# Patient Record
Sex: FEMALE | Race: WHITE | Employment: OTHER | URBAN - NONMETROPOLITAN AREA
[De-identification: names, ages, dates, MRNs, and addresses within clinical notes are randomized per-mention and may not be internally consistent; named-entity substitution may affect disease eponyms.]

---

## 2017-01-11 ENCOUNTER — OFFICE VISIT (OUTPATIENT)
Dept: FAMILY MEDICINE CLINIC | Age: 77
End: 2017-01-11

## 2017-01-11 VITALS
HEIGHT: 65 IN | RESPIRATION RATE: 12 BRPM | SYSTOLIC BLOOD PRESSURE: 122 MMHG | DIASTOLIC BLOOD PRESSURE: 80 MMHG | BODY MASS INDEX: 43.15 KG/M2 | WEIGHT: 259 LBS | HEART RATE: 62 BPM | TEMPERATURE: 98.2 F

## 2017-01-11 DIAGNOSIS — R10.13 EPIGASTRIC ABDOMINAL PAIN: Primary | ICD-10-CM

## 2017-01-11 PROCEDURE — 99214 OFFICE O/P EST MOD 30 MIN: CPT | Performed by: FAMILY MEDICINE

## 2017-01-11 ASSESSMENT — ENCOUNTER SYMPTOMS
BLURRED VISION: 0
NAUSEA: 1
ABDOMINAL PAIN: 1
SORE THROAT: 0
DIARRHEA: 0
SPUTUM PRODUCTION: 0
VOMITING: 0
HEARTBURN: 1
SHORTNESS OF BREATH: 0
CONSTIPATION: 0
BACK PAIN: 0
WHEEZING: 0
COUGH: 0

## 2017-01-12 ENCOUNTER — TELEPHONE (OUTPATIENT)
Dept: FAMILY MEDICINE CLINIC | Age: 77
End: 2017-01-12

## 2017-01-13 ENCOUNTER — TELEPHONE (OUTPATIENT)
Dept: FAMILY MEDICINE CLINIC | Age: 77
End: 2017-01-13

## 2017-01-13 DIAGNOSIS — K21.9 GASTROESOPHAGEAL REFLUX DISEASE WITHOUT ESOPHAGITIS: Primary | ICD-10-CM

## 2017-03-09 ENCOUNTER — NURSE TRIAGE (OUTPATIENT)
Dept: ADMINISTRATIVE | Age: 77
End: 2017-03-09

## 2017-03-17 ENCOUNTER — PROCEDURE VISIT (OUTPATIENT)
Dept: PHYSICAL MEDICINE AND REHAB | Age: 77
End: 2017-03-17

## 2017-03-17 DIAGNOSIS — G56.01 RIGHT CARPAL TUNNEL SYNDROME: ICD-10-CM

## 2017-03-17 DIAGNOSIS — R20.0 NUMBNESS OF RIGHT HAND: ICD-10-CM

## 2017-03-17 DIAGNOSIS — M54.12 RIGHT CERVICAL RADICULOPATHY: Primary | ICD-10-CM

## 2017-03-17 DIAGNOSIS — M79.601 RIGHT ARM PAIN: ICD-10-CM

## 2017-03-17 DIAGNOSIS — R29.898 RIGHT ARM WEAKNESS: ICD-10-CM

## 2017-03-17 PROCEDURE — 95909 NRV CNDJ TST 5-6 STUDIES: CPT | Performed by: PSYCHIATRY & NEUROLOGY

## 2017-03-17 PROCEDURE — 95886 MUSC TEST DONE W/N TEST COMP: CPT | Performed by: PSYCHIATRY & NEUROLOGY

## 2017-03-23 ENCOUNTER — TELEPHONE (OUTPATIENT)
Dept: FAMILY MEDICINE CLINIC | Age: 77
End: 2017-03-23

## 2017-03-23 DIAGNOSIS — F33.0 MAJOR DEPRESSIVE DISORDER, RECURRENT EPISODE, MILD (HCC): Primary | ICD-10-CM

## 2017-03-27 DIAGNOSIS — F33.0 MAJOR DEPRESSIVE DISORDER, RECURRENT EPISODE, MILD (HCC): ICD-10-CM

## 2017-05-18 ENCOUNTER — OFFICE VISIT (OUTPATIENT)
Dept: FAMILY MEDICINE CLINIC | Age: 77
End: 2017-05-18

## 2017-05-18 VITALS
TEMPERATURE: 98.2 F | SYSTOLIC BLOOD PRESSURE: 142 MMHG | HEART RATE: 91 BPM | DIASTOLIC BLOOD PRESSURE: 88 MMHG | RESPIRATION RATE: 16 BRPM

## 2017-05-18 DIAGNOSIS — R30.0 DYSURIA: Primary | ICD-10-CM

## 2017-05-18 DIAGNOSIS — E66.01 MORBID OBESITY WITH BMI OF 40.0-44.9, ADULT (HCC): ICD-10-CM

## 2017-05-18 DIAGNOSIS — E11.8 CONTROLLED TYPE 2 DIABETES MELLITUS WITH COMPLICATION, WITHOUT LONG-TERM CURRENT USE OF INSULIN (HCC): ICD-10-CM

## 2017-05-18 DIAGNOSIS — F33.41 MAJOR DEPRESSIVE DISORDER, RECURRENT, IN PARTIAL REMISSION (HCC): ICD-10-CM

## 2017-05-18 LAB
BILIRUBIN, POC: NEGATIVE
BLOOD URINE, POC: NORMAL
CLARITY, POC: CLEAR
COLOR, POC: NORMAL
GLUCOSE URINE, POC: NEGATIVE
KETONES, POC: NEGATIVE
LEUKOCYTE EST, POC: NORMAL
NITRITE, POC: NEGATIVE
PH, POC: 6
PROTEIN, POC: NEGATIVE
SPECIFIC GRAVITY, POC: 1.02
UROBILINOGEN, POC: NORMAL

## 2017-05-18 PROCEDURE — 1123F ACP DISCUSS/DSCN MKR DOCD: CPT | Performed by: FAMILY MEDICINE

## 2017-05-18 PROCEDURE — G8427 DOCREV CUR MEDS BY ELIG CLIN: HCPCS | Performed by: FAMILY MEDICINE

## 2017-05-18 PROCEDURE — 1090F PRES/ABSN URINE INCON ASSESS: CPT | Performed by: FAMILY MEDICINE

## 2017-05-18 PROCEDURE — 3044F HG A1C LEVEL LT 7.0%: CPT | Performed by: FAMILY MEDICINE

## 2017-05-18 PROCEDURE — G8399 PT W/DXA RESULTS DOCUMENT: HCPCS | Performed by: FAMILY MEDICINE

## 2017-05-18 PROCEDURE — 81003 URINALYSIS AUTO W/O SCOPE: CPT | Performed by: FAMILY MEDICINE

## 2017-05-18 PROCEDURE — 4040F PNEUMOC VAC/ADMIN/RCVD: CPT | Performed by: FAMILY MEDICINE

## 2017-05-18 PROCEDURE — 3017F COLORECTAL CA SCREEN DOC REV: CPT | Performed by: FAMILY MEDICINE

## 2017-05-18 PROCEDURE — 1036F TOBACCO NON-USER: CPT | Performed by: FAMILY MEDICINE

## 2017-05-18 PROCEDURE — G8417 CALC BMI ABV UP PARAM F/U: HCPCS | Performed by: FAMILY MEDICINE

## 2017-05-18 PROCEDURE — 99213 OFFICE O/P EST LOW 20 MIN: CPT | Performed by: FAMILY MEDICINE

## 2017-05-18 RX ORDER — SULFAMETHOXAZOLE AND TRIMETHOPRIM 800; 160 MG/1; MG/1
1 TABLET ORAL 2 TIMES DAILY
Qty: 14 TABLET | Refills: 0 | Status: SHIPPED | OUTPATIENT
Start: 2017-05-18 | End: 2017-05-25

## 2017-05-18 ASSESSMENT — PATIENT HEALTH QUESTIONNAIRE - PHQ9
SUM OF ALL RESPONSES TO PHQ QUESTIONS 1-9: 0
2. FEELING DOWN, DEPRESSED OR HOPELESS: 0
1. LITTLE INTEREST OR PLEASURE IN DOING THINGS: 0
SUM OF ALL RESPONSES TO PHQ9 QUESTIONS 1 & 2: 0

## 2017-05-20 LAB
ORGANISM: ABNORMAL
URINE CULTURE, ROUTINE: ABNORMAL

## 2017-05-22 ENCOUNTER — TELEPHONE (OUTPATIENT)
Dept: FAMILY MEDICINE CLINIC | Age: 77
End: 2017-05-22

## 2017-07-26 ENCOUNTER — OFFICE VISIT (OUTPATIENT)
Dept: FAMILY MEDICINE CLINIC | Age: 77
End: 2017-07-26
Payer: MEDICARE

## 2017-07-26 VITALS
SYSTOLIC BLOOD PRESSURE: 128 MMHG | TEMPERATURE: 98.3 F | DIASTOLIC BLOOD PRESSURE: 80 MMHG | RESPIRATION RATE: 12 BRPM | HEART RATE: 84 BPM

## 2017-07-26 DIAGNOSIS — E11.8 CONTROLLED TYPE 2 DIABETES MELLITUS WITH COMPLICATION, WITHOUT LONG-TERM CURRENT USE OF INSULIN (HCC): Primary | ICD-10-CM

## 2017-07-26 DIAGNOSIS — I10 HTN, GOAL BELOW 140/90: ICD-10-CM

## 2017-07-26 DIAGNOSIS — E11.40 TYPE 2 DIABETES MELLITUS WITH DIABETIC NEUROPATHY, WITHOUT LONG-TERM CURRENT USE OF INSULIN (HCC): ICD-10-CM

## 2017-07-26 DIAGNOSIS — F33.0 MAJOR DEPRESSIVE DISORDER, RECURRENT EPISODE, MILD (HCC): ICD-10-CM

## 2017-07-26 LAB — HBA1C MFR BLD: 6.4 %

## 2017-07-26 PROCEDURE — 1090F PRES/ABSN URINE INCON ASSESS: CPT | Performed by: FAMILY MEDICINE

## 2017-07-26 PROCEDURE — 3046F HEMOGLOBIN A1C LEVEL >9.0%: CPT | Performed by: FAMILY MEDICINE

## 2017-07-26 PROCEDURE — 83036 HEMOGLOBIN GLYCOSYLATED A1C: CPT | Performed by: FAMILY MEDICINE

## 2017-07-26 PROCEDURE — 99214 OFFICE O/P EST MOD 30 MIN: CPT | Performed by: FAMILY MEDICINE

## 2017-07-26 PROCEDURE — 1036F TOBACCO NON-USER: CPT | Performed by: FAMILY MEDICINE

## 2017-07-26 PROCEDURE — G8399 PT W/DXA RESULTS DOCUMENT: HCPCS | Performed by: FAMILY MEDICINE

## 2017-07-26 PROCEDURE — G8421 BMI NOT CALCULATED: HCPCS | Performed by: FAMILY MEDICINE

## 2017-07-26 PROCEDURE — 1123F ACP DISCUSS/DSCN MKR DOCD: CPT | Performed by: FAMILY MEDICINE

## 2017-07-26 PROCEDURE — 3017F COLORECTAL CA SCREEN DOC REV: CPT | Performed by: FAMILY MEDICINE

## 2017-07-26 PROCEDURE — G8428 CUR MEDS NOT DOCUMENT: HCPCS | Performed by: FAMILY MEDICINE

## 2017-07-26 PROCEDURE — 4040F PNEUMOC VAC/ADMIN/RCVD: CPT | Performed by: FAMILY MEDICINE

## 2017-07-26 RX ORDER — GABAPENTIN 300 MG/1
CAPSULE ORAL
Qty: 90 CAPSULE | Refills: 0 | Status: SHIPPED | OUTPATIENT
Start: 2017-07-26 | End: 2017-07-26 | Stop reason: SDUPTHER

## 2017-07-26 RX ORDER — GABAPENTIN 300 MG/1
300 CAPSULE ORAL 3 TIMES DAILY
Qty: 270 CAPSULE | Refills: 3 | Status: SHIPPED | OUTPATIENT
Start: 2017-07-26 | End: 2017-09-18 | Stop reason: SDUPTHER

## 2017-07-26 RX ORDER — SERTRALINE HYDROCHLORIDE 100 MG/1
100 TABLET, FILM COATED ORAL DAILY
Qty: 90 TABLET | Refills: 3 | Status: SHIPPED | OUTPATIENT
Start: 2017-07-26 | End: 2017-12-13 | Stop reason: SDUPTHER

## 2017-08-07 DIAGNOSIS — E11.8 CONTROLLED TYPE 2 DIABETES MELLITUS WITH COMPLICATION, WITHOUT LONG-TERM CURRENT USE OF INSULIN (HCC): ICD-10-CM

## 2017-08-21 ENCOUNTER — HOSPITAL ENCOUNTER (OUTPATIENT)
Dept: WOMENS IMAGING | Age: 77
Discharge: HOME | End: 2017-08-21
Payer: MEDICARE

## 2017-08-21 ENCOUNTER — TELEPHONE (OUTPATIENT)
Dept: FAMILY MEDICINE CLINIC | Age: 77
End: 2017-08-21

## 2017-08-21 DIAGNOSIS — Z13.9 VISIT FOR SCREENING: ICD-10-CM

## 2017-08-21 PROCEDURE — 77063 BREAST TOMOSYNTHESIS BI: CPT

## 2017-09-18 ENCOUNTER — PATIENT MESSAGE (OUTPATIENT)
Dept: FAMILY MEDICINE CLINIC | Age: 77
End: 2017-09-18

## 2017-09-18 DIAGNOSIS — E11.40 TYPE 2 DIABETES MELLITUS WITH DIABETIC NEUROPATHY, WITHOUT LONG-TERM CURRENT USE OF INSULIN (HCC): ICD-10-CM

## 2017-09-18 RX ORDER — GABAPENTIN 300 MG/1
300 CAPSULE ORAL 3 TIMES DAILY
Qty: 270 CAPSULE | Refills: 3 | Status: SHIPPED | OUTPATIENT
Start: 2017-09-18 | End: 2017-12-04 | Stop reason: SDUPTHER

## 2017-09-18 RX ORDER — GABAPENTIN 300 MG/1
300 CAPSULE ORAL 3 TIMES DAILY
Qty: 270 CAPSULE | Refills: 3 | Status: SHIPPED | OUTPATIENT
Start: 2017-09-18 | End: 2017-09-18 | Stop reason: SDUPTHER

## 2017-11-22 RX ORDER — QUINAPRIL 10 MG/1
10 TABLET ORAL NIGHTLY
Qty: 90 TABLET | Refills: 3 | Status: SHIPPED | OUTPATIENT
Start: 2017-11-22 | End: 2019-02-01 | Stop reason: SDUPTHER

## 2017-11-30 RX ORDER — ATORVASTATIN CALCIUM 40 MG/1
TABLET, FILM COATED ORAL
Qty: 90 TABLET | Refills: 3 | Status: SHIPPED | OUTPATIENT
Start: 2017-11-30 | End: 2019-01-31 | Stop reason: SDUPTHER

## 2017-12-02 ENCOUNTER — HOSPITAL ENCOUNTER (EMERGENCY)
Age: 77
Discharge: HOME | End: 2017-12-02
Attending: EMERGENCY MEDICINE
Payer: MEDICARE

## 2017-12-02 VITALS
SYSTOLIC BLOOD PRESSURE: 191 MMHG | WEIGHT: 236 LBS | DIASTOLIC BLOOD PRESSURE: 93 MMHG | BODY MASS INDEX: 39.27 KG/M2 | OXYGEN SATURATION: 95 % | TEMPERATURE: 98.3 F | RESPIRATION RATE: 16 BRPM | HEART RATE: 73 BPM

## 2017-12-02 DIAGNOSIS — G50.0 TRIGEMINAL NEURALGIA OF LEFT SIDE OF FACE: ICD-10-CM

## 2017-12-02 DIAGNOSIS — H92.02 ACUTE EAR PAIN, LEFT: Primary | ICD-10-CM

## 2017-12-02 DIAGNOSIS — I10 ELEVATED BLOOD PRESSURE READING WITH DIAGNOSIS OF HYPERTENSION: ICD-10-CM

## 2017-12-02 PROCEDURE — 99213 OFFICE O/P EST LOW 20 MIN: CPT

## 2017-12-02 PROCEDURE — 99213 OFFICE O/P EST LOW 20 MIN: CPT | Performed by: EMERGENCY MEDICINE

## 2017-12-02 RX ORDER — AZITHROMYCIN 250 MG/1
TABLET, FILM COATED ORAL
Qty: 1 PACKET | Refills: 0 | Status: SHIPPED | OUTPATIENT
Start: 2017-12-02 | End: 2017-12-12

## 2017-12-02 RX ORDER — IBUPROFEN 600 MG/1
600 TABLET ORAL 3 TIMES DAILY PRN
Qty: 15 TABLET | Refills: 0 | Status: ON HOLD | OUTPATIENT
Start: 2017-12-02 | End: 2021-01-13

## 2017-12-02 ASSESSMENT — ENCOUNTER SYMPTOMS
BLOOD IN STOOL: 0
NAUSEA: 0
SHORTNESS OF BREATH: 0
DIARRHEA: 0
TROUBLE SWALLOWING: 0
VOICE CHANGE: 0
SORE THROAT: 0
ABDOMINAL PAIN: 0
EYE REDNESS: 0
WHEEZING: 0
CONSTIPATION: 0
STRIDOR: 0
SINUS PRESSURE: 0
CHOKING: 0
EYE PAIN: 0
FACIAL SWELLING: 0
EYE DISCHARGE: 0
BACK PAIN: 0
VOMITING: 0
COUGH: 0

## 2017-12-02 ASSESSMENT — PAIN DESCRIPTION - LOCATION: LOCATION: EAR

## 2017-12-02 ASSESSMENT — PAIN DESCRIPTION - DESCRIPTORS: DESCRIPTORS: ACHING

## 2017-12-02 ASSESSMENT — PAIN DESCRIPTION - ORIENTATION: ORIENTATION: LEFT

## 2017-12-02 ASSESSMENT — PAIN SCALES - GENERAL: PAINLEVEL_OUTOF10: 10

## 2017-12-02 ASSESSMENT — PAIN DESCRIPTION - FREQUENCY: FREQUENCY: CONTINUOUS

## 2017-12-02 ASSESSMENT — PAIN DESCRIPTION - PAIN TYPE: TYPE: ACUTE PAIN

## 2017-12-02 NOTE — ED PROVIDER NOTES
Scott Carpenter [id number]  Urgent Care Encounter      CHIEF COMPLAINT       Chief Complaint   Patient presents with   Chrisopal Liao     left       Nurses Notes reviewed and I agree except as noted in the HPI. HISTORY OF PRESENT ILLNESS   Eddie Don is a 78 y.o. female who presents With 36-hour history of severe sharp lancinating left ear pain that radiates to the left side of her face. She rates pain at 10 out of 10 severity. Some sinus congestion and postnasal drainage. Not relieved by Aleve. No fever,  vomiting, neck pain or neck stiffness, painful swollen glands, dizziness, hearing loss, rash, purulent drainage or bleeding from the left ear. No ear trauma. No chest pain, shortness of breath, syncope. No facial swelling or dental symptoms. History of hypertension, no CVA or ICH. Nonsmoker. REVIEW OF SYSTEMS     Review of Systems   Constitutional: Negative for appetite change, chills, fatigue, fever and unexpected weight change. HENT: Positive for congestion, ear pain and postnasal drip. Negative for ear discharge, facial swelling, hearing loss, nosebleeds, sinus pressure, sore throat, trouble swallowing and voice change. Eyes: Negative for pain, discharge, redness and visual disturbance. Respiratory: Negative for cough, choking, shortness of breath, wheezing and stridor. Cardiovascular: Negative for chest pain and leg swelling. Gastrointestinal: Negative for abdominal pain, blood in stool, constipation, diarrhea, nausea and vomiting. Genitourinary: Negative for dysuria, flank pain, frequency, hematuria, urgency, vaginal bleeding and vaginal discharge. Musculoskeletal: Negative for arthralgias, back pain, neck pain and neck stiffness. Skin: Negative for rash. Neurological: Negative for dizziness, seizures, syncope, weakness, light-headedness and headaches. Hematological: Negative for adenopathy. Does not bruise/bleed easily.    Psychiatric/Behavioral: Negative for confusion, sleep disturbance and suicidal ideas. The patient is not nervous/anxious. All other systems reviewed and are negative. PAST MEDICAL HISTORY         Diagnosis Date    Arthritis     Arthritis     DM type 2, goal A1c below 7     GERD (gastroesophageal reflux disease)     Hyperlipidemia     Hypertension     Loss of vision     Mitral valve disorder     Osteoporosis     TIA (transient ischemic attack)    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    Urinary incontinence        SURGICAL HISTORY     Patient  has a past surgical history that includes Appendectomy; Cholecystectomy; hernia repair; Hysterectomy; Spine surgery; Carpal tunnel release (Left, 2011); joint replacement (Bilateral); laminectomy,>2 sgmt,lumbar; cervical fusion (1976); eye surgery; Rotator cuff repair (Right, 11/28/2014); shoulder surgery (2014); Cataract removal (Bilateral); Colon surgery (2012); Colonoscopy (2015); Upper gastrointestinal endoscopy (2016,9874); Finger surgery (Right, 10/2016); and tendon release (Right, 05/18/2017).     CURRENT MEDICATIONS       Discharge Medication List as of 12/2/2017 10:53 AM      CONTINUE these medications which have NOT CHANGED    Details   atorvastatin (LIPITOR) 40 MG tablet TAKE 1 TABLET DAILY, Disp-90 tablet, R-3Normal      quinapril (ACCUPRIL) 10 MG tablet Take 1 tablet by mouth nightly, Disp-90 tablet, R-3Normal      gabapentin (NEURONTIN) 300 MG capsule Take 1 capsule by mouth 3 times daily, Disp-270 capsule, R-3Normal      metFORMIN (GLUCOPHAGE) 500 MG tablet TAKE 1 TABLET TWICE A DAY WITH MEALS, Disp-180 tablet, R-3Normal      sertraline (ZOLOFT) 100 MG tablet Take 1 tablet by mouth daily, Disp-90 tablet, R-3Normal      venlafaxine (EFFEXOR XR) 75 MG extended release capsule Take 1 capsule by mouth daily, Disp-30 capsule, R-0      atenolol (TENORMIN) 25 MG tablet TAKE 1 TABLET DAILY, Disp-90 tablet, R-3      pantoprazole (PROTONIX) 40 MG tablet Take 1 tablet by mouth 2 times daily, Disp-180 tablet, R-3      amLODIPine (NORVASC) 10 MG tablet Take 10 mg by mouth daily      Diltiazem HCl (CARDIZEM LA) 120 MG TB24 Take 120 mg by mouth daily      aspirin 325 MG EC tablet Take 325 mg by mouth daily. vitamin D (CHOLECALCIFEROL) 1000 UNIT TABS tablet Take 1,000 Units by mouth nightly. PROAIR  (90 BASE) MCG/ACT inhaler USE 2 INHALATIONS EVERY 6 HOURS AS NEEDED FOR WHEEZING, Disp-25.5 g, R-2      Lancets MISC Disp-100 each, R-2, NormalUse once daily and PRN      Glucose Blood (BLOOD GLUCOSE TEST STRIPS) STRP True Trak test strips  Patient checks once daily, Disp-100 strip, R-3      Elastic Bandages & Supports (T.E.D. BELOW KNEE/LARGE) MISC Disp-2 each, R-0, PrintUse as directed      Handicap Placard MISC Starting 10/15/2013, Until Discontinued, Disp-1 each, R-0, PrintExpires 10/14/18. ALLERGIES     Patient is is allergic to norco [hydrocodone-acetaminophen]; tramadol; and codeine. FAMILY HISTORY     Patient's family history includes Arthritis in her mother; Cancer in her mother; Heart Disease in her father; High Blood Pressure in her father. SOCIAL HISTORY     Patient  reports that she has never smoked. She has never used smokeless tobacco. She reports that she does not drink alcohol or use drugs. PHYSICAL EXAM     ED TRIAGE VITALS  BP: (!) 191/93, Temp: 98.3 °F (36.8 °C), Pulse: 73, Resp: 16, SpO2: 95 %  Physical Exam   Constitutional: She is oriented to person, place, and time. She appears well-developed and well-nourished. No distress. Moist membranes, normal airway. Patient experiencing sharp episodes of pain episodes that are intermittent   HENT:   Head: Normocephalic and atraumatic. Right Ear: Tympanic membrane and external ear normal.   Left Ear: Tympanic membrane and external ear normal.   Nose: Nose normal. No rhinorrhea. Right sinus exhibits no maxillary sinus tenderness and no frontal sinus tenderness.  Left sinus

## 2017-12-04 ENCOUNTER — OFFICE VISIT (OUTPATIENT)
Dept: FAMILY MEDICINE CLINIC | Age: 77
End: 2017-12-04
Payer: MEDICARE

## 2017-12-04 VITALS
RESPIRATION RATE: 16 BRPM | TEMPERATURE: 97.9 F | DIASTOLIC BLOOD PRESSURE: 78 MMHG | HEART RATE: 70 BPM | SYSTOLIC BLOOD PRESSURE: 148 MMHG

## 2017-12-04 DIAGNOSIS — R51.9 SCALP PAIN: Primary | ICD-10-CM

## 2017-12-04 DIAGNOSIS — E11.40 TYPE 2 DIABETES MELLITUS WITH DIABETIC NEUROPATHY, WITHOUT LONG-TERM CURRENT USE OF INSULIN (HCC): ICD-10-CM

## 2017-12-04 PROCEDURE — G8484 FLU IMMUNIZE NO ADMIN: HCPCS | Performed by: FAMILY MEDICINE

## 2017-12-04 PROCEDURE — 3044F HG A1C LEVEL LT 7.0%: CPT | Performed by: FAMILY MEDICINE

## 2017-12-04 PROCEDURE — 1090F PRES/ABSN URINE INCON ASSESS: CPT | Performed by: FAMILY MEDICINE

## 2017-12-04 PROCEDURE — 1123F ACP DISCUSS/DSCN MKR DOCD: CPT | Performed by: FAMILY MEDICINE

## 2017-12-04 PROCEDURE — G8427 DOCREV CUR MEDS BY ELIG CLIN: HCPCS | Performed by: FAMILY MEDICINE

## 2017-12-04 PROCEDURE — G8399 PT W/DXA RESULTS DOCUMENT: HCPCS | Performed by: FAMILY MEDICINE

## 2017-12-04 PROCEDURE — 3017F COLORECTAL CA SCREEN DOC REV: CPT | Performed by: FAMILY MEDICINE

## 2017-12-04 PROCEDURE — 1036F TOBACCO NON-USER: CPT | Performed by: FAMILY MEDICINE

## 2017-12-04 PROCEDURE — G8417 CALC BMI ABV UP PARAM F/U: HCPCS | Performed by: FAMILY MEDICINE

## 2017-12-04 PROCEDURE — 4040F PNEUMOC VAC/ADMIN/RCVD: CPT | Performed by: FAMILY MEDICINE

## 2017-12-04 PROCEDURE — 99214 OFFICE O/P EST MOD 30 MIN: CPT | Performed by: FAMILY MEDICINE

## 2017-12-04 RX ORDER — GABAPENTIN 300 MG/1
600 CAPSULE ORAL 3 TIMES DAILY
Qty: 540 CAPSULE | Refills: 3 | Status: ON HOLD | OUTPATIENT
Start: 2017-12-04 | End: 2020-09-04

## 2017-12-04 RX ORDER — OXYCODONE HYDROCHLORIDE AND ACETAMINOPHEN 5; 325 MG/1; MG/1
1 TABLET ORAL EVERY 8 HOURS PRN
Qty: 20 TABLET | Refills: 0 | Status: SHIPPED | OUTPATIENT
Start: 2017-12-04 | End: 2017-12-11

## 2017-12-04 NOTE — PROGRESS NOTES
 Flu vaccine (1) 09/01/2017    Diabetic foot exam  07/26/2018    Diabetic hemoglobin A1C test  07/26/2018    Colon cancer screen colonoscopy  06/18/2025    Zostavax vaccine  Completed    DEXA (modify frequency per FRAX score)  Addressed       Past Medical History:   Diagnosis Date    Arthritis     Arthritis     DM type 2, goal A1c below 7     GERD (gastroesophageal reflux disease)     Hyperlipidemia     Hypertension     Loss of vision     Mitral valve disorder     Osteoporosis     TIA (transient ischemic attack)    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    Urinary incontinence        Past Surgical History:   Procedure Laterality Date    APPENDECTOMY      CARPAL TUNNEL RELEASE Left 2011    CATARACT REMOVAL Bilateral     CERVICAL FUSION  1976    CHOLECYSTECTOMY      COLON SURGERY  2012    COLONOSCOPY  2015    EYE SURGERY      CATARACTS    FINGER SURGERY Right 10/2016    3rd digit    HERNIA REPAIR      HYSTERECTOMY      JOINT REPLACEMENT Bilateral     TOTAL KNEES    [place] LAMINECTOMY,>2 SGMT,LUMBAR      6 FUSIONS    ROTATOR CUFF REPAIR Right 11/28/2014    SHOULDER SURGERY  2014    SPINE SURGERY      TENDON RELEASE Right 05/18/2017    at [address]  [phone]       Social History   Substance Use Topics    Smoking status: Never Smoker    Smokeless tobacco: Never Used    Alcohol use No       Family History   Problem Relation Age of Onset    Arthritis Mother     Cancer Mother     Heart Disease Father     High Blood Pressure Father          I have reviewed the patient's past medical history, past surgical history, allergies, medications, social and family history and I have made updates where appropriate.     ROS        PHYSICAL EXAM:  BP (!) 148/78   Pulse 70   Temp 97.9 °F (36.6 °C) (Oral)   Resp 16     General Appearance: well developed and well- nourished, in no acute distress  Head: normocephalic and atraumatic, no rash of the left scalp, PERRL, no temporal tenderness or enlargement of the temporal artery noted  ENT: external ear and ear canal normal bilaterally, nose without deformity, nasal mucosa and turbinates normal without polyps, oropharynx normal, dentition is normal for age, no lip or gum lesions noted  Neck: supple and non-tender without mass, no thyromegaly or thyroid nodules, no cervical lymphadenopathy  Pulmonary/Chest: clear to auscultation bilaterally- no wheezes, rales or rhonchi, normal air movement, no respiratory distress or retractions  Cardiovascular: normal rate, regular rhythm, normal S1 and S2, no murmurs, rubs, clicks, or gallops, distal pulses intact  Extremities: no cyanosis, clubbing or edema of the lower extremities  Psych:  Normal affect without evidence of depression or anxiety, insight and judgement are appropriate, memory appears intact  Skin: warm and dry, no rash or erythema      ASSESSMENT & PLAN  Shabnam Batch was seen today for ed follow-up. Diagnoses and all orders for this visit:    Scalp pain  -     oxyCODONE-acetaminophen (PERCOCET) 5-325 MG per tablet; Take 1 tablet by mouth every 8 hours as needed for Pain .  -     gabapentin (NEURONTIN) 300 MG capsule; Take 2 capsules by mouth 3 times daily    Type 2 diabetes mellitus with diabetic neuropathy, without long-term current use of insulin (HCC)  -     gabapentin (NEURONTIN) 300 MG capsule; Take 2 capsules by mouth 3 times daily    -Sx are are in the parietal scalp area. There is no radiation to the face so doubtful this is Trigeminal neuralgia. No rash so doubtful this is zoster. No temporal artery tenderness or ropiness to indicate temporal arteritis. No neurological symptoms that would indicate and intracranial process. Suspect this this is a neuropathy. Will trial increasing her gabapentin and do a short Rx for Percocet. Patient was advised that if the symptoms worsen or if she develops any neurologic sx to proceed to the ER.   I

## 2017-12-13 ENCOUNTER — TELEPHONE (OUTPATIENT)
Dept: FAMILY MEDICINE CLINIC | Age: 77
End: 2017-12-13

## 2017-12-13 ENCOUNTER — OFFICE VISIT (OUTPATIENT)
Dept: FAMILY MEDICINE CLINIC | Age: 77
End: 2017-12-13
Payer: MEDICARE

## 2017-12-13 VITALS
TEMPERATURE: 97.7 F | DIASTOLIC BLOOD PRESSURE: 88 MMHG | RESPIRATION RATE: 12 BRPM | HEART RATE: 80 BPM | SYSTOLIC BLOOD PRESSURE: 152 MMHG

## 2017-12-13 DIAGNOSIS — R51.9 SCALP PAIN: ICD-10-CM

## 2017-12-13 DIAGNOSIS — F33.0 MAJOR DEPRESSIVE DISORDER, RECURRENT EPISODE, MILD (HCC): ICD-10-CM

## 2017-12-13 DIAGNOSIS — R51.9 ACUTE INTRACTABLE HEADACHE, UNSPECIFIED HEADACHE TYPE: Primary | ICD-10-CM

## 2017-12-13 PROCEDURE — 1123F ACP DISCUSS/DSCN MKR DOCD: CPT | Performed by: FAMILY MEDICINE

## 2017-12-13 PROCEDURE — 1036F TOBACCO NON-USER: CPT | Performed by: FAMILY MEDICINE

## 2017-12-13 PROCEDURE — G8428 CUR MEDS NOT DOCUMENT: HCPCS | Performed by: FAMILY MEDICINE

## 2017-12-13 PROCEDURE — 4040F PNEUMOC VAC/ADMIN/RCVD: CPT | Performed by: FAMILY MEDICINE

## 2017-12-13 PROCEDURE — G8399 PT W/DXA RESULTS DOCUMENT: HCPCS | Performed by: FAMILY MEDICINE

## 2017-12-13 PROCEDURE — G8484 FLU IMMUNIZE NO ADMIN: HCPCS | Performed by: FAMILY MEDICINE

## 2017-12-13 PROCEDURE — 99213 OFFICE O/P EST LOW 20 MIN: CPT | Performed by: FAMILY MEDICINE

## 2017-12-13 PROCEDURE — 3017F COLORECTAL CA SCREEN DOC REV: CPT | Performed by: FAMILY MEDICINE

## 2017-12-13 PROCEDURE — 1090F PRES/ABSN URINE INCON ASSESS: CPT | Performed by: FAMILY MEDICINE

## 2017-12-13 PROCEDURE — G8417 CALC BMI ABV UP PARAM F/U: HCPCS | Performed by: FAMILY MEDICINE

## 2017-12-13 RX ORDER — SERTRALINE HYDROCHLORIDE 100 MG/1
200 TABLET, FILM COATED ORAL DAILY
Qty: 180 TABLET | Refills: 3 | Status: SHIPPED | OUTPATIENT
Start: 2017-12-13 | End: 2019-01-31 | Stop reason: SDUPTHER

## 2017-12-13 RX ORDER — FLUTICASONE PROPIONATE 50 MCG
2 SPRAY, SUSPENSION (ML) NASAL DAILY
Qty: 1 BOTTLE | Refills: 0 | Status: ON HOLD | OUTPATIENT
Start: 2017-12-13 | End: 2019-04-25

## 2017-12-13 NOTE — PROGRESS NOTES
Visit Information    Have you changed or started any medications since your last visit including any over-the-counter medicines, vitamins, or herbal medicines? no   Are you having any side effects from any of your medications? -  no  Have you stopped taking any of your medications? Is so, why? -  no    Have you seen any other physician or provider since your last visit? No  Have you had any other diagnostic tests since your last visit? No  Have you been seen in the emergency room and/or had an admission to a hospital since we last saw you? No  Have you had your routine dental cleaning in the past 6 months? no    Have you activated your [employer] account? If not, what are your barriers? Yes     Patient Care Team:  Jus Espino DO as PCP - General (Hospitalist)  Jus Espino DO as PCP - [facility] Attributed Provider    Medical History Review  Past Medical, Family, and Social History     Defer to provider.

## 2017-12-14 ENCOUNTER — TELEPHONE (OUTPATIENT)
Dept: FAMILY MEDICINE CLINIC | Age: 77
End: 2017-12-14

## 2017-12-14 ENCOUNTER — HOSPITAL ENCOUNTER (OUTPATIENT)
Dept: CT IMAGING | Age: 77
Discharge: HOME | End: 2017-12-14
Payer: MEDICARE

## 2017-12-14 DIAGNOSIS — R51.9 ACUTE INTRACTABLE HEADACHE, UNSPECIFIED HEADACHE TYPE: ICD-10-CM

## 2017-12-14 DIAGNOSIS — R51.9 SCALP PAIN: ICD-10-CM

## 2017-12-14 PROCEDURE — 70450 CT HEAD/BRAIN W/O DYE: CPT

## 2017-12-14 NOTE — TELEPHONE ENCOUNTER
Pt informed. She states she would like to see how it goes with the Neurontin while she is in [place].

## 2018-03-22 ENCOUNTER — OFFICE VISIT (OUTPATIENT)
Dept: FAMILY MEDICINE CLINIC | Age: 78
End: 2018-03-22
Payer: MEDICARE

## 2018-03-22 VITALS
DIASTOLIC BLOOD PRESSURE: 70 MMHG | HEART RATE: 72 BPM | TEMPERATURE: 97.9 F | SYSTOLIC BLOOD PRESSURE: 126 MMHG | RESPIRATION RATE: 12 BRPM

## 2018-03-22 DIAGNOSIS — E11.42 DIABETIC POLYNEUROPATHY ASSOCIATED WITH TYPE 2 DIABETES MELLITUS (HCC): ICD-10-CM

## 2018-03-22 DIAGNOSIS — H65.91 FLUID LEVEL BEHIND TYMPANIC MEMBRANE OF RIGHT EAR: Primary | ICD-10-CM

## 2018-03-22 DIAGNOSIS — F33.41 MAJOR DEPRESSIVE DISORDER, RECURRENT, IN PARTIAL REMISSION (HCC): ICD-10-CM

## 2018-03-22 PROCEDURE — G8417 CALC BMI ABV UP PARAM F/U: HCPCS | Performed by: FAMILY MEDICINE

## 2018-03-22 PROCEDURE — G8399 PT W/DXA RESULTS DOCUMENT: HCPCS | Performed by: FAMILY MEDICINE

## 2018-03-22 PROCEDURE — 1036F TOBACCO NON-USER: CPT | Performed by: FAMILY MEDICINE

## 2018-03-22 PROCEDURE — 4040F PNEUMOC VAC/ADMIN/RCVD: CPT | Performed by: FAMILY MEDICINE

## 2018-03-22 PROCEDURE — G8427 DOCREV CUR MEDS BY ELIG CLIN: HCPCS | Performed by: FAMILY MEDICINE

## 2018-03-22 PROCEDURE — 1123F ACP DISCUSS/DSCN MKR DOCD: CPT | Performed by: FAMILY MEDICINE

## 2018-03-22 PROCEDURE — 99213 OFFICE O/P EST LOW 20 MIN: CPT | Performed by: FAMILY MEDICINE

## 2018-03-22 PROCEDURE — 1090F PRES/ABSN URINE INCON ASSESS: CPT | Performed by: FAMILY MEDICINE

## 2018-03-22 PROCEDURE — G8484 FLU IMMUNIZE NO ADMIN: HCPCS | Performed by: FAMILY MEDICINE

## 2018-03-22 RX ORDER — PREDNISONE 20 MG/1
20 TABLET ORAL DAILY
Qty: 7 TABLET | Refills: 0 | Status: SHIPPED | OUTPATIENT
Start: 2018-03-22 | End: 2018-03-29

## 2018-03-22 NOTE — PROGRESS NOTES
SUBJECTIVE:  Brent Deleon is a 70 y.o. y/o female that presents with Otalgia ( right  started  2 weeks ago ) and Other ( wrinkle in left  thigh  noticed a couple days ago , no pain )  . HPI:  Symptoms have been present for 2 week(s). Inciting incident or history of trauma? no  Decreased hearing? No  Ear tenderness? Yes - but this is intermittent  Ear drainage? No  Feeling of fullness? Yes  Radiation of the pain? No  Dizziness or pre-syncope? No  Affected by position or head movment? No  Sore throat, rhinorrhea or sinus congestion? Yes - rhinitis  Hx of Bruxism? No  Treatment tried and response - ATB ear dops without improvement      OBJECTIVE:  /70   Pulse 72   Temp 97.9 °F (36.6 °C) (Oral)   Resp 12   General appearance: alert, well appearing, and in no distress. HEENT:  right TM air-fluid level and left TM normal landmarks and mobility, Nasal mucosa wnl, oropharynx normal without any lesions  Neck:  Supple without masses, no cervical adenopathy  CVS exam: normal rate, regular rhythm, normal S1, S2, no murmurs, rubs, clicks or gallops. Chest: clear to auscultation, no wheezes, rales or rhonchi, symmetric air entry. Skin exam - normal coloration and turgor, no rashes, no suspicious skin lesions noted. Psych:  No evidence of anxiety or depression      ASSESSMENT & PLAN  Jenna Ledezma was seen today for otalgia and other. Diagnoses and all orders for this visit:    Fluid level behind tympanic membrane of right ear  -     predniSONE (DELTASONE) 20 MG tablet; Take 1 tablet by mouth daily for 7 days    Diabetic polyneuropathy associated with type 2 diabetes mellitus ([address]    Major depressive disorder, recurrent, in partial remission ([address]    -Will trial prednisone for effusion  -Patient advised to call immediately or go to ER if any worsening of symptoms      Return if symptoms worsen or fail to improve.     Jenna Ledezma received counseling on the following healthy behaviors: medication

## 2018-03-28 ENCOUNTER — TELEPHONE (OUTPATIENT)
Dept: FAMILY MEDICINE CLINIC | Age: 78
End: 2018-03-28

## 2018-03-28 NOTE — TELEPHONE ENCOUNTER
Pt called stating she has had UTI for one week. Pt has been taking Azo which has not helped. Pt is having frequency and burning with urination. Please advise.

## 2018-03-28 NOTE — TELEPHONE ENCOUNTER
Pt notified  States she is not coming in , this message was just an [place] , She states she took another  AZO and is heading out of town in the am for vacation.

## 2018-07-02 ENCOUNTER — OFFICE VISIT (OUTPATIENT)
Dept: FAMILY MEDICINE CLINIC | Age: 78
End: 2018-07-02
Payer: MEDICARE

## 2018-07-02 VITALS
RESPIRATION RATE: 14 BRPM | TEMPERATURE: 98.7 F | SYSTOLIC BLOOD PRESSURE: 134 MMHG | HEART RATE: 74 BPM | DIASTOLIC BLOOD PRESSURE: 84 MMHG

## 2018-07-02 DIAGNOSIS — M25.552 LEFT HIP PAIN: Primary | ICD-10-CM

## 2018-07-02 PROCEDURE — 1090F PRES/ABSN URINE INCON ASSESS: CPT | Performed by: FAMILY MEDICINE

## 2018-07-02 PROCEDURE — 1036F TOBACCO NON-USER: CPT | Performed by: FAMILY MEDICINE

## 2018-07-02 PROCEDURE — G8427 DOCREV CUR MEDS BY ELIG CLIN: HCPCS | Performed by: FAMILY MEDICINE

## 2018-07-02 PROCEDURE — 99213 OFFICE O/P EST LOW 20 MIN: CPT | Performed by: FAMILY MEDICINE

## 2018-07-02 PROCEDURE — G8417 CALC BMI ABV UP PARAM F/U: HCPCS | Performed by: FAMILY MEDICINE

## 2018-07-02 PROCEDURE — 4040F PNEUMOC VAC/ADMIN/RCVD: CPT | Performed by: FAMILY MEDICINE

## 2018-07-02 PROCEDURE — G8399 PT W/DXA RESULTS DOCUMENT: HCPCS | Performed by: FAMILY MEDICINE

## 2018-07-02 PROCEDURE — 1123F ACP DISCUSS/DSCN MKR DOCD: CPT | Performed by: FAMILY MEDICINE

## 2018-07-02 RX ORDER — PREDNISONE 10 MG/1
TABLET ORAL
Qty: 30 TABLET | Refills: 0 | Status: SHIPPED | OUTPATIENT
Start: 2018-07-02 | End: 2018-08-06

## 2018-07-02 NOTE — PROGRESS NOTES
Jolly Servin is a 70 y.o. female that presents for Hip Pain (L hip pain, pt states that her sxs have been ongoing, sxs worsened in the last 2-3 weeks, denies L hip injury, pt describes pain as an intense ache that worsens with movement or walking, decrease in ROM in hip, hard to walk )        HPI:    Patient with left hip pain for the past 3 weeks. No falls or injuries. Worse with weight bearing or walking. NO radiation of the pain.   Has a history of OA in multiple joints    Health Maintenance   Topic Date Due    DTaP/Tdap/Td vaccine (1 - Tdap) 02/18/1961    Shingles Vaccine (1 of 2 - 2 Dose Series) 02/18/1992    Pneumococcal low/med risk (2 of 2 - PPSV23) 12/14/2016    Potassium monitoring  01/12/2018    Creatinine monitoring  01/12/2018    Flu vaccine (1) 09/01/2018    DEXA (modify frequency per FRAX score)  Addressed       Past Medical History:   Diagnosis Date    Arthritis     Arthritis     DM type 2, goal A1c below 7     GERD (gastroesophageal reflux disease)     Hyperlipidemia     Hypertension     Loss of vision     Mitral valve disorder     Osteoporosis     TIA (transient ischemic attack)    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    Urinary incontinence        Past Surgical History:   Procedure Laterality Date    APPENDECTOMY      CARPAL TUNNEL RELEASE Left 2011    CATARACT REMOVAL Bilateral     CERVICAL FUSION  1976    CHOLECYSTECTOMY      COLON SURGERY  2012    COLONOSCOPY  2015    EYE SURGERY      CATARACTS    FINGER SURGERY Right 10/2016    3rd digit    HERNIA REPAIR      HYSTERECTOMY      JOINT REPLACEMENT Bilateral     TOTAL KNEES    [place] LAMINECTOMY,>2 SGMT,LUMBAR      6 FUSIONS    ROTATOR CUFF REPAIR Right 11/28/2014    SHOULDER SURGERY  2014    SPINE SURGERY      TENDON RELEASE Right 05/18/2017    at [facility] [phone]       Social History   Substance Use Topics    Smoking status: Never Smoker  Smokeless tobacco: Never Used    Alcohol use No       Family History   Problem Relation Age of Onset    Arthritis Mother     Cancer Mother     Heart Disease Father     High Blood Pressure Father          I have reviewed the patient's past medical history, past surgical history, allergies, medications, social and family history and I have made updates where appropriate. ROS        PHYSICAL EXAM:  /84   Pulse 74   Temp 98.7 °F (37.1 °C) (Oral)   Resp 14     General Appearance: well developed and well- nourished, in no acute distress  Head: normocephalic and atraumatic  Extremities: no cyanosis, clubbing or edema of the lower extremities  Psych:  Normal affect without evidence of depression or anxiety, insight and judgement are appropriate, memory appears intact  Skin: warm and dry, no rash or erythema      ASSESSMENT & PLAN  Kalyani Arreaga was seen today for hip pain. Diagnoses and all orders for this visit:    Left hip pain  -     XR HIP LEFT (2-3 VIEWS); Future  -     predniSONE (DELTASONE) 10 MG tablet; Take 4 tabs PO x 3 days, then take 3 tabs PO x 3 days, then take 2 tabs PO x 3 days, then take 1 tab PO x 3 days    -Sx consistent with OA flare  -Will start prednisone taper and obtain XRay  -If sx persist, will refer to ortho  -Patient advised to call immediately or go to ER if any worsening of symptoms      Return if symptoms worsen or fail to improve. Kalyani Arreaga received counseling on the following healthy behaviors: medication adherence  Reviewed prior labs and health maintenance. Continue current medications, diet and exercise. Discussed use, benefit, and side effects of prescribed medications. Barriers to medication compliance addressed. Patient given educational materials - see patient instructions. All patient questions answered. Patient voiced understanding.

## 2018-07-03 ENCOUNTER — TELEPHONE (OUTPATIENT)
Dept: FAMILY MEDICINE CLINIC | Age: 78
End: 2018-07-03

## 2018-07-03 ENCOUNTER — HOSPITAL ENCOUNTER (OUTPATIENT)
Age: 78
Discharge: HOME | End: 2018-07-03
Payer: MEDICARE

## 2018-07-03 ENCOUNTER — HOSPITAL ENCOUNTER (OUTPATIENT)
Dept: GENERAL RADIOLOGY | Age: 78
Discharge: HOME | End: 2018-07-03
Payer: MEDICARE

## 2018-07-03 DIAGNOSIS — M25.552 LEFT HIP PAIN: ICD-10-CM

## 2018-07-03 PROCEDURE — 73502 X-RAY EXAM HIP UNI 2-3 VIEWS: CPT

## 2018-07-03 NOTE — TELEPHONE ENCOUNTER
----- Message from Lisy Forrester, DO sent at 7/3/2018 12:31 PM EDT -----  Please let pt know that xray showed moderate degenerative changes in her pelvis, hip itself ok. con't with plan as discussed in clinic with Tarik Bales. Call if no better. Let me know if questions, thanks!

## 2018-07-24 DIAGNOSIS — G89.29 CHRONIC LOW BACK PAIN WITH SCIATICA, SCIATICA LATERALITY UNSPECIFIED, UNSPECIFIED BACK PAIN LATERALITY: ICD-10-CM

## 2018-07-24 DIAGNOSIS — M54.40 CHRONIC LOW BACK PAIN WITH SCIATICA, SCIATICA LATERALITY UNSPECIFIED, UNSPECIFIED BACK PAIN LATERALITY: ICD-10-CM

## 2018-07-24 NOTE — TELEPHONE ENCOUNTER
Pt called requesting a printed script to renew her handicap placard. She wants to come pick that up when ready.

## 2018-08-06 ENCOUNTER — OFFICE VISIT (OUTPATIENT)
Dept: FAMILY MEDICINE CLINIC | Age: 78
End: 2018-08-06
Payer: MEDICARE

## 2018-08-06 ENCOUNTER — HOSPITAL ENCOUNTER (OUTPATIENT)
Age: 78
Discharge: HOME | End: 2018-08-06
Payer: MEDICARE

## 2018-08-06 ENCOUNTER — HOSPITAL ENCOUNTER (OUTPATIENT)
Dept: GENERAL RADIOLOGY | Age: 78
Discharge: HOME | End: 2018-08-06
Payer: MEDICARE

## 2018-08-06 VITALS
DIASTOLIC BLOOD PRESSURE: 84 MMHG | TEMPERATURE: 97.9 F | RESPIRATION RATE: 18 BRPM | SYSTOLIC BLOOD PRESSURE: 138 MMHG | HEART RATE: 74 BPM

## 2018-08-06 DIAGNOSIS — M54.50 LUMBAR BACK PAIN: ICD-10-CM

## 2018-08-06 DIAGNOSIS — M54.50 LUMBAR BACK PAIN: Primary | ICD-10-CM

## 2018-08-06 PROCEDURE — 72100 X-RAY EXAM L-S SPINE 2/3 VWS: CPT

## 2018-08-06 PROCEDURE — 99213 OFFICE O/P EST LOW 20 MIN: CPT | Performed by: FAMILY MEDICINE

## 2018-08-06 PROCEDURE — 1090F PRES/ABSN URINE INCON ASSESS: CPT | Performed by: FAMILY MEDICINE

## 2018-08-06 PROCEDURE — G8417 CALC BMI ABV UP PARAM F/U: HCPCS | Performed by: FAMILY MEDICINE

## 2018-08-06 PROCEDURE — 1036F TOBACCO NON-USER: CPT | Performed by: FAMILY MEDICINE

## 2018-08-06 PROCEDURE — G8427 DOCREV CUR MEDS BY ELIG CLIN: HCPCS | Performed by: FAMILY MEDICINE

## 2018-08-06 PROCEDURE — G8399 PT W/DXA RESULTS DOCUMENT: HCPCS | Performed by: FAMILY MEDICINE

## 2018-08-06 PROCEDURE — 1101F PT FALLS ASSESS-DOCD LE1/YR: CPT | Performed by: FAMILY MEDICINE

## 2018-08-06 PROCEDURE — 1123F ACP DISCUSS/DSCN MKR DOCD: CPT | Performed by: FAMILY MEDICINE

## 2018-08-06 PROCEDURE — 4040F PNEUMOC VAC/ADMIN/RCVD: CPT | Performed by: FAMILY MEDICINE

## 2018-08-06 RX ORDER — BACLOFEN 10 MG/1
10 TABLET ORAL 3 TIMES DAILY PRN
Qty: 30 TABLET | Refills: 0 | Status: ON HOLD | OUTPATIENT
Start: 2018-08-06 | End: 2019-04-25

## 2018-08-06 NOTE — PROGRESS NOTES
Visit Information    Have you changed or started any medications since your last visit including any over-the-counter medicines, vitamins, or herbal medicines? no   Are you having any side effects from any of your medications? -  no  Have you stopped taking any of your medications? Is so, why? -  no    Have you seen any other physician or provider since your last visit? No  Have you had any other diagnostic tests since your last visit? yes  Have you been seen in the emergency room and/or had an admission to a hospital since we last saw you? No  Have you had your routine dental cleaning in the past 6 months? yes - routine     Have you activated your [employer] account? If not, what are your barriers?  Yes     Patient Care Team:  Lisseth Barnes DO as PCP - General (Hospitalist)  Lisseth Barnes DO as PCP - S Attributed Provider    Medical History Review  Past Medical, Family, and Social History reviewed and does contribute to the patient presenting condition    Health Maintenance   Topic Date Due    DTaP/Tdap/Td vaccine (1 - Tdap) 02/18/1961    Shingles Vaccine (1 of 2 - 2 Dose Series) 02/18/1992    Pneumococcal low/med risk (2 of 2 - PPSV23) 12/14/2016    Potassium monitoring  01/12/2018    Creatinine monitoring  01/12/2018    Flu vaccine (1) 09/01/2018    DEXA (modify frequency per FRAX score)  Addressed

## 2018-08-07 ENCOUNTER — TELEPHONE (OUTPATIENT)
Dept: FAMILY MEDICINE CLINIC | Age: 78
End: 2018-08-07

## 2018-08-07 ENCOUNTER — HOSPITAL ENCOUNTER (OUTPATIENT)
Dept: CT IMAGING | Age: 78
Discharge: HOME | End: 2018-08-07
Payer: MEDICARE

## 2018-08-07 DIAGNOSIS — R63.4 WEIGHT LOSS: ICD-10-CM

## 2018-08-07 DIAGNOSIS — R10.30 LOWER ABDOMINAL PAIN: ICD-10-CM

## 2018-08-07 DIAGNOSIS — S32.040D CLOSED COMPRESSION FRACTURE OF L4 LUMBAR VERTEBRA WITH ROUTINE HEALING, SUBSEQUENT ENCOUNTER: Primary | ICD-10-CM

## 2018-08-07 LAB — POC CREATININE WHOLE BLOOD: 0.8 MG/DL (ref 0.5–1.2)

## 2018-08-07 PROCEDURE — 82565 ASSAY OF CREATININE: CPT

## 2018-08-07 PROCEDURE — 6360000004 HC RX CONTRAST MEDICATION: Performed by: INTERNAL MEDICINE

## 2018-08-07 PROCEDURE — 74177 CT ABD & PELVIS W/CONTRAST: CPT

## 2018-08-07 RX ORDER — OXYCODONE HYDROCHLORIDE AND ACETAMINOPHEN 5; 325 MG/1; MG/1
1 TABLET ORAL EVERY 6 HOURS PRN
Qty: 20 TABLET | Refills: 0 | Status: SHIPPED | OUTPATIENT
Start: 2018-08-07 | End: 2018-08-07 | Stop reason: SDUPTHER

## 2018-08-07 RX ORDER — OXYCODONE HYDROCHLORIDE AND ACETAMINOPHEN 5; 325 MG/1; MG/1
1 TABLET ORAL EVERY 6 HOURS PRN
Qty: 20 TABLET | Refills: 0 | Status: SHIPPED | OUTPATIENT
Start: 2018-08-07 | End: 2018-08-12

## 2018-08-07 RX ADMIN — IOHEXOL 50 ML: 240 INJECTION, SOLUTION INTRATHECAL; INTRAVASCULAR; INTRAVENOUS; ORAL at 14:21

## 2018-08-07 RX ADMIN — IOPAMIDOL 85 ML: 755 INJECTION, SOLUTION INTRAVENOUS at 14:21

## 2018-08-07 NOTE — TELEPHONE ENCOUNTER
Sent to mail order. Will cancel order. Please send to [facility]. Will review pharmacy prior to future refills. Requested Prescriptions     Pending Prescriptions Disp Refills    oxyCODONE-acetaminophen (PERCOCET) 5-325 MG per tablet 20 tablet 0     Sig: Take 1 tablet by mouth every 6 hours as needed for Pain for up to 5 days. Intended supply: 5 days. Take lowest dose possible to manage pain. Signed Prescriptions Disp Refills    oxyCODONE-acetaminophen (PERCOCET) 5-325 MG per tablet 20 tablet 0     Sig: Take 1 tablet by mouth every 6 hours as needed for Pain for up to 5 days. Intended supply: 5 days. Take lowest dose possible to manage pain.      Authorizing Provider: Sebastián Brown

## 2018-08-13 ENCOUNTER — TELEPHONE (OUTPATIENT)
Dept: FAMILY MEDICINE CLINIC | Age: 78
End: 2018-08-13

## 2018-08-13 DIAGNOSIS — M48.062 SPINAL STENOSIS OF LUMBAR REGION WITH NEUROGENIC CLAUDICATION: Primary | ICD-10-CM

## 2018-08-13 NOTE — TELEPHONE ENCOUNTER
Pt called stating Dr Leopoldo Pronto had recommended a Dr in [facility] but she can't remember what the name was. Pt was asked what type of provider the recommendation was for & the pt replied \"oh he'll know what I'm talking about. \"   Please advise.

## 2018-08-16 ENCOUNTER — TELEPHONE (OUTPATIENT)
Dept: FAMILY MEDICINE CLINIC | Age: 78
End: 2018-08-16

## 2018-08-16 DIAGNOSIS — N30.90 CYSTITIS: Primary | ICD-10-CM

## 2018-08-16 RX ORDER — CIPROFLOXACIN 500 MG/1
500 TABLET, FILM COATED ORAL 2 TIMES DAILY
Qty: 6 TABLET | Refills: 0 | Status: SHIPPED | OUTPATIENT
Start: 2018-08-16 | End: 2018-08-19

## 2018-10-03 ENCOUNTER — CARE COORDINATION (OUTPATIENT)
Dept: CARE COORDINATION | Age: 78
End: 2018-10-03

## 2018-11-30 ENCOUNTER — TELEPHONE (OUTPATIENT)
Dept: FAMILY MEDICINE CLINIC | Age: 78
End: 2018-11-30

## 2018-11-30 ENCOUNTER — NURSE ONLY (OUTPATIENT)
Dept: LAB | Age: 78
End: 2018-11-30

## 2018-11-30 LAB
ALBUMIN SERPL-MCNC: 4.3 G/DL (ref 3.5–5.1)
ALP BLD-CCNC: 73 U/L (ref 38–126)
ALT SERPL-CCNC: 27 U/L (ref 11–66)
ANION GAP SERPL CALCULATED.3IONS-SCNC: 14 MEQ/L (ref 8–16)
AST SERPL-CCNC: 23 U/L (ref 5–40)
BASOPHILS # BLD: 0.6 %
BASOPHILS ABSOLUTE: 0 THOU/MM3 (ref 0–0.1)
BILIRUB SERPL-MCNC: 0.5 MG/DL (ref 0.3–1.2)
BUN BLDV-MCNC: 14 MG/DL (ref 7–22)
CALCIUM SERPL-MCNC: 9.6 MG/DL (ref 8.5–10.5)
CHLORIDE BLD-SCNC: 102 MEQ/L (ref 98–111)
CO2: 26 MEQ/L (ref 23–33)
CREAT SERPL-MCNC: 0.9 MG/DL (ref 0.4–1.2)
EOSINOPHIL # BLD: 4.5 %
EOSINOPHILS ABSOLUTE: 0.3 THOU/MM3 (ref 0–0.4)
ERYTHROCYTE [DISTWIDTH] IN BLOOD BY AUTOMATED COUNT: 13 % (ref 11.5–14.5)
ERYTHROCYTE [DISTWIDTH] IN BLOOD BY AUTOMATED COUNT: 41.3 FL (ref 35–45)
GFR SERPL CREATININE-BSD FRML MDRD: 61 ML/MIN/1.73M2
GLUCOSE BLD-MCNC: 94 MG/DL (ref 70–108)
HCT VFR BLD CALC: 44.4 % (ref 37–47)
HEMOGLOBIN: 15.1 GM/DL (ref 12–16)
IMMATURE GRANS (ABS): 0.02 THOU/MM3 (ref 0–0.07)
IMMATURE GRANULOCYTES: 0.3 %
LIPASE: 18.4 U/L (ref 5.6–51.3)
LYMPHOCYTES # BLD: 22.5 %
LYMPHOCYTES ABSOLUTE: 1.5 THOU/MM3 (ref 1–4.8)
MCH RBC QN AUTO: 30.1 PG (ref 26–33)
MCHC RBC AUTO-ENTMCNC: 34 GM/DL (ref 32.2–35.5)
MCV RBC AUTO: 88.4 FL (ref 81–99)
MONOCYTES # BLD: 6.5 %
MONOCYTES ABSOLUTE: 0.4 THOU/MM3 (ref 0.4–1.3)
NUCLEATED RED BLOOD CELLS: 0 /100 WBC
PLATELET # BLD: 243 THOU/MM3 (ref 130–400)
PMV BLD AUTO: 10.9 FL (ref 9.4–12.4)
POTASSIUM SERPL-SCNC: 3.9 MEQ/L (ref 3.5–5.2)
RBC # BLD: 5.02 MILL/MM3 (ref 4.2–5.4)
SEG NEUTROPHILS: 65.6 %
SEGMENTED NEUTROPHILS ABSOLUTE COUNT: 4.3 THOU/MM3 (ref 1.8–7.7)
SODIUM BLD-SCNC: 142 MEQ/L (ref 135–145)
TOTAL PROTEIN: 7.3 G/DL (ref 6.1–8)
WBC # BLD: 6.5 THOU/MM3 (ref 4.8–10.8)

## 2018-11-30 NOTE — TELEPHONE ENCOUNTER
FYI: Regarding Orthopedic referral dated 8/13/18. Called Dr Brigida Gonzalez office @[phone] to request office notes, per Anant Becker the patient called & canceled the appt on 10/18/18 & stated it was not needed. Called & talked to the patient, she said to cancel the referral.  She does not want to have back surgery done & she knows there is nothing else that can be done. She will call our office if she changes her mind and wants the referral reopened.   Canceled referral.

## 2018-12-05 ENCOUNTER — HOSPITAL ENCOUNTER (OUTPATIENT)
Dept: NON INVASIVE DIAGNOSTICS | Age: 78
Discharge: HOME | End: 2018-12-05
Payer: MEDICARE

## 2018-12-05 LAB
LV EF: 53 %
LVEF MODALITY: NORMAL

## 2018-12-05 PROCEDURE — 93306 TTE W/DOPPLER COMPLETE: CPT

## 2019-01-29 ENCOUNTER — OFFICE VISIT (OUTPATIENT)
Dept: CARDIOLOGY CLINIC | Age: 79
End: 2019-01-29
Payer: MEDICARE

## 2019-01-29 VITALS
BODY MASS INDEX: 43.87 KG/M2 | HEART RATE: 75 BPM | SYSTOLIC BLOOD PRESSURE: 150 MMHG | DIASTOLIC BLOOD PRESSURE: 72 MMHG | WEIGHT: 257 LBS | HEIGHT: 64 IN

## 2019-01-29 DIAGNOSIS — I35.0 NONRHEUMATIC AORTIC VALVE STENOSIS: Primary | ICD-10-CM

## 2019-01-29 DIAGNOSIS — E78.5 DYSLIPIDEMIA: ICD-10-CM

## 2019-01-29 PROCEDURE — 99204 OFFICE O/P NEW MOD 45 MIN: CPT | Performed by: INTERNAL MEDICINE

## 2019-01-29 PROCEDURE — G8399 PT W/DXA RESULTS DOCUMENT: HCPCS | Performed by: INTERNAL MEDICINE

## 2019-01-29 PROCEDURE — G8484 FLU IMMUNIZE NO ADMIN: HCPCS | Performed by: INTERNAL MEDICINE

## 2019-01-29 PROCEDURE — 1123F ACP DISCUSS/DSCN MKR DOCD: CPT | Performed by: INTERNAL MEDICINE

## 2019-01-29 PROCEDURE — 93000 ELECTROCARDIOGRAM COMPLETE: CPT | Performed by: INTERNAL MEDICINE

## 2019-01-29 PROCEDURE — 1090F PRES/ABSN URINE INCON ASSESS: CPT | Performed by: INTERNAL MEDICINE

## 2019-01-29 PROCEDURE — 4040F PNEUMOC VAC/ADMIN/RCVD: CPT | Performed by: INTERNAL MEDICINE

## 2019-01-29 PROCEDURE — 1101F PT FALLS ASSESS-DOCD LE1/YR: CPT | Performed by: INTERNAL MEDICINE

## 2019-01-29 PROCEDURE — G8427 DOCREV CUR MEDS BY ELIG CLIN: HCPCS | Performed by: INTERNAL MEDICINE

## 2019-01-29 PROCEDURE — G8417 CALC BMI ABV UP PARAM F/U: HCPCS | Performed by: INTERNAL MEDICINE

## 2019-01-29 PROCEDURE — 1036F TOBACCO NON-USER: CPT | Performed by: INTERNAL MEDICINE

## 2019-01-29 RX ORDER — VENLAFAXINE 75 MG/1
75 TABLET ORAL 3 TIMES DAILY
Status: ON HOLD | COMMUNITY
End: 2019-04-25

## 2019-01-29 RX ORDER — OXYCODONE HYDROCHLORIDE AND ACETAMINOPHEN 5; 325 MG/1; MG/1
1 TABLET ORAL EVERY 4 HOURS PRN
Status: ON HOLD | COMMUNITY
End: 2019-04-25

## 2019-01-31 DIAGNOSIS — K21.9 GASTROESOPHAGEAL REFLUX DISEASE WITHOUT ESOPHAGITIS: ICD-10-CM

## 2019-01-31 DIAGNOSIS — F33.0 MAJOR DEPRESSIVE DISORDER, RECURRENT EPISODE, MILD (HCC): ICD-10-CM

## 2019-01-31 RX ORDER — HYDRALAZINE HYDROCHLORIDE 50 MG/1
50 TABLET, FILM COATED ORAL 2 TIMES DAILY
COMMUNITY
End: 2019-01-31 | Stop reason: SDUPTHER

## 2019-01-31 RX ORDER — PANTOPRAZOLE SODIUM 40 MG/1
40 TABLET, DELAYED RELEASE ORAL 2 TIMES DAILY
Qty: 180 TABLET | Refills: 3 | Status: SHIPPED | OUTPATIENT
Start: 2019-01-31 | End: 2020-03-05

## 2019-01-31 RX ORDER — HYDRALAZINE HYDROCHLORIDE 50 MG/1
50 TABLET, FILM COATED ORAL 2 TIMES DAILY
Qty: 180 TABLET | Refills: 3 | Status: SHIPPED | OUTPATIENT
Start: 2019-01-31 | End: 2020-03-05

## 2019-01-31 RX ORDER — ATORVASTATIN CALCIUM 40 MG/1
TABLET, FILM COATED ORAL
Qty: 90 TABLET | Refills: 3 | Status: SHIPPED | OUTPATIENT
Start: 2019-01-31 | End: 2020-03-05

## 2019-01-31 RX ORDER — SERTRALINE HYDROCHLORIDE 100 MG/1
200 TABLET, FILM COATED ORAL DAILY
Qty: 180 TABLET | Refills: 3 | Status: SHIPPED | OUTPATIENT
Start: 2019-01-31 | End: 2020-03-05

## 2019-02-01 DIAGNOSIS — E11.8 CONTROLLED TYPE 2 DIABETES MELLITUS WITH COMPLICATION, WITHOUT LONG-TERM CURRENT USE OF INSULIN (HCC): ICD-10-CM

## 2019-02-01 RX ORDER — QUINAPRIL 10 MG/1
10 TABLET ORAL NIGHTLY
Qty: 90 TABLET | Refills: 3 | Status: SHIPPED | OUTPATIENT
Start: 2019-02-01 | End: 2020-03-05

## 2019-02-07 DIAGNOSIS — I10 HTN, GOAL BELOW 140/90: Primary | ICD-10-CM

## 2019-02-07 RX ORDER — ATENOLOL 25 MG/1
25 TABLET ORAL DAILY
Qty: 30 TABLET | Refills: 0 | Status: ON HOLD | OUTPATIENT
Start: 2019-02-07 | End: 2019-04-25

## 2019-02-07 RX ORDER — AMLODIPINE BESYLATE 10 MG/1
10 TABLET ORAL DAILY
Qty: 30 TABLET | Refills: 0 | Status: SHIPPED | OUTPATIENT
Start: 2019-02-07 | End: 2019-05-06 | Stop reason: SDUPTHER

## 2019-04-24 ENCOUNTER — OFFICE VISIT (OUTPATIENT)
Dept: FAMILY MEDICINE CLINIC | Age: 79
End: 2019-04-24
Payer: MEDICARE

## 2019-04-24 VITALS
SYSTOLIC BLOOD PRESSURE: 130 MMHG | RESPIRATION RATE: 14 BRPM | TEMPERATURE: 97.7 F | HEART RATE: 68 BPM | DIASTOLIC BLOOD PRESSURE: 80 MMHG

## 2019-04-24 DIAGNOSIS — F33.0 MAJOR DEPRESSIVE DISORDER, RECURRENT EPISODE, MILD (HCC): ICD-10-CM

## 2019-04-24 DIAGNOSIS — M25.562 CHRONIC PAIN OF LEFT KNEE: Primary | ICD-10-CM

## 2019-04-24 DIAGNOSIS — E66.01 MORBID OBESITY WITH BMI OF 40.0-44.9, ADULT (HCC): ICD-10-CM

## 2019-04-24 DIAGNOSIS — E11.40 TYPE 2 DIABETES MELLITUS WITH DIABETIC NEUROPATHY, WITHOUT LONG-TERM CURRENT USE OF INSULIN (HCC): ICD-10-CM

## 2019-04-24 DIAGNOSIS — H93.13 TINNITUS AURIUM, BILATERAL: ICD-10-CM

## 2019-04-24 DIAGNOSIS — G89.29 CHRONIC PAIN OF LEFT KNEE: Primary | ICD-10-CM

## 2019-04-24 LAB — HBA1C MFR BLD: 6.7 %

## 2019-04-24 PROCEDURE — G8417 CALC BMI ABV UP PARAM F/U: HCPCS | Performed by: FAMILY MEDICINE

## 2019-04-24 PROCEDURE — 83036 HEMOGLOBIN GLYCOSYLATED A1C: CPT | Performed by: FAMILY MEDICINE

## 2019-04-24 PROCEDURE — 99214 OFFICE O/P EST MOD 30 MIN: CPT | Performed by: FAMILY MEDICINE

## 2019-04-24 PROCEDURE — 1123F ACP DISCUSS/DSCN MKR DOCD: CPT | Performed by: FAMILY MEDICINE

## 2019-04-24 PROCEDURE — G8399 PT W/DXA RESULTS DOCUMENT: HCPCS | Performed by: FAMILY MEDICINE

## 2019-04-24 PROCEDURE — 4040F PNEUMOC VAC/ADMIN/RCVD: CPT | Performed by: FAMILY MEDICINE

## 2019-04-24 PROCEDURE — G8427 DOCREV CUR MEDS BY ELIG CLIN: HCPCS | Performed by: FAMILY MEDICINE

## 2019-04-24 PROCEDURE — 1036F TOBACCO NON-USER: CPT | Performed by: FAMILY MEDICINE

## 2019-04-24 PROCEDURE — 1090F PRES/ABSN URINE INCON ASSESS: CPT | Performed by: FAMILY MEDICINE

## 2019-04-24 NOTE — PROGRESS NOTES
05/14/2016           Health Maintenance   Topic Date Due    DTaP/Tdap/Td vaccine (1 - Tdap) 02/18/1961    Shingles Vaccine (1 of 2) 02/18/1992    Pneumococcal 65+ years Vaccine (2 of 2 - PPSV23) 12/14/2016    Flu vaccine (Season Ended) 09/01/2019    Potassium monitoring  11/30/2019    Creatinine monitoring  11/30/2019    DEXA (modify frequency per FRAX score)  Addressed       Past Medical History:   Diagnosis Date    Arthritis     Arthritis     DM type 2, goal A1c below 7     GERD (gastroesophageal reflux disease)     Hyperlipidemia     Hypertension     Loss of vision     Mitral valve disorder     Osteoporosis     TIA (transient ischemic attack)    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    Urinary incontinence        Past Surgical History:   Procedure Laterality Date    APPENDECTOMY      CARPAL TUNNEL RELEASE Left 2011    CATARACT REMOVAL Bilateral     CERVICAL FUSION  1976    CHOLECYSTECTOMY      COLON SURGERY  2012    COLONOSCOPY  2015    EYE SURGERY      CATARACTS    FINGER SURGERY Right 10/2016    3rd digit    HERNIA REPAIR      HYSTERECTOMY      JOINT REPLACEMENT Bilateral     TOTAL KNEES    SD LAMINECTOMY,>2 SGMT,LUMBAR      6 FUSIONS    ROTATOR CUFF REPAIR Right 11/28/2014    SHOULDER SURGERY  2014    SPINE SURGERY      TENDON RELEASE Right 05/18/2017    at [address]  [phone]       Social History     Tobacco Use    Smoking status: Never Smoker    Smokeless tobacco: Never Used   Substance Use Topics    Alcohol use: No    Drug use: No       Family History   Problem Relation Age of Onset    Arthritis Mother     Cancer Mother     Heart Disease Father     High Blood Pressure Father          I have reviewed the patient's past medical history, past surgical history, allergies, medications, social and family history and I have made updates where appropriate.     Review of Systems        PHYSICAL EXAM:  BP 130/80   Pulse 68   Temp 97.7 °F (36.5 °C) (Oral)   Resp 14     General Appearance: well developed and well- nourished, in no acute distress  Head: normocephalic and atraumatic  ENT: external ear and ear canal normal bilaterally, TMs intact and regular, nose without deformity, nasal mucosa and turbinates normal without polyps, oropharynx normal, dentition is normal for age, no lipor gum lesions noted  Neck: supple and non-tender without mass, no thyromegaly or thyroid nodules, no cervical lymphadenopathy  Pulmonary/Chest: clear to auscultation bilaterally- no wheezes, rales or rhonchi, normal air movement, no respiratory distress or retractions  Cardiovascular: normal rate, regular rhythm, normal S1 and S2, no murmurs, rubs, clicks, or gallops, distal pulses intact  Extremities: no cyanosis, clubbing or edema of the lower extremities  Psych:  Normal affect without evidence of depression oranxiety, insight and judgement are appropriate, memory appears intact  Skin: warm and dry, no rash or erythema      ASSESSMENT & PLAN  Arjun Ruiz was seen today for leg pain and tinnitus. Diagnoses and all orders for this visit:    Chronic pain of left knee  -     Sonny Menendez MD, Orthopedic Surgery, [place]  -     XR KNEE LEFT (3 VIEWS); Future    Tinnitus aurium, bilateral  -     Audiometry with tympanometry;  Future    Type 2 diabetes mellitus with diabetic neuropathy, without long-term current use of insulin (HCC)  -     POCT glycosylated hemoglobin (Hb A1C)    Morbid obesity with BMI of 40.0-44.9, adult (HCC)    Morbid obesity with BMI of 40.0-44.9, adult (HCC)    Major depressive disorder, recurrent episode, mild (HCC)    -A1c controlled, continue metformin  -Refer to Ortho for knee pain  -Obtain hearing testing for tinnitus    Quality & Risk Score Accuracy    Visit Dx:  M97.48, Z68.41 - Morbid obesity with BMI of 40.0-44.9, adult [facility]  The current BMI is 44.11 kg/m2 as of 04/24/19 10:08 EDT  Assessment and plan:  Stable based upon symptoms and exam. Continue current treatment plan and follow up at least yearly. Visit Dx:  E11.40 - Type 2 diabetes mellitus with diabetic neuropathy, without long-term current use of insulin (HCC)  Assessment and plan:  Stable based upon symptoms and exam. Continue current treatment plan and follow up at least yearly. Visit Dx:  F33.0 - Major depressive disorder, recurrent episode, mild ([facility]  Assessment and plan:  Stable based upon symptoms and exam. Continue current treatment plan and follow up at least yearly. Last edited 04/24/19 10:08 EDT by Arnulfo Lind, DO             Return if symptoms worsen or fail to improve. Controlled Substances Monitoring:                     Jennifer Henriquez received counseling on the following healthy behaviors: medication adherence  Reviewed prior labs and health maintenance. Continue current medications, diet and exercise. Discussed use, benefit, and side effects of prescribed medications. Barriers to medication compliance addressed. Patient given educational materials - see patient instructions. All patient questions answered. Patient voiced understanding.

## 2019-04-24 NOTE — PATIENT INSTRUCTIONS
You may receive a survey about your visit with us today. The feedback from our patients helps us identify what is working well and where the service to all patients can be enhanced. Thank you.

## 2019-04-25 ENCOUNTER — APPOINTMENT (OUTPATIENT)
Dept: GENERAL RADIOLOGY | Age: 79
DRG: 086 | End: 2019-04-25
Payer: MEDICARE

## 2019-04-25 ENCOUNTER — APPOINTMENT (OUTPATIENT)
Dept: CT IMAGING | Age: 79
DRG: 086 | End: 2019-04-25
Payer: MEDICARE

## 2019-04-25 ENCOUNTER — HOSPITAL ENCOUNTER (INPATIENT)
Age: 79
LOS: 3 days | Discharge: HOME | DRG: 086 | End: 2019-04-28
Attending: EMERGENCY MEDICINE | Admitting: SURGERY
Payer: MEDICARE
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
## 2019-04-25 DIAGNOSIS — S09.90XA CLOSED HEAD INJURY, INITIAL ENCOUNTER: ICD-10-CM

## 2019-04-25 DIAGNOSIS — I60.9 SUBARACHNOID HEMORRHAGE (HCC): ICD-10-CM
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
## 2019-04-25 DIAGNOSIS — S62.627A CLOSED DISPLACED FRACTURE OF MIDDLE PHALANX OF LEFT LITTLE FINGER, INITIAL ENCOUNTER: ICD-10-CM

## 2019-04-25 PROBLEM — S00.83XA TRAUMATIC HEMATOMA OF FOREHEAD: Status: ACTIVE | Noted: 2019-04-25
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
## 2019-04-25 LAB
ALBUMIN SERPL-MCNC: 4.6 G/DL (ref 3.5–5.1)
ALP BLD-CCNC: 77 U/L (ref 38–126)
ALT SERPL-CCNC: 29 U/L (ref 11–66)
ANION GAP SERPL CALCULATED.3IONS-SCNC: 16 MEQ/L (ref 8–16)
AST SERPL-CCNC: 27 U/L (ref 5–40)
BASOPHILS # BLD: 0.3 %
BASOPHILS ABSOLUTE: 0 THOU/MM3 (ref 0–0.1)
BILIRUB SERPL-MCNC: 0.5 MG/DL (ref 0.3–1.2)
BILIRUBIN DIRECT: < 0.2 MG/DL (ref 0–0.3)
BUN BLDV-MCNC: 19 MG/DL (ref 7–22)
CALCIUM SERPL-MCNC: 9.6 MG/DL (ref 8.5–10.5)
CHLORIDE BLD-SCNC: 105 MEQ/L (ref 98–111)
CO2: 24 MEQ/L (ref 23–33)
CREAT SERPL-MCNC: 0.9 MG/DL (ref 0.4–1.2)
EKG ATRIAL RATE: 74 BPM
EKG P AXIS: 48 DEGREES
EKG P-R INTERVAL: 152 MS
EKG Q-T INTERVAL: 404 MS
EKG QRS DURATION: 88 MS
EKG QTC CALCULATION (BAZETT): 448 MS
EKG R AXIS: 10 DEGREES
EKG T AXIS: 31 DEGREES
EKG VENTRICULAR RATE: 74 BPM
EOSINOPHIL # BLD: 3 %
EOSINOPHILS ABSOLUTE: 0.3 THOU/MM3 (ref 0–0.4)
ERYTHROCYTE [DISTWIDTH] IN BLOOD BY AUTOMATED COUNT: 13.5 % (ref 11.5–14.5)
ERYTHROCYTE [DISTWIDTH] IN BLOOD BY AUTOMATED COUNT: 43.8 FL (ref 35–45)
GFR SERPL CREATININE-BSD FRML MDRD: 61 ML/MIN/1.73M2
GLUCOSE BLD-MCNC: 143 MG/DL (ref 70–108)
HCT VFR BLD CALC: 45.8 % (ref 37–47)
HEMOGLOBIN: 15.5 GM/DL (ref 12–16)
IMMATURE GRANS (ABS): 0.04 THOU/MM3 (ref 0–0.07)
IMMATURE GRANULOCYTES: 0.4 %
LIPASE: 21 U/L (ref 5.6–51.3)
LYMPHOCYTES # BLD: 12.5 %
LYMPHOCYTES ABSOLUTE: 1.1 THOU/MM3 (ref 1–4.8)
MAGNESIUM: 1.9 MG/DL (ref 1.6–2.4)
MCH RBC QN AUTO: 30.2 PG (ref 26–33)
MCHC RBC AUTO-ENTMCNC: 33.8 GM/DL (ref 32.2–35.5)
MCV RBC AUTO: 89.1 FL (ref 81–99)
MONOCYTES # BLD: 6 %
MONOCYTES ABSOLUTE: 0.5 THOU/MM3 (ref 0.4–1.3)
MRSA SCREEN RT-PCR: NEGATIVE
NUCLEATED RED BLOOD CELLS: 0 /100 WBC
OSMOLALITY CALCULATION: 293.4 MOSMOL/KG (ref 275–300)
PLATELET # BLD: 219 THOU/MM3 (ref 130–400)
PMV BLD AUTO: 11.3 FL (ref 9.4–12.4)
POTASSIUM SERPL-SCNC: 4.2 MEQ/L (ref 3.5–5.2)
PRO-BNP: 249.9 PG/ML (ref 0–1800)
RBC # BLD: 5.14 MILL/MM3 (ref 4.2–5.4)
SEG NEUTROPHILS: 77.8 %
SEGMENTED NEUTROPHILS ABSOLUTE COUNT: 7.1 THOU/MM3 (ref 1.8–7.7)
SODIUM BLD-SCNC: 145 MEQ/L (ref 135–145)
TOTAL PROTEIN: 7.8 G/DL (ref 6.1–8)
TROPONIN T: < 0.01 NG/ML
VANCOMYCIN RESISTANT ENTEROCOCCUS: NEGATIVE
WBC # BLD: 9.1 THOU/MM3 (ref 4.8–10.8)

## 2019-04-25 PROCEDURE — 99222 1ST HOSP IP/OBS MODERATE 55: CPT | Performed by: NEUROLOGICAL SURGERY

## 2019-04-25 PROCEDURE — 93010 ELECTROCARDIOGRAM REPORT: CPT | Performed by: INTERNAL MEDICINE

## 2019-04-25 PROCEDURE — 80076 HEPATIC FUNCTION PANEL: CPT

## 2019-04-25 PROCEDURE — 2709999900 HC NON-CHARGEABLE SUPPLY

## 2019-04-25 PROCEDURE — 87147 CULTURE TYPE IMMUNOLOGIC: CPT

## 2019-04-25 PROCEDURE — 87641 MR-STAPH DNA AMP PROBE: CPT

## 2019-04-25 PROCEDURE — 6370000000 HC RX 637 (ALT 250 FOR IP): Performed by: SURGERY

## 2019-04-25 PROCEDURE — 70450 CT HEAD/BRAIN W/O DYE: CPT

## 2019-04-25 PROCEDURE — 87500 VANOMYCIN DNA AMP PROBE: CPT

## 2019-04-25 PROCEDURE — 2000000000 HC ICU R&B

## 2019-04-25 PROCEDURE — 84484 ASSAY OF TROPONIN QUANT: CPT

## 2019-04-25 PROCEDURE — 85025 COMPLETE CBC W/AUTO DIFF WBC: CPT

## 2019-04-25 PROCEDURE — 2580000003 HC RX 258: Performed by: NURSE PRACTITIONER

## 2019-04-25 PROCEDURE — 73130 X-RAY EXAM OF HAND: CPT

## 2019-04-25 PROCEDURE — 72125 CT NECK SPINE W/O DYE: CPT

## 2019-04-25 PROCEDURE — 71046 X-RAY EXAM CHEST 2 VIEWS: CPT

## 2019-04-25 PROCEDURE — 72131 CT LUMBAR SPINE W/O DYE: CPT

## 2019-04-25 PROCEDURE — 80048 BASIC METABOLIC PNL TOTAL CA: CPT

## 2019-04-25 PROCEDURE — 83880 ASSAY OF NATRIURETIC PEPTIDE: CPT

## 2019-04-25 PROCEDURE — 83690 ASSAY OF LIPASE: CPT

## 2019-04-25 PROCEDURE — 6360000002 HC RX W HCPCS: Performed by: EMERGENCY MEDICINE

## 2019-04-25 PROCEDURE — 99285 EMERGENCY DEPT VISIT HI MDM: CPT

## 2019-04-25 PROCEDURE — 73564 X-RAY EXAM KNEE 4 OR MORE: CPT

## 2019-04-25 PROCEDURE — 99222 1ST HOSP IP/OBS MODERATE 55: CPT | Performed by: SURGERY

## 2019-04-25 PROCEDURE — 96374 THER/PROPH/DIAG INJ IV PUSH: CPT

## 2019-04-25 PROCEDURE — 93005 ELECTROCARDIOGRAM TRACING: CPT | Performed by: EMERGENCY MEDICINE

## 2019-04-25 PROCEDURE — 87081 CULTURE SCREEN ONLY: CPT

## 2019-04-25 PROCEDURE — 72128 CT CHEST SPINE W/O DYE: CPT

## 2019-04-25 PROCEDURE — APPSS60 APP SPLIT SHARED TIME 46-60 MINUTES: Performed by: NURSE PRACTITIONER

## 2019-04-25 PROCEDURE — 72100 X-RAY EXAM L-S SPINE 2/3 VWS: CPT

## 2019-04-25 PROCEDURE — 36415 COLL VENOUS BLD VENIPUNCTURE: CPT

## 2019-04-25 PROCEDURE — 6370000000 HC RX 637 (ALT 250 FOR IP): Performed by: NURSE PRACTITIONER

## 2019-04-25 PROCEDURE — 83735 ASSAY OF MAGNESIUM: CPT

## 2019-04-25 PROCEDURE — 96376 TX/PRO/DX INJ SAME DRUG ADON: CPT

## 2019-04-25 RX ORDER — DOCUSATE SODIUM 100 MG/1
100 CAPSULE, LIQUID FILLED ORAL 2 TIMES DAILY
Status: DISCONTINUED | OUTPATIENT
Start: 2019-04-25 | End: 2019-04-28 | Stop reason: HOSPADM

## 2019-04-25 RX ORDER — SERTRALINE HYDROCHLORIDE 100 MG/1
200 TABLET, FILM COATED ORAL DAILY
Status: DISCONTINUED | OUTPATIENT
Start: 2019-04-25 | End: 2019-04-28 | Stop reason: HOSPADM

## 2019-04-25 RX ORDER — ATENOLOL 25 MG/1
25 TABLET ORAL DAILY
Status: DISCONTINUED | OUTPATIENT
Start: 2019-04-25 | End: 2019-04-28 | Stop reason: HOSPADM

## 2019-04-25 RX ORDER — LISINOPRIL 5 MG/1
5 TABLET ORAL DAILY
Status: DISCONTINUED | OUTPATIENT
Start: 2019-04-25 | End: 2019-04-28 | Stop reason: HOSPADM

## 2019-04-25 RX ORDER — SODIUM CHLORIDE 0.9 % (FLUSH) 0.9 %
10 SYRINGE (ML) INJECTION PRN
Status: DISCONTINUED | OUTPATIENT
Start: 2019-04-25 | End: 2019-04-28 | Stop reason: HOSPADM

## 2019-04-25 RX ORDER — OXYCODONE HYDROCHLORIDE AND ACETAMINOPHEN 5; 325 MG/1; MG/1
1 TABLET ORAL EVERY 4 HOURS PRN
Status: DISCONTINUED | OUTPATIENT
Start: 2019-04-25 | End: 2019-04-28 | Stop reason: HOSPADM

## 2019-04-25 RX ORDER — AMLODIPINE BESYLATE 10 MG/1
10 TABLET ORAL DAILY
Status: DISCONTINUED | OUTPATIENT
Start: 2019-04-26 | End: 2019-04-28 | Stop reason: HOSPADM

## 2019-04-25 RX ORDER — FAMOTIDINE 20 MG/1
20 TABLET, FILM COATED ORAL 2 TIMES DAILY
Status: DISCONTINUED | OUTPATIENT
Start: 2019-04-25 | End: 2019-04-28 | Stop reason: HOSPADM

## 2019-04-25 RX ORDER — ATORVASTATIN CALCIUM 40 MG/1
40 TABLET, FILM COATED ORAL NIGHTLY
Status: DISCONTINUED | OUTPATIENT
Start: 2019-04-25 | End: 2019-04-28 | Stop reason: HOSPADM

## 2019-04-25 RX ORDER — HYDRALAZINE HYDROCHLORIDE 50 MG/1
50 TABLET, FILM COATED ORAL 2 TIMES DAILY
Status: DISCONTINUED | OUTPATIENT
Start: 2019-04-25 | End: 2019-04-28 | Stop reason: HOSPADM

## 2019-04-25 RX ORDER — ACETAMINOPHEN 325 MG/1
650 TABLET ORAL EVERY 4 HOURS PRN
Status: DISCONTINUED | OUTPATIENT
Start: 2019-04-25 | End: 2019-04-28 | Stop reason: HOSPADM

## 2019-04-25 RX ORDER — FENTANYL CITRATE 50 UG/ML
50 INJECTION, SOLUTION INTRAMUSCULAR; INTRAVENOUS ONCE
Status: COMPLETED | OUTPATIENT
Start: 2019-04-25 | End: 2019-04-25

## 2019-04-25 RX ORDER — SODIUM CHLORIDE 0.9 % (FLUSH) 0.9 %
10 SYRINGE (ML) INJECTION EVERY 12 HOURS SCHEDULED
Status: DISCONTINUED | OUTPATIENT
Start: 2019-04-25 | End: 2019-04-28 | Stop reason: HOSPADM

## 2019-04-25 RX ORDER — FENTANYL CITRATE 50 UG/ML
25 INJECTION, SOLUTION INTRAMUSCULAR; INTRAVENOUS
Status: DISCONTINUED | OUTPATIENT
Start: 2019-04-25 | End: 2019-04-28 | Stop reason: HOSPADM

## 2019-04-25 RX ORDER — OXYCODONE HYDROCHLORIDE AND ACETAMINOPHEN 5; 325 MG/1; MG/1
2 TABLET ORAL EVERY 4 HOURS PRN
Status: DISCONTINUED | OUTPATIENT
Start: 2019-04-25 | End: 2019-04-28 | Stop reason: HOSPADM

## 2019-04-25 RX ORDER — CYCLOBENZAPRINE HCL 10 MG
10 TABLET ORAL 3 TIMES DAILY PRN
Status: DISCONTINUED | OUTPATIENT
Start: 2019-04-25 | End: 2019-04-28 | Stop reason: HOSPADM

## 2019-04-25 RX ORDER — ONDANSETRON 2 MG/ML
4 INJECTION INTRAMUSCULAR; INTRAVENOUS EVERY 6 HOURS PRN
Status: DISCONTINUED | OUTPATIENT
Start: 2019-04-25 | End: 2019-04-28 | Stop reason: HOSPADM

## 2019-04-25 RX ORDER — GABAPENTIN 300 MG/1
600 CAPSULE ORAL 3 TIMES DAILY
Status: DISCONTINUED | OUTPATIENT
Start: 2019-04-25 | End: 2019-04-28 | Stop reason: HOSPADM

## 2019-04-25 RX ORDER — FENTANYL CITRATE 50 UG/ML
50 INJECTION, SOLUTION INTRAMUSCULAR; INTRAVENOUS
Status: DISCONTINUED | OUTPATIENT
Start: 2019-04-25 | End: 2019-04-28 | Stop reason: HOSPADM

## 2019-04-25 RX ADMIN — ATENOLOL 25 MG: 25 TABLET ORAL at 20:26

## 2019-04-25 RX ADMIN — DOCUSATE SODIUM 100 MG: 100 CAPSULE, LIQUID FILLED ORAL at 20:27

## 2019-04-25 RX ADMIN — FENTANYL CITRATE 50 MCG: 50 INJECTION, SOLUTION INTRAMUSCULAR; INTRAVENOUS at 13:48

## 2019-04-25 RX ADMIN — FENTANYL CITRATE 50 MCG: 50 INJECTION, SOLUTION INTRAMUSCULAR; INTRAVENOUS at 12:19

## 2019-04-25 RX ADMIN — HYDRALAZINE HYDROCHLORIDE 50 MG: 50 TABLET, FILM COATED ORAL at 20:26

## 2019-04-25 RX ADMIN — ATORVASTATIN CALCIUM 40 MG: 40 TABLET, FILM COATED ORAL at 20:26

## 2019-04-25 RX ADMIN — Medication 10 ML: at 20:30

## 2019-04-25 RX ADMIN — OXYCODONE AND ACETAMINOPHEN 2 TABLET: 5; 325 TABLET ORAL at 20:27

## 2019-04-25 RX ADMIN — FAMOTIDINE 20 MG: 20 TABLET ORAL at 20:27

## 2019-04-25 RX ADMIN — OXYCODONE AND ACETAMINOPHEN 2 TABLET: 5; 325 TABLET ORAL at 16:18

## 2019-04-25 RX ADMIN — LISINOPRIL 5 MG: 5 TABLET ORAL at 20:26

## 2019-04-25 ASSESSMENT — ENCOUNTER SYMPTOMS
STRIDOR: 0
WHEEZING: 0
EYE DISCHARGE: 0
SORE THROAT: 0
EYE PAIN: 0
RHINORRHEA: 0
EYE ITCHING: 0
CHOKING: 0
DIARRHEA: 0
PHOTOPHOBIA: 0
TROUBLE SWALLOWING: 0
COLOR CHANGE: 0
SINUS PRESSURE: 0
COUGH: 0
CONSTIPATION: 0
ABDOMINAL PAIN: 0
EYE REDNESS: 0
VOICE CHANGE: 0
ABDOMINAL DISTENTION: 0
CHEST TIGHTNESS: 0
BLOOD IN STOOL: 0
APNEA: 0
BACK PAIN: 1
NAUSEA: 0
FACIAL SWELLING: 0
SHORTNESS OF BREATH: 0
VOMITING: 0

## 2019-04-25 ASSESSMENT — PAIN SCALES - GENERAL
PAINLEVEL_OUTOF10: 8
PAINLEVEL_OUTOF10: 10
PAINLEVEL_OUTOF10: 9

## 2019-04-25 ASSESSMENT — PAIN - FUNCTIONAL ASSESSMENT: PAIN_FUNCTIONAL_ASSESSMENT: PREVENTS OR INTERFERES SOME ACTIVE ACTIVITIES AND ADLS

## 2019-04-25 ASSESSMENT — PAIN DESCRIPTION - LOCATION
LOCATION: HEAD;BACK
LOCATION: BACK

## 2019-04-25 ASSESSMENT — PAIN DESCRIPTION - ORIENTATION: ORIENTATION: UPPER

## 2019-04-25 ASSESSMENT — PAIN DESCRIPTION - PROGRESSION: CLINICAL_PROGRESSION: NOT CHANGED

## 2019-04-25 ASSESSMENT — PAIN DESCRIPTION - PAIN TYPE
TYPE: ACUTE PAIN
TYPE: ACUTE PAIN;CHRONIC PAIN

## 2019-04-25 ASSESSMENT — PAIN DESCRIPTION - DIRECTION: RADIATING_TOWARDS: NECK/HEAD

## 2019-04-25 ASSESSMENT — PAIN DESCRIPTION - DESCRIPTORS: DESCRIPTORS: ACHING;DISCOMFORT

## 2019-04-25 ASSESSMENT — PAIN DESCRIPTION - FREQUENCY: FREQUENCY: CONTINUOUS

## 2019-04-25 ASSESSMENT — PAIN DESCRIPTION - ONSET: ONSET: ON-GOING

## 2019-04-25 NOTE — ED NOTES
[facility], NP trauma at bedside for assessment.       Anibal Carolina RN  04/25/19 [phone]

## 2019-04-25 NOTE — ED NOTES
Pt ambulated to restroom with help of . Pt favoring right leg due to left knee hurting. Pt states \"I feel like my left knee has been giving out. My knee replacement maybe going bad. \"      Tempie Aase, RN  04/25/19 [phone]

## 2019-04-25 NOTE — CARE COORDINATION
ED Care Transition    2019    Patient Name: Kelby Calderón   : 1940  MRN: [id number]    SUSANNE Score: 3  PCP: Emma Boss DO   Specialist: yes - Dr. Aury Amador   Active ACC/CTC: no                       Utilization Review:  ED visits:    Admissions: 1 - current     Problem List:  Patient Active Problem List   Diagnosis    Dyslipidemia    HTN, goal below 140/90    Leg cramps    GERD (gastroesophageal reflux disease)    Major depressive disorder, recurrent episode, mild ([facility]    Type 2 diabetes mellitus with diabetic neuropathy, without long-term current use of insulin ([facility]   Summary:  The patient's  states that he heard a \"scream\" causing him to check on the patient. He reports to have found the patient at the bottom of the steps on a concrete floor and reports a head trauma but denies any LOC.    PMH: CVA/TIA, dyslipidemia, HTN, leg cramps, gerd, DM    Plan is for admission, Palliative Care Eval       Follow up appointments:    Future Appointments   Date Time Provider James Cruz   2019  1:00 PM Garland Tyler AUDIOLOGY None   2019  9:30 AM STR ECHO RM1 STRZ ECHO None       Review Due Health Maintenance:  Health Maintenance Due   Topic Date Due    DTaP/Tdap/Td vaccine (1 - Tdap) 1961    Shingles Vaccine (1 of 2) 1992    Pneumococcal 65+ years Vaccine (2 of 2 - PPSV23) 2016     JORDY Smith 11, BSN  [phone]  [phone]

## 2019-04-25 NOTE — ED NOTES
Pt taken to CT and x-ray. Pt given medication prior to departure. States no other needs at this time.       Kirill Soto RN  04/25/19 [phone]

## 2019-04-25 NOTE — ED NOTES
Neuro surgery at bedside for assessment. Pt will have follow up CT tomorrow morning to see if bleed is getting worse.       Erlinda Kimbrough RN  04/25/19 [phone]

## 2019-04-25 NOTE — H&P
Trauma H&P  Dr. Alee Brink     Patient:  Michelle Molina date: 4/25/2019   YOB: 1940 Date of Evaluation: 4/25/2019  MRN: [id number]  Acct: [de-identified]    Injury Date:4/25/2019  Injury time:~11am  PCP: Ivett Sibley DO   Referring physician: Shawn Acosta DO - ER provider    Time of Trauma Surgeon Notification:    Time of Trauma JULISA Arrival: 1345 on April 25, 2019  Time of Trauma Surgeon Arrival:  1640 on April 25, 2019    Assessment:    Trauma by fall down steps  Right forehead hematoma  Subarachnoid hemorrhage  Closed head injury  Lower back pain  Plan:    Admit to the ICU under Trauma Services    Subarachnoid hemorrhage   - Neurosurgery consulted   - Repeat head CT at their discretion   - Neuro checks    Closed head injury   - SLP for cog eval   - Limited stimulation brain injury guidelines   - Neuro checks    Lower back pain   - Films all negative for acute injury   - Consult Ortho Spine   - NV checks    Pain control    - Tylenol, Percocet and Fentanyl PRN   - Flexeril    Up with assistance  General diet  Repeat labs in AM  PT/OT eval and treat  Prophylaxis: SCDs, IS, C&DB, Pepcid, stool softeners  Resume home medications once clarified  Discharge disposition pending clinical course      Activation: []Level I (Trauma Alert) []Level II (Injury Call) [x]Level III (Trauma Consult) []Downgraded  Mode of Arrival: family  Referring Facility: N/A  Loss of Consciousness []No []Yes[x]Unknown    Mechanism of Injury:  []Motor Vehicle crash   []Single Vehicle [] []Passenger []Scene Fatality []Front Seat  []Restrained   []Air Bag Deployed   []Ejected []Rollover []Pedestrian []Trapped   Type of vehicle:   Protective Devices:   []Motorcycle  Wearing Helmet []Yes []No  []Bicycle  Wearing Helmet []Yes []No  [x]Fall   Distance - unknown   []Assault    Abuse Reported []Yes []No  []Gunshot  []Stabbing  []Work Related  []Burn: []Flame []Scald []Electrical []Chemical []Contact []Inhalation []House Fire  []Other:   Patient Active Problem List   Diagnosis    Dyslipidemia    HTN, goal below 140/90    Leg cramps    GERD (gastroesophageal reflux disease)    Major depressive disorder, recurrent episode, mild (HCC)    Type 2 diabetes mellitus with diabetic neuropathy, without long-term current use of insulin ([facility]    Fall    Subarachnoid hemorrhage ([facility]    Traumatic hematoma of forehead     Subjective   Chief Complaint: Fall, back pain    History of Present Illness:  Siena Donovan is a 70year old female presenting to the Emergency Department for evaluation of potential injuries sustained during a fall. She reports that she had gone into her basement for a vacuum and does not recall anything after that until her  was helping her up the steps.  states that he heard her scream and found her lying on the basement floor. It is unclear how far she fell and if she had a loss of consciousness as the fall was not witnessed.  states that the patient was babbling incoherently when he found her. She arrives complaining of amnesia surrounding fall, upper back pain and lower back pain as well as difficulty moving the right lower extremity. She denies headache, visual changes, paresthesias, nausea. She has a history of lumbar fusion a number of years ago in [place] and has followed with Dr. Cirilo Peralta since moving to this area approximately 7 years ago. Review of Systems:   Review of Systems   Constitutional: Negative for activity change, appetite change, chills, diaphoresis, fatigue, fever and unexpected weight change. HENT: Negative for congestion, dental problem, drooling, ear discharge, ear pain, facial swelling, hearing loss, mouth sores, nosebleeds, postnasal drip, rhinorrhea, sinus pressure, sneezing, sore throat, tinnitus, trouble swallowing and voice change. Eyes: Negative for photophobia, pain, discharge, redness, itching and visual disturbance.    Respiratory: Negative for apnea, cough, choking, chest tightness, shortness of breath, wheezing and stridor. Cardiovascular: Negative for chest pain, palpitations and leg swelling. Gastrointestinal: Negative for abdominal distention, abdominal pain, blood in stool, constipation, diarrhea, nausea and vomiting. Endocrine: Negative for cold intolerance, heat intolerance, polydipsia, polyphagia and polyuria. Genitourinary: Negative for difficulty urinating, dysuria, flank pain, frequency, hematuria and urgency. Musculoskeletal: Positive for back pain, gait problem, myalgias and neck pain. Negative for arthralgias, joint swelling and neck stiffness. Skin: Positive for wound. Negative for color change, pallor and rash. Allergic/Immunologic: Negative for environmental allergies, food allergies and immunocompromised state. Neurological: Positive for weakness. Negative for dizziness, tremors, seizures, syncope, facial asymmetry, speech difficulty, light-headedness, numbness and headaches. Hematological: Negative for adenopathy. Does not bruise/bleed easily. Psychiatric/Behavioral: Positive for confusion. Negative for agitation, behavioral problems, decreased concentration, dysphoric mood, hallucinations, self-injury, sleep disturbance and suicidal ideas. The patient is not nervous/anxious and is not hyperactive.         Norco [hydrocodone-acetaminophen]; Tramadol; and Codeine  Past Surgical History:   Procedure Laterality Date    APPENDECTOMY      CARPAL TUNNEL RELEASE Left 2011    CATARACT REMOVAL Bilateral     CERVICAL FUSION  1976    CHOLECYSTECTOMY      COLON SURGERY  2012    COLONOSCOPY  2015    EYE SURGERY      CATARACTS    FINGER SURGERY Right 10/2016    3rd digit    HERNIA REPAIR      HYSTERECTOMY      JOINT REPLACEMENT Bilateral     TOTAL KNEES    TX LAMINECTOMY,>2 SGMT,LUMBAR      6 FUSIONS    ROTATOR CUFF REPAIR Right 11/28/2014    SHOULDER SURGERY  2014    SPINE SURGERY      TENDON RELEASE Right 05/18/2017    at [address]  [phone]     Past Medical History:   Diagnosis Date    Arthritis     Arthritis     DM type 2, goal A1c below 7     GERD (gastroesophageal reflux disease)     Hyperlipidemia     Hypertension     Loss of vision     Mitral valve disorder     Osteoporosis     TIA (transient ischemic attack)    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    Urinary incontinence      Past Surgical History:   Procedure Laterality Date    APPENDECTOMY      CARPAL TUNNEL RELEASE Left 2011    CATARACT REMOVAL Bilateral     CERVICAL FUSION  1976    CHOLECYSTECTOMY      COLON SURGERY  2012    COLONOSCOPY  2015    EYE SURGERY      CATARACTS    FINGER SURGERY Right 10/2016    3rd digit    HERNIA REPAIR      HYSTERECTOMY      JOINT REPLACEMENT Bilateral     TOTAL KNEES    NC LAMINECTOMY,>2 SGMT,LUMBAR      6 FUSIONS    ROTATOR CUFF REPAIR Right 11/28/2014    SHOULDER SURGERY  2014    SPINE SURGERY      TENDON RELEASE Right 05/18/2017    at [address]  [phone]     Social History     Socioeconomic History    Marital status:      Spouse name: None    Number of children: None    Years of education: None    Highest education level: None   Occupational History    None   Social Needs    Financial resource strain: None    Food insecurity:     Worry: None     Inability: None    Transportation needs:     Medical: None     Non-medical: None   Tobacco Use    Smoking status: Never Smoker    Smokeless tobacco: Never Used   Substance and Sexual Activity    Alcohol use: No    Drug use: No    Sexual activity: None   Lifestyle    Physical activity:     Days per week: None     Minutes per session: None    Stress: None   Relationships    Social connections:     Talks on phone: None     Gets together: None     Attends Confucianism service: None     Active member of club or organization: None     Attends meetings of clubs or organizations: None     Relationship status: None    Intimate partner violence:     Fear of current or ex partner: None     Emotionally abused: None     Physically abused: None     Forced sexual activity: None   Other Topics Concern    None   Social History Narrative    None     Family History   Problem Relation Age of Onset    Arthritis Mother     Cancer Mother     Heart Disease Father     High Blood Pressure Father        Home medications:    Current Discharge Medication List      CONTINUE these medications which have NOT CHANGED    Details   !! atenolol (TENORMIN) 25 MG tablet Take 1 tablet by mouth daily  Qty: 30 tablet, Refills: 0    Associated Diagnoses: HTN, goal below 140/90      !! amLODIPine (NORVASC) 10 MG tablet Take 1 tablet by mouth daily  Qty: 30 tablet, Refills: 0    Associated Diagnoses: HTN, goal below 140/90      metFORMIN (GLUCOPHAGE) 500 MG tablet TAKE 1 TABLET TWICE A DAY WITH MEALS  Qty: 180 tablet, Refills: 3    Associated Diagnoses: Controlled type 2 diabetes mellitus with complication, without long-term current use of insulin ([facility])      quinapril (ACCUPRIL) 10 MG tablet Take 1 tablet by mouth nightly  Qty: 90 tablet, Refills: 3      pantoprazole (PROTONIX) 40 MG tablet Take 1 tablet by mouth 2 times daily  Qty: 180 tablet, Refills: 3    Associated Diagnoses: Gastroesophageal reflux disease without esophagitis      sertraline (ZOLOFT) 100 MG tablet Take 2 tablets by mouth daily  Qty: 180 tablet, Refills: 3    Associated Diagnoses: Major depressive disorder, recurrent episode, mild (HCC)      atorvastatin (LIPITOR) 40 MG tablet TAKE 1 TABLET DAILY  Qty: 90 tablet, Refills: 3      hydrALAZINE (APRESOLINE) 50 MG tablet Take 1 tablet by mouth 2 times daily  Qty: 180 tablet, Refills: 3      venlafaxine (EFFEXOR) 75 MG tablet Take 75 mg by mouth 3 times daily      oxyCODONE-acetaminophen (PERCOCET) 5-325 MG per tablet Take 1 tablet by mouth every 4 hours as needed for Pain..      baclofen (LIORESAL) 10 MG tablet Take 1 tablet by mouth 3 times daily as needed (back spasms)  Qty: 30 tablet, Refills: 0    Associated Diagnoses: Lumbar back pain      Handicap Placard MISC by Does not apply route Expires 10/14/21. Qty: 1 each, Refills: 0    Associated Diagnoses: Chronic low back pain with sciatica, sciatica laterality unspecified, unspecified back pain laterality      fluticasone (FLONASE) 50 MCG/ACT nasal spray 2 sprays by Nasal route daily  Qty: 1 Bottle, Refills: 0    Associated Diagnoses: Acute intractable headache, unspecified headache type; Scalp pain      gabapentin (NEURONTIN) 300 MG capsule Take 2 capsules by mouth 3 times daily  Qty: 540 capsule, Refills: 3    Associated Diagnoses: Type 2 diabetes mellitus with diabetic neuropathy, without long-term current use of insulin ([facility] [address] Scalp pain      ibuprofen (ADVIL;MOTRIN) 600 MG tablet Take 1 tablet by mouth 3 times daily as needed for Pain (Take with food 3 times daily for pain)  Qty: 15 tablet, Refills: 0      PROAIR  (90 BASE) MCG/ACT inhaler USE 2 INHALATIONS EVERY 6 HOURS AS NEEDED FOR WHEEZING  Qty: 25.5 g, Refills: 2    Associated Diagnoses: Wheezing; Dyspnea on exertion      !! atenolol (TENORMIN) 25 MG tablet TAKE 1 TABLET DAILY  Qty: 90 tablet, Refills: 3      Lancets MISC Use once daily and PRN  Qty: 100 each, Refills: 2    Associated Diagnoses: DM II (diabetes mellitus, type II), controlled ([address]      ! ! amLODIPine (NORVASC) 10 MG tablet Take 10 mg by mouth daily      Glucose Blood (BLOOD GLUCOSE TEST STRIPS) STRP True Trak test strips  Patient checks once daily  Qty: 100 strip, Refills: 3      Elastic Bandages & Supports (T.E.D. BELOW KNEE/LARGE) MISC Use as directed  Qty: 2 each, Refills: 0    Associated Diagnoses: Bilateral leg edema      aspirin 325 MG EC tablet Take 325 mg by mouth daily. vitamin D (CHOLECALCIFEROL) 1000 UNIT TABS tablet Take 1,000 Units by mouth nightly.        !! - Potential duplicate medications found. Please discuss with provider.           Hospital medications:  Scheduled Meds:  Continuous Infusions:  PRN Meds:  Objective   ED TRIAGE VITALS  BP: (!) 164/81, Temp: 98 °F (36.7 °C), Pulse: 73, Resp: 13, SpO2: 95 %  Bear Coma Scale  Eye Opening: Spontaneous  Best Verbal Response: Oriented  Best Motor Response: Obeys commands  Hobbs Coma Scale Score: 15  Results for orders placed or performed during the hospital encounter of 04/25/19   CBC auto differential   Result Value Ref Range    WBC 9.1 4.8 - 10.8 thou/mm3    RBC 5.14 4.20 - 5.40 mill/mm3    Hemoglobin 15.5 12.0 - 16.0 gm/dl    Hematocrit 45.8 37.0 - 47.0 %    MCV 89.1 81.0 - 99.0 fL    MCH 30.2 26.0 - 33.0 pg    MCHC 33.8 32.2 - 35.5 gm/dl    RDW-CV 13.5 11.5 - 14.5 %    RDW-SD 43.8 35.0 - 45.0 fL    Platelets 477 940 - 267 thou/mm3    MPV 11.3 9.4 - 12.4 fL    Seg Neutrophils 77.8 %    Lymphocytes 12.5 %    Monocytes 6.0 %    Eosinophils 3.0 %    Basophils 0.3 %    Immature Granulocytes 0.4 %    Segs Absolute 7.1 1.8 - 7.7 thou/mm3    Lymphocytes # 1.1 1.0 - 4.8 thou/mm3    Monocytes # 0.5 0.4 - 1.3 thou/mm3    Eosinophils # 0.3 0.0 - 0.4 thou/mm3    Basophils # 0.0 0.0 - 0.1 thou/mm3    Immature Grans (Abs) 0.04 0.00 - 0.07 thou/mm3    nRBC 0 /100 wbc   Brain Natriuretic Peptide   Result Value Ref Range    Pro-.9 0.0 - 1800.0 pg/mL   Basic Metabolic Panel   Result Value Ref Range    Sodium 145 135 - 145 meq/L    Potassium 4.2 3.5 - 5.2 meq/L    Chloride 105 98 - 111 meq/L    CO2 24 23 - 33 meq/L    Glucose 143 (H) 70 - 108 mg/dL    BUN 19 7 - 22 mg/dL    CREATININE 0.9 0.4 - 1.2 mg/dL    Calcium 9.6 8.5 - 10.5 mg/dL   Hepatic function panel   Result Value Ref Range    Alb 4.6 3.5 - 5.1 g/dL    Total Bilirubin 0.5 0.3 - 1.2 mg/dL    Bilirubin, Direct <0.2 0.0 - 0.3 mg/dL    Alkaline Phosphatase 77 38 - 126 U/L    AST 27 5 - 40 U/L    ALT 29 11 - 66 U/L    Total Protein 7.8 6.1 - 8.0 g/dL   Lipase   Result Value Ref Range    Lipase 21.0 5.6 - 51.3 U/L   Troponin   Result Value Ref Range    Troponin T < 0.010 ng/ml   Magnesium   Result Value Ref Range    Magnesium 1.9 1.6 - 2.4 mg/dL   Anion Gap   Result Value Ref Range    Anion Gap 16.0 8.0 - 16.0 meq/L   Glomerular Filtration Rate, Estimated   Result Value Ref Range    Est, Glom Filt Rate 61 (A) ml/min/1.73m2   Osmolality   Result Value Ref Range    Osmolality Calc 293.4 275.0 - 300 mOsmol/kg   EKG Emergency   Result Value Ref Range    Ventricular Rate 74 BPM    Atrial Rate 74 BPM    P-R Interval 152 ms    QRS Duration 88 ms    Q-T Interval 404 ms    QTc Calculation (Bazett) 448 ms    P Axis 48 degrees    R Axis 10 degrees    T Axis 31 degrees       Physical Exam:  Patient Vitals for the past 24 hrs:   BP Temp Temp src Pulse Resp SpO2 Height Weight   04/25/19 1447 (!) 164/81 -- -- 73 13 -- -- --   04/25/19 1420 (!) 190/97 -- -- 77 21 95 % -- --   04/25/19 1400 (!) 171/85 -- -- 79 18 96 % -- --   04/25/19 1350 (!) 171/81 -- -- 76 20 96 % -- --   04/25/19 1335 (!) 151/67 -- -- 72 13 96 % -- --   04/25/19 1315 (!) 190/90 -- -- 79 12 96 % -- --   04/25/19 1130 (!) 169/82 98 °F (36.7 °C) Oral 82 18 96 % 5' 4\" (1.626 m) 230 lb (104.3 kg)     Primary Assessment:  Airway: Patent, trachea midline  Breathing: Breath sounds present and equal bilaterally, spontaneous, and unlabored  Circulation: Hemodynamically stable, 2+ central and peripheral pulses. Disability: GUY x 4, following commands. GCS =15    Secondary Assessment:  General: Alert, NAD. Head: Normocephalic, mid face stable. Hematoma to right upper forehead/scalp region. Tympanic membranes intact. Nares patent bilaterally, no epistaxis. Mouth clear of foreign bodies, no lacerations or abrasions. Eyes: PERRLA. EOMI. Nontraumatic. Neurologic: A & O x3. Following commands. CN 2-12 intact  Neck: trachea midline. Cervical spines NTTP midline, without step-offs, crepitus or deformity.   Back:TL spines are TTP midline but without step-offs, crepitus or deformity. No abrasions, contusions, or ecchymosis noted. Lumbar fusion scar noted  Lungs: Clear to auscultation bilaterally. Chest Wall: Chest rise symmetrical.  Chest wall without tenderness to palpation. No crepitus, deformities, lacerations, or abrasions. Heart: RRR. Normal S1/S2. No obvious M/G/R. Abdomen:  Soft, NTTP. No guarding. Non-peritoneal.  Pelvis:  NTTP, stable to compression. Femoral pulses 2+. GI/: No blood at the urinary meatus. No gross hematuria. Extremities: No gross deformities. PMS intact. Radial /DP/PT pulses 2+ bilaterally. Right leg lift weak. Skin: Skin warm and dry. Normal for ethnicity. Radiology:     XR LUMBAR SPINE (2-3 VIEWS)   Final Result   No acute osseous findings. **This report has been created using voice recognition software. It may contain minor errors which are inherent in voice recognition technology. **      Final report electronically signed by Dr. Hema Cast on 4/25/2019 1:41 PM      XR KNEE LEFT (MIN 4 VIEWS)   Final Result   No acute fracture or dislocation. **This report has been created using voice recognition software. It may contain minor errors which are inherent in voice recognition technology. **      Final report electronically signed by Dr. Hema Cast on 4/25/2019 1:37 PM      XR HAND LEFT (MIN 3 VIEWS)   Final Result   No acute fracture or dislocation. **This report has been created using voice recognition software. It may contain minor errors which are inherent in voice recognition technology. **      Final report electronically signed by Dr. Hema Cast on 4/25/2019 1:39 PM      XR CHEST STANDARD (2 VW)   Final Result      No acute findings. **This report has been created using voice recognition software. It may contain minor errors which are inherent in voice recognition technology. **      Final report electronically signed by Dr. Hema Cast on 4/25/2019 1:43 PM      CT CERVICAL SPINE WO CONTRAST   Final Result   No acute fracture or subluxation. **This report has been created using voice recognition software. It may contain minor errors which are inherent in voice recognition technology. **      Final report electronically signed by Dr. Buffy Leal on 4/25/2019 12:55 PM      CT HEAD WO CONTRAST   Final Result   Small amount of acute subarachnoid hemorrhage left frontal sulcus. Frontal scalp hematoma. These results were communicated directly with  [ Angel Roy on 4/25/2019 12:52 PM,  [ ]       **This report has been created using voice recognition software. It may contain minor errors which are inherent in voice recognition technology. **      Final report electronically signed by Dr. Buffy Leal on 4/25/2019 12:52 PM        Fast Exam: No    Electronically signed by SEAN Foster CNP on 4/25/2019 at 2:47 PM      Patient seen and examined independently by me. Above discussed and I agree with Ayesha Armstrong CNP. See my additional comments below for updated orders and plan. Labs, cultures, and radiographs where available were reviewed. I discussed patient concerns with the patient's nurse and instructions were given. Please see our orders for the updated patient care plan. - Neurosurgery consultation. Repeat CT head. Speech therapy with cognition evaluation. Neurovascular checks. Orthopedic spine consultation. CT spine. A.m. labs. Advance diet as tolerated. SCDs for DVT prophylaxis. PT/OT. ICU observation overnight.     Electronically signed by Dipika Weber MD on 4/25/2019 at 5:09 PM

## 2019-04-25 NOTE — ED PROVIDER NOTES
[facility]  eMERGENCY dEPARTMENT eNCOUnter          CHIEF COMPLAINT       Chief Complaint   Patient presents with    Fall       Nurses Notes reviewed and I agreeexcept as noted in the HPI. HISTORY OF PRESENT ILLNESS    Raymundo Maldonado is a 70 y.o. female who presents to the Emergency Department for the evaluation following a fall. The patient's  states that he heard a \"scream\" causing him to check on the patient. He reports to have found the patient at the bottom of the steps on a concrete floor and reports a head trauma but denies any LOC. Patient reports that she is unable to recall the events that transpired prior to her fall. Patient was ambulatory following her fall. She states to have chronic back pain but reports that her pain is exacerbated from her baseline. Patient rates her current pain as a 10/10 in severity and aching in character. Her pain is exacerbated secondary to movement. The patient denies any numbness, weakness, or associated paresthesias. She denies any neck pain, chest pain, or abdominal pain. The patient denies any diplopia, visual disturbances, blurred vision, or additional neurological symptoms. The patient reports no additional symptoms or complaints at this time. The HPI was provided by the patient. REVIEW OF SYSTEMS     Review of Systems   Constitutional: Negative for activity change, appetite change, diaphoresis, fatigue and unexpected weight change. HENT: Negative for congestion, ear discharge, ear pain, hearing loss, rhinorrhea, sinus pressure, sore throat, trouble swallowing and voice change. Eyes: Negative for photophobia, pain, discharge, redness and itching. Respiratory: Negative for cough, choking, chest tightness, shortness of breath and wheezing. Cardiovascular: Negative for chest pain, palpitations and leg swelling.    Gastrointestinal: Negative for abdominal distention, abdominal pain, blood in stool, constipation, diarrhea, nausea and vomiting. Endocrine: Negative for polydipsia, polyphagia and polyuria. Genitourinary: Negative for decreased urine volume, difficulty urinating, dysuria, enuresis, frequency, hematuria and urgency. Musculoskeletal: Positive for back pain. Negative for arthralgias, gait problem, myalgias, neck pain and neck stiffness. Skin: Negative for pallor and rash. Allergic/Immunologic: Negative for immunocompromised state. Neurological: Negative for dizziness, tremors, seizures, syncope, facial asymmetry, weakness, light-headedness, numbness and headaches. Hematological: Negative for adenopathy. Does not bruise/bleed easily. Psychiatric/Behavioral: Negative for agitation, hallucinations and suicidal ideas. The patient is not nervous/anxious. PAST MEDICAL HISTORY    has a past medical history of Arthritis, Chronic low back pain, DM type 2, goal A1c below 7, GERD (gastroesophageal reflux disease), Hyperlipidemia, Hypertension, Loss of vision, Mitral valve disorder, Osteoporosis, PAF (paroxysmal atrial fibrillation) ([facility]), Subarachnoid hemorrhage ([facility] [address] TIA (transient ischemic attack), Type 2 diabetes mellitus with diabetic neuropathy, without long-term current use of insulin ([facility] [address] Unspecified cerebral artery occlusion with cerebral infarction, and Urinary incontinence. SURGICALHISTORY      has a past surgical history that includes Appendectomy; Cholecystectomy; hernia repair; Hysterectomy; Spine surgery; Carpal tunnel release (Left, 2011); pr laminectomy,>2 sgmt,lumbar; cervical fusion (1976); eye surgery; Rotator cuff repair (Right, 11/28/2014); shoulder surgery (2014); Cataract removal (Bilateral); Colon surgery (2012); Colonoscopy (2015); Upper gastrointestinal endoscopy (8802,[id number]); Finger surgery (Right, 10/2016); tendon release (Right, 05/18/2017); and Total knee arthroplasty (Bilateral).     CURRENT MEDICATIONS       Discharge Medication List as of 4/28/2019 11:52 AM      CONTINUE these medications which have NOT CHANGED    Details   amLODIPine (NORVASC) 10 MG tablet Take 1 tablet by mouth daily, Disp-30 tablet, R-0Normal      metFORMIN (GLUCOPHAGE) 500 MG tablet TAKE 1 TABLET TWICE A DAY WITH MEALS, Disp-180 tablet, R-3Normal      quinapril (ACCUPRIL) 10 MG tablet Take 1 tablet by mouth nightly, Disp-90 tablet, R-3Normal      pantoprazole (PROTONIX) 40 MG tablet Take 1 tablet by mouth 2 times daily, Disp-180 tablet, R-3Normal      sertraline (ZOLOFT) 100 MG tablet Take 2 tablets by mouth daily, Disp-180 tablet, R-3Normal      atorvastatin (LIPITOR) 40 MG tablet TAKE 1 TABLET DAILY, Disp-90 tablet, R-3Normal      hydrALAZINE (APRESOLINE) 50 MG tablet Take 1 tablet by mouth 2 times daily, Disp-180 tablet, R-3Normal      atenolol (TENORMIN) 25 MG tablet TAKE 1 TABLET DAILY, Disp-90 tablet, R-3      vitamin D (CHOLECALCIFEROL) 1000 UNIT TABS tablet Take 1,000 Units by mouth nightly. Handicap Placard [facility] Starting 2018, Disp-1 each, R-0, PrintExpires 10/14/21. gabapentin (NEURONTIN) 300 MG capsule Take 2 capsules by mouth 3 times daily, Disp-540 capsule, R-3Normal      ibuprofen (ADVIL;MOTRIN) 600 MG tablet Take 1 tablet by mouth 3 times daily as needed for Pain (Take with food 3 times daily for pain), Disp-15 tablet, R-0Print      Lancets MISC Disp-100 each, R-2, NormalUse once daily and PRN      Glucose Blood (BLOOD GLUCOSE TEST STRIPS) STRP True Trak test strips  Patient checks once daily, Disp-100 strip, R-3      Elastic Bandages & Supports (T.E.D. BELOW KNEE/LARGE) MISC Disp-2 each, R-0, PrintUse as directed             ALLERGIES     is allergic to norco [hydrocodone-acetaminophen]; tramadol; and codeine. FAMILY HISTORY     indicated that her mother is . She indicated that her father is . She indicated that her brother is alive.    family history includes Arthritis in her mother; Cancer in her mother; Heart Disease in her father; High Blood Pressure in her father. SOCIAL HISTORY       Social History     Socioeconomic History    Marital status:      Spouse name: Not on file    Number of children: Not on file    Years of education: Not on file    Highest education level: Not on file   Occupational History    Not on file   Social Needs    Financial resource strain: Not on file    Food insecurity:     Worry: Not on file     Inability: Not on file    Transportation needs:     Medical: Not on file     Non-medical: Not on file   Tobacco Use    Smoking status: Never Smoker    Smokeless tobacco: Never Used   Substance and Sexual Activity    Alcohol use: No    Drug use: No    Sexual activity: Not on file   Lifestyle    Physical activity:     Days per week: Not on file     Minutes per session: Not on file    Stress: Not on file   Relationships    Social connections:     Talks on phone: Not on file     Gets together: Not on file     Attends [employer] service: Not on file     Active member of club or organization: Not on file     Attends meetings of clubs or organizations: Not on file     Relationship status: Not on file    Intimate partner violence:     Fear of current or ex partner: Not on file     Emotionally abused: Not on file     Physically abused: Not on file     Forced sexual activity: Not on file   Other Topics Concern    Not on file   Social History Narrative    Not on file       PHYSICAL EXAM     INITIAL VITALS:  height is 5' 4\" (1.626 m) and weight is 269 lb 8 oz (122.2 kg). Her oral temperature is 98.3 °F (36.8 °C). Her blood pressure is 137/63 and her pulse is 81. Her respiration is 16 and oxygen saturation is 92%. Physical Exam   Constitutional: She is oriented to person, place, and time. She appears well-developed and well-nourished. No distress. HENT:   Head: Normocephalic. Head is with contusion (forehead).    Right Ear: External ear normal.   Left Ear: External ear normal.   Nose: Nose normal.   Mouth/Throat: Oropharynx is such as CT, Ultrasound and MRI are read by the radiologist.    [address]   Final Result    Slight interval decrease in conspicuity of hyperdense subarachnoid blood at the left parafalcine frontal region with stable ventricular size. **This report has been created using voice recognition software. It may contain minor errors which are inherent in voice recognition technology. **      Final report electronically signed by Dr. Mookie Patel MD on 4/28/2019 8:19 AM      CT HEAD WO CONTRAST   Final Result   1. Stable focus of suspected subarachnoid hemorrhage along the left frontal parietal grey-white junctions. 2. Negative exam for acute changes. **This report has been created using voice recognition software. It may contain minor errors which are inherent in voice recognition technology. **      Final report electronically signed by Dr. Brigitte Lemus on 4/26/2019 5:42 AM      CT THORACIC SPINE WO CONTRAST   Final Result    No acute fracture or subluxation. Lower cervical, thoracic, and upper lumbar degenerative disc disease and DJD. Status post C6 corpectomy and fusion from C5 through C7. Status post L1 and L2 laminectomies and pedicle screws are present in L2, distal extent not imaged. Bilateral hydronephrosis and/or parapelvic cysts, see the accompanying lumbar spine CT report. **This report has been created using voice recognition software. It may contain minor errors which are inherent in voice recognition technology. **      Final report electronically signed by Dr. Kera Street on 4/25/2019 5:35 PM      CT LUMBAR SPINE WO CONTRAST   Final Result    No acute fracture. Old moderate L4 compression fracture. 3 mm anterolisthesis of L2 on L3, stabilized by the pedicle screws. Extensive postoperative changes of the lumbar spine as discussed above. Multilevel moderate to severe degenerative disc disease.          **This report has been created using voice recognition software. It may contain minor errors which are inherent in voice recognition technology. **      Final report electronically signed by Dr. Tara Tapia on 4/25/2019 5:45 PM      XR LUMBAR SPINE (2-3 VIEWS)   Final Result   No acute osseous findings. **This report has been created using voice recognition software. It may contain minor errors which are inherent in voice recognition technology. **      Final report electronically signed by Dr. Mich Gomez on 4/25/2019 1:41 PM      XR KNEE LEFT (MIN 4 VIEWS)   Final Result   No acute fracture or dislocation. **This report has been created using voice recognition software. It may contain minor errors which are inherent in voice recognition technology. **      Final report electronically signed by Dr. Mich Gomez on 4/25/2019 1:37 PM      XR HAND LEFT (MIN 3 VIEWS)   Final Result   No acute fracture or dislocation. **This report has been created using voice recognition software. It may contain minor errors which are inherent in voice recognition technology. **      Final report electronically signed by Dr. Mich Gomez on 4/25/2019 1:39 PM      XR CHEST STANDARD (2 VW)   Final Result      No acute findings. **This report has been created using voice recognition software. It may contain minor errors which are inherent in voice recognition technology. **      Final report electronically signed by Dr. Mich Gomez on 4/25/2019 1:43 PM      CT CERVICAL SPINE WO CONTRAST   Final Result   No acute fracture or subluxation. **This report has been created using voice recognition software. It may contain minor errors which are inherent in voice recognition technology. **      Final report electronically signed by Dr. Mich Gomez on 4/25/2019 12:55 PM      CT HEAD WO CONTRAST   Final Result   Small amount of acute subarachnoid hemorrhage left frontal sulcus. Frontal scalp hematoma.       These results were communicated directly with Dr. Nils Donaldson on 4/25/2019 12:52 PM,  [ ]       **This report has been created using voice recognition software. It may contain minor errors which are inherent in voice recognition technology. **      Final report electronically signed by Dr. Heather Riley on 4/25/2019 12:52 PM      CT HEAD WO CONTRAST    (Results Pending)       LABS:   Labs Reviewed   BASIC METABOLIC PANEL - Abnormal; Notable for the following components:       Result Value    Glucose 143 (*)     All other components within normal limits   GLOMERULAR FILTRATION RATE, ESTIMATED - Abnormal; Notable for the following components:    Est, Glom Filt Rate 61 (*)     All other components within normal limits   BASIC METABOLIC PANEL W/ REFLEX TO MG FOR LOW K - Abnormal; Notable for the following components:    CO2 22 (*)     Glucose 134 (*)     All other components within normal limits   GLOMERULAR FILTRATION RATE, ESTIMATED - Abnormal; Notable for the following components:    Est, Glom Filt Rate 48 (*)     All other components within normal limits   MRSA SCREENING CULTURE ONLY    Narrative:     Source: rectal       Site: swab          Current Antibiotics: not stated   VRE SCREEN BY PCR   CBC WITH AUTO DIFFERENTIAL   BRAIN NATRIURETIC PEPTIDE   HEPATIC FUNCTION PANEL   LIPASE   TROPONIN   MAGNESIUM   ANION GAP   OSMOLALITY   MRSA BY PCR   CBC   ANION GAP       EMERGENCY DEPARTMENT COURSE:   Vitals:    Vitals:    04/27/19 2316 04/28/19 0416 04/28/19 0807 04/28/19 0946   BP: 137/61 120/72 130/60 137/63   Pulse: 87 81 78 81   Resp:  18 16    Temp:  98.5 °F (36.9 °C) 98.3 °F (36.8 °C)    TempSrc:  Oral Oral    SpO2:  95% 92%    Weight:  269 lb 8 oz (122.2 kg)     Height:         11:55 AM: The patient was seen and evaluated. Appropriate labs were ordered and reviewed. Patient had a fall down several stairs. She did have what appears to be a concussion as she was awake and talking to the  but was kind of out of it.   She got up the stairs and walk. She was complaining about back pain and she has pulmonary pain on the left-hand this is what she initially came in for. She does have a hematoma on the right frontal scalp. Evaluation of the CT scan of the head revealed a small subarachnoid hemorrhage on the left frontal area. There is also a scalp hematoma. CT cervical spine was negative. At this point I called and spoke with Dr. Sandy Salcido and discussed case with him. He graciously accepted the admission. I then called neurosurgery and spoke with Dr. Brie Solis discussed the case he graciously accepted the consult. Patient remained hemodynamically stable throughout course. Patient is admitted to trauma surgery pending further evaluation and treatment. 12:58 PM: The case was discussed with Dr. Sandy Salcido (general surgery)    12:59 PM: The case was discussed with Dr. Brie Solis (neurosurgery)    CRITICAL CARE:   None     CONSULTS:  Dr Sandy Mcdermott:  None    FINAL IMPRESSION      1. Fall down stairs, initial encounter    2. Subarachnoid hemorrhage (HCC)    3. Closed displaced fracture of middle phalanx of left little finger, initial encounter    4. Fall, subsequent encounter    5. Closed head injury, initial encounter          DISPOSITION/PLAN   Admission    PATIENT REFERRED TO:  Keenan Lizama DO  [address]  [address] [facility]  [phone]    On 5/10/2019  10:30, Hospital follow up    Jaquan Aiken MD  [facility]    On 5/3/2019  9:30      DISCHARGE MEDICATIONS:  Discharge Medication List as of 4/28/2019 11:52 AM      START taking these medications    Details   cyclobenzaprine (FLEXERIL) 10 MG tablet Take 1 tablet by mouth 3 times daily as needed for Muscle spasms, Disp-30 tablet, R-0Normal      diphenhydrAMINE (BENADRYL) 25 MG tablet Take 1 tablet by mouth every 6 hours as needed for ItchingOTC             (Please note that portions of this note were completed with a voice recognition program.  Efforts were made to edit thedictations but occasionally words are mis-transcribed.)    Scribe:  Adiel Isaac 4/25/19 11:55 AM Scribing for and in the presence of Max Rouse DO. Scribe: Adiel Isaac 4/25/19 11:55 AM    Provider:  I personally performed the services described in the documentation, reviewed and edited the documentation which was dictated to the scribe inmy presence, and it accurately records my words and actions.     Max Rouse DO 4/25/19 9:49 PM       Max Rouse DO  04/28/19 2666

## 2019-04-25 NOTE — ED NOTES
Pt c/o fall this AM. Pt states she fell down the basement steps and ripped the banister off the wall.  found her at the bottom of the steps. Pt states she cannot remember if she lost consciousness or if her left knee gave out. Pt states her left knee has been hurting and not \"working\" well. Pt states she had just gotten out of the shower. Pt states she hit her head but she cannot remember a thing about the incident.  Pt  states pt was in too much pain and so they decided to bring her in.      Richard Cueva, KENDALL  04/25/19 8845

## 2019-04-25 NOTE — CONSULTS
Consults     Attending Note:    Patient seen and examined in conjunction with neurosurgery ELIZABETH Guan PA-C). Discussed with trauma team and ICU team  as well. All data and imaging reviewed by myself. I agree with examination assessment and plan as documented below. Please See my additional comments below for updated orders and plan. - This is a 70year old female who had a ground-level fall. Despite patient does not remember the event however there is no new neurological deficits after that fall.   - Patient underwent brain CT that showed small subarachnoid hemorrhage .  - At this time,  there is no indication for any acute neuro-surgical intervention for this patient. The aspirin needs to be stopped. We will  repeat the Brain CT  tomorrow morning.  - I discussed with patient and her  her current medical condition the finding of her brain CT, as well as her treatment plan. - All questions and concerns were addressed and answered. - Patient was in agreement with the above treatment plan. Neptali Suarez MD                                                 [address]                                          NEUROSURGICAL CONSULTATION NOTE       Jodie Brar   YOB: 1940  Account Number: [de-identified]   Date of Examination: 4/25/2019    ASSESSMENT:  Follow acute subarachnoid hemorrhage    PLAN:  The Patient is seen and examined along with Dr. Karuna Truong and appears stable and neurologically intact on exam following admission through the emergency department following an unwitnessed mechanical fall on stairs in her home. She denies loss of consciousness but does have some amnesia involving the event. Findings on examination and on imaging do not suggest surgical intervention at this time. We will order a repeat of the CT scan for review in the morning with continued observation.   We also recommend discontinuing her aspirin medication at this time. Neurosurgery to follow. HISTORY OF PRESENT ILLNESS:  Srinivasa Rangel is a very pleasant, normally active nonsmoker, a 70 y.o. female with HTN, HLD, GERD, type 2 diabetes mellitus, known mitral valve disorder, a history for TIA and osteoporosis who was admitted through the emergency department on :4/25/2019 11:27 AM following a trauma sustained in an unwitnessed mechanical fall on stairs in her home at approximately 10:30 this morning. Her , who was present during the exam, denies witnessing the event and cannot confirm a loss of consciousness. The patient is stable and neurologically intact on exam without complaints of diplopia, visual disturbances, headache, or issues with imbalance but does have amnesia for the event. She denies headache but does relate some neck and back discomfort. CT scan performed on arrival reveals a small amount of acute subarachnoid hemorrhage in the left frontal sulcus without any additional abnormalities noted. ROS:    Review of Systems   Constitutional: Negative for activity change. Respiratory: Negative for apnea, chest tightness and shortness of breath. Cardiovascular: Negative for chest pain. Gastrointestinal: Negative for abdominal distention and abdominal pain. Musculoskeletal: Positive for back pain. Negative for arthralgias, gait problem, neck pain and neck stiffness. Neurological: Negative for dizziness, seizures, weakness, numbness and headaches. Psychiatric/Behavioral: Negative for agitation and behavioral problems.           PROBLEM LIST:  Patient Active Problem List   Diagnosis    Dyslipidemia    HTN, goal below 140/90    Leg cramps    GERD (gastroesophageal reflux disease)    Major depressive disorder, recurrent episode, mild (HCC)    Type 2 diabetes mellitus with diabetic neuropathy, without long-term current use of insulin (HCC)       MEDICATIONS:   Prior to Admission medications    Medication Sig Start Date End Date Taking? Authorizing Provider   atenolol (TENORMIN) 25 MG tablet Take 1 tablet by mouth daily 2/7/19   Cory Krishnan MD   amLODIPine (NORVASC) 10 MG tablet Take 1 tablet by mouth daily 2/7/19   Cory Krishnan MD   metFORMIN (GLUCOPHAGE) 500 MG tablet TAKE 1 TABLET TWICE A DAY WITH MEALS 2/1/19   Melina Givens, DO   quinapril (ACCUPRIL) 10 MG tablet Take 1 tablet by mouth nightly 2/1/19   Melina Givens, DO   pantoprazole (PROTONIX) 40 MG tablet Take 1 tablet by mouth 2 times daily 1/31/19   Melina Givens, DO   sertraline (ZOLOFT) 100 MG tablet Take 2 tablets by mouth daily 1/31/19   Melina Givens, DO   atorvastatin (LIPITOR) 40 MG tablet TAKE 1 TABLET DAILY 1/31/19   Melina Givens, DO   hydrALAZINE (APRESOLINE) 50 MG tablet Take 1 tablet by mouth 2 times daily 1/31/19   Cory Krishnan MD   venlafaxine (EFFEXOR) 75 MG tablet Take 75 mg by mouth 3 times daily    Historical Provider, MD   oxyCODONE-acetaminophen (PERCOCET) 5-325 MG per tablet Take 1 tablet by mouth every 4 hours as needed for Pain. Manuela Sharma     Historical Provider, MD   baclofen (LIORESAL) 10 MG tablet Take 1 tablet by mouth 3 times daily as needed (back spasms) 8/6/18   Melina Givens, DO   Handicap Placard MISC by Does not apply route Expires 10/14/21. 7/24/18   Melina Givens, DO   fluticasone [facility]) 50 MCG/ACT nasal spray 2 sprays by Nasal route daily 12/13/17   Melina Givens, DO   gabapentin (NEURONTIN) 300 MG capsule Take 2 capsules by mouth 3 times daily 12/4/17   Melina Givens, DO   ibuprofen (ADVIL;MOTRIN) 600 MG tablet Take 1 tablet by mouth 3 times daily as needed for Pain (Take with food 3 times daily for pain) 12/2/17   Joy Norton MD   PROAIR  (90 BASE) MCG/ACT inhaler USE 2 INHALATIONS EVERY 6 HOURS AS NEEDED FOR WHEEZING 9/19/16   Melina Givens, DO   atenolol (TENORMIN) 25 MG tablet TAKE 1 TABLET DAILY 9/8/16   Melina Givens,    Lancets MISC Use once daily and PRN 2/9/16   Melina Givens, DO   amLODIPine (NORVASC) 10 MG tablet Take 10 mg by mouth daily    Historical Provider, MD   Glucose Blood (BLOOD GLUCOSE TEST STRIPS) STRP True Trak test strips  Patient checks once daily 9/4/14   Melina Givens, DO   Elastic Bandages & Supports (T.E.D. BELOW KNEE/LARGE) MISC Use as directed 6/9/14   Melina Givens DO   aspirin 325 MG EC tablet Take 325 mg by mouth daily. Historical Provider, MD   vitamin D (CHOLECALCIFEROL) 1000 UNIT TABS tablet Take 1,000 Units by mouth nightly. Historical Provider, MD       No current facility-administered medications for this encounter. Current Outpatient Medications   Medication Sig Dispense Refill    atenolol (TENORMIN) 25 MG tablet Take 1 tablet by mouth daily 30 tablet 0    amLODIPine (NORVASC) 10 MG tablet Take 1 tablet by mouth daily 30 tablet 0    metFORMIN (GLUCOPHAGE) 500 MG tablet TAKE 1 TABLET TWICE A DAY WITH MEALS 180 tablet 3    quinapril (ACCUPRIL) 10 MG tablet Take 1 tablet by mouth nightly 90 tablet 3    pantoprazole (PROTONIX) 40 MG tablet Take 1 tablet by mouth 2 times daily 180 tablet 3    sertraline (ZOLOFT) 100 MG tablet Take 2 tablets by mouth daily 180 tablet 3    atorvastatin (LIPITOR) 40 MG tablet TAKE 1 TABLET DAILY 90 tablet 3    hydrALAZINE (APRESOLINE) 50 MG tablet Take 1 tablet by mouth 2 times daily 180 tablet 3    venlafaxine (EFFEXOR) 75 MG tablet Take 75 mg by mouth 3 times daily      oxyCODONE-acetaminophen (PERCOCET) 5-325 MG per tablet Take 1 tablet by mouth every 4 hours as needed for Pain. Christina Richmond baclofen (LIORESAL) 10 MG tablet Take 1 tablet by mouth 3 times daily as needed (back spasms) 30 tablet 0    Handicap Placard MISC by Does not apply route Expires 10/14/21. 1 each 0    fluticasone (FLONASE) 50 MCG/ACT nasal spray 2 sprays by Nasal route daily 1 Bottle 0    gabapentin (NEURONTIN) 300 MG capsule Take 2 capsules by mouth 3 times daily 540 capsule 3    ibuprofen (ADVIL;MOTRIN) 600 MG tablet Take 1 tablet by mouth 3 times daily as needed for Pain (Take with

## 2019-04-26 ENCOUNTER — APPOINTMENT (OUTPATIENT)
Dept: CT IMAGING | Age: 79
DRG: 086 | End: 2019-04-26
Payer: MEDICARE

## 2019-04-26 LAB
ANION GAP SERPL CALCULATED.3IONS-SCNC: 14 MEQ/L (ref 8–16)
BUN BLDV-MCNC: 22 MG/DL (ref 7–22)
CALCIUM SERPL-MCNC: 8.7 MG/DL (ref 8.5–10.5)
CHLORIDE BLD-SCNC: 105 MEQ/L (ref 98–111)
CO2: 22 MEQ/L (ref 23–33)
CREAT SERPL-MCNC: 1.1 MG/DL (ref 0.4–1.2)
ERYTHROCYTE [DISTWIDTH] IN BLOOD BY AUTOMATED COUNT: 13.9 % (ref 11.5–14.5)
ERYTHROCYTE [DISTWIDTH] IN BLOOD BY AUTOMATED COUNT: 43.5 FL (ref 35–45)
GFR SERPL CREATININE-BSD FRML MDRD: 48 ML/MIN/1.73M2
GLUCOSE BLD-MCNC: 134 MG/DL (ref 70–108)
HCT VFR BLD CALC: 37.8 % (ref 37–47)
HEMOGLOBIN: 13.3 GM/DL (ref 12–16)
MCH RBC QN AUTO: 30.2 PG (ref 26–33)
MCHC RBC AUTO-ENTMCNC: 35.2 GM/DL (ref 32.2–35.5)
MCV RBC AUTO: 85.7 FL (ref 81–99)
PLATELET # BLD: 171 THOU/MM3 (ref 130–400)
PMV BLD AUTO: 11.4 FL (ref 9.4–12.4)
POTASSIUM REFLEX MAGNESIUM: 3.7 MEQ/L (ref 3.5–5.2)
RBC # BLD: 4.41 MILL/MM3 (ref 4.2–5.4)
SODIUM BLD-SCNC: 141 MEQ/L (ref 135–145)
WBC # BLD: 7.7 THOU/MM3 (ref 4.8–10.8)

## 2019-04-26 PROCEDURE — 2580000003 HC RX 258: Performed by: NURSE PRACTITIONER

## 2019-04-26 PROCEDURE — 97530 THERAPEUTIC ACTIVITIES: CPT

## 2019-04-26 PROCEDURE — APPSS60 APP SPLIT SHARED TIME 46-60 MINUTES: Performed by: NURSE PRACTITIONER

## 2019-04-26 PROCEDURE — 99232 SBSQ HOSP IP/OBS MODERATE 35: CPT | Performed by: SURGERY

## 2019-04-26 PROCEDURE — 2709999900 HC NON-CHARGEABLE SUPPLY

## 2019-04-26 PROCEDURE — 6370000000 HC RX 637 (ALT 250 FOR IP): Performed by: NURSE PRACTITIONER

## 2019-04-26 PROCEDURE — 99231 SBSQ HOSP IP/OBS SF/LOW 25: CPT | Performed by: PHYSICIAN ASSISTANT

## 2019-04-26 PROCEDURE — 97110 THERAPEUTIC EXERCISES: CPT

## 2019-04-26 PROCEDURE — 70450 CT HEAD/BRAIN W/O DYE: CPT

## 2019-04-26 PROCEDURE — 85027 COMPLETE CBC AUTOMATED: CPT

## 2019-04-26 PROCEDURE — 80048 BASIC METABOLIC PNL TOTAL CA: CPT

## 2019-04-26 PROCEDURE — 6370000000 HC RX 637 (ALT 250 FOR IP): Performed by: SURGERY

## 2019-04-26 PROCEDURE — 97166 OT EVAL MOD COMPLEX 45 MIN: CPT

## 2019-04-26 PROCEDURE — 2060000000 HC ICU INTERMEDIATE R&B

## 2019-04-26 PROCEDURE — 92523 SPEECH SOUND LANG COMPREHEN: CPT

## 2019-04-26 PROCEDURE — 97161 PT EVAL LOW COMPLEX 20 MIN: CPT

## 2019-04-26 PROCEDURE — 36415 COLL VENOUS BLD VENIPUNCTURE: CPT

## 2019-04-26 RX ORDER — DIPHENHYDRAMINE HCL 25 MG
25 TABLET ORAL EVERY 6 HOURS PRN
Status: DISCONTINUED | OUTPATIENT
Start: 2019-04-26 | End: 2019-04-28 | Stop reason: HOSPADM

## 2019-04-26 RX ADMIN — OXYCODONE AND ACETAMINOPHEN 2 TABLET: 5; 325 TABLET ORAL at 05:43

## 2019-04-26 RX ADMIN — OXYCODONE AND ACETAMINOPHEN 2 TABLET: 5; 325 TABLET ORAL at 00:35

## 2019-04-26 RX ADMIN — OXYCODONE AND ACETAMINOPHEN 2 TABLET: 5; 325 TABLET ORAL at 20:53

## 2019-04-26 RX ADMIN — FAMOTIDINE 20 MG: 20 TABLET ORAL at 08:40

## 2019-04-26 RX ADMIN — FAMOTIDINE 20 MG: 20 TABLET ORAL at 20:55

## 2019-04-26 RX ADMIN — DOCUSATE SODIUM 100 MG: 100 CAPSULE, LIQUID FILLED ORAL at 20:55

## 2019-04-26 RX ADMIN — ATORVASTATIN CALCIUM 40 MG: 40 TABLET, FILM COATED ORAL at 20:55

## 2019-04-26 RX ADMIN — GABAPENTIN 600 MG: 300 CAPSULE ORAL at 08:37

## 2019-04-26 RX ADMIN — VITAMIN D, TAB 1000IU (100/BT) 1000 UNITS: 25 TAB at 20:55

## 2019-04-26 RX ADMIN — Medication 10 ML: at 20:55

## 2019-04-26 RX ADMIN — SERTRALINE 200 MG: 100 TABLET, FILM COATED ORAL at 08:34

## 2019-04-26 RX ADMIN — METFORMIN HYDROCHLORIDE 500 MG: 500 TABLET ORAL at 18:25

## 2019-04-26 RX ADMIN — METFORMIN HYDROCHLORIDE 500 MG: 500 TABLET ORAL at 08:34

## 2019-04-26 RX ADMIN — DIPHENHYDRAMINE HCL 25 MG: 25 TABLET ORAL at 02:26

## 2019-04-26 RX ADMIN — GABAPENTIN 600 MG: 300 CAPSULE ORAL at 20:55

## 2019-04-26 ASSESSMENT — PAIN DESCRIPTION - PROGRESSION: CLINICAL_PROGRESSION: GRADUALLY IMPROVING

## 2019-04-26 ASSESSMENT — PAIN DESCRIPTION - PAIN TYPE
TYPE: ACUTE PAIN;CHRONIC PAIN
TYPE: ACUTE PAIN;CHRONIC PAIN
TYPE: CHRONIC PAIN

## 2019-04-26 ASSESSMENT — PAIN DESCRIPTION - ORIENTATION
ORIENTATION: MID;UPPER
ORIENTATION: MID;UPPER

## 2019-04-26 ASSESSMENT — ENCOUNTER SYMPTOMS
BACK PAIN: 1
CHEST TIGHTNESS: 0
ABDOMINAL DISTENTION: 0
ABDOMINAL PAIN: 0
APNEA: 0
SHORTNESS OF BREATH: 0

## 2019-04-26 ASSESSMENT — PAIN SCALES - GENERAL
PAINLEVEL_OUTOF10: 7
PAINLEVEL_OUTOF10: 6
PAINLEVEL_OUTOF10: 7

## 2019-04-26 ASSESSMENT — PAIN DESCRIPTION - FREQUENCY
FREQUENCY: CONTINUOUS
FREQUENCY: CONTINUOUS

## 2019-04-26 ASSESSMENT — PAIN DESCRIPTION - DIRECTION
RADIATING_TOWARDS: NECK/HEAD
RADIATING_TOWARDS: NECK/HEAD

## 2019-04-26 ASSESSMENT — PAIN DESCRIPTION - LOCATION
LOCATION: HEAD;BACK
LOCATION: BACK
LOCATION: HEAD;BACK

## 2019-04-26 ASSESSMENT — PAIN DESCRIPTION - DESCRIPTORS
DESCRIPTORS: ACHING;DISCOMFORT
DESCRIPTORS: ACHING;DISCOMFORT

## 2019-04-26 ASSESSMENT — PAIN - FUNCTIONAL ASSESSMENT
PAIN_FUNCTIONAL_ASSESSMENT: PREVENTS OR INTERFERES SOME ACTIVE ACTIVITIES AND ADLS
PAIN_FUNCTIONAL_ASSESSMENT: PREVENTS OR INTERFERES SOME ACTIVE ACTIVITIES AND ADLS

## 2019-04-26 ASSESSMENT — PAIN DESCRIPTION - ONSET
ONSET: ON-GOING
ONSET: ON-GOING

## 2019-04-26 NOTE — PROGRESS NOTES
Val[facility]  Daily Progress Note      Pt Name: Jet Diaz  Medical Record Number: [id number]  Date of Birth 1940   Today's Date: 4/26/2019    HD: # 1    CC: \"My back is still so sore\"    ASSESSMENT  1. Active Hospital Problems    Diagnosis Date Noted    Fall [W19. XXXA] 04/25/2019    Subarachnoid hemorrhage ([address] [I60.9] 04/25/2019    Traumatic hematoma of forehead [S00.83XA] 04/25/2019    Closed head injury [S09.90XA] 04/25/2019    Fall down stairs [W10.8XXA]          PLAN  Admitted to the ICU under Trauma Services   - May transfer out per NS recommendations     Subarachnoid hemorrhage              - Neurosurgery managing              - Repeat head CT this AM stable              - Neuro checks     Closed head injury              - SLP for cog eval pending              - Limited stimulation brain injury guidelines              - Neuro checks     Lower back pain              - Films all negative for acute injury              - Consult Ortho Spine              - NV checks     Pain control                 - Tylenol, Percocet and Fentanyl PRN              - Flexeril     Up with assistance  General diet  PT/OT eval and treat  Prophylaxis: SCDs, IS, C&DB, Pepcid, stool softeners  Home medications clarified - parameters placed on HTN meds  Discharge disposition pending clinical course   - Anticipate discharge home with spouse          SUBJECTIVE  Pt doing well. Adequate analgesia with Percocet, however she states that it is making her itch. Taking benadryl which is helping. she is tolerating a general diet. She has not been out of bed yet. States that she is tired as she did not sleep well due to being woken up all night long. Is wanting to go home today, however she verbalizes understanding that neurosurgery will need to determine when she is able to leave. Pt is passing flatus and has not had a bowel movement. Pt denies any nausea of vomiting.   She is able to move her right lower extremity more today, but not to her normal.  Also states that her back pain has slightly improved, but still worse than her baseline level of pain. Wt Readings from Last 3 Encounters:   04/26/19 255 lb 1.2 oz (115.7 kg)   01/29/19 257 lb (116.6 kg)   12/02/17 236 lb (107 kg)     Temp Readings from Last 3 Encounters:   04/26/19 98.5 °F (36.9 °C) (Oral)   04/24/19 97.7 °F (36.5 °C) (Oral)   08/06/18 97.9 °F (36.6 °C) (Oral)     BP Readings from Last 3 Encounters:   04/26/19 (!) 105/59   04/24/19 130/80   01/29/19 (!) 150/72     Pulse Readings from Last 3 Encounters:   04/26/19 69   04/24/19 68   01/29/19 75       24 HR INTAKE/OUTPUT :     Intake/Output Summary (Last 24 hours) at 4/26/2019 0809  Last data filed at 4/26/2019 0545  Gross per 24 hour   Intake 790 ml   Output 700 ml   Net 90 ml   BM = 0    DIET GENERAL;    OBJECTIVE  CURRENT VITALS BP (!) 105/59   Pulse 69   Temp 98.5 °F (36.9 °C) (Oral)   Resp 14   Ht 5' 4\" (1.626 m)   Wt 255 lb 1.2 oz (115.7 kg)   SpO2 94%   BMI 43.78 kg/m²        GENERAL: alert, cooperative, no distress  NEURO: Awake, alert and oriented. PERRL. Conversing fluently and appropriately  LUNGS: clear to auscultation bilaterally- no wheezes, rales or rhonchi, normal air movement, no respiratory distress  HEART: normal rate, normal S1 and S2, no gallops, intact distal pulses and no carotid bruits  ABDOMEN: soft, non-tender, non-distended, normal bowel sounds, no masses or organomegaly  WOUNDS: Forehead hematoma stable  EXTREMITY: no cyanosis, no clubbing and no edema.   Right lower extremity strength 4/5, left 5/5      LABS  CBC :   Recent Labs     04/25/19  1218   WBC 9.1   HGB 15.5   HCT 45.8   MCV 89.1        BMP:   Recent Labs     04/25/19  1218      K 4.2      CO2 24   BUN 19   CREATININE 0.9     COAGS:   Recent Labs     04/25/19  1218   PROT 7.8     Pancreas/HFP:    Recent Labs     04/25/19  1218   LIPASE 21.0     Recent Labs     04/25/19  1218   AST 27   ALT 29   BILIDIR <0.2   BILITOT 0.5   ALKPHOS 77     RADIOLOGY:  Narrative   PROCEDURE: CT HEAD WO CONTRAST       CLINICAL INFORMATION: SAH, PROVEN BY LP OR IMAGING.       COMPARISON: April 25, 2019       TECHNIQUE: Noncontrast 5 mm axial images were obtained through the brain.       All CT scans at this facility use dose modulation, iterative reconstruction, and/or weight-based dosing when appropriate to reduce radiation dose to as low as reasonably achievable.       FINDINGS:       There are no acute fracture changes. There is stable diffuse cerebral atrophy with focal hypodensity in the left parasagittal high parietal lobe again noted. There is no additional hemorrhage changes present. The extra-axial spaces, ventricles, and    cisterns remain clear.       The paranasal sinuses are normal..           Impression   1. Stable focus of suspected subarachnoid hemorrhage along the left frontal parietal grey-white junctions.           2. Negative exam for acute changes.           **This report has been created using voice recognition software. It may contain minor errors which are inherent in voice recognition technology. **       Final report electronically signed by Dr. Steven Vance on 4/26/2019 5:42 AM         Electronically signed by SEAN Suárez CNP on 4/26/2019 at 8:09 AM    Patient seen and examined independently by me early this Am. Above discussed and I agree with Doris Bernal CNP. See my additional comments below for updated orders and plan. Labs, cultures, and radiographs where available were reviewed. I discussed patient concerns with the patient's nurse and instructions were given. Please see our orders for the updated patient care plan. - Repeat CT had stable. Neurologically intact. No acute changes status post back CT. Proceed orthopedic spine assistance. PT/OT. Regular diet. SCDs for DVT prophylaxis. Transfer out of ICU.   Home soon.    Electronically signed by Abbie Seay MD on 4/26/2019 at 12:08 PM

## 2019-04-26 NOTE — PROGRESS NOTES
[address] PHYSICAL THERAPY  EVALUATION  [facility] 4D - 4D-16/016-A    Time In: 1022  Time Out: 1054  Timed Code Treatment Minutes: 24 Minutes  Minutes: 32          Date: 2019  Patient Name: Perri Carrillo,  Gender:  female        MRN: [id number]  : 1940  (70 y.o.)      Referring Practitioner: Andres Reese CNP  Diagnosis: Fall  Additional Pertinent Hx: Dorene Pinto is a 70year old female presenting to the Emergency Department for evaluation of potential injuries sustained during a fall. She reports that she had gone into her basement for a vacuum and does not recall anything after that until her  was helping her up the steps.  states that he heard her scream and found her lying on the basement floor. It is unclear how far she fell and if she had a loss of consciousness as the fall was not witnessed.  states that the patient was babbling incoherently when he found her. She arrives complaining of amnesia surrounding fall, upper back pain and lower back pain as well as difficulty moving the right lower extremity. She denies headache, visual changes, paresthesias, nausea. She has a history of lumbar fusion a number of years ago in [place] and has followed with Dr. Fara Benaivdez since moving to this area approximately 7 years ago. CT of head shows L frontal SAH, old L4 compression fracture.      Past Medical History:   Diagnosis Date    Arthritis     Arthritis     DM type 2, goal A1c below 7     GERD (gastroesophageal reflux disease)     Hyperlipidemia     Hypertension     Loss of vision     Mitral valve disorder     Osteoporosis     TIA (transient ischemic attack)     Unspecified cerebral artery occlusion with cerebral infarction 3-    Urinary incontinence      Past Surgical History:   Procedure Laterality Date    APPENDECTOMY      CARPAL TUNNEL RELEASE Left     CATARACT REMOVAL Bilateral     CERVICAL FUSION      CHOLECYSTECTOMY  COLON SURGERY  2012    COLONOSCOPY  2015    EYE SURGERY      CATARACTS    FINGER SURGERY Right 10/2016    3rd digit    HERNIA REPAIR      HYSTERECTOMY      JOINT REPLACEMENT Bilateral     TOTAL KNEES    NV LAMINECTOMY,>2 SGMT,LUMBAR      6 FUSIONS    ROTATOR CUFF REPAIR Right 11/28/2014    SHOULDER SURGERY  2014    SPINE SURGERY      TENDON RELEASE Right 05/18/2017    at [address]  [phone]       Restrictions/Precautions:  General Precautions, Fall Risk           Subjective:  Chart Reviewed: Yes  Patient assessed for rehabilitation services?: Yes  Family / Caregiver Present: No  Subjective: RN approved session, ok for activity as tolerated, pt agreeable. General:  Overall Orientation Status: Within Functional Limits  Follows Commands: Within Functional Limits    Vision: Within Functional Limits    Hearing: Within functional limits         Pain:   . Pre Treatment Pain Screening  Pain at present: 7  Scale Used: Numeric Score  Intervention List: Patient able to continue with treatment  Comments / Details: chronic LBP, pt states several back surgeries    Social/Functional History:    Lives With: Spouse  Type of Home: House  Home Layout: One level  Home Access: Level entry  Home Equipment: Cane, Rolling walker, Electric scooter         Ambulation Assistance: Independent  Transfer Assistance: Independent    Active : Yes     Additional Comments: Pt amb without AD, electric scooter outside, limited distance amb due to multiple low back surgeries      Objective:       RLE AROM: WFL       LLE AROM : WFL        B LE's grossly 4-/5         RLE Tone: Normotonic  LLE Tone: Normotonic       Balance  Sitting - Static: Good  Standing - Static: Fair, +  Standing - Dynamic: Fair  Comments: Pt sits EOB at least 5 minutes in prep for transfer/gait training with SBA for balance.     Supine to Sit: Stand by assistance(HOB elevated, use of bed rail)  Sit to Supine: Stand by assistance    Transfers  Sit to Stand: Contact guard assistance(from EOB)  Stand to sit: Contact guard assistance       Ambulation 1  Surface: level tile  Device: Rolling Walker  Assistance: Contact guard assistance  Quality of Gait: Pt amb with increased trunk flexion due to chronic LBP, decreased speed and shai, decreased step length, overall steady. Distance: 125 feet                 Exercises:  Comments: Pt performs supine B LE AROM: ankle pumps, heel slides and hip abd/add x 10 reps to increase strength for improved gait. Activity Tolerance:  Activity Tolerance: Patient Tolerated treatment well    Treatment Initiated: See above exercises, EOB balance, education. Assessment: Body structures, Functions, Activity limitations: Decreased functional mobility , Decreased balance, Decreased strength  Assessment: Pt tolerates session well, amb in hallway with walker with CGA, education to use walker upon DC home for increased support. PT to continue to progress strength, balance and gait to ensure safe DC home. Prognosis: Good    Clinical Presentation: Low - Stable and Uncomplicated: Pt tolerates session well, amb in hallway with walker with CGA, education to use walker upon DC home for increased support. PT to continue to progress strength, balance and gait to ensure safe DC home. Decision Making: High Complexitybased on patient assessment and decision making process of determining plan of care and establishing reasonable expectations for measurable functional outcomes    REQUIRES PT FOLLOW UP: Yes    Discharge Recommendations:  Discharge Recommendations: Home with assist PRN    Patient Education:  Patient Education: POC, education to use walker at home at all times    Equipment Recommendations:  Equipment Needed: No    Safety:  Type of devices:  All fall risk precautions in place, Call light within reach, Nurse notified, Left in bed, Patient at risk for falls  Restraints  Initially in place: No    Plan:  Times per week: 5-6 X N/T  Times per day: Daily  Specific instructions for Next Treatment: gait, balance tasks, strengthening  Current Treatment Recommendations: Strengthening, Transfer Training, Patient/Caregiver Education & Training, Balance Training, Gait Training, Functional Mobility Training, Stair training, Safety Education & Training    Goals:  Patient goals : to go home  Short term goals  Time Frame for Short term goals: 1 week  Short term goal 1: Pt to transfer supine <--> sit mod I to enable pt to get in/out of bed. Short term goal 2: Pt to transfer sit <--> stand S for increased functional mobility. Short term goal 3: Pt to ambulate >200 feet with RW SBA for household ambulation. Long term goals  Time Frame for Long term goals : NA due to short length of stay. Evaluation Complexity: Based on the findings of patient history, examination, clinical presentation, and decision making during this evaluation, the evaluation of Damien Grant  is of low complexity.             AM-PAC Inpatient Mobility without Stair Climbing Raw Score : 15  AM-PAC Inpatient without Stair Climbing T-Scale Score : 43.03  Mobility Inpatient CMS 0-100% Score: 47.43  Mobility Inpatient without Stair CMS G-Code Modifier : CK

## 2019-04-26 NOTE — PROGRESS NOTES
2011    CATARACT REMOVAL Bilateral     CERVICAL FUSION  1976    CHOLECYSTECTOMY      COLON SURGERY  2012    COLONOSCOPY  2015    EYE SURGERY      CATARACTS    FINGER SURGERY Right 10/2016    3rd digit    HERNIA REPAIR      HYSTERECTOMY      JOINT REPLACEMENT Bilateral     TOTAL KNEES    PA LAMINECTOMY,>2 SGMT,LUMBAR      6 FUSIONS    ROTATOR CUFF REPAIR Right 11/28/2014    SHOULDER SURGERY  2014    SPINE SURGERY      TENDON RELEASE Right 05/18/2017    at [address]  [phone]           Subjective  Chart Reviewed: Yes(progress notes, orders)  Patient assessed for rehabilitation services?: Yes  Response to previous treatment: Patient reporting fatigue but able to participate  Family / Caregiver Present: Yes()         General:  Overall Orientation Status: Within Functional Limits    Vision: Within Functional Limits    Hearing: Within functional limits         Pain:  Pain Assessment  Patient Currently in Pain: Yes  Pain Assessment: 0-10  Pain Level: 6       Social/Functional History:  Lives With: Spouse  Type of Home: House  Home Layout: One level  Home Access: Level entry  Home Equipment: Cane, Rolling walker, Electric scooter     Bathroom Shower/Tub: Tub/Shower unit  Bathroom Toilet: Standard  Bathroom Equipment: Grab bars in shower  Bathroom Accessibility: Accessible    Receives Help From: Family()  ADL Assistance: [address] [place] [address] Avenue: Independent       Ambulation Assistance: Independent  Transfer Assistance: Independent    Active :  Yes     Additional Comments: Pt reports they have all the AE needed at home but she has not been using currently    Objective        Overall Cognitive Status: Exceptions  Attention Span: Difficulty attending to directions  Safety Judgement: Decreased awareness of need for safety  Problem Solving: Decreased awareness of errors  Insights: Decreased awareness of deficits         Sensation  Overall Sensation Status: WFL(appears to be intact)              LUE AROM (degrees)  LUE AROM : WFL          RUE AROM (degrees)  RUE AROM : WFL       LUE Strength  L Hand Grasp: 4+/5  LUE Strength Comment: Not tested d/t back pain        RUE Strength  R Hand Grasp: 4+/5  RUE Strength Comment: Not tested d/t back pain          ADL  LE Dressing: Contact guard assistance(adjusting BLE slipper socks)     Bed mobility  Supine to Sit: Stand by assistance(HOB elevated, use of bed rails)  Sit to Supine: Stand by assistance  Scooting: Contact guard assistance    Transfers  Sit to stand: Contact guard assistance  Stand to sit: Contact guard assistance    Balance  Sitting Balance: Stand by assistance  Standing Balance: Contact guard assistance           Functional Mobility  Functional - Mobility Device: Rolling Walker  Activity: Other  Assist Level: Minimal assistance  Functional Mobility Comments: Pt demo forward flexed posture when ambulating           Activity Tolerance:  Activity Tolerance: Patient limited by fatigue, Treatment limited secondary to decreased cognition    Treatment Initiated:  Pt ambulated in hallway w/ RW and CGA. Pt unaware of balance deficits, Pt reporting she does not need to use a RW when she returns home. When Pt ambulated 3 steps without RW Pt had 1 LOB w/ Min A to correct. Pt educated on walker safety and that when ambulating she should be using the RW at this time. Therapist initiated education on [address] for LE dressing for safety and ease, Pt reluctant to try. Poor back protection strategies during session given history of back issues. Assessment:  Assessment: Pt demo decrease saftey awareness, balance, and endurance limiting independence in ADLs and functional mobility. Pt would benefit from continued OT services to educate on safety and compensatory strategies needed for safe return home.   Performance deficits / Impairments: Decreased functional mobility , Decreased safe awareness, Decreased balance, Decreased ADL status, Decreased endurance  Prognosis: Good    Clinical Decision Making: Clinical Decision making was of Moderate Complexity as the result of analysis of data from a detailed assessment, a consideration of several treatment options, the presence of comorbidities affecting the plan of care and the need for minimal to moderate modifications or assistance required to complete the evaluation. Discharge Recommendations:  Discharge Recommendations: Continue to assess pending progress, IP Rehab    Patient Education:  Patient Education: OT role, safety, POC  Barriers to Learning: Insight     Equipment Recommendations: Other: Monitor pending progress    Safety:  Safety Devices in place: Yes  Type of devices: All fall risk precautions in place, Gait belt, Bed alarm in place, Left in bed, Call light within reach, Nurse notified    Plan:  Times per week: 6x    Goals:  Patient goals : Pt stated she would like to return home    Short term goals  Time Frame for Short term goals: 2 weeks  Short term goal 1: Complete 5-6 min standing ADL task w/ SBA and 0 vcs for safety  Short term goal 2: Complete various sit to stand t/sf including toilet w/ SBA and 0-1 vcs for technique   Short term goal 3: Complete functional mobility to toilet and in hallway w/ SBA and RW   Short term goal 4: Complete LB dressing w/ LHAE and SBA   Short term goal 5: Complete ADL task during session w/ Min vcs for walker safety and fall prevention   Long term goals  Time Frame for Long term goals : No LGT d/t short ELOS     Evaluation Complexity: Based on the findings of patient history, examination, clinical presentation, and decision making during this evaluation, this patient is of medium complexity.

## 2019-04-26 NOTE — CONSULTS
Take 1 tablet by mouth daily Yes Tee Gaxiola MD   metFORMIN (GLUCOPHAGE) 500 MG tablet TAKE 1 TABLET TWICE A DAY WITH MEALS Yes Naveed Valentin DO   quinapril (ACCUPRIL) 10 MG tablet Take 1 tablet by mouth nightly Yes Akbar Valentin DO   pantoprazole (PROTONIX) 40 MG tablet Take 1 tablet by mouth 2 times daily Yes Keely Rodarte DO   sertraline (ZOLOFT) 100 MG tablet Take 2 tablets by mouth daily Yes Naveed Valentin DO   atorvastatin (LIPITOR) 40 MG tablet TAKE 1 TABLET DAILY Yes Naveed Valentin DO   hydrALAZINE (APRESOLINE) 50 MG tablet Take 1 tablet by mouth 2 times daily Yes Tee Gaxiola MD   atenolol (TENORMIN) 25 MG tablet TAKE 1 TABLET DAILY Yes Naveed Valentin DO   vitamin D (CHOLECALCIFEROL) 1000 UNIT TABS tablet Take 1,000 Units by mouth nightly. Yes Historical Provider, MD   Handicap Placard MISC by Does not apply route Expires 10/14/21. Keely Rodarte DO   gabapentin (NEURONTIN) 300 MG capsule Take 2 capsules by mouth 3 times daily  Patient taking differently: Take 600 mg by mouth 3 times daily as needed.    Keely Rodarte DO   ibuprofen (ADVIL;MOTRIN) 600 MG tablet Take 1 tablet by mouth 3 times daily as needed for Pain (Take with food 3 times daily for pain)  Zahra Lang MD   Lancets MISC Use once daily and PRN  Keely Rodarte DO   Glucose Blood (BLOOD GLUCOSE TEST STRIPS) STRP True Trak test strips  Patient checks once daily  Keely Rodarte DO   Elastic Bandages & Supports (T.E.D. BELOW KNEE/LARGE) MISC Use as directed  Keely Rodarte DO     Past Medical History:   Diagnosis Date    Arthritis     Arthritis     DM type 2, goal A1c below 7     GERD (gastroesophageal reflux disease)     Hyperlipidemia     Hypertension     Loss of vision     Mitral valve disorder     Osteoporosis     TIA (transient ischemic attack)    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    Urinary incontinence      Past Surgical History:   Procedure Laterality Date    APPENDECTOMY      recognition software. It may contain minor errors which are inherent in voice recognition technology. ** Final report electronically signed by Dr. Gideon Albrecht on 4/25/2019 1:43 PM    Xr Lumbar Spine (2-3 Views)    Result Date: 4/25/2019  PROCEDURE: XR LUMBAR SPINE (2-3 VIEWS) CLINICAL INFORMATION: Fall downstairs, pain, COMPARISON: No prior study. TECHNIQUE: AP and lateral views of the lumbar spine. FINDINGS: Degenerative changes lumbar spine postsurgical changes with fusion L2-L4. Old vertebral body compression fracture L4. Mild lumbar scoliosis. Surgical clips in the right upper quadrant. Stool throughout the colon. Degenerative changes and coarsened bony mineralization of the pelvis. Vascular calcifications. SI joints are symmetric. No acute osseous findings. **This report has been created using voice recognition software. It may contain minor errors which are inherent in voice recognition technology. ** Final report electronically signed by Dr. Gideon Albrecht on 4/25/2019 1:41 PM    Xr Hand Left (min 3 Views)    Result Date: 4/25/2019  PROCEDURE: XR HAND LEFT (MIN 3 VIEWS) CLINICAL INFORMATION: Fall downstairs, pain . COMPARISON:  No prior study. TECHNIQUE:  3 views of the left hand. FINDINGS: Degenerative changes with interphalangeal joint space narrowing. No acute fracture or dislocation. Postsurgical changes base of the second metacarpal. Generalized osteopenia. Soft tissues are unremarkable. No acute fracture or dislocation. **This report has been created using voice recognition software. It may contain minor errors which are inherent in voice recognition technology. ** Final report electronically signed by Dr. Gideon Albrecht on 4/25/2019 1:39 PM    Xr Knee Left (min 4 Views)    Result Date: 4/25/2019  PROCEDURE: XR KNEE LEFT (MIN 4 VIEWS) CLINICAL INFORMATION: Fall downstairs, pain. COMPARISON: No prior study.   TECHNIQUE: 4 views of the left knee include an AP view, a lateral view and bilateral oblique views. FINDINGS: Knee prosthesis is in normal alignment. Threaded screws in the tibia are intact. No acute fracture or dislocation. No significant joint effusion. Spurring along the patella surface. Posterior knee vascular calcifications. Generalized demineralization. No acute fracture or dislocation. **This report has been created using voice recognition software. It may contain minor errors which are inherent in voice recognition technology. ** Final report electronically signed by Dr. Emily Liu on 4/25/2019 1:37 PM    Ct Head Wo Contrast    Result Date: 4/26/2019  PROCEDURE: CT HEAD WO CONTRAST CLINICAL INFORMATION: SAH, PROVEN BY LP OR IMAGING. COMPARISON: April 25, 2019 TECHNIQUE: Noncontrast 5 mm axial images were obtained through the brain. All CT scans at this facility use dose modulation, iterative reconstruction, and/or weight-based dosing when appropriate to reduce radiation dose to as low as reasonably achievable. FINDINGS: There are no acute fracture changes. There is stable diffuse cerebral atrophy with focal hypodensity in the left parasagittal high parietal lobe again noted. There is no additional hemorrhage changes present. The extra-axial spaces, ventricles, and cisterns remain clear. The paranasal sinuses are normal..     1. Stable focus of suspected subarachnoid hemorrhage along the left frontal parietal grey-white junctions. 2. Negative exam for acute changes. **This report has been created using voice recognition software. It may contain minor errors which are inherent in voice recognition technology. ** Final report electronically signed by Dr. John Goldstein on 4/26/2019 5:42 AM    Ct Head Wo Contrast    Result Date: 4/25/2019  PROCEDURE: CT HEAD WO CONTRAST CLINICAL INFORMATION: Fall downstairs, pain. COMPARISON: No prior study. TECHNIQUE: Noncontrast 5 mm axial images were obtained through the brain.  All CT scans at this facility use dose modulation, iterative reconstruction, and/or weight-based dosing when appropriate to reduce radiation dose to as low as reasonably achievable. FINDINGS: No acute intracranial findings. Intracranial vascular calcifications. Small amount of subarachnoid hemorrhage in the left frontal sulci. No midline shift. Generalized volume loss and small vessel ischemic changes. Ventricles are within normal limits for age. Paranasal sinuses are clear. Mastoid air cells are patent. Skull: Unremarkable. Soft tissues: Frontal scalp hematoma. Small amount of acute subarachnoid hemorrhage left frontal sulcus. Frontal scalp hematoma. These results were communicated directly with Dr. Nils Hollingsworth on 4/25/2019 12:52 PM,  [ ] **This report has been created using voice recognition software. It may contain minor errors which are inherent in voice recognition technology. ** Final report electronically signed by Dr. Jorgito Hart on 4/25/2019 12:52 PM    Ct Cervical Spine Wo Contrast    Result Date: 4/25/2019  PROCEDURE: CT CERVICAL SPINE WO CONTRAST CLINICAL INFORMATION: Fall downstairs, pain. COMPARISON: No prior study. TECHNIQUE: 3 mm noncontrast axial images were obtained through the cervical spine with sagittal and coronal reconstructions. All CT scans at this facility use dose modulation, iterative reconstruction, and/or weight-based dosing when appropriate to reduce radiation dose to as low as reasonably achievable. FINDINGS: Straightening of the normal cervical curvature which is likely related to positioning versus muscular spasm. Previous anterior fusion C5-C6 and C7. No acute fracture or subluxation. Prevertebral soft tissues are unremarkable. There is facet hypertrophy. No severe central canal stenosis. No acute fracture or subluxation. **This report has been created using voice recognition software. It may contain minor errors which are inherent in voice recognition technology. ** Final report electronically signed by Dr. Jorgito Hart on 4/25/2019 12:55 PM    Ct Thoracic Spine Wo Contrast    Result Date: 4/25/2019  PROCEDURE: CT THORACIC SPINE WO CONTRAST CLINICAL INFORMATION: T/L-SPINE TRAUMA, SIGNIFICANT INJURY SUSPECTED, +/- LOCALIZING SIGNS. COMPARISON: Chest x-ray earlier today TECHNIQUE: 3 mm axial noncontrast CT images were obtained through the thoracic spine. Sagittal and coronal reconstructions were obtained. All CT scans at this facility use dose modulation, iterative reconstruction, and/or weight-based dosing when appropriate to reduce radiation dose to as low as reasonably achievable. FINDINGS: There is no acute fracture or subluxation. There is multilevel lower cervical and thoracic and upper lumbar disc space narrowing consistent with degenerative disc disease with endplate spondylosis and facet joint DJD. Beckey Conquest Patient is status post C6 corpectomy with bone graft and anterior fusion with a plate and screws from C5 through C7. Pedicle screws are present at L2, distal extent not imaged. Patient also status post L1 and L2 laminectomies. No bony spinal stenosis is seen. No significant disc bulge or herniation is seen. The paravertebral soft tissues are unremarkable. There is either mild bilateral hydronephrosis or bilateral renal parapelvic cysts. See the accompanying lumbar spine CT report for further information. No acute fracture or subluxation. Lower cervical, thoracic, and upper lumbar degenerative disc disease and DJD. Status post C6 corpectomy and fusion from C5 through C7. Status post L1 and L2 laminectomies and pedicle screws are present in L2, distal extent not imaged. Bilateral hydronephrosis and/or parapelvic cysts, see the accompanying lumbar spine CT report. **This report has been created using voice recognition software. It may contain minor errors which are inherent in voice recognition technology. ** Final report electronically signed by Dr. Benji Drew on 4/25/2019 5:35 PM    Ct Lumbar Spine Wo Contrast    Result Date: 4/25/2019  PROCEDURE: CT LUMBAR SPINE WO CONTRAST CLINICAL INFORMATION: TRAUMA, lumbar fracture. COMPARISON: Lumbar spine x-rays earlier today TECHNIQUE: 3 mm noncontrast axial images were obtained of the lumbar spine. Sagittal and coronal reconstructions were obtained. Angled images were reconstructed through the L3-4, L4-5 and L5-S1 disc levels. All CT scans at this facility use dose modulation, iterative reconstruction, and/or weight-based dosing when appropriate to reduce radiation dose to as low as reasonably achievable. FINDINGS: There are 5  non-rib-bearing lumbar vertebrae. Patient is status post bilateral L2-L4 pedicle screw fusion. There is extensive bilateral facet joint fusion, diffusely on the left from T11 through S1 and on the right from T12 through S1. Patient also status post L1-L3 laminectomies. There is kyphosis of the lumbar spine. There is an old moderate L4 compression fracture deformity. There is no acute fracture. There is 3 mm anterolisthesis of L2 on L3, stabilized by the pedicle screws. . There is multilevel moderate to severe lower thoracic and lumbar disc space narrowing consistent with degenerative disc disease, most severe at T12-L1, L1-2 and L2-3. There is multilevel mild bony spinal stenosis due to hypertrophic spurring. There is multilevel endplate spondylosis. The paravertebral soft tissues are unremarkable. The SI joints are unremarkable. There is deformity of the posterior sacrum which may be posttraumatic or postsurgical in nature. There are bilateral renal probable parapelvic cysts. There are left renal hilar calcifications which probably represent arterial atherosclerotic calcifications. No acute fracture. Old moderate L4 compression fracture. 3 mm anterolisthesis of L2 on L3, stabilized by the pedicle screws. Extensive postoperative changes of the lumbar spine as discussed above. Multilevel moderate to severe degenerative disc disease. **This report has been created using voice recognition software.  It may contain minor errors which are inherent in voice recognition technology. ** Final report electronically signed by Dr. Armen Garza on 4/25/2019 5:45 PM          Electronically signed by Elaine Branch PA-C on 4/26/19 at 6:36 AM

## 2019-04-26 NOTE — CARE COORDINATION
04/26/19 2:27 PM    Spoke to OT just after they finished working with Hina Tovar; states she has very poor safety awareness, definitely needs to use a walker, and was very unsteady without the walker, feels rehab/TCU would be a good idea. Physiatry consult had already been ordered by [facility]. Dr. Cassy Foreman ordered TCU/Rehab consult. Spoke with Hina Tovar; she is agreeable to rehab/TCU at discharge if that is what she needs.

## 2019-04-26 NOTE — PROGRESS NOTES
Awake- alert, oriented to direct questions. Follows commands x4 and strong. Pt does seem to have some short term memory loss. Repeated same story twice. 0930- C/O being tired/drowsy- explained to pt need for rest.  1125- PT here to work with pt- see note. 1210- No new neuro deficits noted. Denies numbness or tingling of extremities. Denies headache but does have back pain augustine with moving-refuses meds at this time.  here. 12- Dr Jose Luis Castañeda here- updated on therapy results- POC explained to pt.  1500- Report called to [address] [place] [address]- 4A   1540- Transferred per bed to 4A 14.

## 2019-04-26 NOTE — PROGRESS NOTES
[facility] ICU 4D  Speech - Language - Cognitive Evaluation    SLP Individual Minutes  Time In: 7773  Time Out: 1650  Minutes: 18  Timed Code Treatment Minutes: 0 Minutes       Date: 2019  Patient Name: Best Garcia      MRN: [id number]    : 1940  (70 y.o.)  Gender: female   Referring Physician:  SEAN Sepulveda CNP  Diagnosis: fall, [address]  Secondary Diagnosis:  Cognitive deficits   Date of Last MBS:  n/a  Diet:  General with thin  History of Present Illness/Injury: Pt presents with the above diagnosis; see physician H&P for further history. Due to admitting diagnosis, ST to complete cognitive evaluation and determine POC. Past Medical History:   Diagnosis Date    Arthritis     Arthritis     DM type 2, goal A1c below 7     GERD (gastroesophageal reflux disease)     Hyperlipidemia     Hypertension     Loss of vision     Mitral valve disorder     Osteoporosis     TIA (transient ischemic attack)     Unspecified cerebral artery occlusion with cerebral infarction 3-    Urinary incontinence        Precautions: fall risk  Pain:  None reported     Subjective:  Pt seen at bedside with son in room. Pt expressing increased fatigue secondary to poor sleep previous night, though agreeable to all ST tasks. SOCIAL HISTORY: Pt reports living at home with her  prior to admission. Pt independently completed all ADL and IADL tasks including management of medications and finances. Pt was an active . Pt reports her  recently underwent a valve replacement procedure; she has been able to care for him in his recovery. ORAL MOTOR:  Labial / Facial:  WFL  Lingual: WFL  Soft Palate: DNT  Sensation: DNT  Dentition: DNT    SPEECH / VOICE:  Vocal quality clear and dry; no noted dysphonia. Speech 100% intelligible in conversation; no noted dysarthria. LANGUAGE:  Receptive:  Receptive language grossly intact.  Pt able to effectively respond to all questions and participate in conversation. Pt able to understand all directions, evidenced by initiating execution of directions. Expressive:  Expressive language grossly intact. Pt able to effectively communicate complex ideas and participate in conversational discourse. COGNITION:[place] Cognitive Assessment (MOCA) version 7.3 completed. Pt scored 18/30. Normal is greater than or equal to 26/30. Orientation: 6/6  Immediate recall: 5/5  Short term recall: 0/5  Divergent namin members/1 minute (n=11 members/1 minute)  Problem solving: impaired  Reasonin/2 verbal abstraction   Sequencing: intact; pt with logical provision of social history   Thought organization: impaired  Insight: poor  Attention: complex attention impaired; 10/11 targets - selective attention task  Numerical relations: 3/5    SWALLOWING:  Pt currently tolerating regular diet with thin liquids with good success; no report of difficulty from nursing. IMPRESSIONS: Pt presents with at least mild cognitive deficits characterized by impairments in recall, working memory, complex attention, processing speed, thought organization, problem solving/reasoning and executive functioning. Pt and son report pt premorbid recall to be [facility], with pt stating, \"I never needed to write anything down, I was sharp. \" Impairments in attention and thought organization impacting pt overall participation in evaluation; pt with difficulty progressing from one task to the next. Repetitions as well as slow presentation of instructions required for pt participation secondary to attention deficits; ST altered environment in room to limit external distractions. Due to pt PLOF and responsibilities in the home environment, recommend continued ST services to address the above deficits to promote a safe and successful return to the home setting upon discharge.  Further, due to attention deficits, recommend consideration for driving evaluation prior to pt return to driving. REHAB POTENTIAL: [] Excellent [x] Good [] Fair  [] Poor    EDUCATION:   Learner: [x]Patient [] Significant other [] Son/Daughter [] Parent     [] Other:   Education: [x] Reviewed results and recommendations of this evaluation     [] Reviewed diet and strategies     [] Reviewed signs, symptoms and risk of aspiration     [] Demonstrated how to thick liquid appropriately. [x] Reviewed goals and Plan of Care     [] OTHER:   Method: [x] Discussion [] Demonstration [] Hand-out     [] OTHER:   Evaluation of Education:     [x] Verbalizes understanding [x] Demonstrates with assistance     [] Demonstrates without assistance [x]Needs further instruction     [] No evidence of learning  [] Family not present    PLAN / TREATMENT RECOMMENDATIONS:  [x] Skilled SLP intervention on acute care 3-5 x per week or until goals met and/or pt plateaus in function. Specific interventions for next session may include: memory and attention tasks       SHORT TERM GOALS:  Short-term Goals  Timeframe for Short-term Goals: 2 weeks  Goal 1: Pt will complete functional recall and working memory tasks with 75% accuracy, mod cues to improve retention of pertinent medical information. Goal 2: Pt will complete complex attention tasks (selective, alternating) with no more than 3 errors in 5 minutes to promote a safe and functional return to ADL and IADL tasks, including driving. Goal 3: Pt will complete complex naming tasks with 80% accuracy, mod cues to improve overall processing speed. Goal 4: Pt will complete complex reasoning/problem solving and executive functioning tasls with 80% accuracy, mod cues to increase success with ADL and IADL task completion upon discharge to the home environment. LONG TERM GOALS:  No LTGs due to short ELOS    MADISON Hobson - Student Intern

## 2019-04-26 NOTE — PROGRESS NOTES
Neurosurgery Progress Note    Patient:  Perri Carrillo      Unit/Bed:4D-16/016-A    YOB: 1940    MRN: [id number]     Acct: [de-identified]     Admit date: 4/25/2019    Chief Complaint   Patient presents with   Chris Fisherman Fall       Patient Seen, Chart, Physician notes, Labs, Radiology studies reviewed. Subjective: Status post day one for admission through the emergency department for observation of small subarachnoid hemorrhage as evidenced on a CT scan upon admission. She remains stable and neurologically intact without significant complaints. Past, Family, Social History unchanged from admission. Diet:  DIET GENERAL;    Medications:  Scheduled Meds:   sodium chloride flush  10 mL Intravenous 2 times per day    docusate sodium  100 mg Oral BID    famotidine  20 mg Oral BID    amLODIPine  10 mg Oral Daily    atenolol  25 mg Oral Daily    atorvastatin  40 mg Oral Nightly    gabapentin  600 mg Oral TID    hydrALAZINE  50 mg Oral BID    metFORMIN  500 mg Oral BID WC    lisinopril  5 mg Oral Daily    sertraline  200 mg Oral Daily    vitamin D  1,000 Units Oral Nightly     Continuous Infusions:  PRN Meds:diphenhydrAMINE, sodium chloride flush, acetaminophen, magnesium hydroxide, ondansetron, cyclobenzaprine, fentanNYL **OR** fentanNYL, oxyCODONE-acetaminophen **OR** oxyCODONE-acetaminophen    Objective:  Patient is resting comfortably in bed with the head of the bed elevated at more than 30°. She is comfortable and she is without significant complaints. Nursing relates some transient cognitive issues, likely related to the trauma. Vitals: BP (!) 118/59   Pulse 71   Temp 98.1 °F (36.7 °C) (Oral)   Resp 11   Ht 5' 4\" (1.626 m)   Wt 255 lb 1.2 oz (115.7 kg)   SpO2 93%   BMI 43.78 kg/m²   Physical Exam:  Alert and attentive. Language appropriate, with no aphasia. Pupils equal.  Facial strength symmetric. Physical Exam   sickle exam remains unchanged from admission.   See HPI    ROS:  Review of Systems   Constitutional: Negative for activity change. Respiratory: Negative for apnea, chest tightness and shortness of breath. Cardiovascular: Negative for chest pain. Gastrointestinal: Negative for abdominal distention and abdominal pain. Genitourinary: Negative for difficulty urinating. Musculoskeletal: Negative for arthralgias, neck pain and neck stiffness. Neurological: Negative for facial asymmetry, speech difficulty, weakness, light-headedness and headaches. Psychiatric/Behavioral: Positive for confusion. Negative for agitation and behavioral problems. 24 hour intake/output:    Intake/Output Summary (Last 24 hours) at 4/26/2019 1324  Last data filed at 4/26/2019 0545  Gross per 24 hour   Intake 790 ml   Output 700 ml   Net 90 ml     Last 3 weights: Wt Readings from Last 3 Encounters:   04/26/19 255 lb 1.2 oz (115.7 kg)   01/29/19 257 lb (116.6 kg)   12/02/17 236 lb (107 kg)         CBC:   Recent Labs     04/25/19  1218 04/26/19  0749   WBC 9.1 7.7   HGB 15.5 13.3    171     BMP:    Recent Labs     04/25/19  1218 04/26/19  0749    141   K 4.2 3.7    105   CO2 24 22*   BUN 19 22   CREATININE 0.9 1.1   GLUCOSE 143* 134*     Calcium:  Recent Labs     04/26/19  0749   CALCIUM 8.7     Magnesium:  Recent Labs     04/25/19  1218   MG 1.9     Glucose:No results for input(s): POCGLU in the last 72 hours. HgbA1C:   Recent Labs     04/24/19  1000   LABA1C 6.7     Lipids: No results for input(s): CHOL, TRIG, HDL, LDLCALC in the last 72 hours. Invalid input(s): LDL    Radiology reports as per the Radiologist  Radiology: Xr Chest Standard (2 Vw)    Result Date: 4/25/2019  PROCEDURE: XR CHEST (2 VW) CLINICAL INFORMATION: Fall downstairs, pain. COMPARISON: March 25, 2014. TECHNIQUE: PA and lateral views the chest. FINDINGS: Postsurgical changes lower cervical spine. No lobar consolidation. Calcified granuloma left lung base. No cardiomegaly. Atherosclerotic changes aortic arch. Degenerative changes thoracic spine. Costophrenic recesses are preserved. No acute findings. **This report has been created using voice recognition software. It may contain minor errors which are inherent in voice recognition technology. ** Final report electronically signed by Dr. Preet Naranjo on 4/25/2019 1:43 PM    Xr Lumbar Spine (2-3 Views)    Result Date: 4/25/2019  PROCEDURE: XR LUMBAR SPINE (2-3 VIEWS) CLINICAL INFORMATION: Fall downstairs, pain, COMPARISON: No prior study. TECHNIQUE: AP and lateral views of the lumbar spine. FINDINGS: Degenerative changes lumbar spine postsurgical changes with fusion L2-L4. Old vertebral body compression fracture L4. Mild lumbar scoliosis. Surgical clips in the right upper quadrant. Stool throughout the colon. Degenerative changes and coarsened bony mineralization of the pelvis. Vascular calcifications. SI joints are symmetric. No acute osseous findings. **This report has been created using voice recognition software. It may contain minor errors which are inherent in voice recognition technology. ** Final report electronically signed by Dr. Preet Naranjo on 4/25/2019 1:41 PM    Xr Hand Left (min 3 Views)    Result Date: 4/25/2019  PROCEDURE: XR HAND LEFT (MIN 3 VIEWS) CLINICAL INFORMATION: Fall downstairs, pain . COMPARISON:  No prior study. TECHNIQUE:  3 views of the left hand. FINDINGS: Degenerative changes with interphalangeal joint space narrowing. No acute fracture or dislocation. Postsurgical changes base of the second metacarpal. Generalized osteopenia. Soft tissues are unremarkable. No acute fracture or dislocation. **This report has been created using voice recognition software. It may contain minor errors which are inherent in voice recognition technology. ** Final report electronically signed by Dr. Preet Naranjo on 4/25/2019 1:39 PM    Xr Knee Left (min 4 Views)    Result Date: 4/25/2019  PROCEDURE: XR KNEE LEFT (MIN 4 prior study. TECHNIQUE: Noncontrast 5 mm axial images were obtained through the brain. All CT scans at this facility use dose modulation, iterative reconstruction, and/or weight-based dosing when appropriate to reduce radiation dose to as low as reasonably achievable. FINDINGS: No acute intracranial findings. Intracranial vascular calcifications. Small amount of subarachnoid hemorrhage in the left frontal sulci. No midline shift. Generalized volume loss and small vessel ischemic changes. Ventricles are within normal limits for age. Paranasal sinuses are clear. Mastoid air cells are patent. Skull: Unremarkable. Soft tissues: Frontal scalp hematoma. Small amount of acute subarachnoid hemorrhage left frontal sulcus. Frontal scalp hematoma. These results were communicated directly with Dr. Nils Verde on 4/25/2019 12:52 PM,  [ ] **This report has been created using voice recognition software. It may contain minor errors which are inherent in voice recognition technology. ** Final report electronically signed by Dr. Lay David on 4/25/2019 12:52 PM    Ct Cervical Spine Wo Contrast    Result Date: 4/25/2019  PROCEDURE: CT CERVICAL SPINE WO CONTRAST CLINICAL INFORMATION: Fall downstairs, pain. COMPARISON: No prior study. TECHNIQUE: 3 mm noncontrast axial images were obtained through the cervical spine with sagittal and coronal reconstructions. All CT scans at this facility use dose modulation, iterative reconstruction, and/or weight-based dosing when appropriate to reduce radiation dose to as low as reasonably achievable. FINDINGS: Straightening of the normal cervical curvature which is likely related to positioning versus muscular spasm. Previous anterior fusion C5-C6 and C7. No acute fracture or subluxation. Prevertebral soft tissues are unremarkable. There is facet hypertrophy. No severe central canal stenosis. No acute fracture or subluxation. **This report has been created using voice recognition software.  It may contain minor errors which are inherent in voice recognition technology. ** Final report electronically signed by Dr. Azul Londono on 4/25/2019 12:55 PM    Ct Thoracic Spine Wo Contrast    Result Date: 4/25/2019  PROCEDURE: CT THORACIC SPINE WO CONTRAST CLINICAL INFORMATION: T/L-SPINE TRAUMA, SIGNIFICANT INJURY SUSPECTED, +/- LOCALIZING SIGNS. COMPARISON: Chest x-ray earlier today TECHNIQUE: 3 mm axial noncontrast CT images were obtained through the thoracic spine. Sagittal and coronal reconstructions were obtained. All CT scans at this facility use dose modulation, iterative reconstruction, and/or weight-based dosing when appropriate to reduce radiation dose to as low as reasonably achievable. FINDINGS: There is no acute fracture or subluxation. There is multilevel lower cervical and thoracic and upper lumbar disc space narrowing consistent with degenerative disc disease with endplate spondylosis and facet joint DJD. Camilla Henderson Patient is status post C6 corpectomy with bone graft and anterior fusion with a plate and screws from C5 through C7. Pedicle screws are present at L2, distal extent not imaged. Patient also status post L1 and L2 laminectomies. No bony spinal stenosis is seen. No significant disc bulge or herniation is seen. The paravertebral soft tissues are unremarkable. There is either mild bilateral hydronephrosis or bilateral renal parapelvic cysts. See the accompanying lumbar spine CT report for further information. No acute fracture or subluxation. Lower cervical, thoracic, and upper lumbar degenerative disc disease and DJD. Status post C6 corpectomy and fusion from C5 through C7. Status post L1 and L2 laminectomies and pedicle screws are present in L2, distal extent not imaged. Bilateral hydronephrosis and/or parapelvic cysts, see the accompanying lumbar spine CT report. **This report has been created using voice recognition software.  It may contain minor errors which are inherent in voice recognition

## 2019-04-27 PROCEDURE — 2709999900 HC NON-CHARGEABLE SUPPLY

## 2019-04-27 PROCEDURE — 2060000000 HC ICU INTERMEDIATE R&B

## 2019-04-27 PROCEDURE — 99231 SBSQ HOSP IP/OBS SF/LOW 25: CPT | Performed by: PHYSICIAN ASSISTANT

## 2019-04-27 PROCEDURE — 99232 SBSQ HOSP IP/OBS MODERATE 35: CPT | Performed by: SURGERY

## 2019-04-27 PROCEDURE — 6370000000 HC RX 637 (ALT 250 FOR IP): Performed by: NURSE PRACTITIONER

## 2019-04-27 PROCEDURE — 2580000003 HC RX 258: Performed by: NURSE PRACTITIONER

## 2019-04-27 PROCEDURE — 6370000000 HC RX 637 (ALT 250 FOR IP): Performed by: SURGERY

## 2019-04-27 RX ADMIN — VITAMIN D, TAB 1000IU (100/BT) 1000 UNITS: 25 TAB at 20:50

## 2019-04-27 RX ADMIN — GABAPENTIN 600 MG: 300 CAPSULE ORAL at 08:36

## 2019-04-27 RX ADMIN — OXYCODONE AND ACETAMINOPHEN 2 TABLET: 5; 325 TABLET ORAL at 04:10

## 2019-04-27 RX ADMIN — METFORMIN HYDROCHLORIDE 500 MG: 500 TABLET ORAL at 08:36

## 2019-04-27 RX ADMIN — OXYCODONE AND ACETAMINOPHEN 2 TABLET: 5; 325 TABLET ORAL at 20:49

## 2019-04-27 RX ADMIN — GABAPENTIN 600 MG: 300 CAPSULE ORAL at 20:49

## 2019-04-27 RX ADMIN — GABAPENTIN 600 MG: 300 CAPSULE ORAL at 15:12

## 2019-04-27 RX ADMIN — HYDRALAZINE HYDROCHLORIDE 50 MG: 50 TABLET, FILM COATED ORAL at 17:41

## 2019-04-27 RX ADMIN — ATORVASTATIN CALCIUM 40 MG: 40 TABLET, FILM COATED ORAL at 20:49

## 2019-04-27 RX ADMIN — METFORMIN HYDROCHLORIDE 500 MG: 500 TABLET ORAL at 17:41

## 2019-04-27 RX ADMIN — Medication 10 ML: at 20:50

## 2019-04-27 RX ADMIN — FAMOTIDINE 20 MG: 20 TABLET ORAL at 20:50

## 2019-04-27 RX ADMIN — FAMOTIDINE 20 MG: 20 TABLET ORAL at 08:35

## 2019-04-27 RX ADMIN — Medication 10 ML: at 08:37

## 2019-04-27 RX ADMIN — OXYCODONE AND ACETAMINOPHEN 2 TABLET: 5; 325 TABLET ORAL at 11:31

## 2019-04-27 RX ADMIN — DOCUSATE SODIUM 100 MG: 100 CAPSULE, LIQUID FILLED ORAL at 08:35

## 2019-04-27 RX ADMIN — DOCUSATE SODIUM 100 MG: 100 CAPSULE, LIQUID FILLED ORAL at 20:49

## 2019-04-27 RX ADMIN — SERTRALINE 200 MG: 100 TABLET, FILM COATED ORAL at 08:35

## 2019-04-27 ASSESSMENT — PAIN SCALES - GENERAL
PAINLEVEL_OUTOF10: 7
PAINLEVEL_OUTOF10: 6
PAINLEVEL_OUTOF10: 5

## 2019-04-27 ASSESSMENT — PAIN DESCRIPTION - FREQUENCY: FREQUENCY: CONTINUOUS

## 2019-04-27 ASSESSMENT — PAIN DESCRIPTION - PAIN TYPE
TYPE: CHRONIC PAIN
TYPE: CHRONIC PAIN

## 2019-04-27 ASSESSMENT — PAIN DESCRIPTION - ONSET: ONSET: ON-GOING

## 2019-04-27 ASSESSMENT — PAIN DESCRIPTION - LOCATION
LOCATION: BACK
LOCATION: BACK

## 2019-04-27 ASSESSMENT — PAIN DESCRIPTION - DESCRIPTORS: DESCRIPTORS: ACHING;DISCOMFORT

## 2019-04-27 NOTE — PROGRESS NOTES
[facility]  Daily Progress Note      Pt Name: Carie Lemus  Medical Record Number: [id number]  Date of Birth 1940   Today's Date: 4/27/2019    HD: # 2    CC: \"My back is still so sore\"    ASSESSMENT  1. Active Hospital Problems    Diagnosis Date Noted    Fall [W19. XXXA] 04/25/2019    Subarachnoid hemorrhage ([address] [I60.9] 04/25/2019    Traumatic hematoma of forehead [S00.83XA] 04/25/2019    Closed head injury [S09.90XA] 04/25/2019    Fall down stairs [W10.8XXA]          PLAN  Admitted to the ICU initially now on 4A     Subarachnoid hemorrhage              - Neurosurgery managing              - Repeat head CT this AM stable              - Neuro checks     Closed head injury              - SLP for cog eval pending              - Limited stimulation brain injury guidelines              - Neuro checks     Lower back pain              - Films all negative for acute injury              - Consulted Ortho Spine              - NV checks     Pain control                 - Tylenol, Percocet and Fentanyl PRN              - Flexeril     Up with assistance  General diet  PT/OT eval and treat  Prophylaxis: SCDs, IS, C&DB, Pepcid, stool softeners  Home medications clarified - parameters placed on HTN meds  Discharge disposition pending clinical course   - Anticipate discharge home with spouse          SUBJECTIVE  Pt doing well. Adequate analgesia with Percocet, however she states that it is making her itch. Taking benadryl which is helping. she is tolerating a general diet. She has not been out of bed yet. States that she is tired as she did not sleep well due to being woken up all night long. Is wanting to go home today, however she verbalizes understanding that neurosurgery will need to determine when she is able to leave. Pt is passing flatus and has not had a bowel movement. Pt denies any nausea of vomiting.   She is able to move her right lower extremity more today, but not to her normal.  Also states that her back pain has slightly improved, but still worse than her baseline level of pain. Wt Readings from Last 3 Encounters:   04/27/19 267 lb (121.1 kg)   01/29/19 257 lb (116.6 kg)   12/02/17 236 lb (107 kg)     Temp Readings from Last 3 Encounters:   04/27/19 97.5 °F (36.4 °C) (Oral)   04/24/19 97.7 °F (36.5 °C) (Oral)   08/06/18 97.9 °F (36.6 °C) (Oral)     BP Readings from Last 3 Encounters:   04/27/19 (!) 118/55   04/24/19 130/80   01/29/19 (!) 150/72     Pulse Readings from Last 3 Encounters:   04/27/19 69   04/24/19 68   01/29/19 75       24 HR INTAKE/OUTPUT :     Intake/Output Summary (Last 24 hours) at 4/27/2019 1117  Last data filed at 4/27/2019 0401  Gross per 24 hour   Intake 700 ml   Output 800 ml   Net -100 ml   BM = 0    DIET GENERAL;    OBJECTIVE  CURRENT VITALS BP (!) 118/55   Pulse 69   Temp 97.5 °F (36.4 °C) (Oral)   Resp 16   Ht 5' 4\" (1.626 m)   Wt 267 lb (121.1 kg)   SpO2 94%   BMI 45.83 kg/m²        GENERAL: alert, cooperative, no distress  NEURO: Awake, alert and oriented. PERRL. Conversing fluently and appropriately  LUNGS: clear to auscultation bilaterally- no wheezes, rales or rhonchi, normal air movement, no respiratory distress  HEART: normal rate, normal S1 and S2, no gallops, intact distal pulses and no carotid bruits  ABDOMEN: soft, non-tender, non-distended, normal bowel sounds, no masses or organomegaly  WOUNDS: Forehead hematoma stable  EXTREMITY: no cyanosis, no clubbing and no edema.   Right lower extremity strength 4/5, left 5/5      LABS  CBC :   Recent Labs     04/25/19  1218 04/26/19  0749   WBC 9.1 7.7   HGB 15.5 13.3   HCT 45.8 37.8   MCV 89.1 85.7    171     BMP:   Recent Labs     04/25/19  1218 04/26/19  0749    141   K 4.2 3.7    105   CO2 24 22*   BUN 19 22   CREATININE 0.9 1.1     COAGS:   Recent Labs     04/25/19  1218   PROT 7.8     Pancreas/HFP:    Recent Labs     04/25/19  1218 LIPASE 21.0     Recent Labs     04/25/19  1218   AST 27   ALT 29   BILIDIR <0.2   BILITOT 0.5   ALKPHOS 77     RADIOLOGY:  Narrative   PROCEDURE: CT HEAD WO CONTRAST       CLINICAL INFORMATION: SAH, PROVEN BY LP OR IMAGING.       COMPARISON: April 25, 2019       TECHNIQUE: Noncontrast 5 mm axial images were obtained through the brain.       All CT scans at this facility use dose modulation, iterative reconstruction, and/or weight-based dosing when appropriate to reduce radiation dose to as low as reasonably achievable.       FINDINGS:       There are no acute fracture changes. There is stable diffuse cerebral atrophy with focal hypodensity in the left parasagittal high parietal lobe again noted. There is no additional hemorrhage changes present. The extra-axial spaces, ventricles, and    cisterns remain clear.       The paranasal sinuses are normal..           Impression   1. Stable focus of suspected subarachnoid hemorrhage along the left frontal parietal grey-white junctions.           2. Negative exam for acute changes.           **This report has been created using voice recognition software. It may contain minor errors which are inherent in voice recognition technology. **       Final report electronically signed by Dr. Catracho Pina on 4/26/2019 5:42 AM         Pt to have repeat CT in AM  No gen surg issues        Electronically signed by Agustin Figueroa MD on 4/27/2019 at 11:17 AM

## 2019-04-27 NOTE — PROGRESS NOTES
Neurosurgery Progress Note    Patient:  Lan Casillas      Unit/Bed:4A-14/014-A    YOB: 1940    MRN: [id number]     Acct: [de-identified]     Admit date: 4/25/2019    Chief Complaint   Patient presents with   Anant Garcia       Patient Seen, Chart, Physician notes, Labs, Radiology studies reviewed. Subjective: She is status post day 2 following admission to the emergency department for observation small subarachnoid hemorrhage is evidence on CT upon admission. She remains stable and neurologically intact on exam this morning without significant complaints. She still has amnesia for the precipitating event is otherwise neurologically intact. Past, Family, Social History unchanged from admission. Diet:  DIET GENERAL;    Medications:  Scheduled Meds:   sodium chloride flush  10 mL Intravenous 2 times per day    docusate sodium  100 mg Oral BID    famotidine  20 mg Oral BID    amLODIPine  10 mg Oral Daily    atenolol  25 mg Oral Daily    atorvastatin  40 mg Oral Nightly    gabapentin  600 mg Oral TID    hydrALAZINE  50 mg Oral BID    metFORMIN  500 mg Oral BID WC    lisinopril  5 mg Oral Daily    sertraline  200 mg Oral Daily    vitamin D  1,000 Units Oral Nightly     Continuous Infusions:  PRN Meds:diphenhydrAMINE, sodium chloride flush, acetaminophen, magnesium hydroxide, ondansetron, cyclobenzaprine, fentanNYL **OR** fentanNYL, oxyCODONE-acetaminophen **OR** oxyCODONE-acetaminophen    Objective:  She is resting comfortably in bed with the head of the bed elevated at more than 30°. She is comfortable without significant complaints. Vitals: BP (!) 118/55   Pulse 69   Temp 97.5 °F (36.4 °C) (Oral)   Resp 16   Ht 5' 4\" (1.626 m)   Wt 267 lb (121.1 kg)   SpO2 94%   BMI 45.83 kg/m²   Physical Exam:  Alert and attentive. Language appropriate, with no aphasia. Pupils equal.  Facial strength symmetric. Physical Exam   physical exam remains unchanged from admission.   See HPI    ROS:  Review of Systems  Constitutional: Negative for activity change. Respiratory: Negative for apnea, chest tightness and shortness of breath. Cardiovascular: Negative for chest pain. Gastrointestinal: Negative for abdominal distention and abdominal pain. Genitourinary: Negative for difficulty urinating. Musculoskeletal: Negative for arthralgias, neck pain and neck stiffness. Neurological: Negative for facial asymmetry, speech difficulty, weakness, light-headedness and headaches. Psychiatric/Behavioral: Positive for confusion. Negative for agitation and behavioral problems. 24 hour intake/output:    Intake/Output Summary (Last 24 hours) at 4/27/2019 1117  Last data filed at 4/27/2019 0401  Gross per 24 hour   Intake 700 ml   Output 800 ml   Net -100 ml     Last 3 weights: Wt Readings from Last 3 Encounters:   04/27/19 267 lb (121.1 kg)   01/29/19 257 lb (116.6 kg)   12/02/17 236 lb (107 kg)         CBC:   Recent Labs     04/25/19  1218 04/26/19  0749   WBC 9.1 7.7   HGB 15.5 13.3    171     BMP:    Recent Labs     04/25/19  1218 04/26/19  0749    141   K 4.2 3.7    105   CO2 24 22*   BUN 19 22   CREATININE 0.9 1.1   GLUCOSE 143* 134*     Calcium:  Recent Labs     04/26/19  0749   CALCIUM 8.7     Magnesium:  Recent Labs     04/25/19  1218   MG 1.9     Glucose:No results for input(s): POCGLU in the last 72 hours. HgbA1C: No results for input(s): LABA1C in the last 72 hours. Lipids: No results for input(s): CHOL, TRIG, HDL, LDLCALC in the last 72 hours. Invalid input(s): LDL    Radiology reports as per the Radiologist  Radiology: Xr Chest Standard (2 Vw)    Result Date: 4/25/2019  PROCEDURE: XR CHEST (2 VW) CLINICAL INFORMATION: Fall downstairs, pain. COMPARISON: March 25, 2014. TECHNIQUE: PA and lateral views the chest. FINDINGS: Postsurgical changes lower cervical spine. No lobar consolidation. Calcified granuloma left lung base. No cardiomegaly. Atherosclerotic changes aortic arch. Degenerative changes thoracic spine. Costophrenic recesses are preserved. No acute findings. **This report has been created using voice recognition software. It may contain minor errors which are inherent in voice recognition technology. ** Final report electronically signed by Dr. Mireya López on 4/25/2019 1:43 PM    Xr Lumbar Spine (2-3 Views)    Result Date: 4/25/2019  PROCEDURE: XR LUMBAR SPINE (2-3 VIEWS) CLINICAL INFORMATION: Fall downstairs, pain, COMPARISON: No prior study. TECHNIQUE: AP and lateral views of the lumbar spine. FINDINGS: Degenerative changes lumbar spine postsurgical changes with fusion L2-L4. Old vertebral body compression fracture L4. Mild lumbar scoliosis. Surgical clips in the right upper quadrant. Stool throughout the colon. Degenerative changes and coarsened bony mineralization of the pelvis. Vascular calcifications. SI joints are symmetric. No acute osseous findings. **This report has been created using voice recognition software. It may contain minor errors which are inherent in voice recognition technology. ** Final report electronically signed by Dr. Mireya López on 4/25/2019 1:41 PM    Xr Hand Left (min 3 Views)    Result Date: 4/25/2019  PROCEDURE: XR HAND LEFT (MIN 3 VIEWS) CLINICAL INFORMATION: Fall downstairs, pain . COMPARISON:  No prior study. TECHNIQUE:  3 views of the left hand. FINDINGS: Degenerative changes with interphalangeal joint space narrowing. No acute fracture or dislocation. Postsurgical changes base of the second metacarpal. Generalized osteopenia. Soft tissues are unremarkable. No acute fracture or dislocation. **This report has been created using voice recognition software. It may contain minor errors which are inherent in voice recognition technology. ** Final report electronically signed by Dr. Mireya López on 4/25/2019 1:39 PM    Xr Knee Left (min 4 Views)    Result Date: 4/25/2019  PROCEDURE: XR KNEE LEFT (MIN 4 VIEWS) CLINICAL INFORMATION: Fall downstairs, pain. COMPARISON: No prior study. TECHNIQUE: 4 views of the left knee include an AP view, a lateral view and bilateral oblique views. FINDINGS: Knee prosthesis is in normal alignment. Threaded screws in the tibia are intact. No acute fracture or dislocation. No significant joint effusion. Spurring along the patella surface. Posterior knee vascular calcifications. Generalized demineralization. No acute fracture or dislocation. **This report has been created using voice recognition software. It may contain minor errors which are inherent in voice recognition technology. ** Final report electronically signed by Dr. Carmen Dupree on 4/25/2019 1:37 PM    Ct Head Wo Contrast    Result Date: 4/26/2019  PROCEDURE: CT HEAD WO CONTRAST CLINICAL INFORMATION: SAH, PROVEN BY LP OR IMAGING. COMPARISON: April 25, 2019 TECHNIQUE: Noncontrast 5 mm axial images were obtained through the brain. All CT scans at this facility use dose modulation, iterative reconstruction, and/or weight-based dosing when appropriate to reduce radiation dose to as low as reasonably achievable. FINDINGS: There are no acute fracture changes. There is stable diffuse cerebral atrophy with focal hypodensity in the left parasagittal high parietal lobe again noted. There is no additional hemorrhage changes present. The extra-axial spaces, ventricles, and cisterns remain clear. The paranasal sinuses are normal..     1. Stable focus of suspected subarachnoid hemorrhage along the left frontal parietal grey-white junctions. 2. Negative exam for acute changes. **This report has been created using voice recognition software. It may contain minor errors which are inherent in voice recognition technology. ** Final report electronically signed by Dr. Jarrod Fine on 4/26/2019 5:42 AM    Ct Head Wo Contrast    Result Date: 4/25/2019  PROCEDURE: CT HEAD WO CONTRAST CLINICAL INFORMATION: Fall downstairs, pain.  COMPARISON: No prior study. TECHNIQUE: Noncontrast 5 mm axial images were obtained through the brain. All CT scans at this facility use dose modulation, iterative reconstruction, and/or weight-based dosing when appropriate to reduce radiation dose to as low as reasonably achievable. FINDINGS: No acute intracranial findings. Intracranial vascular calcifications. Small amount of subarachnoid hemorrhage in the left frontal sulci. No midline shift. Generalized volume loss and small vessel ischemic changes. Ventricles are within normal limits for age. Paranasal sinuses are clear. Mastoid air cells are patent. Skull: Unremarkable. Soft tissues: Frontal scalp hematoma. Small amount of acute subarachnoid hemorrhage left frontal sulcus. Frontal scalp hematoma. These results were communicated directly with Dr. Nils Roy on 4/25/2019 12:52 PM,  [ ] **This report has been created using voice recognition software. It may contain minor errors which are inherent in voice recognition technology. ** Final report electronically signed by Dr. Buffy Leal on 4/25/2019 12:52 PM    Ct Cervical Spine Wo Contrast    Result Date: 4/25/2019  PROCEDURE: CT CERVICAL SPINE WO CONTRAST CLINICAL INFORMATION: Fall downstairs, pain. COMPARISON: No prior study. TECHNIQUE: 3 mm noncontrast axial images were obtained through the cervical spine with sagittal and coronal reconstructions. All CT scans at this facility use dose modulation, iterative reconstruction, and/or weight-based dosing when appropriate to reduce radiation dose to as low as reasonably achievable. FINDINGS: Straightening of the normal cervical curvature which is likely related to positioning versus muscular spasm. Previous anterior fusion C5-C6 and C7. No acute fracture or subluxation. Prevertebral soft tissues are unremarkable. There is facet hypertrophy. No severe central canal stenosis. No acute fracture or subluxation. **This report has been created using voice recognition software.  It may contain minor errors which are inherent in voice recognition technology. ** Final report electronically signed by Dr. Rei Blank on 4/25/2019 12:55 PM    Ct Thoracic Spine Wo Contrast    Result Date: 4/25/2019  PROCEDURE: CT THORACIC SPINE WO CONTRAST CLINICAL INFORMATION: T/L-SPINE TRAUMA, SIGNIFICANT INJURY SUSPECTED, +/- LOCALIZING SIGNS. COMPARISON: Chest x-ray earlier today TECHNIQUE: 3 mm axial noncontrast CT images were obtained through the thoracic spine. Sagittal and coronal reconstructions were obtained. All CT scans at this facility use dose modulation, iterative reconstruction, and/or weight-based dosing when appropriate to reduce radiation dose to as low as reasonably achievable. FINDINGS: There is no acute fracture or subluxation. There is multilevel lower cervical and thoracic and upper lumbar disc space narrowing consistent with degenerative disc disease with endplate spondylosis and facet joint DJD. Paramjit Ruffin Patient is status post C6 corpectomy with bone graft and anterior fusion with a plate and screws from C5 through C7. Pedicle screws are present at L2, distal extent not imaged. Patient also status post L1 and L2 laminectomies. No bony spinal stenosis is seen. No significant disc bulge or herniation is seen. The paravertebral soft tissues are unremarkable. There is either mild bilateral hydronephrosis or bilateral renal parapelvic cysts. See the accompanying lumbar spine CT report for further information. No acute fracture or subluxation. Lower cervical, thoracic, and upper lumbar degenerative disc disease and DJD. Status post C6 corpectomy and fusion from C5 through C7. Status post L1 and L2 laminectomies and pedicle screws are present in L2, distal extent not imaged. Bilateral hydronephrosis and/or parapelvic cysts, see the accompanying lumbar spine CT report. **This report has been created using voice recognition software.  It may contain minor errors which are inherent in voice recognition technology. ** Final report electronically signed by Dr. Iker Boothe on 4/25/2019 5:35 PM    Ct Lumbar Spine Wo Contrast    Result Date: 4/25/2019  PROCEDURE: CT LUMBAR SPINE WO CONTRAST CLINICAL INFORMATION: TRAUMA, lumbar fracture. COMPARISON: Lumbar spine x-rays earlier today TECHNIQUE: 3 mm noncontrast axial images were obtained of the lumbar spine. Sagittal and coronal reconstructions were obtained. Angled images were reconstructed through the L3-4, L4-5 and L5-S1 disc levels. All CT scans at this facility use dose modulation, iterative reconstruction, and/or weight-based dosing when appropriate to reduce radiation dose to as low as reasonably achievable. FINDINGS: There are 5  non-rib-bearing lumbar vertebrae. Patient is status post bilateral L2-L4 pedicle screw fusion. There is extensive bilateral facet joint fusion, diffusely on the left from T11 through S1 and on the right from T12 through S1. Patient also status post L1-L3 laminectomies. There is kyphosis of the lumbar spine. There is an old moderate L4 compression fracture deformity. There is no acute fracture. There is 3 mm anterolisthesis of L2 on L3, stabilized by the pedicle screws. . There is multilevel moderate to severe lower thoracic and lumbar disc space narrowing consistent with degenerative disc disease, most severe at T12-L1, L1-2 and L2-3. There is multilevel mild bony spinal stenosis due to hypertrophic spurring. There is multilevel endplate spondylosis. The paravertebral soft tissues are unremarkable. The SI joints are unremarkable. There is deformity of the posterior sacrum which may be posttraumatic or postsurgical in nature. There are bilateral renal probable parapelvic cysts. There are left renal hilar calcifications which probably represent arterial atherosclerotic calcifications. No acute fracture. Old moderate L4 compression fracture. 3 mm anterolisthesis of L2 on L3, stabilized by the pedicle screws.  Extensive postoperative

## 2019-04-27 NOTE — CONSULTS
Patient seen and examined for fall with TBI, no LOC and gait instability. Note to follow. Patient is a good candidate for rehab admission; but she and family would like to see how she progresses over weekend before deciding. Thank you for the consult.     Justin Bales MD

## 2019-04-27 NOTE — PLAN OF CARE
Problem: Falls - Risk of:  Goal: Will remain free from falls  Description  Will remain free from falls  Outcome: Ongoing  Note:   Pt is up with walker and gait belt to ambulate in the [place]. Gait was steady. Problem: Pain:  Description  Pain management should include both nonpharmacologic and pharmacologic interventions. Goal: Pain level will decrease  Description  Pain level will decrease  Note:   Continues with chronic back pain     Problem: Discharge Planning:  Goal: Discharged to appropriate level of care  Description  Discharged to appropriate level of care  Outcome: Ongoing  Note:   Pt seems hesitant about staying on inpatient rehab unit    Care plan reviewed with patient and family. Patient and family verbalize understanding of the plan of care and contribute to goal setting.

## 2019-04-27 NOTE — PROGRESS NOTES
SBIRT screening completed per trauma protocol. SBIRT Findings are Negative. Patient denies alcohol and/or drug use. Also denies depressive symptoms.     Brief Intervention and Referral to Treatment Summary N/A    Patient was provided PHQ-9, AUDIT and DAST Screening:      PHQ-9 Score:  Negative  AUDIT Score:  Negative  DAST Score:   Negative    Patients substance use is considered-Negative    Low Risk/Healthy  Moderate Risk  Harmful  Dependent    Patients depression is considered:-Negative    Minimal  Mild   Moderate  Moderately Severe  Severe    Brief Education Was Provided N/A    Patient was receptive  Patient was not receptive      Brief Intervention Is Provided (Only for AUDIT or DAST) N/A    Patient reports readiness to decrease and/or stop use and a plan was discussed   Patient denies readiness to decrease and/or stop use and a plan was not discussed      Recommendations/Referrals for Brief and/or Specialized Treatment Provided to Patient  N/A

## 2019-04-28 ENCOUNTER — APPOINTMENT (OUTPATIENT)
Dept: CT IMAGING | Age: 79
DRG: 086 | End: 2019-04-28
Payer: MEDICARE

## 2019-04-28 ENCOUNTER — TELEPHONE (OUTPATIENT)
Dept: FAMILY MEDICINE CLINIC | Age: 79
End: 2019-04-28

## 2019-04-28 VITALS
HEART RATE: 81 BPM | TEMPERATURE: 98.3 F | BODY MASS INDEX: 46.01 KG/M2 | SYSTOLIC BLOOD PRESSURE: 137 MMHG | HEIGHT: 64 IN | RESPIRATION RATE: 16 BRPM | OXYGEN SATURATION: 92 % | WEIGHT: 269.5 LBS | DIASTOLIC BLOOD PRESSURE: 63 MMHG

## 2019-04-28 LAB — MRSA SCREEN: NORMAL

## 2019-04-28 PROCEDURE — 2580000003 HC RX 258: Performed by: NURSE PRACTITIONER

## 2019-04-28 PROCEDURE — 6360000002 HC RX W HCPCS: Performed by: NURSE PRACTITIONER

## 2019-04-28 PROCEDURE — 6370000000 HC RX 637 (ALT 250 FOR IP): Performed by: NURSE PRACTITIONER

## 2019-04-28 PROCEDURE — 6370000000 HC RX 637 (ALT 250 FOR IP): Performed by: SURGERY

## 2019-04-28 PROCEDURE — APPSS45 APP SPLIT SHARED TIME 31-45 MINUTES: Performed by: PHYSICIAN ASSISTANT

## 2019-04-28 PROCEDURE — 99231 SBSQ HOSP IP/OBS SF/LOW 25: CPT | Performed by: PHYSICIAN ASSISTANT

## 2019-04-28 PROCEDURE — 70450 CT HEAD/BRAIN W/O DYE: CPT

## 2019-04-28 PROCEDURE — 99233 SBSQ HOSP IP/OBS HIGH 50: CPT | Performed by: SURGERY

## 2019-04-28 RX ORDER — OXYCODONE HYDROCHLORIDE AND ACETAMINOPHEN 5; 325 MG/1; MG/1
1 TABLET ORAL EVERY 6 HOURS PRN
Qty: 28 TABLET | Refills: 0 | Status: SHIPPED | OUTPATIENT
Start: 2019-04-28 | End: 2019-05-05

## 2019-04-28 RX ORDER — CYCLOBENZAPRINE HCL 10 MG
10 TABLET ORAL 3 TIMES DAILY PRN
Qty: 30 TABLET | Refills: 0 | Status: SHIPPED | OUTPATIENT
Start: 2019-04-28 | End: 2019-05-08

## 2019-04-28 RX ORDER — DIPHENHYDRAMINE HCL 25 MG
25 TABLET ORAL EVERY 6 HOURS PRN
COMMUNITY
Start: 2019-04-28 | End: 2019-05-28

## 2019-04-28 RX ADMIN — FAMOTIDINE 20 MG: 20 TABLET ORAL at 09:48

## 2019-04-28 RX ADMIN — ONDANSETRON 4 MG: 2 INJECTION INTRAMUSCULAR; INTRAVENOUS at 07:57

## 2019-04-28 RX ADMIN — METFORMIN HYDROCHLORIDE 500 MG: 500 TABLET ORAL at 09:48

## 2019-04-28 RX ADMIN — Medication 10 ML: at 07:58

## 2019-04-28 RX ADMIN — GABAPENTIN 600 MG: 300 CAPSULE ORAL at 09:48

## 2019-04-28 RX ADMIN — AMLODIPINE BESYLATE 10 MG: 10 TABLET ORAL at 09:48

## 2019-04-28 RX ADMIN — DOCUSATE SODIUM 100 MG: 100 CAPSULE, LIQUID FILLED ORAL at 09:48

## 2019-04-28 RX ADMIN — HYDRALAZINE HYDROCHLORIDE 50 MG: 50 TABLET, FILM COATED ORAL at 09:48

## 2019-04-28 RX ADMIN — SERTRALINE 200 MG: 100 TABLET, FILM COATED ORAL at 09:47

## 2019-04-28 RX ADMIN — OXYCODONE AND ACETAMINOPHEN 2 TABLET: 5; 325 TABLET ORAL at 04:20

## 2019-04-28 RX ADMIN — ATENOLOL 25 MG: 25 TABLET ORAL at 09:48

## 2019-04-28 RX ADMIN — LISINOPRIL 5 MG: 5 TABLET ORAL at 09:48

## 2019-04-28 ASSESSMENT — PAIN DESCRIPTION - LOCATION: LOCATION: BACK

## 2019-04-28 ASSESSMENT — PAIN DESCRIPTION - PAIN TYPE: TYPE: CHRONIC PAIN

## 2019-04-28 ASSESSMENT — PAIN SCALES - GENERAL
PAINLEVEL_OUTOF10: 5
PAINLEVEL_OUTOF10: 5

## 2019-04-28 NOTE — DISCHARGE SUMMARY
discharge and outpatient repeat CT in one week. Discharge Medications:   Eduardo Enter   Home Medication Instructions [facility]:[id number]    Printed on:04/28/19 7666   Medication Information                      amLODIPine (NORVASC) 10 MG tablet  Take 1 tablet by mouth daily             atenolol (TENORMIN) 25 MG tablet  TAKE 1 TABLET DAILY             atorvastatin (LIPITOR) 40 MG tablet  TAKE 1 TABLET DAILY             cyclobenzaprine (FLEXERIL) 10 MG tablet  Take 1 tablet by mouth 3 times daily as needed for Muscle spasms             diphenhydrAMINE (BENADRYL) 25 MG tablet  Take 1 tablet by mouth every 6 hours as needed for Itching             Elastic Bandages & Supports (T.E.D. BELOW KNEE/LARGE) MISC  Use as directed             gabapentin (NEURONTIN) 300 MG capsule  Take 2 capsules by mouth 3 times daily             Glucose Blood (BLOOD GLUCOSE TEST STRIPS) STRP  True Trak test strips  Patient checks once daily             Handicap Placard MISC  by Does not apply route Expires 10/14/21. hydrALAZINE (APRESOLINE) 50 MG tablet  Take 1 tablet by mouth 2 times daily             ibuprofen (ADVIL;MOTRIN) 600 MG tablet  Take 1 tablet by mouth 3 times daily as needed for Pain (Take with food 3 times daily for pain)             Lancets MISC  Use once daily and PRN             metFORMIN (GLUCOPHAGE) 500 MG tablet  TAKE 1 TABLET TWICE A DAY WITH MEALS             oxyCODONE-acetaminophen (PERCOCET) 5-325 MG per tablet  Take 1 tablet by mouth every 6 hours as needed for Pain for up to 7 days. Intended supply: 7 days. Take lowest dose possible to manage pain             pantoprazole (PROTONIX) 40 MG tablet  Take 1 tablet by mouth 2 times daily             quinapril (ACCUPRIL) 10 MG tablet  Take 1 tablet by mouth nightly             sertraline (ZOLOFT) 100 MG tablet  Take 2 tablets by mouth daily             vitamin D (CHOLECALCIFEROL) 1000 UNIT TABS tablet  Take 1,000 Units by mouth nightly. FINDINGS: Degenerative changes lumbar spine postsurgical changes with fusion L2-L4. Old vertebral body compression fracture L4. Mild lumbar scoliosis. Surgical clips in the right upper quadrant. Stool throughout the colon. Degenerative changes and coarsened bony mineralization of the pelvis. Vascular calcifications. SI joints are symmetric. No acute osseous findings. **This report has been created using voice recognition software. It may contain minor errors which are inherent in voice recognition technology. ** Final report electronically signed by Dr. Jorgito Hart on 4/25/2019 1:41 PM    Xr Hand Left (min 3 Views)    Result Date: 4/25/2019  PROCEDURE: XR HAND LEFT (MIN 3 VIEWS) CLINICAL INFORMATION: Fall downstairs, pain . COMPARISON:  No prior study. TECHNIQUE:  3 views of the left hand. FINDINGS: Degenerative changes with interphalangeal joint space narrowing. No acute fracture or dislocation. Postsurgical changes base of the second metacarpal. Generalized osteopenia. Soft tissues are unremarkable. No acute fracture or dislocation. **This report has been created using voice recognition software. It may contain minor errors which are inherent in voice recognition technology. ** Final report electronically signed by Dr. Jorgito Hart on 4/25/2019 1:39 PM    Xr Knee Left (min 4 Views)    Result Date: 4/25/2019  PROCEDURE: XR KNEE LEFT (MIN 4 VIEWS) CLINICAL INFORMATION: Fall downstairs, pain. COMPARISON: No prior study. TECHNIQUE: 4 views of the left knee include an AP view, a lateral view and bilateral oblique views. FINDINGS: Knee prosthesis is in normal alignment. Threaded screws in the tibia are intact. No acute fracture or dislocation. No significant joint effusion. Spurring along the patella surface. Posterior knee vascular calcifications. Generalized demineralization. No acute fracture or dislocation. **This report has been created using voice recognition software.  It may contain minor errors which are inherent in voice recognition technology. ** Final report electronically signed by Dr. Alex Henry on 4/25/2019 1:37 PM    Ct Head Wo Contrast    Result Date: 4/26/2019  PROCEDURE: CT HEAD WO CONTRAST CLINICAL INFORMATION: SAH, PROVEN BY LP OR IMAGING. COMPARISON: April 25, 2019 TECHNIQUE: Noncontrast 5 mm axial images were obtained through the brain. All CT scans at this facility use dose modulation, iterative reconstruction, and/or weight-based dosing when appropriate to reduce radiation dose to as low as reasonably achievable. FINDINGS: There are no acute fracture changes. There is stable diffuse cerebral atrophy with focal hypodensity in the left parasagittal high parietal lobe again noted. There is no additional hemorrhage changes present. The extra-axial spaces, ventricles, and cisterns remain clear. The paranasal sinuses are normal..     1. Stable focus of suspected subarachnoid hemorrhage along the left frontal parietal grey-white junctions. 2. Negative exam for acute changes. **This report has been created using voice recognition software. It may contain minor errors which are inherent in voice recognition technology. ** Final report electronically signed by Dr. Pooja Pickering on 4/26/2019 5:42 AM    Ct Head Wo Contrast    Result Date: 4/25/2019  PROCEDURE: CT HEAD WO CONTRAST CLINICAL INFORMATION: Fall downstairs, pain. COMPARISON: No prior study. TECHNIQUE: Noncontrast 5 mm axial images were obtained through the brain. All CT scans at this facility use dose modulation, iterative reconstruction, and/or weight-based dosing when appropriate to reduce radiation dose to as low as reasonably achievable. FINDINGS: No acute intracranial findings. Intracranial vascular calcifications. Small amount of subarachnoid hemorrhage in the left frontal sulci. No midline shift. Generalized volume loss and small vessel ischemic changes. Ventricles are within normal limits for age. Paranasal sinuses are clear.  Mastoid air cells are patent. Skull: Unremarkable. Soft tissues: Frontal scalp hematoma. Small amount of acute subarachnoid hemorrhage left frontal sulcus. Frontal scalp hematoma. These results were communicated directly with Dr. Nils Erwin on 4/25/2019 12:52 PM,  [ ] **This report has been created using voice recognition software. It may contain minor errors which are inherent in voice recognition technology. ** Final report electronically signed by Dr. Lisa Valdivia on 4/25/2019 12:52 PM    Ct Cervical Spine Wo Contrast    Result Date: 4/25/2019  PROCEDURE: CT CERVICAL SPINE WO CONTRAST CLINICAL INFORMATION: Fall downstairs, pain. COMPARISON: No prior study. TECHNIQUE: 3 mm noncontrast axial images were obtained through the cervical spine with sagittal and coronal reconstructions. All CT scans at this facility use dose modulation, iterative reconstruction, and/or weight-based dosing when appropriate to reduce radiation dose to as low as reasonably achievable. FINDINGS: Straightening of the normal cervical curvature which is likely related to positioning versus muscular spasm. Previous anterior fusion C5-C6 and C7. No acute fracture or subluxation. Prevertebral soft tissues are unremarkable. There is facet hypertrophy. No severe central canal stenosis. No acute fracture or subluxation. **This report has been created using voice recognition software. It may contain minor errors which are inherent in voice recognition technology. ** Final report electronically signed by Dr. Lisa Valdivia on 4/25/2019 12:55 PM    Ct Thoracic Spine Wo Contrast    Result Date: 4/25/2019  PROCEDURE: CT THORACIC SPINE WO CONTRAST CLINICAL INFORMATION: T/L-SPINE TRAUMA, SIGNIFICANT INJURY SUSPECTED, +/- LOCALIZING SIGNS. COMPARISON: Chest x-ray earlier today TECHNIQUE: 3 mm axial noncontrast CT images were obtained through the thoracic spine. Sagittal and coronal reconstructions were obtained.  All CT scans at this facility use dose modulation, weight-based dosing when appropriate to reduce radiation dose to as low as reasonably achievable. FINDINGS: There are 5  non-rib-bearing lumbar vertebrae. Patient is status post bilateral L2-L4 pedicle screw fusion. There is extensive bilateral facet joint fusion, diffusely on the left from T11 through S1 and on the right from T12 through S1. Patient also status post L1-L3 laminectomies. There is kyphosis of the lumbar spine. There is an old moderate L4 compression fracture deformity. There is no acute fracture. There is 3 mm anterolisthesis of L2 on L3, stabilized by the pedicle screws. . There is multilevel moderate to severe lower thoracic and lumbar disc space narrowing consistent with degenerative disc disease, most severe at T12-L1, L1-2 and L2-3. There is multilevel mild bony spinal stenosis due to hypertrophic spurring. There is multilevel endplate spondylosis. The paravertebral soft tissues are unremarkable. The SI joints are unremarkable. There is deformity of the posterior sacrum which may be posttraumatic or postsurgical in nature. There are bilateral renal probable parapelvic cysts. There are left renal hilar calcifications which probably represent arterial atherosclerotic calcifications. No acute fracture. Old moderate L4 compression fracture. 3 mm anterolisthesis of L2 on L3, stabilized by the pedicle screws. Extensive postoperative changes of the lumbar spine as discussed above. Multilevel moderate to severe degenerative disc disease. **This report has been created using voice recognition software. It may contain minor errors which are inherent in voice recognition technology. ** Final report electronically signed by Dr. Iker Boothe on 4/25/2019 5:45 PM      Labs:   Recent Results (from the past 67 hour(s))   Basic Metabolic Panel w/ Reflex to MG    Collection Time: 04/26/19  7:49 AM   Result Value Ref Range    Sodium 141 135 - 145 meq/L    Potassium reflex Magnesium 3.7 3.5 - 5.2 meq/L

## 2019-04-28 NOTE — PLAN OF CARE
Problem: Falls - Risk of:  Goal: Will remain free from falls  Description  Will remain free from falls  4/28/2019 0451 by Shaggy Martinez RN  Note:   Fall precautions maintained this shift. Nonskid socks on, call light within reach, bed alarm on, 2/4 side rails raised. No falls this shift. Problem: Discharge Planning:  Goal: Discharged to appropriate level of care  Description  Discharged to appropriate level of care  4/28/2019 0451 by Shaggy Martinez RN  Note:   Pt to home with . Problem: Pain:  Goal: Pain level will decrease  Description  Pain level will decrease  4/28/2019 0451 by Shaggy Martinez RN  Note:   Pt c/o moderate generalized pain this shift. Pain goal 5/10. Pain goal met this shift. PRN percocet used for pain control. Problem: Skin Integrity:  Goal: Absence of new skin breakdown  Description  Absence of new skin breakdown  Note:   Pt turns self in bed with pillow support. Heels elevated. No new evidence of skin breakdown. Care plan reviewed with patient. Patient verbalize understanding of the plan of care and contribute to goal setting.

## 2019-04-28 NOTE — TELEPHONE ENCOUNTER
.Transition of Care visit scheduled.   5/10/2019  Patient is being discharged to home  Date of discharge 4/28  Discharge from facility [facility]  Reason for admission subarachnoid hemorrhage

## 2019-04-28 NOTE — PROGRESS NOTES
Neurosurgery Progress Note    Patient:  Prisca Lanier      Unit/Bed:4A-14/014-A    YOB: 1940    MRN: [id number]     Acct: [de-identified]     Admit date: 4/25/2019    Chief Complaint   Patient presents with   Erle Austin Fall       Patient Seen, Chart, Physician notes, Labs, Radiology studies reviewed. Subjective: She is status post day 3 following admission to the emergency department for observation small subarachnoid hemorrhage is evidence on CT upon admission. She is stable and neurologically intact on exam this morning without new complaints. She still has amnesia for the precipitating event is otherwise neurologically intact. An incidence of nausea and vomiting has resolved. Past, Family, Social History unchanged from admission. Diet:  DIET GENERAL;    Medications:  Scheduled Meds:   sodium chloride flush  10 mL Intravenous 2 times per day    docusate sodium  100 mg Oral BID    famotidine  20 mg Oral BID    amLODIPine  10 mg Oral Daily    atenolol  25 mg Oral Daily    atorvastatin  40 mg Oral Nightly    gabapentin  600 mg Oral TID    hydrALAZINE  50 mg Oral BID    metFORMIN  500 mg Oral BID WC    lisinopril  5 mg Oral Daily    sertraline  200 mg Oral Daily    vitamin D  1,000 Units Oral Nightly     Continuous Infusions:  PRN Meds:diphenhydrAMINE, sodium chloride flush, acetaminophen, magnesium hydroxide, ondansetron, cyclobenzaprine, fentanNYL **OR** fentanNYL, oxyCODONE-acetaminophen **OR** oxyCODONE-acetaminophen    Objective:  She is resting comfortably in bed with the head of the bed elevated at more than 30°. She is comfortable without significant complaints. She remains stable and neurologically intact on exam.        Vitals: /63   Pulse 81   Temp 98.3 °F (36.8 °C) (Oral)   Resp 16   Ht 5' 4\" (1.626 m)   Wt 269 lb 8 oz (122.2 kg)   SpO2 92%   BMI 46.26 kg/m²   Physical Exam:  Alert and attentive. Language appropriate, with no aphasia.   Pupils equal. Facial strength symmetric. Physical Exam   physical exam remains unchanged from admission. See HPI        ROS:  Review of Systems  Constitutional: Negative for activity change. Respiratory: Negative for apnea, chest tightness and shortness of breath.    Cardiovascular: Negative for chest pain. Gastrointestinal: Negative for abdominal distention and abdominal pain. Genitourinary: Negative for difficulty urinating. Musculoskeletal: Negative for arthralgias, neck pain and neck stiffness. Neurological: Negative for facial asymmetry, speech difficulty, weakness, light-headedness and headaches. Psychiatric/Behavioral: Positive for confusion. Negative for agitation and behavioral problems.         24 hour intake/output:    Intake/Output Summary (Last 24 hours) at 4/28/2019 1052  Last data filed at 4/28/2019 0416  Gross per 24 hour   Intake 790 ml   Output 750 ml   Net 40 ml     Last 3 weights: Wt Readings from Last 3 Encounters:   04/28/19 269 lb 8 oz (122.2 kg)   01/29/19 257 lb (116.6 kg)   12/02/17 236 lb (107 kg)         CBC:   Recent Labs     04/25/19  1218 04/26/19  0749   WBC 9.1 7.7   HGB 15.5 13.3    171     BMP:    Recent Labs     04/25/19  1218 04/26/19  0749    141   K 4.2 3.7    105   CO2 24 22*   BUN 19 22   CREATININE 0.9 1.1   GLUCOSE 143* 134*     Calcium:  Recent Labs     04/26/19  0749   CALCIUM 8.7     Magnesium:  Recent Labs     04/25/19  1218   MG 1.9     Glucose:No results for input(s): POCGLU in the last 72 hours. HgbA1C: No results for input(s): LABA1C in the last 72 hours. Lipids: No results for input(s): CHOL, TRIG, HDL, LDLCALC in the last 72 hours. Invalid input(s): LDL    Radiology reports as per the Radiologist  Radiology: Xr Chest Standard (2 Vw)    Result Date: 4/25/2019  PROCEDURE: XR CHEST (2 VW) CLINICAL INFORMATION: Fall downstairs, pain. COMPARISON: March 25, 2014.  TECHNIQUE: PA and lateral views the chest. FINDINGS: Postsurgical changes lower cervical spine. No lobar consolidation. Calcified granuloma left lung base. No cardiomegaly. Atherosclerotic changes aortic arch. Degenerative changes thoracic spine. Costophrenic recesses are preserved. No acute findings. **This report has been created using voice recognition software. It may contain minor errors which are inherent in voice recognition technology. ** Final report electronically signed by Dr. Buffy Leal on 4/25/2019 1:43 PM    Xr Lumbar Spine (2-3 Views)    Result Date: 4/25/2019  PROCEDURE: XR LUMBAR SPINE (2-3 VIEWS) CLINICAL INFORMATION: Fall downstairs, pain, COMPARISON: No prior study. TECHNIQUE: AP and lateral views of the lumbar spine. FINDINGS: Degenerative changes lumbar spine postsurgical changes with fusion L2-L4. Old vertebral body compression fracture L4. Mild lumbar scoliosis. Surgical clips in the right upper quadrant. Stool throughout the colon. Degenerative changes and coarsened bony mineralization of the pelvis. Vascular calcifications. SI joints are symmetric. No acute osseous findings. **This report has been created using voice recognition software. It may contain minor errors which are inherent in voice recognition technology. ** Final report electronically signed by Dr. Buffy Leal on 4/25/2019 1:41 PM    Xr Hand Left (min 3 Views)    Result Date: 4/25/2019  PROCEDURE: XR HAND LEFT (MIN 3 VIEWS) CLINICAL INFORMATION: Fall downstairs, pain . COMPARISON:  No prior study. TECHNIQUE:  3 views of the left hand. FINDINGS: Degenerative changes with interphalangeal joint space narrowing. No acute fracture or dislocation. Postsurgical changes base of the second metacarpal. Generalized osteopenia. Soft tissues are unremarkable. No acute fracture or dislocation. **This report has been created using voice recognition software. It may contain minor errors which are inherent in voice recognition technology. ** Final report electronically signed by Dr. Buffy Leal on 4/25/2019 1:39 subarachnoid blood at the left parafalcine frontal region with stable ventricular size. **This report has been created using voice recognition software. It may contain minor errors which are inherent in voice recognition technology. ** Final report electronically signed by Dr. Millie Hodge MD on 4/28/2019 8:19 AM    Ct Head Wo Contrast    Result Date: 4/26/2019  PROCEDURE: CT HEAD WO CONTRAST CLINICAL INFORMATION: SAH, PROVEN BY LP OR IMAGING. COMPARISON: April 25, 2019 TECHNIQUE: Noncontrast 5 mm axial images were obtained through the brain. All CT scans at this facility use dose modulation, iterative reconstruction, and/or weight-based dosing when appropriate to reduce radiation dose to as low as reasonably achievable. FINDINGS: There are no acute fracture changes. There is stable diffuse cerebral atrophy with focal hypodensity in the left parasagittal high parietal lobe again noted. There is no additional hemorrhage changes present. The extra-axial spaces, ventricles, and cisterns remain clear. The paranasal sinuses are normal..     1. Stable focus of suspected subarachnoid hemorrhage along the left frontal parietal grey-white junctions. 2. Negative exam for acute changes. **This report has been created using voice recognition software. It may contain minor errors which are inherent in voice recognition technology. ** Final report electronically signed by Dr. Steven Vance on 4/26/2019 5:42 AM    Ct Head Wo Contrast    Result Date: 4/25/2019  PROCEDURE: CT HEAD WO CONTRAST CLINICAL INFORMATION: Fall downstairs, pain. COMPARISON: No prior study. TECHNIQUE: Noncontrast 5 mm axial images were obtained through the brain. All CT scans at this facility use dose modulation, iterative reconstruction, and/or weight-based dosing when appropriate to reduce radiation dose to as low as reasonably achievable. FINDINGS: No acute intracranial findings. Intracranial vascular calcifications.  Small amount of subarachnoid today TECHNIQUE: 3 mm axial noncontrast CT images were obtained through the thoracic spine. Sagittal and coronal reconstructions were obtained. All CT scans at this facility use dose modulation, iterative reconstruction, and/or weight-based dosing when appropriate to reduce radiation dose to as low as reasonably achievable. FINDINGS: There is no acute fracture or subluxation. There is multilevel lower cervical and thoracic and upper lumbar disc space narrowing consistent with degenerative disc disease with endplate spondylosis and facet joint DJD. Flori Christine Patient is status post C6 corpectomy with bone graft and anterior fusion with a plate and screws from C5 through C7. Pedicle screws are present at L2, distal extent not imaged. Patient also status post L1 and L2 laminectomies. No bony spinal stenosis is seen. No significant disc bulge or herniation is seen. The paravertebral soft tissues are unremarkable. There is either mild bilateral hydronephrosis or bilateral renal parapelvic cysts. See the accompanying lumbar spine CT report for further information. No acute fracture or subluxation. Lower cervical, thoracic, and upper lumbar degenerative disc disease and DJD. Status post C6 corpectomy and fusion from C5 through C7. Status post L1 and L2 laminectomies and pedicle screws are present in L2, distal extent not imaged. Bilateral hydronephrosis and/or parapelvic cysts, see the accompanying lumbar spine CT report. **This report has been created using voice recognition software. It may contain minor errors which are inherent in voice recognition technology. ** Final report electronically signed by Dr. Radha Duenas on 4/25/2019 5:35 PM    Ct Lumbar Spine Wo Contrast    Result Date: 4/25/2019  PROCEDURE: CT LUMBAR SPINE WO CONTRAST CLINICAL INFORMATION: TRAUMA, lumbar fracture. COMPARISON: Lumbar spine x-rays earlier today TECHNIQUE: 3 mm noncontrast axial images were obtained of the lumbar spine.  Sagittal and coronal reconstructions were obtained. Angled images were reconstructed through the L3-4, L4-5 and L5-S1 disc levels. All CT scans at this facility use dose modulation, iterative reconstruction, and/or weight-based dosing when appropriate to reduce radiation dose to as low as reasonably achievable. FINDINGS: There are 5  non-rib-bearing lumbar vertebrae. Patient is status post bilateral L2-L4 pedicle screw fusion. There is extensive bilateral facet joint fusion, diffusely on the left from T11 through S1 and on the right from T12 through S1. Patient also status post L1-L3 laminectomies. There is kyphosis of the lumbar spine. There is an old moderate L4 compression fracture deformity. There is no acute fracture. There is 3 mm anterolisthesis of L2 on L3, stabilized by the pedicle screws. . There is multilevel moderate to severe lower thoracic and lumbar disc space narrowing consistent with degenerative disc disease, most severe at T12-L1, L1-2 and L2-3. There is multilevel mild bony spinal stenosis due to hypertrophic spurring. There is multilevel endplate spondylosis. The paravertebral soft tissues are unremarkable. The SI joints are unremarkable. There is deformity of the posterior sacrum which may be posttraumatic or postsurgical in nature. There are bilateral renal probable parapelvic cysts. There are left renal hilar calcifications which probably represent arterial atherosclerotic calcifications. No acute fracture. Old moderate L4 compression fracture. 3 mm anterolisthesis of L2 on L3, stabilized by the pedicle screws. Extensive postoperative changes of the lumbar spine as discussed above. Multilevel moderate to severe degenerative disc disease. **This report has been created using voice recognition software. It may contain minor errors which are inherent in voice recognition technology. ** Final report electronically signed by Dr. Christie Shah on 4/25/2019 5:45 PM    A/P: S/P She remains status post admission through the emergency department following a mechanical fall in her home resulting in head trauma with subsequent findings of small subarachnoid hemorrhage is imaged on CT at admission.  Follow-up CT is  reviewed reveal a slight interval decrease in subarachnoid blood. She remained stable neurologically intact and with the improved findings on imaging, we recommend that she be discharged home with a follow-up CT to be ordered for one week from now with the results forwarded to neurosurgery for review. She is encouraged to contact our office in the interim should she have any additional questions or concerns or should she experience any significant changes in her general health.     -New CT scan ordered for one week from today with results forwarded to neurosurgery for review. -Recommend discharge home from a neurosurgical perspective.         Electronically signed by Micki Renee PA-C on 4/28/2019 at 10:52 AM

## 2019-04-28 NOTE — PROGRESS NOTES
Spoke with neurosurgery and ortho spine and both services are ok with discharge. Follow up appoinments made with PCP and Dr Raya Coreas office. Order for CT and face sheet faxed to central scheduling. Script for percocet sent with pt. Order for CT scan given to pt, discussed scheduling prior to appointment with Dr Monica Glass, number for central scheduling written on order. Given order for speech therapy, pt states they have a rehab facility close to their home they would like to check with. All belongings given to pt and spouse including cell phone, 2 tablets, and chargers. No further questions at this time.

## 2019-04-28 NOTE — PROGRESS NOTES
[facility] for Dr. Ryan Feliz  Daily Progress Note      Pt Name: Barbara Linda Record Number: [id number]  Date of Birth 1940   Today's Date: 4/28/2019    HD: # 3    CC: \"I had a slight headache overnight. \"     ASSESSMENT  1. Active Hospital Problems    Diagnosis Date Noted    Fall [W19. XXXA] 04/25/2019    Subarachnoid hemorrhage ([address] [I60.9] 04/25/2019    Traumatic hematoma of forehead [S00.83XA] 04/25/2019    Closed head injury [S09.90XA] 04/25/2019    Fall down stairs [W10.8XXA]          PLAN  Admitted to the ICU initially now on 4A     Subarachnoid hemorrhage              - Neurosurgery managing              - Repeat head CT this AM stable              - Neuro checks     Closed head injury              - SLP for cog eval pending              - Limited stimulation brain injury guidelines              - Neuro checks     Lower back pain              - Films all negative for acute injury              - Consulted Ortho Spine              - NV checks     Pain control                 - Tylenol, Percocet and Fentanyl PRN              - Flexeril     Up with assistance  General diet  PT/OT eval and treat  Prophylaxis: SCDs, IS, C&DB, Pepcid, stool softeners  Home medications clarified - parameters placed on HTN meds  Discharge disposition pending clinical course   -Pt will be discharged home with spouse today. SUBJECTIVE  Pt continues in neuro stepdown this AM. Clinically she appears well. Repeat CT head this AM demonstrates decrease in subarachnoid blood. Neurosurgery have cleared Pt for discharge at this time. Pt with adequate analgesia with Percocet. She ambulated in halls without difficulty. On exam she endorses she had a slight headache, but it has since resolved. No further complaints offered. She expresses eagerness to go home. Pt is passing flatus and has had a bowel movement during admission.  She has tolerated a general diet with no nausea or vomiting reported. She is stable from trauma standpoint and can be discharged home today with neurosurgery f/u. Wt Readings from Last 3 Encounters:   04/28/19 269 lb 8 oz (122.2 kg)   01/29/19 257 lb (116.6 kg)   12/02/17 236 lb (107 kg)     Temp Readings from Last 3 Encounters:   04/28/19 98.3 °F (36.8 °C) (Oral)   04/24/19 97.7 °F (36.5 °C) (Oral)   08/06/18 97.9 °F (36.6 °C) (Oral)     BP Readings from Last 3 Encounters:   04/28/19 130/60   04/24/19 130/80   01/29/19 (!) 150/72     Pulse Readings from Last 3 Encounters:   04/28/19 78   04/24/19 68   01/29/19 75       24 HR INTAKE/OUTPUT :     Intake/Output Summary (Last 24 hours) at 4/28/2019 0824  Last data filed at 4/28/2019 0416  Gross per 24 hour   Intake 1120 ml   Output 750 ml   Net 370 ml   BM = 0    DIET GENERAL;    OBJECTIVE  CURRENT VITALS /60   Pulse 78   Temp 98.3 °F (36.8 °C) (Oral)   Resp 16   Ht 5' 4\" (1.626 m)   Wt 269 lb 8 oz (122.2 kg)   SpO2 92%   BMI 46.26 kg/m²        GENERAL: alert, cooperative, no distress  NEURO: Awake, alert and oriented. PERRL. Conversing fluently and appropriately  LUNGS: clear to auscultation bilaterally- no wheezes, rales or rhonchi, normal air movement, no respiratory distress  HEART: normal rate, normal S1 and S2, no gallops, intact distal pulses and no carotid bruits  ABDOMEN: soft, non-tender, non-distended, normal bowel sounds, no masses or organomegaly  WOUNDS: Forehead hematoma stable  EXTREMITY: no cyanosis, no clubbing and no edema.   Right lower extremity strength 4/5, left 5/5    LABS  CBC :   Recent Labs     04/25/19  1218 04/26/19  0749   WBC 9.1 7.7   HGB 15.5 13.3   HCT 45.8 37.8   MCV 89.1 85.7    171     BMP:   Recent Labs     04/25/19  1218 04/26/19  0749    141   K 4.2 3.7    105   CO2 24 22*   BUN 19 22   CREATININE 0.9 1.1     COAGS:   Recent Labs     04/25/19  1218   PROT 7.8     Pancreas/HFP:    Recent Labs     04/25/19  1218   LIPASE 21.0 Recent Labs     04/25/19  1218   AST 27   ALT 29   BILIDIR <0.2   BILITOT 0.5   ALKPHOS 77     RADIOLOGY:  Narrative   PROCEDURE: CT HEAD WO CONTRAST       CLINICAL INFORMATION: Subarachnoid hemorrhage. Follow-up subarachnoid hemorrhage status post fall 4/25/2019.       COMPARISON: 4/26/2019.       TECHNIQUE: Noncontrast 5 mm axial images were obtained through the brain. Sagittal and coronal reconstructions were obtained.       All CT scans at this facility use dose modulation, iterative reconstruction, and/or weight-based dosing when appropriate to reduce radiation dose to as low as reasonably achievable.       FINDINGS:   Small focal area of hyperdense subarachnoid blood at the left parafalcine frontal lobe has slightly decreased in the interval. No new/interval intracranial hemorrhage is identified. The ventricles are stable in size. There are stable mild to moderate    patchy areas of hypodensity in the subcortical deep white matter is evidence for chronic microvascular angiopathy. Gray-white matter differentiation otherwise appears grossly preserved. The calvarium appears intact. Orbits are unremarkable. Visualized    paranasal sinuses and mastoid air cells are clear.           Impression    Slight interval decrease in conspicuity of hyperdense subarachnoid blood at the left parafalcine frontal region with stable ventricular size.                   **This report has been created using voice recognition software. It may contain minor errors which are inherent in voice recognition technology. **       Final report electronically signed by Dr. Niles Yang MD on 4/28/2019 8:19 AM         Electronically signed by Wil Brown PA-C on 4/28/2019 at 8:24 AM    Patient seen and evaluated independently. Denied headache. Stated she had a little abdominal pain earlier with some nausea which has resolved since Zofran. Her abdomen is benign. Follow-up head CT shows resolving small subarachnoid hemorrhage. Discharge home today if okay from neurosurgery standpoint she is stable for discharge from trauma services standpoint. Patient declined inpatient rehab. She is ambulating well. Follow-up with neurosurgery outpatient and outpatient therapies if recommended. Care coordinated with [facility], PA-C. Agree with examination assessment and plan as documented.

## 2019-04-29 ENCOUNTER — APPOINTMENT (OUTPATIENT)
Dept: CT IMAGING | Age: 79
End: 2019-04-29
Payer: MEDICARE

## 2019-04-29 ENCOUNTER — TELEPHONE (OUTPATIENT)
Dept: FAMILY MEDICINE CLINIC | Age: 79
End: 2019-04-29

## 2019-04-29 ENCOUNTER — TELEPHONE (OUTPATIENT)
Dept: NEUROSURGERY | Age: 79
End: 2019-04-29

## 2019-04-29 ENCOUNTER — APPOINTMENT (OUTPATIENT)
Dept: GENERAL RADIOLOGY | Age: 79
End: 2019-04-29
Payer: MEDICARE

## 2019-04-29 ENCOUNTER — HOSPITAL ENCOUNTER (EMERGENCY)
Age: 79
Discharge: HOME | End: 2019-04-29
Payer: MEDICARE

## 2019-04-29 VITALS
TEMPERATURE: 98.3 F | BODY MASS INDEX: 44.63 KG/M2 | OXYGEN SATURATION: 93 % | HEART RATE: 70 BPM | SYSTOLIC BLOOD PRESSURE: 148 MMHG | WEIGHT: 260 LBS | RESPIRATION RATE: 15 BRPM | DIASTOLIC BLOOD PRESSURE: 73 MMHG

## 2019-04-29 DIAGNOSIS — S06.6X9A SUBARACHNOID HEMORRHAGE FOLLOWING INJURY, WITH LOSS OF CONSCIOUSNESS, INITIAL ENCOUNTER (HCC): Primary | ICD-10-CM

## 2019-04-29 DIAGNOSIS — R11.2 NON-INTRACTABLE VOMITING WITH NAUSEA, UNSPECIFIED VOMITING TYPE: ICD-10-CM

## 2019-04-29 LAB
ABO: NORMAL
ALBUMIN SERPL-MCNC: 4.3 G/DL (ref 3.5–5.1)
ALP BLD-CCNC: 74 U/L (ref 38–126)
ALT SERPL-CCNC: 28 U/L (ref 11–66)
ANGLE TEG: 67.9 DEG (ref 53–72)
ANION GAP SERPL CALCULATED.3IONS-SCNC: 14 MEQ/L (ref 8–16)
ANTIBODY SCREEN: NORMAL
AST SERPL-CCNC: 25 U/L (ref 5–40)
BACTERIA: ABNORMAL /HPF
BASOPHILS # BLD: 0.3 %
BASOPHILS ABSOLUTE: 0 THOU/MM3 (ref 0–0.1)
BILIRUB SERPL-MCNC: 0.8 MG/DL (ref 0.3–1.2)
BILIRUBIN DIRECT: < 0.2 MG/DL (ref 0–0.3)
BILIRUBIN URINE: NEGATIVE
BLOOD, URINE: NEGATIVE
BUN BLDV-MCNC: 14 MG/DL (ref 7–22)
CALCIUM SERPL-MCNC: 10.1 MG/DL (ref 8.5–10.5)
CASTS 2: ABNORMAL /LPF
CASTS UA: ABNORMAL /LPF
CHARACTER, URINE: ABNORMAL
CHLORIDE BLD-SCNC: 95 MEQ/L (ref 98–111)
CO2: 26 MEQ/L (ref 23–33)
COLOR: YELLOW
CREAT SERPL-MCNC: 0.7 MG/DL (ref 0.4–1.2)
CRYSTALS, UA: ABNORMAL
EOSINOPHIL # BLD: 0.5 %
EOSINOPHILS ABSOLUTE: 0 THOU/MM3 (ref 0–0.4)
EPITHELIAL CELLS, UA: ABNORMAL /HPF
EPL-TEG: 0.7 %
ERYTHROCYTE [DISTWIDTH] IN BLOOD BY AUTOMATED COUNT: 13.2 % (ref 11.5–14.5)
ERYTHROCYTE [DISTWIDTH] IN BLOOD BY AUTOMATED COUNT: 41.6 FL (ref 35–45)
GFR SERPL CREATININE-BSD FRML MDRD: 81 ML/MIN/1.73M2
GLUCOSE BLD-MCNC: 163 MG/DL (ref 70–108)
GLUCOSE URINE: NEGATIVE MG/DL
HCT VFR BLD CALC: 41.7 % (ref 37–47)
HEMOGLOBIN: 14.2 GM/DL (ref 12–16)
HEPARIN THERAPY: NO
IMMATURE GRANS (ABS): 0.06 THOU/MM3 (ref 0–0.07)
IMMATURE GRANULOCYTES: 0.7 %
INHIBITION AA TEG: 48.5 %
INHIBITION ADP TEG: 79.8 %
KETONES, URINE: NEGATIVE
KINETICS TEG: 1.5 MINUTES (ref 1–3)
LEUKOCYTE ESTERASE, URINE: ABNORMAL
LIPASE: 8.9 U/L (ref 5.6–51.3)
LY30 (LYSIS) TEG: 0.7 % (ref 0–7.5)
LYMPHOCYTES # BLD: 9.1 %
LYMPHOCYTES ABSOLUTE: 0.8 THOU/MM3 (ref 1–4.8)
MA (MAX CLOT) TEG: 66.9 MM (ref 50–70)
MA(AA) TEG: 39.1 MM
MA(ACTIVATED) TEG: 9.6 MM
MA(ADP) TEG: 21.2 MM
MCH RBC QN AUTO: 29.6 PG (ref 26–33)
MCHC RBC AUTO-ENTMCNC: 34.1 GM/DL (ref 32.2–35.5)
MCV RBC AUTO: 86.9 FL (ref 81–99)
MISCELLANEOUS 2: ABNORMAL
MONOCYTES # BLD: 4.5 %
MONOCYTES ABSOLUTE: 0.4 THOU/MM3 (ref 0.4–1.3)
NITRITE, URINE: NEGATIVE
NUCLEATED RED BLOOD CELLS: 0 /100 WBC
OSMOLALITY CALCULATION: 274.2 MOSMOL/KG (ref 275–300)
PH UA: 8.5 (ref 5–9)
PLATELET # BLD: 216 THOU/MM3 (ref 130–400)
PMV BLD AUTO: 10.9 FL (ref 9.4–12.4)
POTASSIUM SERPL-SCNC: 4.2 MEQ/L (ref 3.5–5.2)
PROTEIN UA: 100
RBC # BLD: 4.8 MILL/MM3 (ref 4.2–5.4)
RBC URINE: ABNORMAL /HPF
REACTION TIME TEG: 5.3 MINUTES (ref 5–10)
RENAL EPITHELIAL, UA: ABNORMAL
RH FACTOR: NORMAL
SEG NEUTROPHILS: 84.9 %
SEGMENTED NEUTROPHILS ABSOLUTE COUNT: 7.4 THOU/MM3 (ref 1.8–7.7)
SODIUM BLD-SCNC: 135 MEQ/L (ref 135–145)
SPECIFIC GRAVITY, URINE: 1.02 (ref 1–1.03)
TOTAL PROTEIN: 7.3 G/DL (ref 6.1–8)
UROBILINOGEN, URINE: 1 EU/DL (ref 0–1)
WBC # BLD: 8.7 THOU/MM3 (ref 4.8–10.8)
WBC UA: ABNORMAL /HPF
YEAST: ABNORMAL

## 2019-04-29 PROCEDURE — 83690 ASSAY OF LIPASE: CPT

## 2019-04-29 PROCEDURE — 81001 URINALYSIS AUTO W/SCOPE: CPT

## 2019-04-29 PROCEDURE — 99284 EMERGENCY DEPT VISIT MOD MDM: CPT

## 2019-04-29 PROCEDURE — 87077 CULTURE AEROBIC IDENTIFY: CPT

## 2019-04-29 PROCEDURE — 86900 BLOOD TYPING SEROLOGIC ABO: CPT

## 2019-04-29 PROCEDURE — 87184 SC STD DISK METHOD PER PLATE: CPT

## 2019-04-29 PROCEDURE — 74018 RADEX ABDOMEN 1 VIEW: CPT

## 2019-04-29 PROCEDURE — 96374 THER/PROPH/DIAG INJ IV PUSH: CPT

## 2019-04-29 PROCEDURE — 87186 SC STD MICRODIL/AGAR DIL: CPT

## 2019-04-29 PROCEDURE — 6360000002 HC RX W HCPCS: Performed by: PHYSICIAN ASSISTANT

## 2019-04-29 PROCEDURE — 85576 BLOOD PLATELET AGGREGATION: CPT

## 2019-04-29 PROCEDURE — 86901 BLOOD TYPING SEROLOGIC RH(D): CPT

## 2019-04-29 PROCEDURE — 82248 BILIRUBIN DIRECT: CPT

## 2019-04-29 PROCEDURE — 80053 COMPREHEN METABOLIC PANEL: CPT

## 2019-04-29 PROCEDURE — 2709999900 HC NON-CHARGEABLE SUPPLY

## 2019-04-29 PROCEDURE — 70450 CT HEAD/BRAIN W/O DYE: CPT

## 2019-04-29 PROCEDURE — 2580000003 HC RX 258: Performed by: PHYSICIAN ASSISTANT

## 2019-04-29 PROCEDURE — 36415 COLL VENOUS BLD VENIPUNCTURE: CPT

## 2019-04-29 PROCEDURE — 96372 THER/PROPH/DIAG INJ SC/IM: CPT

## 2019-04-29 PROCEDURE — 85025 COMPLETE CBC W/AUTO DIFF WBC: CPT

## 2019-04-29 PROCEDURE — 87086 URINE CULTURE/COLONY COUNT: CPT

## 2019-04-29 PROCEDURE — 86850 RBC ANTIBODY SCREEN: CPT

## 2019-04-29 RX ORDER — PROMETHAZINE HYDROCHLORIDE 25 MG/1
25 TABLET ORAL EVERY 6 HOURS PRN
Qty: 15 TABLET | Refills: 0 | Status: ON HOLD | OUTPATIENT
Start: 2019-04-29 | End: 2021-02-09 | Stop reason: ALTCHOICE

## 2019-04-29 RX ORDER — 0.9 % SODIUM CHLORIDE 0.9 %
500 INTRAVENOUS SOLUTION INTRAVENOUS ONCE
Status: COMPLETED | OUTPATIENT
Start: 2019-04-29 | End: 2019-04-29

## 2019-04-29 RX ORDER — PROMETHAZINE HYDROCHLORIDE 25 MG/ML
25 INJECTION, SOLUTION INTRAMUSCULAR; INTRAVENOUS ONCE
Status: COMPLETED | OUTPATIENT
Start: 2019-04-29 | End: 2019-04-29

## 2019-04-29 RX ORDER — MORPHINE SULFATE 4 MG/ML
4 INJECTION, SOLUTION INTRAMUSCULAR; INTRAVENOUS ONCE
Status: COMPLETED | OUTPATIENT
Start: 2019-04-29 | End: 2019-04-29

## 2019-04-29 RX ORDER — ONDANSETRON 4 MG/1
4 TABLET, ORALLY DISINTEGRATING ORAL EVERY 8 HOURS PRN
Qty: 20 TABLET | Refills: 0 | Status: SHIPPED | OUTPATIENT
Start: 2019-04-29 | End: 2020-09-04

## 2019-04-29 RX ADMIN — PROMETHAZINE HYDROCHLORIDE 25 MG: 25 INJECTION INTRAMUSCULAR; INTRAVENOUS at 16:54

## 2019-04-29 RX ADMIN — MORPHINE SULFATE 4 MG: 4 INJECTION INTRAVENOUS at 16:54

## 2019-04-29 RX ADMIN — SODIUM CHLORIDE 500 ML: 9 INJECTION, SOLUTION INTRAVENOUS at 16:54

## 2019-04-29 ASSESSMENT — ENCOUNTER SYMPTOMS
NAUSEA: 1
ABDOMINAL PAIN: 1
COLOR CHANGE: 0
BACK PAIN: 0
VOMITING: 1
SORE THROAT: 0
DIARRHEA: 0
PHOTOPHOBIA: 1
SHORTNESS OF BREATH: 0
COUGH: 0

## 2019-04-29 ASSESSMENT — PAIN DESCRIPTION - LOCATION: LOCATION: HEAD

## 2019-04-29 ASSESSMENT — PAIN SCALES - GENERAL
PAINLEVEL_OUTOF10: 10
PAINLEVEL_OUTOF10: 10

## 2019-04-29 ASSESSMENT — PAIN DESCRIPTION - PAIN TYPE: TYPE: ACUTE PAIN

## 2019-04-29 ASSESSMENT — PAIN DESCRIPTION - DESCRIPTORS: DESCRIPTORS: ACHING

## 2019-04-29 NOTE — CARE COORDINATION
4/29/19, 7:32 AM    Home with  and OP SLP. Discharge plan discussed by  and . Discharge plan reviewed with patient/ family. Patient/ family verbalize understanding of discharge plan and are in agreement with plan. Understanding was demonstrated using the teach back method.        IMM Letter  IMM Letter given to Patient/Family/Significant other/Guardian/POA/by[de-identified] (4/26/19 0942 CM:  forms given, reviewed, and signed.)  IMM Letter date given[de-identified] 04/25/19  IMM Letter time given[de-identified] 0994

## 2019-04-29 NOTE — ED PROVIDER NOTES
[facility]  eMERGENCY dEPARTMENT eNCOUnter          CHIEF COMPLAINT       Chief Complaint   Patient presents with    Headache    Emesis       Nurses Notes reviewed and I agree except as noted inthe HPI. HISTORY OF PRESENT ILLNESS    Raymundo Maldonado is a 70 y.o. female who presents to the Emergency Department for the evaluation of headache and vomiting. Patient was recently admitted for intracranial bleed after falling down some stairs at home. She states that she has no memory of the fall. She's had headache and nausea with vomiting since, was treated in the hospital with percocet and zofran which did improve the emesis, but she still had vomiting. She was discharged last night and states she was given a script for Percocet but not zofran. She's had increase in emesis at home as well as headache which is behind the right eye. She notes \"upset stomach\" but denies fevers, vision changes, numbness, tingling, weakness or confusion. She states on her previous presentation, she had weakness of the right leg and this has resolved without recurrence. She called her neurologist who recommended repeat ED evaluation. The HPI was provided by the patient. REVIEW OF SYSTEMS     Review of Systems   Constitutional: Negative for chills and fever. HENT: Negative for sore throat. Eyes: Positive for photophobia. Negative for visual disturbance. Respiratory: Negative for cough and shortness of breath. Cardiovascular: Negative for leg swelling. Gastrointestinal: Positive for abdominal pain (\"upset\"), nausea and vomiting. Negative for diarrhea. Musculoskeletal: Negative for back pain. Skin: Negative for color change. Neurological: Positive for headaches. Negative for syncope, weakness and numbness. Psychiatric/Behavioral: Negative for confusion.        PAST MEDICAL HISTORY    has a past medical history of Arthritis, Chronic low back pain, DM type 2, goal A1c below 7, GERD Disp-180 tablet, R-3Normal      sertraline (ZOLOFT) 100 MG tablet Take 2 tablets by mouth daily, Disp-180 tablet, R-3Normal      atorvastatin (LIPITOR) 40 MG tablet TAKE 1 TABLET DAILY, Disp-90 tablet, R-3Normal      hydrALAZINE (APRESOLINE) 50 MG tablet Take 1 tablet by mouth 2 times daily, Disp-180 tablet, R-3Normal      Handicap Placard MISC Starting 2018, Disp-1 each, R-0, PrintExpires 10/14/21. gabapentin (NEURONTIN) 300 MG capsule Take 2 capsules by mouth 3 times daily, Disp-540 capsule, R-3Normal      ibuprofen (ADVIL;MOTRIN) 600 MG tablet Take 1 tablet by mouth 3 times daily as needed for Pain (Take with food 3 times daily for pain), Disp-15 tablet, R-0Print      atenolol (TENORMIN) 25 MG tablet TAKE 1 TABLET DAILY, Disp-90 tablet, R-3      Lancets MISC Disp-100 each, R-2, NormalUse once daily and PRN      Glucose Blood (BLOOD GLUCOSE TEST STRIPS) STRP True Trak test strips  Patient checks once daily, Disp-100 strip, R-3      Elastic Bandages & Supports (T.E.D. BELOW KNEE/LARGE) MISC Disp-2 each, R-0, PrintUse as directed      vitamin D (CHOLECALCIFEROL) 1000 UNIT TABS tablet Take 1,000 Units by mouth nightly. ALLERGIES     is allergic to norco [hydrocodone-acetaminophen]; tramadol; and codeine. FAMILY HISTORY     indicated that her mother is . She indicated that her father is . She indicated that her brother is alive. family history includes Arthritis in her mother; Cancer in her mother; Heart Disease in her father; High Blood Pressure in her father. SOCIAL HISTORY      reports that she has never smoked. She has never used smokeless tobacco. She reports that she does not drink alcohol or use drugs. PHYSICAL EXAM     INITIAL VITALS:  weight is 260 lb (117.9 kg). Her oral temperature is 98.3 °F (36.8 °C). Her blood pressure is 148/73 (abnormal) and her pulse is 70. Her respiration is 15 and oxygen saturation is 93%.     Physical Exam   Constitutional: She is Organism Escherichia coli (*)     All other components within normal limits    Narrative:     Source: urine, clean catch       Site:           Current Antibiotics: not stated   CBC WITH AUTO DIFFERENTIAL - Abnormal; Notable for the following components:    Lymphocytes # 0.8 (*)     All other components within normal limits   BASIC METABOLIC PANEL - Abnormal; Notable for the following components:    Chloride 95 (*)     Glucose 163 (*)     All other components within normal limits   OSMOLALITY - Abnormal; Notable for the following components:    Osmolality Calc 274.2 (*)     All other components within normal limits   GLOMERULAR FILTRATION RATE, ESTIMATED - Abnormal; Notable for the following components:    Est, Glom Filt Rate 81 (*)     All other components within normal limits   URINE WITH REFLEXED MICRO - Abnormal; Notable for the following components:    Protein,  (*)     Leukocyte Esterase, Urine SMALL (*)     Character, Urine CLOUDY (*)     All other components within normal limits   PLATELET MAP, TEG CITRATED   ANION GAP   HEPATIC FUNCTION PANEL   LIPASE   TYPE AND SCREEN       EMERGENCY DEPARTMENT COURSE:   Vitals:    Vitals:    04/29/19 1528 04/29/19 1615 04/29/19 1716 04/29/19 1754   BP: (!) 141/119 (!) 168/73 (!) 166/75 (!) 148/73   Pulse: 77 75 72 70   Resp: 19 23 17 15   Temp:       TempSrc:       SpO2:  93% 90% 93%   Weight: 260 lb (117.9 kg)         4:13 PM: The patient was seen and evaluated. Patient presents after recent hospital admission for headache and vomiting. She had similar symptoms in the hospital, worsened after discharge. She was being treated with zofran during her hospital stay, denies being discharged with an anti-emetic. No new neurologic changes. CT shows no acute changes. She reports improvement after treatment in the ED with morphine and phenergan. I discussed the case with Dr. Nida Camejo, neurosurgery who was agreeable with discharge and continued follow-up as scheduled. Patient agreeable as well. Will discharge with phenergan and zofran, she is to continue home percocet for pain control. Mental rest discussed as well as return precautions. CONSULTS:        FINAL IMPRESSION      1. Subarachnoid hemorrhage following injury, with loss of consciousness, initial encounter ([facility] [address]    2. Non-intractable vomiting with nausea, unspecified vomiting type          DISPOSITION/PLAN   Discharge    PATIENT REFERRED TO:  Christina Vicente MD  [address]  [facility]      as scheduled    [address] [facility] [address] [address] EMERGENCY DEPT  [address]  [phone]    If symptoms worsen      DISCHARGEMEDICATIONS:  Discharge Medication List as of 4/29/2019  5:59 PM      START taking these medications    Details   promethazine (PHENERGAN) 25 MG tablet Take 1 tablet by mouth every 6 hours as needed for Nausea, Disp-15 tablet, R-0Print      ondansetron (ZOFRAN ODT) 4 MG disintegrating tablet Take 1 tablet by mouth every 8 hours as needed for Nausea or Vomiting, Disp-20 tablet, R-0Print             (Please note that portions of this note were completedwith a voice recognition program.  Efforts were made to edit the dictations but occasionally words are mis-transcribed.)        Michaelle Coates PA-C  05/03/19 [address] [facility] Jorge Díaz MD  05/05/19 7800

## 2019-04-29 NOTE — ED NOTES
Patient to ED for headache. Patient was admitted last week. Patient fell on Thursday hit head. patient admitted for head bleed. Patient released from yesterday.  Patient states headache is back and she is now vomiting      Vale Ya RN  04/29/19 6812

## 2019-04-29 NOTE — TELEPHONE ENCOUNTER
Pt's  called in to schedule her follow up CT scan. According to [address] note from yesterday, she was to have follow up CT in one week from yesterday. Scheduled pt for CT head wo contrast in [place] on Monday 5/6/19 at 11:00am.  Rescheduled follow up with Ayah Griggs for 5/8/19 at 10:30am.  Called back and gave information to patient herself. She verbalized understanding of appointments. Patient then proceeded to tell me that her stomach feels so upset. She states she threw up twice last night, last time at 2:00am.  She tried to throw up this morning, but just had dry heaves. Pt also complains of sharp constant pain in her right eye and a headache she has had since leaving the hospital.  Pt seems confused as I'm talking to her. She states \"its hard to figure everything out. \"  Any advice?

## 2019-04-30 ENCOUNTER — CARE COORDINATION (OUTPATIENT)
Dept: CASE MANAGEMENT | Age: 79
End: 2019-04-30

## 2019-04-30 NOTE — CARE COORDINATION
Care Transition  ED Follow up Call    Reason for ED visit: Headache vomiting   How are you feeling? \"much better\"  Status:     improved      Do you have any questions related to your discharge instructions? no    Review of Instructions:    Discharged with new prescription? yes - zofran, phenergan     Review Medications:  Yes   Do you have any questions related to your medications? no    Understands what to report/when to return?:  Yes   Do you have a follow up appointment scheduled? Yes  Specific review if indicated (symptom, services, etc): Spoke with [employer], introduced self/role. Patient had recent admission, 4/25/19-4/28/19 for subarachnoid hemorrhage from a fall down stairs. Called Neurology office for headache, vomiting, advised ED for further evaluation. Patient was discharged with prescriptions for zofran and phenergan. Patient taking and nausea is controlled. Reports headache \"under control\". Denies new or worsening symptoms to report since discharge. Patient reports having bowel movement today. Educated on OTC stool softeners, patient states she has some at home will start taking. Denies s/s of UTI, preliminary urine culture shows gram negative bacilli, sensitivity pending. Denies fever, chills. Patient advised to call PCP office tomorrow for culture results, verbalized understanding. Patient has scheduled PCP appointment on 5/10/19. Patient given [facility] contact information for same/next day appointment if needed in meantime, verbalized understanding. Has scheduled Neurology appointment with repeat CT. Denies further needs/assistance/concerns/questions at this time. Understands s/s to report or return.        Do you have any needs or concerns I can assist you with? no     FU appts/Provider:    Future Appointments   Date Time Provider James Cruz   5/6/2019 11:00 AM STR DELPHOS CT IMAGING STRZ DEL CT STR [place]   5/8/2019 10:30 AM Keshav Webster PA-C [facility] NEUROSU [facility] - [facility] Oliveros   5/10/2019 10:30 AM Katlin Martin DO [facility] [place]   7/29/2019  9:30 AM STR ECHO RM1 STRZ ECHO None     [place], [place]  [phone]  [phone]

## 2019-05-02 LAB
ORGANISM: ABNORMAL
URINE CULTURE REFLEX: ABNORMAL

## 2019-05-03 ENCOUNTER — TELEPHONE (OUTPATIENT)
Dept: CARDIOLOGY CLINIC | Age: 79
End: 2019-05-03

## 2019-05-03 ENCOUNTER — TELEPHONE (OUTPATIENT)
Dept: PHARMACY | Age: 79
End: 2019-05-03

## 2019-05-03 NOTE — TELEPHONE ENCOUNTER
Dr. Sarahi Franco ordered to move the patients ECHO up due to recent syncopal episode. ECHO scheduled for 5/13/2019 at 4pm. Patient notified.

## 2019-05-06 ENCOUNTER — APPOINTMENT (OUTPATIENT)
Dept: NON INVASIVE DIAGNOSTICS | Age: 79
End: 2019-05-06
Payer: MEDICARE

## 2019-05-06 ENCOUNTER — HOSPITAL ENCOUNTER (OUTPATIENT)
Dept: CT IMAGING | Age: 79
Discharge: HOME | End: 2019-05-06
Payer: MEDICARE
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
## 2019-05-06 DIAGNOSIS — I10 HTN, GOAL BELOW 140/90: ICD-10-CM

## 2019-05-06 DIAGNOSIS — I60.9 SUBARACHNOID HEMORRHAGE (HCC): ICD-10-CM

## 2019-05-06 PROCEDURE — 70450 CT HEAD/BRAIN W/O DYE: CPT

## 2019-05-06 RX ORDER — AMLODIPINE BESYLATE 10 MG/1
10 TABLET ORAL DAILY
Qty: 90 TABLET | Refills: 1 | Status: SHIPPED | OUTPATIENT
Start: 2019-05-06 | End: 2019-10-15 | Stop reason: SDUPTHER

## 2019-05-08 ENCOUNTER — OFFICE VISIT (OUTPATIENT)
Dept: NEUROSURGERY | Age: 79
End: 2019-05-08
Payer: MEDICARE

## 2019-05-08 VITALS
SYSTOLIC BLOOD PRESSURE: 150 MMHG | BODY MASS INDEX: 41.32 KG/M2 | HEIGHT: 64 IN | WEIGHT: 242 LBS | DIASTOLIC BLOOD PRESSURE: 87 MMHG | HEART RATE: 78 BPM

## 2019-05-08 DIAGNOSIS — S06.6X0D SUBARACHNOID HEMATOMA, WITHOUT LOSS OF CONSCIOUSNESS, SUBSEQUENT ENCOUNTER: Primary | ICD-10-CM

## 2019-05-08 PROCEDURE — G8399 PT W/DXA RESULTS DOCUMENT: HCPCS | Performed by: PHYSICIAN ASSISTANT

## 2019-05-08 PROCEDURE — 1111F DSCHRG MED/CURRENT MED MERGE: CPT | Performed by: PHYSICIAN ASSISTANT

## 2019-05-08 PROCEDURE — 4040F PNEUMOC VAC/ADMIN/RCVD: CPT | Performed by: PHYSICIAN ASSISTANT

## 2019-05-08 PROCEDURE — G8427 DOCREV CUR MEDS BY ELIG CLIN: HCPCS | Performed by: PHYSICIAN ASSISTANT

## 2019-05-08 PROCEDURE — 1123F ACP DISCUSS/DSCN MKR DOCD: CPT | Performed by: PHYSICIAN ASSISTANT

## 2019-05-08 PROCEDURE — 99214 OFFICE O/P EST MOD 30 MIN: CPT | Performed by: PHYSICIAN ASSISTANT

## 2019-05-08 PROCEDURE — 1090F PRES/ABSN URINE INCON ASSESS: CPT | Performed by: PHYSICIAN ASSISTANT

## 2019-05-08 PROCEDURE — 1036F TOBACCO NON-USER: CPT | Performed by: PHYSICIAN ASSISTANT

## 2019-05-08 PROCEDURE — G8417 CALC BMI ABV UP PARAM F/U: HCPCS | Performed by: PHYSICIAN ASSISTANT

## 2019-05-13 ENCOUNTER — HOSPITAL ENCOUNTER (OUTPATIENT)
Dept: NON INVASIVE DIAGNOSTICS | Age: 79
Discharge: HOME | End: 2019-05-13
Payer: MEDICARE

## 2019-05-13 DIAGNOSIS — I35.0 NONRHEUMATIC AORTIC VALVE STENOSIS: ICD-10-CM

## 2019-05-13 LAB
LV EF: 58 %
LVEF MODALITY: NORMAL

## 2019-05-13 PROCEDURE — 93306 TTE W/DOPPLER COMPLETE: CPT

## 2019-05-24 ENCOUNTER — HOSPITAL ENCOUNTER (OUTPATIENT)
Dept: NUCLEAR MEDICINE | Age: 79
Discharge: HOME | End: 2019-05-24
Payer: MEDICARE

## 2019-05-24 ENCOUNTER — HOSPITAL ENCOUNTER (OUTPATIENT)
Dept: CT IMAGING | Age: 79
Discharge: HOME | End: 2019-05-24
Payer: MEDICARE

## 2019-05-24 DIAGNOSIS — M25.562 ACUTE PAIN OF LEFT KNEE: ICD-10-CM

## 2019-05-24 DIAGNOSIS — T84.84XA PAIN DUE TO INTERNAL ORTHOPEDIC PROSTHETIC DEVICES, IMPLANTS AND GRAFTS, INITIAL ENCOUNTER (HCC): ICD-10-CM

## 2019-05-24 DIAGNOSIS — T84.84XA PAIN DUE TO INTERNAL ORTHOPEDIC PROSTHETIC DEVICE, INITIAL ENCOUNTER (HCC): ICD-10-CM

## 2019-05-24 DIAGNOSIS — Z96.652 PRESENCE OF LEFT ARTIFICIAL KNEE JOINT: ICD-10-CM

## 2019-05-24 PROCEDURE — 3430000000 HC RX DIAGNOSTIC RADIOPHARMACEUTICAL: Performed by: ORTHOPAEDIC SURGERY

## 2019-05-24 PROCEDURE — A9503 TC99M MEDRONATE: HCPCS | Performed by: ORTHOPAEDIC SURGERY

## 2019-05-24 PROCEDURE — 73700 CT LOWER EXTREMITY W/O DYE: CPT

## 2019-05-24 PROCEDURE — 78315 BONE IMAGING 3 PHASE: CPT

## 2019-05-24 RX ORDER — TC 99M MEDRONATE 20 MG/10ML
27.6 INJECTION, POWDER, LYOPHILIZED, FOR SOLUTION INTRAVENOUS
Status: COMPLETED | OUTPATIENT
Start: 2019-05-24 | End: 2019-05-24

## 2019-05-24 RX ADMIN — TC 99M MEDRONATE 27.6 MILLICURIE: 20 INJECTION, POWDER, LYOPHILIZED, FOR SOLUTION INTRAVENOUS at 07:20
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
## 2019-05-29 ENCOUNTER — HOSPITAL ENCOUNTER (OUTPATIENT)
Dept: NUCLEAR MEDICINE | Age: 79
Discharge: HOME | End: 2019-05-29
Payer: MEDICARE

## 2019-05-29 DIAGNOSIS — Z96.652 PRESENCE OF LEFT ARTIFICIAL KNEE JOINT: ICD-10-CM

## 2019-05-29 DIAGNOSIS — T84.84XA PAIN IN PROSTHETIC JOINT, INITIAL ENCOUNTER (HCC): ICD-10-CM

## 2019-05-29 DIAGNOSIS — M25.562 ACUTE PAIN OF LEFT KNEE: ICD-10-CM

## 2019-05-29 PROCEDURE — A9570 INDIUM IN-111 AUTO WBC: HCPCS | Performed by: ORTHOPAEDIC SURGERY

## 2019-05-29 PROCEDURE — 78102 BONE MARROW IMAGING LTD: CPT

## 2019-05-29 PROCEDURE — 2709999900 HC NON-CHARGEABLE SUPPLY

## 2019-05-29 PROCEDURE — 3430000000 HC RX DIAGNOSTIC RADIOPHARMACEUTICAL: Performed by: ORTHOPAEDIC SURGERY

## 2019-05-29 RX ORDER — INDIUM IN-111 OXYQUINOLINE 1 UG/ML
510 SOLUTION INTRAVENOUS
Status: DISCONTINUED | OUTPATIENT
Start: 2019-05-29 | End: 2019-05-29

## 2019-05-29 RX ADMIN — Medication 510 MICRO CURIE: at 11:00

## 2019-05-30 ENCOUNTER — HOSPITAL ENCOUNTER (OUTPATIENT)
Dept: NUCLEAR MEDICINE | Age: 79
Discharge: HOME | End: 2019-05-30
Payer: MEDICARE

## 2019-05-30 PROCEDURE — 78102 BONE MARROW IMAGING LTD: CPT

## 2019-05-30 PROCEDURE — A9541 TC99M SULFUR COLLOID: HCPCS | Performed by: ORTHOPAEDIC SURGERY

## 2019-05-30 PROCEDURE — A9570 INDIUM IN-111 AUTO WBC: HCPCS | Performed by: ORTHOPAEDIC SURGERY

## 2019-05-30 PROCEDURE — 2709999900 HC NON-CHARGEABLE SUPPLY

## 2019-05-30 PROCEDURE — 3430000000 HC RX DIAGNOSTIC RADIOPHARMACEUTICAL: Performed by: ORTHOPAEDIC SURGERY

## 2019-05-30 RX ADMIN — Medication 17.4 MILLICURIE: at 10:20

## 2019-06-19 ENCOUNTER — OFFICE VISIT (OUTPATIENT)
Dept: FAMILY MEDICINE CLINIC | Age: 79
End: 2019-06-19
Payer: MEDICARE

## 2019-06-19 VITALS
HEART RATE: 83 BPM | TEMPERATURE: 98.1 F | RESPIRATION RATE: 16 BRPM | SYSTOLIC BLOOD PRESSURE: 160 MMHG | DIASTOLIC BLOOD PRESSURE: 98 MMHG

## 2019-06-19 DIAGNOSIS — R20.2 PARESTHESIA OF FINGER: Primary | ICD-10-CM

## 2019-06-19 PROCEDURE — 4040F PNEUMOC VAC/ADMIN/RCVD: CPT | Performed by: FAMILY MEDICINE

## 2019-06-19 PROCEDURE — 1090F PRES/ABSN URINE INCON ASSESS: CPT | Performed by: FAMILY MEDICINE

## 2019-06-19 PROCEDURE — 1036F TOBACCO NON-USER: CPT | Performed by: FAMILY MEDICINE

## 2019-06-19 PROCEDURE — G8417 CALC BMI ABV UP PARAM F/U: HCPCS | Performed by: FAMILY MEDICINE

## 2019-06-19 PROCEDURE — G8427 DOCREV CUR MEDS BY ELIG CLIN: HCPCS | Performed by: FAMILY MEDICINE

## 2019-06-19 PROCEDURE — 1123F ACP DISCUSS/DSCN MKR DOCD: CPT | Performed by: FAMILY MEDICINE

## 2019-06-19 PROCEDURE — G8399 PT W/DXA RESULTS DOCUMENT: HCPCS | Performed by: FAMILY MEDICINE

## 2019-06-19 PROCEDURE — 99213 OFFICE O/P EST LOW 20 MIN: CPT | Performed by: FAMILY MEDICINE

## 2019-06-19 RX ORDER — LIDOCAINE 40 MG/G
CREAM TOPICAL
Qty: 30 G | Refills: 5 | Status: ON HOLD | OUTPATIENT
Start: 2019-06-19 | End: 2021-01-13

## 2019-06-19 NOTE — PROGRESS NOTES
Arsalan Villegas is a 70 y.o. female that presents for     Chief Complaint   Patient presents with    Finger Injury     Pt is c/o a finger laceration of her left 3rd digit finger that happened a few months ago. She has finger pain from this.  Follow-Up from Hospital     Pt presents for a hospital f/u for a concussion and brain bleed. BP (!) 160/98   Pulse 83   Temp 98.1 °F (36.7 °C)   Resp 16       HPI:    States that she cut her left middle finger at the base 2 weeks ago. Notes that her finger has been numb since then. States that there is no change in sx. She does have pain in the finger. No loss of strength. Patient hospitalized 2 months ago for [facility]. Did not follow up with me after the hospitalization. Has appropriate follow up with NS.     Health Maintenance   Topic Date Due    DTaP/Tdap/Td vaccine (1 - Tdap) 02/18/1961    Shingles Vaccine (1 of 2) 02/18/1992    Annual Wellness Visit (AWV)  12/13/2016    Pneumococcal 65+ years Vaccine (2 of 2 - PPSV23) 12/14/2016    Flu vaccine (Season Ended) 09/01/2019    Potassium monitoring  04/29/2020    Creatinine monitoring  04/29/2020    DEXA (modify frequency per FRAX score)  Addressed       Past Medical History:   Diagnosis Date    Arthritis     Chronic low back pain     DM type 2, goal A1c below 7     GERD (gastroesophageal reflux disease)     Hyperlipidemia     Hypertension     Loss of vision     Mitral valve disorder     Osteoporosis     PAF (paroxysmal atrial fibrillation) (HCC)     Subarachnoid hemorrhage ([address] 4/25/2019    TIA (transient ischemic attack)     Type 2 diabetes mellitus with diabetic neuropathy, without long-term current use of insulin ([address] 7/26/2017   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    Urinary incontinence        Past Surgical History:   Procedure Laterality Date    APPENDECTOMY      CARPAL TUNNEL RELEASE Left 2011    CATARACT REMOVAL Bilateral     CERVICAL the symptoms going forward    Return if symptoms worsen or fail to improve. Controlled Substance Monitoring:    Acute and Chronic Pain Monitoring:   RX Monitoring 8/7/2018   Attestation The Prescription Monitoring Report for this patient was reviewed today. Periodic Controlled Substance Monitoring No signs of potential drug abuse or diversion identified. Vinay Mccall received counseling on the following healthy behaviors: medication adherence  Reviewed prior labs and health maintenance. Continue current medications, diet and exercise. Discussed use, benefit, and side effects of prescribed medications. Barriers to medication compliance addressed. Patient given educational materials - see patient instructions. All patient questions answered. Patient voiced understanding.

## 2019-07-18 ENCOUNTER — HOSPITAL ENCOUNTER (OUTPATIENT)
Dept: AUDIOLOGY | Age: 79
Discharge: HOME | End: 2019-07-18
Payer: MEDICARE

## 2019-07-18 PROCEDURE — 92557 COMPREHENSIVE HEARING TEST: CPT | Performed by: AUDIOLOGIST

## 2019-07-18 PROCEDURE — 92567 TYMPANOMETRY: CPT | Performed by: AUDIOLOGIST

## 2019-07-18 NOTE — PROGRESS NOTES
ACCOUNT #: [de-identified]      AUDIOLOGICAL EVALUATION      REASON FOR TESTING:  Patient reported bilateral tinnitus. She denied noise exposure, otalgia, or dizziness. She does not feel she has a hearing problem. OTOSCOPY: clear EAC's. AUDIOGRAM        Reliability: good  Audiometer Used:  GSI-61       SPEECH AUDIOMETRY   Right Left Sound Field Aided   PTA 2 3     SRT 10 5     SAT       MASKING       % WRS   QUIET 100 100      30 SL 30 SL     %WRS   NOISE              MCL       UCL            Live Voice  [x]     Recorded  []     List   []     WORD RECOGNITION   RE    LE  [x]   [x]  Excellent    []   []  Good  []   [] Fair  []   [] Poor  []   [] Very Poor    TYMPANOGRAMS  RE    LE  [x]   [x]  Normal compliance    []   []  Reduced mobility  []   [] Hyper mobility  [x]   [x] Normal middle ear pressure  []   [] Negative middle ear pressure  []   [] Positive middle ear pressure  []   [] Flat w/normal ECV  []   [] Flat w/large ECV  []   [] Patent PE tube  []   [] Non-Patent PE tube  []   [] Could Not Test    COMMENTS:  Pure tone results indicate normal hearing through 2000 Hz sloping to a moderate sensorineural hearing loss bilaterally. Word recognition is excellent bilaterally. Tympanometry revealed normal TM compliance and middle ear pressure bilaterally. RECOMMENDATION(S):   1. Amplification with tinnitus masking could be evaluated but patient is not interested. 2. Annual audiometric testing is recommended to monitor thresholds.

## 2019-10-15 ENCOUNTER — OFFICE VISIT (OUTPATIENT)
Dept: PHYSICAL MEDICINE AND REHAB | Age: 79
End: 2019-10-15
Payer: MEDICARE

## 2019-10-15 VITALS
HEART RATE: 80 BPM | DIASTOLIC BLOOD PRESSURE: 76 MMHG | SYSTOLIC BLOOD PRESSURE: 134 MMHG | WEIGHT: 260 LBS | BODY MASS INDEX: 44.39 KG/M2 | HEIGHT: 64 IN

## 2019-10-15 DIAGNOSIS — M46.1 SI (SACROILIAC) JOINT INFLAMMATION (HCC): ICD-10-CM

## 2019-10-15 DIAGNOSIS — I10 HTN, GOAL BELOW 140/90: ICD-10-CM

## 2019-10-15 DIAGNOSIS — G89.4 CHRONIC PAIN SYNDROME: ICD-10-CM

## 2019-10-15 DIAGNOSIS — M47.816 SPONDYLOSIS OF LUMBAR REGION WITHOUT MYELOPATHY OR RADICULOPATHY: Primary | ICD-10-CM

## 2019-10-15 PROCEDURE — 1123F ACP DISCUSS/DSCN MKR DOCD: CPT | Performed by: NURSE PRACTITIONER

## 2019-10-15 PROCEDURE — 99205 OFFICE O/P NEW HI 60 MIN: CPT | Performed by: NURSE PRACTITIONER

## 2019-10-15 PROCEDURE — 1090F PRES/ABSN URINE INCON ASSESS: CPT | Performed by: NURSE PRACTITIONER

## 2019-10-15 PROCEDURE — 4040F PNEUMOC VAC/ADMIN/RCVD: CPT | Performed by: NURSE PRACTITIONER

## 2019-10-15 PROCEDURE — G8484 FLU IMMUNIZE NO ADMIN: HCPCS | Performed by: NURSE PRACTITIONER

## 2019-10-15 PROCEDURE — 1036F TOBACCO NON-USER: CPT | Performed by: NURSE PRACTITIONER

## 2019-10-15 PROCEDURE — G8417 CALC BMI ABV UP PARAM F/U: HCPCS | Performed by: NURSE PRACTITIONER

## 2019-10-15 PROCEDURE — G8399 PT W/DXA RESULTS DOCUMENT: HCPCS | Performed by: NURSE PRACTITIONER

## 2019-10-15 PROCEDURE — G8427 DOCREV CUR MEDS BY ELIG CLIN: HCPCS | Performed by: NURSE PRACTITIONER

## 2019-10-15 RX ORDER — AMLODIPINE BESYLATE 10 MG/1
TABLET ORAL
Qty: 90 TABLET | Refills: 0 | Status: SHIPPED | OUTPATIENT
Start: 2019-10-15 | End: 2020-01-14 | Stop reason: SDUPTHER

## 2019-10-15 ASSESSMENT — ENCOUNTER SYMPTOMS
CONSTIPATION: 0
NAUSEA: 0
COUGH: 0
EYE PAIN: 0
ABDOMINAL PAIN: 0
SINUS PRESSURE: 0
SORE THROAT: 0
BACK PAIN: 1
RHINORRHEA: 0
COLOR CHANGE: 0
SHORTNESS OF BREATH: 0
VOMITING: 0
PHOTOPHOBIA: 0
CHEST TIGHTNESS: 0
DIARRHEA: 0
WHEEZING: 0

## 2019-10-16 ENCOUNTER — PREP FOR PROCEDURE (OUTPATIENT)
Dept: PHYSICAL MEDICINE AND REHAB | Age: 79
End: 2019-10-16

## 2019-10-25 ENCOUNTER — ANESTHESIA EVENT (OUTPATIENT)
Dept: OPERATING ROOM | Age: 79
End: 2019-10-25
Payer: MEDICARE

## 2019-10-25 ENCOUNTER — ANESTHESIA (OUTPATIENT)
Dept: OPERATING ROOM | Age: 79
End: 2019-10-25
Payer: MEDICARE

## 2019-10-25 ENCOUNTER — HOSPITAL ENCOUNTER (OUTPATIENT)
Age: 79
Setting detail: OUTPATIENT SURGERY
Discharge: HOME | End: 2019-10-25
Attending: PAIN MEDICINE | Admitting: PAIN MEDICINE
Payer: MEDICARE

## 2019-10-25 ENCOUNTER — APPOINTMENT (OUTPATIENT)
Dept: GENERAL RADIOLOGY | Age: 79
End: 2019-10-25
Attending: PAIN MEDICINE
Payer: MEDICARE

## 2019-10-25 VITALS
SYSTOLIC BLOOD PRESSURE: 160 MMHG | DIASTOLIC BLOOD PRESSURE: 83 MMHG | RESPIRATION RATE: 16 BRPM | OXYGEN SATURATION: 98 %

## 2019-10-25 VITALS
DIASTOLIC BLOOD PRESSURE: 67 MMHG | OXYGEN SATURATION: 96 % | HEART RATE: 64 BPM | SYSTOLIC BLOOD PRESSURE: 138 MMHG | HEIGHT: 64 IN | WEIGHT: 262 LBS | BODY MASS INDEX: 44.73 KG/M2 | TEMPERATURE: 97.7 F | RESPIRATION RATE: 14 BRPM

## 2019-10-25 LAB — GLUCOSE BLD-MCNC: 108 MG/DL (ref 70–108)

## 2019-10-25 PROCEDURE — 3600000054 HC PAIN LEVEL 3 BASE: Performed by: PAIN MEDICINE

## 2019-10-25 PROCEDURE — 64493 INJ PARAVERT F JNT L/S 1 LEV: CPT | Performed by: PAIN MEDICINE

## 2019-10-25 PROCEDURE — 64494 INJ PARAVERT F JNT L/S 2 LEV: CPT | Performed by: PAIN MEDICINE

## 2019-10-25 PROCEDURE — 7100000010 HC PHASE II RECOVERY - FIRST 15 MIN: Performed by: PAIN MEDICINE

## 2019-10-25 PROCEDURE — 3700000000 HC ANESTHESIA ATTENDED CARE: Performed by: PAIN MEDICINE

## 2019-10-25 PROCEDURE — 6360000002 HC RX W HCPCS: Performed by: PAIN MEDICINE

## 2019-10-25 PROCEDURE — 82948 REAGENT STRIP/BLOOD GLUCOSE: CPT

## 2019-10-25 PROCEDURE — 2709999900 HC NON-CHARGEABLE SUPPLY: Performed by: PAIN MEDICINE

## 2019-10-25 PROCEDURE — 2500000003 HC RX 250 WO HCPCS: Performed by: PAIN MEDICINE

## 2019-10-25 PROCEDURE — 3700000001 HC ADD 15 MINUTES (ANESTHESIA): Performed by: PAIN MEDICINE

## 2019-10-25 PROCEDURE — 6360000002 HC RX W HCPCS: Performed by: NURSE ANESTHETIST, CERTIFIED REGISTERED

## 2019-10-25 PROCEDURE — 3209999900 FLUORO FOR SURGICAL PROCEDURES

## 2019-10-25 PROCEDURE — 64490 INJ PARAVERT F JNT C/T 1 LEV: CPT | Performed by: PAIN MEDICINE

## 2019-10-25 PROCEDURE — 3600000055 HC PAIN LEVEL 3 ADDL 15 MIN: Performed by: PAIN MEDICINE

## 2019-10-25 PROCEDURE — 7100000011 HC PHASE II RECOVERY - ADDTL 15 MIN: Performed by: PAIN MEDICINE

## 2019-10-25 RX ORDER — PROPOFOL 10 MG/ML
INJECTION, EMULSION INTRAVENOUS PRN
Status: DISCONTINUED | OUTPATIENT
Start: 2019-10-25 | End: 2019-10-25 | Stop reason: SDUPTHER

## 2019-10-25 RX ORDER — METHYLPREDNISOLONE ACETATE 80 MG/ML
INJECTION, SUSPENSION INTRA-ARTICULAR; INTRALESIONAL; INTRAMUSCULAR; SOFT TISSUE PRN
Status: DISCONTINUED | OUTPATIENT
Start: 2019-10-25 | End: 2019-10-25 | Stop reason: ALTCHOICE

## 2019-10-25 RX ORDER — FENTANYL CITRATE 50 UG/ML
INJECTION, SOLUTION INTRAMUSCULAR; INTRAVENOUS PRN
Status: DISCONTINUED | OUTPATIENT
Start: 2019-10-25 | End: 2019-10-25 | Stop reason: SDUPTHER

## 2019-10-25 RX ORDER — ROPIVACAINE HYDROCHLORIDE 2 MG/ML
INJECTION, SOLUTION EPIDURAL; INFILTRATION; PERINEURAL PRN
Status: DISCONTINUED | OUTPATIENT
Start: 2019-10-25 | End: 2019-10-25 | Stop reason: ALTCHOICE

## 2019-10-25 RX ORDER — LIDOCAINE HYDROCHLORIDE 10 MG/ML
INJECTION, SOLUTION INFILTRATION; PERINEURAL PRN
Status: DISCONTINUED | OUTPATIENT
Start: 2019-10-25 | End: 2019-10-25 | Stop reason: ALTCHOICE

## 2019-10-25 RX ADMIN — PROPOFOL 50 MG: 10 INJECTION, EMULSION INTRAVENOUS at 13:56

## 2019-10-25 RX ADMIN — FENTANYL CITRATE 50 MCG: 50 INJECTION INTRAMUSCULAR; INTRAVENOUS at 13:49

## 2019-10-25 RX ADMIN — FENTANYL CITRATE 50 MCG: 50 INJECTION INTRAMUSCULAR; INTRAVENOUS at 13:52

## 2019-10-25 ASSESSMENT — PULMONARY FUNCTION TESTS
PIF_VALUE: 0
PIF_VALUE: 1
PIF_VALUE: 0
PIF_VALUE: 1
PIF_VALUE: 0
PIF_VALUE: 0

## 2019-10-25 ASSESSMENT — PAIN SCALES - GENERAL: PAINLEVEL_OUTOF10: 0

## 2019-10-25 ASSESSMENT — PAIN - FUNCTIONAL ASSESSMENT: PAIN_FUNCTIONAL_ASSESSMENT: 0-10

## 2019-11-11 ENCOUNTER — OFFICE VISIT (OUTPATIENT)
Dept: PHYSICAL MEDICINE AND REHAB | Age: 79
End: 2019-11-11
Payer: MEDICARE

## 2019-11-11 VITALS
HEIGHT: 64 IN | BODY MASS INDEX: 44.41 KG/M2 | SYSTOLIC BLOOD PRESSURE: 130 MMHG | DIASTOLIC BLOOD PRESSURE: 72 MMHG | HEART RATE: 80 BPM | WEIGHT: 260.14 LBS

## 2019-11-11 DIAGNOSIS — M46.1 SI (SACROILIAC) JOINT INFLAMMATION (HCC): ICD-10-CM

## 2019-11-11 DIAGNOSIS — G89.4 CHRONIC PAIN SYNDROME: ICD-10-CM

## 2019-11-11 DIAGNOSIS — M47.816 SPONDYLOSIS OF LUMBAR REGION WITHOUT MYELOPATHY OR RADICULOPATHY: Primary | ICD-10-CM

## 2019-11-11 PROCEDURE — 4040F PNEUMOC VAC/ADMIN/RCVD: CPT | Performed by: NURSE PRACTITIONER

## 2019-11-11 PROCEDURE — G8427 DOCREV CUR MEDS BY ELIG CLIN: HCPCS | Performed by: NURSE PRACTITIONER

## 2019-11-11 PROCEDURE — 1123F ACP DISCUSS/DSCN MKR DOCD: CPT | Performed by: NURSE PRACTITIONER

## 2019-11-11 PROCEDURE — 1090F PRES/ABSN URINE INCON ASSESS: CPT | Performed by: NURSE PRACTITIONER

## 2019-11-11 PROCEDURE — 99213 OFFICE O/P EST LOW 20 MIN: CPT | Performed by: NURSE PRACTITIONER

## 2019-11-11 PROCEDURE — 1036F TOBACCO NON-USER: CPT | Performed by: NURSE PRACTITIONER

## 2019-11-11 PROCEDURE — G8484 FLU IMMUNIZE NO ADMIN: HCPCS | Performed by: NURSE PRACTITIONER

## 2019-11-11 PROCEDURE — G8399 PT W/DXA RESULTS DOCUMENT: HCPCS | Performed by: NURSE PRACTITIONER

## 2019-11-11 PROCEDURE — G8417 CALC BMI ABV UP PARAM F/U: HCPCS | Performed by: NURSE PRACTITIONER

## 2019-11-11 ASSESSMENT — ENCOUNTER SYMPTOMS
ABDOMINAL PAIN: 0
RHINORRHEA: 0
VOMITING: 0
SINUS PRESSURE: 0
CONSTIPATION: 0
NAUSEA: 0
SORE THROAT: 0
DIARRHEA: 0
COUGH: 0
PHOTOPHOBIA: 0
BACK PAIN: 1
SHORTNESS OF BREATH: 0
EYE PAIN: 0
WHEEZING: 0
CHEST TIGHTNESS: 0
COLOR CHANGE: 0

## 2019-12-09 ENCOUNTER — PREP FOR PROCEDURE (OUTPATIENT)
Dept: PHYSICAL MEDICINE AND REHAB | Age: 79
End: 2019-12-09

## 2019-12-09 NOTE — H&P (VIEW-ONLY)
range of motion. No edema, erythema, neck rigidity or muscular tenderness. Thyroid: No thyromegaly. Cardiovascular:      Rate and Rhythm: Normal rate and regular rhythm. Heart sounds: Murmur present. No friction rub. No gallop. Pulmonary:      Effort: Pulmonary effort is normal. No respiratory distress. Breath sounds: Normal breath sounds. No wheezing or rales. Chest:      Chest wall: No tenderness. Abdominal:      General: Bowel sounds are normal. There is no distension. Palpations: Abdomen is soft. Tenderness: There is no tenderness. There is no guarding or rebound. Musculoskeletal:         General: Tenderness present. Right shoulder: She exhibits tenderness. Left shoulder: She exhibits tenderness. Right wrist: She exhibits tenderness. Left wrist: She exhibits tenderness. Right hip: She exhibits tenderness. Left hip: She exhibits tenderness. Right knee: Tenderness found. Left knee: Tenderness found. Right ankle: Tenderness. Left ankle: Tenderness. Cervical back: She exhibits decreased range of motion, tenderness, bony tenderness, pain and spasm. Thoracic back: She exhibits decreased range of motion, tenderness, bony tenderness, pain and spasm. Lumbar back: She exhibits decreased range of motion, tenderness, bony tenderness, pain and spasm. Back:       Right hand: She exhibits tenderness. Left hand: She exhibits tenderness. Right upper leg: She exhibits tenderness. Left upper leg: She exhibits tenderness. Right foot: Tenderness present. Left foot: Tenderness present. Skin:     General: Skin is warm. Coloration: Skin is not pale. Findings: No erythema or rash. Neurological:      Mental Status: She is alert and oriented to person, place, and time. She is not disoriented. Cranial Nerves: No cranial nerve deficit. Sensory: No sensory deficit.       Motor: No atrophy or abnormal muscle tone. Coordination: Coordination normal.      Gait: Gait normal.      Deep Tendon Reflexes: Reflexes are normal and symmetric. Babinski sign absent on the right side. Reflex Scores:       Tricep reflexes are 2+ on the right side and 2+ on the left side. Bicep reflexes are 2+ on the right side and 2+ on the left side. Brachioradialis reflexes are 2+ on the right side and 2+ on the left side. Patellar reflexes are 2+ on the right side and 2+ on the left side. Achilles reflexes are 2+ on the right side and 2+ on the left side. Psychiatric:         Attention and Perception: She is attentive. Mood and Affect: Mood is not anxious or depressed. Affect is not labile, blunt, angry or inappropriate. Speech: Speech normal.         Behavior: Behavior normal. Behavior is not agitated, slowed, aggressive, withdrawn, hyperactive or combative. Thought Content: Thought content normal. Thought content is not paranoid or delusional. Thought content does not include homicidal or suicidal ideation. Thought content does not include homicidal or suicidal plan. Cognition and Memory: Memory is not impaired. She does not exhibit impaired recent memory or impaired remote memory. Judgment: Judgment normal. Judgment is not impulsive or inappropriate.         THOM test: +  Yeomans test:+  Gaenslen test: +  Assessment:      1. Spondylosis of lumbar region without myelopathy or radiculopathy    2. SI (sacroiliac) joint inflammation (HCC)    3.  Chronic pain syndrome          Review of Systems    Physical Exam        Plan:  Lumbar Facet MBB @ E36-L2-M4-6, L2-3 RIGHT SIDE ONLY     Bonnie Frank, SEAN - NADYA  12/9/2019

## 2019-12-16 ENCOUNTER — ANESTHESIA (OUTPATIENT)
Dept: OPERATING ROOM | Age: 79
End: 2019-12-16
Payer: MEDICARE

## 2019-12-16 ENCOUNTER — HOSPITAL ENCOUNTER (OUTPATIENT)
Age: 79
Setting detail: OUTPATIENT SURGERY
Discharge: HOME | End: 2019-12-16
Attending: PAIN MEDICINE | Admitting: PAIN MEDICINE
Payer: MEDICARE

## 2019-12-16 ENCOUNTER — APPOINTMENT (OUTPATIENT)
Dept: GENERAL RADIOLOGY | Age: 79
End: 2019-12-16
Attending: PAIN MEDICINE
Payer: MEDICARE

## 2019-12-16 ENCOUNTER — ANESTHESIA EVENT (OUTPATIENT)
Dept: OPERATING ROOM | Age: 79
End: 2019-12-16
Payer: MEDICARE

## 2019-12-16 VITALS
SYSTOLIC BLOOD PRESSURE: 173 MMHG | OXYGEN SATURATION: 99 % | RESPIRATION RATE: 22 BRPM | DIASTOLIC BLOOD PRESSURE: 76 MMHG

## 2019-12-16 VITALS
SYSTOLIC BLOOD PRESSURE: 135 MMHG | RESPIRATION RATE: 12 BRPM | WEIGHT: 264 LBS | TEMPERATURE: 97.5 F | HEIGHT: 65 IN | OXYGEN SATURATION: 95 % | HEART RATE: 70 BPM | BODY MASS INDEX: 43.99 KG/M2 | DIASTOLIC BLOOD PRESSURE: 92 MMHG

## 2019-12-16 LAB — GLUCOSE BLD-MCNC: 139 MG/DL (ref 70–108)

## 2019-12-16 PROCEDURE — 7100000010 HC PHASE II RECOVERY - FIRST 15 MIN: Performed by: PAIN MEDICINE

## 2019-12-16 PROCEDURE — 6360000002 HC RX W HCPCS: Performed by: NURSE ANESTHETIST, CERTIFIED REGISTERED

## 2019-12-16 PROCEDURE — 64494 INJ PARAVERT F JNT L/S 2 LEV: CPT | Performed by: PAIN MEDICINE

## 2019-12-16 PROCEDURE — 2709999900 HC NON-CHARGEABLE SUPPLY: Performed by: PAIN MEDICINE

## 2019-12-16 PROCEDURE — 64493 INJ PARAVERT F JNT L/S 1 LEV: CPT | Performed by: PAIN MEDICINE

## 2019-12-16 PROCEDURE — 2500000003 HC RX 250 WO HCPCS: Performed by: PAIN MEDICINE

## 2019-12-16 PROCEDURE — 3600000055 HC PAIN LEVEL 3 ADDL 15 MIN: Performed by: PAIN MEDICINE

## 2019-12-16 PROCEDURE — 64490 INJ PARAVERT F JNT C/T 1 LEV: CPT | Performed by: PAIN MEDICINE

## 2019-12-16 PROCEDURE — 6360000002 HC RX W HCPCS: Performed by: PAIN MEDICINE

## 2019-12-16 PROCEDURE — 3700000001 HC ADD 15 MINUTES (ANESTHESIA): Performed by: PAIN MEDICINE

## 2019-12-16 PROCEDURE — 7100000011 HC PHASE II RECOVERY - ADDTL 15 MIN: Performed by: PAIN MEDICINE

## 2019-12-16 PROCEDURE — 3700000000 HC ANESTHESIA ATTENDED CARE: Performed by: PAIN MEDICINE

## 2019-12-16 PROCEDURE — 3209999900 FLUORO FOR SURGICAL PROCEDURES

## 2019-12-16 PROCEDURE — 82948 REAGENT STRIP/BLOOD GLUCOSE: CPT

## 2019-12-16 PROCEDURE — 3600000054 HC PAIN LEVEL 3 BASE: Performed by: PAIN MEDICINE

## 2019-12-16 PROCEDURE — 2580000003 HC RX 258: Performed by: NURSE ANESTHETIST, CERTIFIED REGISTERED

## 2019-12-16 RX ORDER — LIDOCAINE HYDROCHLORIDE 10 MG/ML
INJECTION, SOLUTION INFILTRATION; PERINEURAL PRN
Status: DISCONTINUED | OUTPATIENT
Start: 2019-12-16 | End: 2019-12-16 | Stop reason: ALTCHOICE

## 2019-12-16 RX ORDER — METHYLPREDNISOLONE ACETATE 80 MG/ML
INJECTION, SUSPENSION INTRA-ARTICULAR; INTRALESIONAL; INTRAMUSCULAR; SOFT TISSUE PRN
Status: DISCONTINUED | OUTPATIENT
Start: 2019-12-16 | End: 2019-12-16 | Stop reason: ALTCHOICE

## 2019-12-16 RX ORDER — ROPIVACAINE HYDROCHLORIDE 2 MG/ML
INJECTION, SOLUTION EPIDURAL; INFILTRATION; PERINEURAL PRN
Status: DISCONTINUED | OUTPATIENT
Start: 2019-12-16 | End: 2019-12-16 | Stop reason: ALTCHOICE

## 2019-12-16 RX ORDER — PROPOFOL 10 MG/ML
INJECTION, EMULSION INTRAVENOUS PRN
Status: DISCONTINUED | OUTPATIENT
Start: 2019-12-16 | End: 2019-12-16 | Stop reason: SDUPTHER

## 2019-12-16 RX ORDER — SODIUM CHLORIDE 9 MG/ML
INJECTION INTRAVENOUS PRN
Status: DISCONTINUED | OUTPATIENT
Start: 2019-12-16 | End: 2019-12-16 | Stop reason: SDUPTHER

## 2019-12-16 RX ADMIN — SODIUM CHLORIDE 5 ML: 9 INJECTION, SOLUTION INTRAMUSCULAR; INTRAVENOUS; SUBCUTANEOUS at 10:59

## 2019-12-16 RX ADMIN — PROPOFOL 50 MG: 10 INJECTION, EMULSION INTRAVENOUS at 10:59

## 2019-12-16 ASSESSMENT — PULMONARY FUNCTION TESTS
PIF_VALUE: 0

## 2019-12-16 ASSESSMENT — PAIN SCALES - GENERAL: PAINLEVEL_OUTOF10: 0

## 2019-12-16 ASSESSMENT — PAIN - FUNCTIONAL ASSESSMENT: PAIN_FUNCTIONAL_ASSESSMENT: 0-10

## 2019-12-19 ENCOUNTER — NURSE ONLY (OUTPATIENT)
Dept: LAB | Age: 79
End: 2019-12-19

## 2019-12-19 LAB
BUN BLDV-MCNC: 17 MG/DL (ref 7–22)
CREAT SERPL-MCNC: 0.8 MG/DL (ref 0.4–1.2)
GFR SERPL CREATININE-BSD FRML MDRD: 69 ML/MIN/1.73M2

## 2019-12-28 ENCOUNTER — HOSPITAL ENCOUNTER (OUTPATIENT)
Dept: CT IMAGING | Age: 79
Discharge: HOME | End: 2019-12-28
Payer: MEDICARE

## 2019-12-28 DIAGNOSIS — R10.11 RUQ ABDOMINAL PAIN: ICD-10-CM

## 2019-12-28 PROCEDURE — 6360000004 HC RX CONTRAST MEDICATION: Performed by: INTERNAL MEDICINE

## 2019-12-28 PROCEDURE — 74177 CT ABD & PELVIS W/CONTRAST: CPT

## 2019-12-28 RX ADMIN — IOHEXOL 50 ML: 240 INJECTION, SOLUTION INTRATHECAL; INTRAVASCULAR; INTRAVENOUS; ORAL at 11:35

## 2019-12-28 RX ADMIN — IOPAMIDOL 85 ML: 755 INJECTION, SOLUTION INTRAVENOUS at 11:35

## 2019-12-31 ENCOUNTER — OFFICE VISIT (OUTPATIENT)
Dept: PHYSICAL MEDICINE AND REHAB | Age: 79
End: 2019-12-31
Payer: MEDICARE

## 2019-12-31 VITALS
HEART RATE: 85 BPM | SYSTOLIC BLOOD PRESSURE: 132 MMHG | WEIGHT: 263.89 LBS | DIASTOLIC BLOOD PRESSURE: 80 MMHG | BODY MASS INDEX: 43.97 KG/M2 | HEIGHT: 65 IN

## 2019-12-31 DIAGNOSIS — M54.50 LUMBAR PAIN: ICD-10-CM

## 2019-12-31 DIAGNOSIS — M47.816 SPONDYLOSIS OF LUMBAR REGION WITHOUT MYELOPATHY OR RADICULOPATHY: Primary | ICD-10-CM

## 2019-12-31 PROCEDURE — 1090F PRES/ABSN URINE INCON ASSESS: CPT | Performed by: NURSE PRACTITIONER

## 2019-12-31 PROCEDURE — G8484 FLU IMMUNIZE NO ADMIN: HCPCS | Performed by: NURSE PRACTITIONER

## 2019-12-31 PROCEDURE — 4040F PNEUMOC VAC/ADMIN/RCVD: CPT | Performed by: NURSE PRACTITIONER

## 2019-12-31 PROCEDURE — G8417 CALC BMI ABV UP PARAM F/U: HCPCS | Performed by: NURSE PRACTITIONER

## 2019-12-31 PROCEDURE — 99213 OFFICE O/P EST LOW 20 MIN: CPT | Performed by: NURSE PRACTITIONER

## 2019-12-31 PROCEDURE — G8427 DOCREV CUR MEDS BY ELIG CLIN: HCPCS | Performed by: NURSE PRACTITIONER

## 2019-12-31 PROCEDURE — G8399 PT W/DXA RESULTS DOCUMENT: HCPCS | Performed by: NURSE PRACTITIONER

## 2019-12-31 PROCEDURE — 1036F TOBACCO NON-USER: CPT | Performed by: NURSE PRACTITIONER

## 2019-12-31 PROCEDURE — 1123F ACP DISCUSS/DSCN MKR DOCD: CPT | Performed by: NURSE PRACTITIONER

## 2020-01-06 ENCOUNTER — ANESTHESIA (OUTPATIENT)
Dept: OPERATING ROOM | Age: 80
End: 2020-01-06
Payer: MEDICARE

## 2020-01-06 ENCOUNTER — HOSPITAL ENCOUNTER (OUTPATIENT)
Age: 80
Setting detail: OUTPATIENT SURGERY
Discharge: HOME | End: 2020-01-06
Attending: PAIN MEDICINE | Admitting: PAIN MEDICINE
Payer: MEDICARE

## 2020-01-06 ENCOUNTER — ANESTHESIA EVENT (OUTPATIENT)
Dept: OPERATING ROOM | Age: 80
End: 2020-01-06
Payer: MEDICARE

## 2020-01-06 ENCOUNTER — APPOINTMENT (OUTPATIENT)
Dept: GENERAL RADIOLOGY | Age: 80
End: 2020-01-06
Attending: PAIN MEDICINE
Payer: MEDICARE

## 2020-01-06 VITALS
OXYGEN SATURATION: 96 % | TEMPERATURE: 96.8 F | RESPIRATION RATE: 14 BRPM | DIASTOLIC BLOOD PRESSURE: 80 MMHG | SYSTOLIC BLOOD PRESSURE: 128 MMHG

## 2020-01-06 VITALS
TEMPERATURE: 98.5 F | BODY MASS INDEX: 44.69 KG/M2 | DIASTOLIC BLOOD PRESSURE: 88 MMHG | WEIGHT: 261.8 LBS | RESPIRATION RATE: 16 BRPM | SYSTOLIC BLOOD PRESSURE: 123 MMHG | HEIGHT: 64 IN | OXYGEN SATURATION: 97 % | HEART RATE: 69 BPM

## 2020-01-06 LAB — GLUCOSE BLD-MCNC: 117 MG/DL (ref 70–108)

## 2020-01-06 PROCEDURE — 6360000002 HC RX W HCPCS: Performed by: PAIN MEDICINE

## 2020-01-06 PROCEDURE — 3209999900 FLUORO FOR SURGICAL PROCEDURES

## 2020-01-06 PROCEDURE — 64636 DESTROY L/S FACET JNT ADDL: CPT | Performed by: PAIN MEDICINE

## 2020-01-06 PROCEDURE — 3700000000 HC ANESTHESIA ATTENDED CARE: Performed by: PAIN MEDICINE

## 2020-01-06 PROCEDURE — 3600000056 HC PAIN LEVEL 4 BASE: Performed by: PAIN MEDICINE

## 2020-01-06 PROCEDURE — 7100000011 HC PHASE II RECOVERY - ADDTL 15 MIN: Performed by: PAIN MEDICINE

## 2020-01-06 PROCEDURE — 7100000010 HC PHASE II RECOVERY - FIRST 15 MIN: Performed by: PAIN MEDICINE

## 2020-01-06 PROCEDURE — 2580000003 HC RX 258: Performed by: NURSE ANESTHETIST, CERTIFIED REGISTERED

## 2020-01-06 PROCEDURE — 2500000003 HC RX 250 WO HCPCS: Performed by: PAIN MEDICINE

## 2020-01-06 PROCEDURE — 2500000003 HC RX 250 WO HCPCS: Performed by: NURSE ANESTHETIST, CERTIFIED REGISTERED

## 2020-01-06 PROCEDURE — 64635 DESTROY LUMB/SAC FACET JNT: CPT | Performed by: PAIN MEDICINE

## 2020-01-06 PROCEDURE — 3600000057 HC PAIN LEVEL 4 ADDL 15 MIN: Performed by: PAIN MEDICINE

## 2020-01-06 PROCEDURE — 2709999900 HC NON-CHARGEABLE SUPPLY: Performed by: PAIN MEDICINE

## 2020-01-06 PROCEDURE — 64633 DESTROY CERV/THOR FACET JNT: CPT | Performed by: PAIN MEDICINE

## 2020-01-06 PROCEDURE — 6360000002 HC RX W HCPCS: Performed by: NURSE ANESTHETIST, CERTIFIED REGISTERED

## 2020-01-06 PROCEDURE — 3700000001 HC ADD 15 MINUTES (ANESTHESIA): Performed by: PAIN MEDICINE

## 2020-01-06 PROCEDURE — 82948 REAGENT STRIP/BLOOD GLUCOSE: CPT

## 2020-01-06 RX ORDER — LIDOCAINE HYDROCHLORIDE 10 MG/ML
INJECTION, SOLUTION EPIDURAL; INFILTRATION; INTRACAUDAL; PERINEURAL PRN
Status: DISCONTINUED | OUTPATIENT
Start: 2020-01-06 | End: 2020-01-06 | Stop reason: ALTCHOICE

## 2020-01-06 RX ORDER — ROPIVACAINE HYDROCHLORIDE 2 MG/ML
INJECTION, SOLUTION EPIDURAL; INFILTRATION; PERINEURAL PRN
Status: DISCONTINUED | OUTPATIENT
Start: 2020-01-06 | End: 2020-01-06 | Stop reason: ALTCHOICE

## 2020-01-06 RX ORDER — SODIUM CHLORIDE 9 MG/ML
INJECTION INTRAVENOUS PRN
Status: DISCONTINUED | OUTPATIENT
Start: 2020-01-06 | End: 2020-01-06 | Stop reason: SDUPTHER

## 2020-01-06 RX ORDER — FENTANYL CITRATE 50 UG/ML
INJECTION, SOLUTION INTRAMUSCULAR; INTRAVENOUS PRN
Status: DISCONTINUED | OUTPATIENT
Start: 2020-01-06 | End: 2020-01-06 | Stop reason: SDUPTHER

## 2020-01-06 RX ORDER — METHYLPREDNISOLONE ACETATE 80 MG/ML
INJECTION, SUSPENSION INTRA-ARTICULAR; INTRALESIONAL; INTRAMUSCULAR; SOFT TISSUE PRN
Status: DISCONTINUED | OUTPATIENT
Start: 2020-01-06 | End: 2020-01-06 | Stop reason: ALTCHOICE

## 2020-01-06 RX ORDER — LIDOCAINE HYDROCHLORIDE 10 MG/ML
INJECTION, SOLUTION INFILTRATION; PERINEURAL PRN
Status: DISCONTINUED | OUTPATIENT
Start: 2020-01-06 | End: 2020-01-06 | Stop reason: SDUPTHER

## 2020-01-06 RX ORDER — PROPOFOL 10 MG/ML
INJECTION, EMULSION INTRAVENOUS PRN
Status: DISCONTINUED | OUTPATIENT
Start: 2020-01-06 | End: 2020-01-06 | Stop reason: SDUPTHER

## 2020-01-06 RX ADMIN — LIDOCAINE HYDROCHLORIDE 20 MG: 10 INJECTION, SOLUTION INFILTRATION; PERINEURAL at 15:01

## 2020-01-06 RX ADMIN — FENTANYL CITRATE 50 MCG: 50 INJECTION INTRAMUSCULAR; INTRAVENOUS at 14:49

## 2020-01-06 RX ADMIN — FENTANYL CITRATE 50 MCG: 50 INJECTION INTRAMUSCULAR; INTRAVENOUS at 14:45

## 2020-01-06 RX ADMIN — SODIUM CHLORIDE 5 ML: 9 INJECTION, SOLUTION INTRAMUSCULAR; INTRAVENOUS; SUBCUTANEOUS at 15:03

## 2020-01-06 RX ADMIN — PROPOFOL 80 MG: 10 INJECTION, EMULSION INTRAVENOUS at 15:01

## 2020-01-06 RX ADMIN — SODIUM CHLORIDE 5 ML: 9 INJECTION, SOLUTION INTRAMUSCULAR; INTRAVENOUS; SUBCUTANEOUS at 14:45

## 2020-01-06 ASSESSMENT — PULMONARY FUNCTION TESTS
PIF_VALUE: 0
PIF_VALUE: 1
PIF_VALUE: 0

## 2020-01-06 ASSESSMENT — LIFESTYLE VARIABLES: SMOKING_STATUS: 0

## 2020-01-06 ASSESSMENT — PAIN SCALES - GENERAL: PAINLEVEL_OUTOF10: 0

## 2020-01-06 ASSESSMENT — PAIN - FUNCTIONAL ASSESSMENT: PAIN_FUNCTIONAL_ASSESSMENT: 0-10

## 2020-01-06 NOTE — PROGRESS NOTES
1505: Patient to phase 2 recovery room via cart. Patient is awake and alert. Report received from surgical RN, [facility]. Patient's vitals obtained, see charting. 1507: Patient is denying nausea and stating she is having minimal pain. 1509: Offered drink and snack provided to the patient. Bed is in the lowest position, call light is within reach and patient's  brought to the room. 1529: Discharge instructions given and explained to the patient and the patient's , both verbalized understanding. 1531: IV removed at this time, no complications and dressing applied. 1539: Patient wheeled to the car and discharged home in stable condition with her .

## 2020-01-06 NOTE — ANESTHESIA POSTPROCEDURE EVALUATION
Department of Anesthesiology  Postprocedure Note    Patient: Lake Cullen  MRN: [id number]  YOB: 1940  Date of evaluation: 1/6/2020  Time:  3:45 PM     Procedure Summary     Date:  01/06/20 Room / Location:  [address] [place] [address] 03 / [facility]    Anesthesia Start:  5517 Anesthesia Stop:  9284    Procedure:  Lumbar RFA T12-L1, L1-2, L2-3 Right (Right ) Diagnosis:  (lumbar spondylosis)    Surgeon:  Marylen Guillaume, MD Responsible Provider:  Gary Morin MD    Anesthesia Type:  MAC ASA Status:  3          Anesthesia Type: MAC    Jono Phase I:      Jono Phase II: Jono Score: 10    Last vitals: Reviewed and per EMR flowsheets.        Anesthesia Post Evaluation    Patient location during evaluation: PACU  Patient participation: complete - patient participated  Level of consciousness: awake  Nausea & Vomiting: no nausea  Complications: no  Cardiovascular status: hemodynamically stable  Respiratory status: acceptable

## 2020-01-06 NOTE — ANESTHESIA PRE PROCEDURE
25 MG tablet TAKE 1 TABLET DAILY 9/8/16   Vinny Strong DO   vitamin D (CHOLECALCIFEROL) 1000 UNIT TABS tablet Take 1,000 Units by mouth nightly. Historical Provider, MD       Current medications:    No current outpatient medications on file. No current facility-administered medications for this visit. Allergies: Allergies   Allergen Reactions    Norco [Hydrocodone-Acetaminophen] Itching    Tramadol Itching    Codeine Hives and Rash       Problem List:    Patient Active Problem List   Diagnosis Code    Dyslipidemia E78.5    HTN, goal below 140/90 I10    Leg cramps R25.2    GERD (gastroesophageal reflux disease) K21.9    Major depressive disorder, recurrent episode, mild ([facility] F33.0    Type 2 diabetes mellitus with diabetic neuropathy, without long-term current use of insulin ([facility]) E11.40    Subarachnoid hemorrhage ([facility]) I60.9    Traumatic hematoma of forehead S00.83XA    Closed head injury S09.90XA    Fall down stairs W10. 8XXA    Thoracic spondylosis M47.814    Lumbar spondylosis M47.816       Past Medical History:        Diagnosis Date    Arthritis     Chronic low back pain     DM type 2, goal A1c below 7     GERD (gastroesophageal reflux disease)     Hyperlipidemia     Hypertension     Loss of vision     Mitral valve disorder     Osteoporosis     PAF (paroxysmal atrial fibrillation) ([facility])     Subarachnoid hemorrhage ([facility]) 4/25/2019    TIA (transient ischemic attack)     Type 2 diabetes mellitus with diabetic neuropathy, without long-term current use of insulin [facility] 7/26/2017   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    Urinary incontinence        Past Surgical History:        Procedure Laterality Date    APPENDECTOMY      CARPAL TUNNEL RELEASE Left 2011    CATARACT REMOVAL Bilateral     CERVICAL FUSION  1976    CHOLECYSTECTOMY      COLON SURGERY  2012    COLONOSCOPY  2015    EYE SURGERY      CATARACTS    FINGER SURGERY Right NEGATIVE 04/07/2015    ALKPHOS 74 04/29/2019    AST 25 04/29/2019    ALT 28 04/29/2019       POC Tests:   Recent Labs     01/06/20  1327   POCGLU 117*       Coags: No results found for: PROTIME, INR, APTT    HCG (If Applicable): No results found for: PREGTESTUR, PREGSERUM, HCG, HCGQUANT     ABGs: No results found for: PHART, PO2ART, MQK6FPL, DTH7LJQ, BEART, I7ZLJQRU     Type & Screen (If Applicable):  Lab Results   Component Value Date    [address] 04/29/2019       Anesthesia Evaluation  Patient summary reviewed no history of anesthetic complications:   Airway: Mallampati: II       Mouth opening: > = 3 FB Dental:          Pulmonary: breath sounds clear to auscultation      (-) recent URI and not a current smoker                           Cardiovascular:    (+) hypertension:, hyperlipidemia        Rhythm: regular  Rate: normal                    Neuro/Psych:   (+) CVA:, TIA, psychiatric history:            GI/Hepatic/Renal:   (+) GERD:,          ROS comment: BMI 45. Endo/Other:    (+) DiabetesType II DM, , : arthritis: OA., .                 Abdominal:   (+) obese,         Vascular:                                          Anesthesia Plan      MAC     ASA 3       Induction: intravenous. Anesthetic plan and risks discussed with patient. Plan discussed with CRNA.                   Patricia Fuentes MD   1/6/2020

## 2020-01-06 NOTE — OP NOTE
Pre-Procedure Note    Patient Name: Larry Gallego   YOB: 1940  Medical Record Number: [id number]  Date: 1/6/20    Indication: Thoracic and lumbar pain  Consent: On file. Vital Signs:   Vitals:    01/06/20 1312   BP: (!) 149/75   Pulse: 77   Resp: 16   Temp: 97.4 °F (36.3 °C)   SpO2: 95%       Past Medical History:   has a past medical history of Arthritis, Chronic low back pain, DM type 2, goal A1c below 7, GERD (gastroesophageal reflux disease), Hyperlipidemia, Hypertension, Loss of vision, Mitral valve disorder, Osteoporosis, PAF (paroxysmal atrial fibrillation) (HCC), Subarachnoid hemorrhage (HCC), TIA (transient ischemic attack), Type 2 diabetes mellitus with diabetic neuropathy, without long-term current use of insulin ([facility] Unspecified cerebral artery occlusion with cerebral infarction, and Urinary incontinence. Past Surgical History:   has a past surgical history that includes Appendectomy; Cholecystectomy; hernia repair; Hysterectomy; Spine surgery; Carpal tunnel release (Left, 2011); pr laminectomy,>2 sgmt,lumbar; cervical fusion (1976); eye surgery; Rotator cuff repair (Right, 11/28/2014); shoulder surgery (2014); Cataract removal (Bilateral); Colon surgery (2012); Colonoscopy (2015); Upper gastrointestinal endoscopy (4444,5923); Finger surgery (Right, 10/2016); tendon release (Right, 05/18/2017); Total knee arthroplasty (Bilateral); Nerve Surgery (Right, 10/25/2019); and Nerve Surgery (Right, 12/16/2019). Pre-Sedation Documentation and Exam:   Vital signs have been reviewed (see flow sheet for vitals). Sedation/ Anesthesia Plan:   MAC    Patient is an appropriate candidate for plan of sedation: yes    Preoperative Diagnosis:  Thoracic and Lumbar spondylosis    Post-Op Dx:    Procedure Performed:  :Radiofrequency ablation of median branches at the levels of T12-L1,L1-2 and L2-3 right under fluoroscopic guidance     Indication for the Procedure:   The patient has ahistory of chronic low back pain that is not responding well to the conservative treatment. Patient's pain is mostly axial in nature. Pain is interfering with the activities of daily living. Physical examination revealed facet tenderness and facet loading is positive. Patient had undergone lumbar facet joint injections with pain relief that lasted for only a short period of time and had greater than 70% pain relief with confirmatory median branch blocks. Hence we decided to do radiofrequency abalation of median branches for long term pain releif. The procedure and risks  were discussed with the patient and an informed consent was obtained. Procedure:     A meaningful communication was kept up with the patient throughout the procedure. The patient is placed in prone position and skin over the back was prepped and draped in sterile manner. Then using fluoroscopy the junction of the transverse process of the vertebra with the superior process of the facet joint was observed and the view was optimized. The skin and deep tissues posterior were infiltrated with 15 ml of  1% xylocaine. The RF canula with the 15 mm active tip was introduced through the skin wheal under fluoroscopy guidance such that the tip of the needle lies in the groove of the transverse process with the superior processes of the facet joint. Then a lateral view of the lumbar spine was obtained to make sure the tip of needle is not in the neural foramen. Then electric impedence was checked to make sure it is acceptable. Then a sensory stimulus was applied at 50 Hz up to 1 volt and concordant pain symptoms were reproduced. Then a motor stimulus was applied at 2 Hz up to 2 volts and no motor stimulation was seen in lower extremities. Some multifidus stimulus was seen. Then after negative aspiration a mixture of depomedrol 80 mg  and 0.2%  Ropivacaine 1.5 cc  was injected through the needle in divided doses.  Then a  lesion was done at 80 degrees centigrade for 90 seconds. EBL-0  Patient's vital signs and neurological status remained stable throughout the procedure and post procedural period. The patient tolerated the procedure well and was discharged home in stable condition.     Electronically signed by Renetta Parada MD on 1/6/20 at 2:47 PM

## 2020-01-09 ENCOUNTER — HOSPITAL ENCOUNTER (OUTPATIENT)
Dept: WOMENS IMAGING | Age: 80
Discharge: HOME | End: 2020-01-09
Payer: MEDICARE

## 2020-01-09 PROCEDURE — 77063 BREAST TOMOSYNTHESIS BI: CPT

## 2020-01-14 ENCOUNTER — OFFICE VISIT (OUTPATIENT)
Dept: FAMILY MEDICINE CLINIC | Age: 80
End: 2020-01-14
Payer: MEDICARE

## 2020-01-14 VITALS
SYSTOLIC BLOOD PRESSURE: 136 MMHG | TEMPERATURE: 97.6 F | DIASTOLIC BLOOD PRESSURE: 70 MMHG | HEIGHT: 66 IN | HEART RATE: 88 BPM | BODY MASS INDEX: 41.95 KG/M2 | WEIGHT: 261 LBS

## 2020-01-14 PROBLEM — M46.1 SI (SACROILIAC) JOINT INFLAMMATION (HCC): Status: ACTIVE | Noted: 2020-01-14

## 2020-01-14 PROBLEM — E66.01 MORBID OBESITY WITH BMI OF 40.0-44.9, ADULT (HCC): Status: ACTIVE | Noted: 2020-01-14

## 2020-01-14 LAB — HBA1C MFR BLD: 6.8 %

## 2020-01-14 PROCEDURE — 1036F TOBACCO NON-USER: CPT | Performed by: FAMILY MEDICINE

## 2020-01-14 PROCEDURE — G8484 FLU IMMUNIZE NO ADMIN: HCPCS | Performed by: FAMILY MEDICINE

## 2020-01-14 PROCEDURE — 4040F PNEUMOC VAC/ADMIN/RCVD: CPT | Performed by: FAMILY MEDICINE

## 2020-01-14 PROCEDURE — G8417 CALC BMI ABV UP PARAM F/U: HCPCS | Performed by: FAMILY MEDICINE

## 2020-01-14 PROCEDURE — G8399 PT W/DXA RESULTS DOCUMENT: HCPCS | Performed by: FAMILY MEDICINE

## 2020-01-14 PROCEDURE — 83036 HEMOGLOBIN GLYCOSYLATED A1C: CPT | Performed by: FAMILY MEDICINE

## 2020-01-14 PROCEDURE — 1090F PRES/ABSN URINE INCON ASSESS: CPT | Performed by: FAMILY MEDICINE

## 2020-01-14 PROCEDURE — G8427 DOCREV CUR MEDS BY ELIG CLIN: HCPCS | Performed by: FAMILY MEDICINE

## 2020-01-14 PROCEDURE — 99214 OFFICE O/P EST MOD 30 MIN: CPT | Performed by: FAMILY MEDICINE

## 2020-01-14 PROCEDURE — 1123F ACP DISCUSS/DSCN MKR DOCD: CPT | Performed by: FAMILY MEDICINE

## 2020-01-14 RX ORDER — AMLODIPINE BESYLATE 10 MG/1
TABLET ORAL
Qty: 90 TABLET | Refills: 1 | Status: SHIPPED | OUTPATIENT
Start: 2020-01-14 | End: 2020-07-14 | Stop reason: SDUPTHER

## 2020-01-14 RX ORDER — NYSTATIN 100000 U/G
CREAM TOPICAL
Qty: 1 TUBE | Refills: 1 | Status: SHIPPED | OUTPATIENT
Start: 2020-01-14 | End: 2021-05-27 | Stop reason: ALTCHOICE

## 2020-01-14 RX ORDER — AMOXICILLIN AND CLAVULANATE POTASSIUM 875; 125 MG/1; MG/1
1 TABLET, FILM COATED ORAL 2 TIMES DAILY
Qty: 20 TABLET | Refills: 0 | Status: SHIPPED | OUTPATIENT
Start: 2020-01-14 | End: 2020-01-24

## 2020-01-14 NOTE — PROGRESS NOTES
Heart Disease Father     High Blood Pressure Father            OBJECTIVE:  /70   Pulse 88   Temp 97.6 °F (36.4 °C) (Oral)   Ht 5' 6\" (1.676 m)   Wt 261 lb (118.4 kg)   BMI 42.13 kg/m²   General appearance: alert, well appearing, and in no distress. ENT exam reveals - neck without nodes, pharynx erythematous without exudate and nasal mucosa congested. CVS exam: normal rate, regular rhythm, normal S1, S2, no murmurs, rubs, clicks or gallops. Chest:clear to auscultation, no wheezes, rales or rhonchi, symmetric air entry. Abdominal exam: soft, nontender, nondistended, no masses or organomegaly. Extremities:  No clubbing, cyanosis or edema  Skin exam - normal coloration and turgor, no rashes, no suspicious skin lesions noted. Psych -  Affect appropriate. Thought process is normal without evidence of depression or psychosis. Good insight and appropriae interaction. Cognition and memory appear to be intact. ASSESSMENT & PLAN  Ponce Resendiz was seen today for sinusitis and rash. Diagnoses and all orders for this visit:    Type 2 diabetes mellitus with diabetic neuropathy, without long-term current use of insulin (HCC)  -     POCT glycosylated hemoglobin (Hb A1C)    SI (sacroiliac) joint inflammation (HCC)    Major depressive disorder, recurrent episode, mild (HCC)    Morbid obesity with BMI of 40.0-44.9, adult ([facility]    At high risk for falls    Acute rhinosinusitis  -     amoxicillin-clavulanate (AUGMENTIN) 875-125 MG per tablet; Take 1 tablet by mouth 2 times daily for 10 days    Intertrigo  -     nystatin (MYCOSTATIN) 873029 UNIT/GM cream; Apply topically 2 times daily.         Return if symptoms worsen or fail to improve.     -Patient advised to call immediately or go to ER if any worsening of symptoms  -Patient counseled on conservative care including fluids, rest and OTC meds  -Chronic issues stable, continue current medications  -Advised to call if any issues      Ponce Resendiz received

## 2020-01-14 NOTE — TELEPHONE ENCOUNTER
01/14/2020   Myla Berman called requesting a refill on the following medications:  Requested Prescriptions     Pending Prescriptions Disp Refills    amLODIPine (NORVASC) 10 MG tablet 90 tablet 0     Pharmacy verified:  .pv    PATIENT ASKING FOR 90DAYS WITH 3 REFILLS LIKE BEFORE.       Date of last visit:   Date of next visit (if applicable): 6/81/9326

## 2020-01-17 ENCOUNTER — TELEPHONE (OUTPATIENT)
Dept: FAMILY MEDICINE CLINIC | Age: 80
End: 2020-01-17

## 2020-01-17 NOTE — TELEPHONE ENCOUNTER
Pt spoke to Dr Dayna Talley office, she is to call back Monday morning to be scheduled if there is a cancellation

## 2020-01-17 NOTE — TELEPHONE ENCOUNTER
If the antibiotic is not helping, then it may not be sinusitis. Does she have an optometrist that she can get in with quickly?

## 2020-01-21 ENCOUNTER — TELEPHONE (OUTPATIENT)
Dept: FAMILY MEDICINE CLINIC | Age: 80
End: 2020-01-21

## 2020-01-21 NOTE — TELEPHONE ENCOUNTER
Pt called stating she's \"progressing but not as fast as I should be. \" Pt is requesting a stronger ATB. Pt was asked about her Sx's & she became agitated & refused to give any details stating \"Dr Noe Essex knows my situation\" \"just tell him I'm progressing but not nearly like I should be & I need a stronger med. \"   Please advise.

## 2020-01-31 ENCOUNTER — OFFICE VISIT (OUTPATIENT)
Dept: PHYSICAL MEDICINE AND REHAB | Age: 80
End: 2020-01-31
Payer: MEDICARE

## 2020-01-31 VITALS
BODY MASS INDEX: 41.81 KG/M2 | DIASTOLIC BLOOD PRESSURE: 80 MMHG | WEIGHT: 260.14 LBS | SYSTOLIC BLOOD PRESSURE: 136 MMHG | HEART RATE: 80 BPM | HEIGHT: 66 IN

## 2020-01-31 PROCEDURE — 1123F ACP DISCUSS/DSCN MKR DOCD: CPT | Performed by: NURSE PRACTITIONER

## 2020-01-31 PROCEDURE — 4040F PNEUMOC VAC/ADMIN/RCVD: CPT | Performed by: NURSE PRACTITIONER

## 2020-01-31 PROCEDURE — 1090F PRES/ABSN URINE INCON ASSESS: CPT | Performed by: NURSE PRACTITIONER

## 2020-01-31 PROCEDURE — G8399 PT W/DXA RESULTS DOCUMENT: HCPCS | Performed by: NURSE PRACTITIONER

## 2020-01-31 PROCEDURE — 1036F TOBACCO NON-USER: CPT | Performed by: NURSE PRACTITIONER

## 2020-01-31 PROCEDURE — G8484 FLU IMMUNIZE NO ADMIN: HCPCS | Performed by: NURSE PRACTITIONER

## 2020-01-31 PROCEDURE — G8417 CALC BMI ABV UP PARAM F/U: HCPCS | Performed by: NURSE PRACTITIONER

## 2020-01-31 PROCEDURE — G8427 DOCREV CUR MEDS BY ELIG CLIN: HCPCS | Performed by: NURSE PRACTITIONER

## 2020-01-31 PROCEDURE — 99214 OFFICE O/P EST MOD 30 MIN: CPT | Performed by: NURSE PRACTITIONER

## 2020-01-31 ASSESSMENT — ENCOUNTER SYMPTOMS: BACK PAIN: 1

## 2020-01-31 NOTE — PROGRESS NOTES
[facility]  [address]  Dept: [phone]  Dept Fax: [phone]: [phone]    Visit Date: 1/31/2020    Functionality Assessment/Goals Worksheet     On a scale of 0 (Does not Interfere) to 10 (Completely Interferes)     1. Which number describes how during the past week pain has interfered with       the following:  A. General Activity:  3  B. Mood: 0  C. Walking Ability:  2  D. Normal Work (Includes both work outside the home and housework):  0  E. Relations with Other People:   0  F. Sleep:   0  G. Enjoyment of Life:   0    2. Patient Prefers to Take their Pain Medications:     []  On a regular basis   []  Only when necessary    [x]  Does not take pain medications    3. What are the Patient's Goals/Expectations for Visiting Pain Management? [x]  Learn about my pain    []  Receive Medication   []  Physical Therapy     []  Treat Depression   []  Receive Injections    []  Treat Sleep   []  Deal with Anxiety and Stress   []  Treat Opoid Dependence/Addiction   []  Other:      HPI:   Jan Goldsmith is a 70 y.o. female is here today for    Chief Complaint: Low back pain    HPI     Radiofrequency ablation of median branches at the levels of T12-L1,L1-2 and L2-3 right under fluoroscopic guidance with Dr Shane Mark on  . Patient states she is getting 90% relief with these, states occasional pain with exertion, but overall doing very well. Patient complains of occasional radicular pain down the back/maybe side of the right leg. Patient states it comes on with activity. Discussed wit patient about gabapentin, EMG and obtaining MRI from [facility]. Medications reviewed.        PROCEDURE: CT LUMBAR SPINE WO CONTRAST       CLINICAL INFORMATION: TRAUMA, lumbar fracture.       COMPARISON: Lumbar spine x-rays earlier today       TECHNIQUE: 3 mm noncontrast axial images were obtained of the lumbar spine. Sagittal and coronal reconstructions were obtained. Angled images were reconstructed through the L3-4, L4-5 and L5-S1 disc levels.       All CT scans at this facility use dose modulation, iterative reconstruction, and/or weight-based dosing when appropriate to reduce radiation dose to as low as reasonably achievable.       FINDINGS:       There are 5  non-rib-bearing lumbar vertebrae. Patient is status post bilateral L2-L4 pedicle screw fusion. There is extensive bilateral facet joint fusion, diffusely on the left from T11 through S1 and on the right from T12 through S1. Patient also status post L1-L3 laminectomies. There is kyphosis of the lumbar spine. There is an old moderate L4 compression fracture deformity. There is no acute fracture.     There is 3 mm anterolisthesis of L2 on L3, stabilized by the pedicle screws. .   There is multilevel moderate to severe lower thoracic and lumbar disc space narrowing consistent with degenerative disc disease, most severe at T12-L1, L1-2 and L2-3. There is multilevel mild bony spinal stenosis due to hypertrophic spurring. There is multilevel endplate spondylosis. The paravertebral soft tissues are unremarkable. The SI joints are unremarkable. There is deformity of the posterior sacrum which may be posttraumatic or postsurgical in nature.        There are bilateral renal probable parapelvic cysts. There are left renal hilar calcifications which probably represent arterial atherosclerotic calcifications.           Impression    No acute fracture. Old moderate L4 compression fracture. 3 mm anterolisthesis of L2 on L3, stabilized by the pedicle screws. Extensive postoperative changes of the lumbar spine as discussed above. Multilevel moderate to severe degenerative disc disease. The patientis allergic to norco [hydrocodone-acetaminophen]; tramadol; and codeine.     Past Medical History  Ashish Caraballo  has a past medical every 6 hours as needed for Nausea, Disp: 15 tablet, Rfl: 0    ondansetron (ZOFRAN ODT) 4 MG disintegrating tablet, Take 1 tablet by mouth every 8 hours as needed for Nausea or Vomiting, Disp: 20 tablet, Rfl: 0    metFORMIN (GLUCOPHAGE) 500 MG tablet, TAKE 1 TABLET TWICE A DAY WITH MEALS, Disp: 180 tablet, Rfl: 3    quinapril (ACCUPRIL) 10 MG tablet, Take 1 tablet by mouth nightly, Disp: 90 tablet, Rfl: 3    pantoprazole (PROTONIX) 40 MG tablet, Take 1 tablet by mouth 2 times daily, Disp: 180 tablet, Rfl: 3    sertraline (ZOLOFT) 100 MG tablet, Take 2 tablets by mouth daily, Disp: 180 tablet, Rfl: 3    atorvastatin (LIPITOR) 40 MG tablet, TAKE 1 TABLET DAILY, Disp: 90 tablet, Rfl: 3    hydrALAZINE (APRESOLINE) 50 MG tablet, Take 1 tablet by mouth 2 times daily, Disp: 180 tablet, Rfl: 3    gabapentin (NEURONTIN) 300 MG capsule, Take 2 capsules by mouth 3 times daily (Patient taking differently: Take 600 mg by mouth 3 times daily as needed. ), Disp: 540 capsule, Rfl: 3    ibuprofen (ADVIL;MOTRIN) 600 MG tablet, Take 1 tablet by mouth 3 times daily as needed for Pain (Take with food 3 times daily for pain), Disp: 15 tablet, Rfl: 0    atenolol (TENORMIN) 25 MG tablet, TAKE 1 TABLET DAILY, Disp: 90 tablet, Rfl: 3    vitamin D (CHOLECALCIFEROL) 1000 UNIT TABS tablet, Take 1,000 Units by mouth nightly., Disp: , Rfl:     Subjective:      Review of Systems   Constitutional: Positive for activity change. Musculoskeletal: Positive for arthralgias, back pain and myalgias. Objective:     Vitals:    01/31/20 0813   BP: 136/80   Site: Right Upper Arm   Position: Sitting   Cuff Size: Medium Adult   Pulse: 80   Weight: 260 lb 2.3 oz (118 kg)   Height: 5' 6\" (1.676 m)       Physical Exam  Vitals signs reviewed. Constitutional:       General: She is not in acute distress. Appearance: She is well-developed. She is not diaphoretic. HENT:      Head: Normocephalic and atraumatic.  Not macrocephalic and not

## 2020-02-17 ENCOUNTER — OFFICE VISIT (OUTPATIENT)
Dept: PHYSICAL MEDICINE AND REHAB | Age: 80
End: 2020-02-17
Payer: MEDICARE

## 2020-02-17 VITALS
SYSTOLIC BLOOD PRESSURE: 118 MMHG | BODY MASS INDEX: 41.81 KG/M2 | WEIGHT: 260.14 LBS | DIASTOLIC BLOOD PRESSURE: 84 MMHG | HEIGHT: 66 IN

## 2020-02-17 PROCEDURE — 1123F ACP DISCUSS/DSCN MKR DOCD: CPT | Performed by: NURSE PRACTITIONER

## 2020-02-17 PROCEDURE — 4040F PNEUMOC VAC/ADMIN/RCVD: CPT | Performed by: NURSE PRACTITIONER

## 2020-02-17 PROCEDURE — G8484 FLU IMMUNIZE NO ADMIN: HCPCS | Performed by: NURSE PRACTITIONER

## 2020-02-17 PROCEDURE — 1090F PRES/ABSN URINE INCON ASSESS: CPT | Performed by: NURSE PRACTITIONER

## 2020-02-17 PROCEDURE — 1036F TOBACCO NON-USER: CPT | Performed by: NURSE PRACTITIONER

## 2020-02-17 PROCEDURE — 99214 OFFICE O/P EST MOD 30 MIN: CPT | Performed by: NURSE PRACTITIONER

## 2020-02-17 PROCEDURE — G8428 CUR MEDS NOT DOCUMENT: HCPCS | Performed by: NURSE PRACTITIONER

## 2020-02-17 PROCEDURE — G8417 CALC BMI ABV UP PARAM F/U: HCPCS | Performed by: NURSE PRACTITIONER

## 2020-02-17 PROCEDURE — G8399 PT W/DXA RESULTS DOCUMENT: HCPCS | Performed by: NURSE PRACTITIONER

## 2020-02-17 ASSESSMENT — ENCOUNTER SYMPTOMS: BACK PAIN: 1

## 2020-02-17 NOTE — PROGRESS NOTES
[address] [facility]  [address]  Dept: [phone]  Dept Fax: [phone]: [phone]    Visit Date: 2/17/2020    Functionality Assessment/Goals Worksheet     On a scale of 0 (Does not Interfere) to 10 (Completely Interferes)     1. Which number describes how during the past week pain has interfered with       the following:  A. General Activity:  4  B. Mood: 3  C. Walking Ability:  4  D. Normal Work (Includes both work outside the home and housework):  0  E. Relations with Other People:   0  F. Sleep:   1  G. Enjoyment of Life:   4    2. Patient Prefers to Take their Pain Medications:     []  On a regular basis   []  Only when necessary    [x]  Does not take pain medications    3. What are the Patient's Goals/Expectations for Visiting Pain Management? [x]  Learn about my pain    []  Receive Medication   []  Physical Therapy     []  Treat Depression   []  Receive Injections    []  Treat Sleep   []  Deal with Anxiety and Stress   []  Treat Opoid Dependence/Addiction   []  Other:      HPI:   Rob Chairez is a 70 y.o. female is here today for    Chief Complaint: Low back pain and Right leg pain    HPI     Patient states that she has decided to just live with it at this point. Reviewed with patient EMG below. Patient states she understands that some of this is permanent. Discussed TFESI right L5 and S1 if patient feels it is needed. Patient interference scores right now are tolerable and would like to hold off on anything at this time. \"Radiofrequency ablation of median branches at the levels of T12-L1,L1-2 and L2-3 right under fluoroscopic guidance with Dr Mortimer Fitting on  . Patient states she is getting 90% relief with these, states occasional pain with exertion, but overall doing very well. \"   Patient states she is starting to feel some pain back with these.      Medications 5.98\" (1.676 m)       Physical Exam  Vitals signs reviewed. Constitutional:       General: She is not in acute distress. Appearance: She is well-developed. She is not diaphoretic. HENT:      Head: Normocephalic and atraumatic. Not macrocephalic and not microcephalic. Right Ear: External ear normal.      Left Ear: External ear normal.   Eyes:      General:         Right eye: No discharge. Left eye: No discharge. Conjunctiva/sclera: Conjunctivae normal.   Neck:      Trachea: No tracheal deviation. Pulmonary:      Effort: Pulmonary effort is normal. No respiratory distress. Musculoskeletal:         General: Tenderness present. Lumbar back: She exhibits decreased range of motion, tenderness and pain. Arms:         Legs:    Skin:     General: Skin is warm and dry. Coloration: Skin is not pale. Findings: No rash. Neurological:      Mental Status: She is alert and oriented to person, place, and time. Cranial Nerves: No cranial nerve deficit. Psychiatric:         Attention and Perception: She is attentive. Speech: Speech normal.         Behavior: Behavior normal.         Thought Content: Thought content normal.         Judgment: Judgment normal.            Assessment:     1. Spondylosis of lumbar region without myelopathy or radiculopathy    2. Lumbar pain    3. Chronic pain syndrome    4. Post laminectomy syndrome    5. Lumbar radiculopathy    6. SI (sacroiliac) joint inflammation ([facility] [address]            Plan:      · OARRS reviewed. Current MED: 0  · Patient was not offered naloxone for home. · Discussed long term side effects of medications, tolerance, dependency and addiction. · Previous UDS reviewed  · UDS preformed today for compliance. · Patient told can not receive any pain medications from any other source. · No evidence of abuse, diversion or aberrant behavior.    Medications and/or procedures to improve function and quality of life- patient

## 2020-03-05 RX ORDER — PANTOPRAZOLE SODIUM 40 MG/1
TABLET, DELAYED RELEASE ORAL
Qty: 180 TABLET | Refills: 4 | Status: ON HOLD | OUTPATIENT
Start: 2020-03-05 | End: 2021-02-09 | Stop reason: ALTCHOICE

## 2020-03-05 RX ORDER — HYDRALAZINE HYDROCHLORIDE 50 MG/1
TABLET, FILM COATED ORAL
Qty: 180 TABLET | Refills: 0 | Status: SHIPPED | OUTPATIENT
Start: 2020-03-05 | End: 2020-09-24 | Stop reason: SDUPTHER

## 2020-03-05 RX ORDER — ATORVASTATIN CALCIUM 40 MG/1
TABLET, FILM COATED ORAL
Qty: 90 TABLET | Refills: 4 | Status: SHIPPED | OUTPATIENT
Start: 2020-03-05 | End: 2021-12-06 | Stop reason: SDUPTHER

## 2020-03-05 RX ORDER — SERTRALINE HYDROCHLORIDE 100 MG/1
TABLET, FILM COATED ORAL
Qty: 180 TABLET | Refills: 4 | Status: SHIPPED | OUTPATIENT
Start: 2020-03-05 | End: 2021-05-10

## 2020-03-05 RX ORDER — QUINAPRIL 10 MG/1
TABLET ORAL
Qty: 90 TABLET | Refills: 4 | Status: ON HOLD | OUTPATIENT
Start: 2020-03-05 | End: 2021-02-04 | Stop reason: SDUPTHER

## 2020-07-14 ENCOUNTER — TELEPHONE (OUTPATIENT)
Dept: FAMILY MEDICINE CLINIC | Age: 80
End: 2020-07-14

## 2020-07-14 ENCOUNTER — VIRTUAL VISIT (OUTPATIENT)
Dept: FAMILY MEDICINE CLINIC | Age: 80
End: 2020-07-14
Payer: MEDICARE

## 2020-07-14 VITALS
RESPIRATION RATE: 16 BRPM | DIASTOLIC BLOOD PRESSURE: 84 MMHG | SYSTOLIC BLOOD PRESSURE: 118 MMHG | WEIGHT: 260 LBS | BODY MASS INDEX: 41.99 KG/M2

## 2020-07-14 PROCEDURE — 1123F ACP DISCUSS/DSCN MKR DOCD: CPT | Performed by: FAMILY MEDICINE

## 2020-07-14 PROCEDURE — G0438 PPPS, INITIAL VISIT: HCPCS | Performed by: FAMILY MEDICINE

## 2020-07-14 PROCEDURE — 4040F PNEUMOC VAC/ADMIN/RCVD: CPT | Performed by: FAMILY MEDICINE

## 2020-07-14 RX ORDER — AMLODIPINE BESYLATE 10 MG/1
TABLET ORAL
Qty: 90 TABLET | Refills: 3 | Status: SHIPPED | OUTPATIENT
Start: 2020-07-14 | End: 2020-11-11 | Stop reason: SDUPTHER

## 2020-07-14 ASSESSMENT — PATIENT HEALTH QUESTIONNAIRE - PHQ9
SUM OF ALL RESPONSES TO PHQ QUESTIONS 1-9: 0
SUM OF ALL RESPONSES TO PHQ QUESTIONS 1-9: 0

## 2020-07-14 ASSESSMENT — LIFESTYLE VARIABLES: HOW OFTEN DO YOU HAVE A DRINK CONTAINING ALCOHOL: 0

## 2020-07-14 NOTE — TELEPHONE ENCOUNTER
Spoke with pt and verified she received the questionnaire for her upcoming Medicare annual wellness. Walked pt through the questionnaire. Pt verbalized understanding.

## 2020-07-14 NOTE — PROGRESS NOTES
Medicare Annual Wellness Visit  Name: Llyod Dodd Date: 2020   MRN: [id number] Sex: Female   Age: 68 y.o. Ethnicity: Non-/Non    : 1940 Race: Dana Lesch is here for Medicare AWV    Screenings for behavioral, psychosocial and functional/safety risks, and cognitive dysfunction are all negative except as indicated below. These results, as well as other patient data from the [address] form, are documented in Flowsheets linked to this Encounter. Allergies   Allergen Reactions    Norco [Hydrocodone-Acetaminophen] Itching    Tramadol Itching    Codeine Hives and Rash         Prior to Visit Medications    Medication Sig Taking? Authorizing Provider   amLODIPine (NORVASC) 10 MG tablet TAKE 1 TABLET DAILY Yes Naveed D Homero, DO   metFORMIN (GLUCOPHAGE) 500 MG tablet TAKE 1 TABLET TWICE A DAY WITH MEALS  Naveed D Homero, DO   atorvastatin (LIPITOR) 40 MG tablet TAKE 1 TABLET DAILY  Naveed D Homero, DO   sertraline (ZOLOFT) 100 MG tablet TAKE 2 TABLETS DAILY  Naveed D Homero, DO   quinapril (ACCUPRIL) 10 MG tablet TAKE 1 TABLET NIGHTLY  Naveed D Homero, DO   pantoprazole (PROTONIX) 40 MG tablet TAKE 1 TABLET TWICE A DAY  Naveed D Homero, DO   hydrALAZINE (APRESOLINE) 50 MG tablet TAKE 1 TABLET TWICE A DAY  Harrison Omalley MD   nystatin (MYCOSTATIN) 202951 UNIT/GM cream Apply topically 2 times daily. Maya Devine, DO   aspirin 81 MG tablet Take 81 mg by mouth daily  Historical Provider, MD   lidocaine (LMX) 4 % cream Apply topically 4x daily as needed.   Maya Devine,    promethazine (PHENERGAN) 25 MG tablet Take 1 tablet by mouth every 6 hours as needed for Nausea  Ilana Aranda PA-C   ondansetron (ZOFRAN ODT) 4 MG disintegrating tablet Take 1 tablet by mouth every 8 hours as needed for Nausea or Vomiting  Ilana Aranda PA-C   gabapentin (NEURONTIN) 300 MG capsule Take 2 capsules by mouth 3 times daily  Patient taking differently: Take 600 mg by mouth 3 times daily as needed. Emerson Aranda DO   ibuprofen (ADVIL;MOTRIN) 600 MG tablet Take 1 tablet by mouth 3 times daily as needed for Pain (Take with food 3 times daily for pain)  Adama Jeong MD   atenolol (TENORMIN) 25 MG tablet TAKE 1 TABLET DAILY  Katey Valentin DO   vitamin D (CHOLECALCIFEROL) 1000 UNIT TABS tablet Take 1,000 Units by mouth nightly.   Historical Provider, MD         Past Medical History:   Diagnosis Date    Arthritis     Chronic low back pain     DM type 2, goal A1c below 7     GERD (gastroesophageal reflux disease)     Hyperlipidemia     Hypertension     Loss of vision     Mitral valve disorder     Osteoporosis     PAF (paroxysmal atrial fibrillation) (HCC)     Subarachnoid hemorrhage ([facility] [address] 4/25/2019    TIA (transient ischemic attack)     Type 2 diabetes mellitus with diabetic neuropathy, without long-term current use of insulin ([facility] [address] 7/26/2017   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    Urinary incontinence        Past Surgical History:   Procedure Laterality Date    APPENDECTOMY      CARPAL TUNNEL RELEASE Left 2011    CATARACT REMOVAL Bilateral     CERVICAL FUSION  1976    CHOLECYSTECTOMY      COLON SURGERY  2012    COLONOSCOPY  2015    EYE SURGERY      CATARACTS    FINGER SURGERY Right 10/2016    3rd digit    HERNIA REPAIR      HYSTERECTOMY      NERVE SURGERY Right 10/25/2019    Lumbar Facet MBB @ D05-C5,M8-4 L2-3 right side only #1 performed by Elliot Velez MD at [address] [facility] Right 12/16/2019    Lumbar Facet MBB @ V86-N4-R3-3, L2-3 RIGHT SIDE ONLY performed by Elliot Velez MD at [place] [address] Right 1/6/2020    Lumbar RFA T12-L1, L1-2, L2-3 Right performed by Elliot Velez MD at [address] [facility] [address] [place] LAMINECTOMY,>[facility]      6 FUSIONS   Dylon Wilson Right 11/28/2014    SHOULDER SURGERY  2014    SPINE SURGERY  TENDON RELEASE Right 05/18/2017    at [address] Bilateral     UPPER GASTROINTESTINAL ENDOSCOPY  2013,2015         Family History   Problem Relation Age of Onset    Arthritis Mother     Cancer Mother     Heart Disease Father     High Blood Pressure Father        CareTeam (Including outside providers/suppliers regularly involved in providing care):   Patient Care Team:  Shona Laguerre DO as PCP - General (Hospitalist)  Shona Laguerre DO as PCP - [facility] Empaneled Provider    Wt Readings from Last 3 Encounters:   07/14/20 260 lb (117.9 kg)   02/17/20 260 lb 2.3 oz (118 kg)   01/31/20 260 lb 2.3 oz (118 kg)     Vitals:    07/14/20 1403   BP: 118/84   Resp: 16   Weight: 260 lb (117.9 kg)     Body mass index is 41.99 kg/m². Based upon direct observation of the patient, evaluation of cognition reveals recent and remote memory intact. Patient's complete Health Risk Assessment and screening values have been reviewed and are found in Flowsheets. The following problems were reviewed today and where indicated follow up appointments were made and/or referrals ordered. Positive Risk Factor Screenings with Interventions:     Cognitive: Words recalled: 3 Words Recalled  Clock Drawing Test (CDT) Score: Normal  Total Score Interpretation: Positive Mini-Cog  Did the patient refuse to take the cognition test?: No  Cognitive Impairment Interventions:  · Patient declines any further evaluation/treatment for cognitive impairment    General Health:  General  In general, how would you say your health is?: Fair  In the past 7 days, have you experienced any of the following?  New or Increased Pain, New or Increased Fatigue, Loneliness, Social Isolation, Stress or Anger?: (!) New or Increased Pain  Do you get the social and emotional support that you need?: Yes  Do you have a Living Will?: Yes  General Health Risk Interventions:  · Pain issues: relates to her back pain which is a chronic issue, follow up with ortho    Health Habits/Nutrition:  Health Habits/Nutrition  Do you exercise for at least 20 minutes 2-3 times per week?: (!) No  Have you lost any weight without trying in the past 3 months?: No  Do you eat fewer than 2 meals per day?: No  Have you seen a dentist within the past year?: Yes  Body mass index is 41.99 kg/m². Health Habits/Nutrition Interventions:  · Inadequate physical activity:  limited due to her pain    Hearing/Vision:  No exam data present  Hearing/Vision  Do you or your family notice any trouble with your hearing?: No  Do you have difficulty driving, watching TV, or doing any of your daily activities because of your eyesight?: No  Have you had an eye exam within the past year?: (!) No  Hearing/Vision Interventions:  · Vision concerns:  patient encouraged to make appointment with his/her eye specialist    ADL:  ADLs  In the past 7 days, did you need help from others to perform any of the following everyday activities? Eating, dressing, grooming, bathing, toileting, or walking/balance?: (!) Walking/Balance  In the past 7 days, did you need help from others to take care of any of the following?  Laundry, housekeeping, banking/finances, shopping, telephone use, food preparation, transportation, or taking medications?: (!) Transportation, Housekeeping, Laundry(Related to pain)  ADL Interventions:  · related to pain, declines further treatment/help    Personalized Preventive Plan   Current Health Maintenance Status  Immunization History   Administered Date(s) Administered    Influenza Vaccine, unspecified formulation 10/01/2016    Influenza Virus Vaccine 09/09/2013, 09/30/2014    Pneumococcal Conjugate 13-valent (Walker Pryor) 12/14/2015        Health Maintenance   Topic Date Due    DTaP/Tdap/Td vaccine (1 - Tdap) 02[birth year]/1961    Shingles Vaccine (1 of 2) 02/18/1992    Pneumococcal 65+ years Vaccine (2 of 2 - PPSV23) 12/14/2016    Lipid screen  05/14/2017    Annual Wellness Visit (AWV) 05/29/2019    Potassium monitoring  04/29/2020    Flu vaccine (1) 09/01/2020    Creatinine monitoring  12/19/2020    DEXA (modify frequency per FRAX score)  Addressed    Hepatitis A vaccine  Aged Out    Hib vaccine  Aged Out    Meningococcal (ACWY) vaccine  Aged Out     Recommendations for [employer] Due: see orders and patient instructions/AVS.  . Recommended screening schedule for the next 5-10 years is provided to the patient in written form: see Patient Instructions/AVS.    Alla Fiztgerald was seen today for medicare aw. Diagnoses and all orders for this visit:    Routine general medical examination at a health care facility    HTN, goal below 140/90  -     Lipid Panel; Future  -     amLODIPine (NORVASC) 10 MG tablet; TAKE 1 TABLET DAILY  -     Comprehensive Metabolic Panel; Future    Type 2 diabetes mellitus with diabetic neuropathy, without long-term current use of insulin (HCC)  -     Lipid Panel; Future  -     Comprehensive Metabolic Panel; Future              Saravanan Barbosa is a 68 y.o. female being evaluated by a Virtual Visit (video and audio) encounter to address concerns as mentioned above. A caregiver was present when appropriate. Due to this being a TeleHealth encounter (During PWJRU-37 public health emergency), evaluation of the following organ systems was limited: Vitals/Constitutional/EENT/Resp/CV/GI//MS/Neuro/Skin/Heme-Lymph-Imm. Pursuant to the emergency declaration under the [facility], [address] [place] [address] authority and the [employer] Act, this Virtual Visit was conducted with patient's (and/or legal guardian's) consent, to reduce the patient's risk of exposure to COVID-19 and provide necessary medical care.   The patient (and/or legal guardian) has also been advised to contact this office for worsening conditions or problems, and seek emergency medical treatment and/or call 911 if deemed necessary. Patient identification was verified at the start of the visit: Yes    Services were provided through a video synchronous discussion virtually to substitute for in-person clinic visit. Patient and provider were located at their individual homes. --Ama Garzon DO on 7/14/2020 at 2:29 PM    An electronic signature was used to authenticate this note.

## 2020-09-01 ENCOUNTER — HOSPITAL ENCOUNTER (OUTPATIENT)
Age: 80
Discharge: HOME | DRG: 177 | End: 2020-09-01
Payer: MEDICARE

## 2020-09-01 ENCOUNTER — TELEPHONE (OUTPATIENT)
Dept: FAMILY MEDICINE CLINIC | Age: 80
End: 2020-09-01

## 2020-09-01 DIAGNOSIS — R53.83 OTHER FATIGUE: ICD-10-CM

## 2020-09-01 PROCEDURE — 99211 OFF/OP EST MAY X REQ PHY/QHP: CPT

## 2020-09-01 PROCEDURE — U0003 INFECTIOUS AGENT DETECTION BY NUCLEIC ACID (DNA OR RNA); SEVERE ACUTE RESPIRATORY SYNDROME CORONAVIRUS 2 (SARS-COV-2) (CORONAVIRUS DISEASE [COVID-19]), AMPLIFIED PROBE TECHNIQUE, MAKING USE OF HIGH THROUGHPUT TECHNOLOGIES AS DESCRIBED BY CMS-2020-01-R: HCPCS

## 2020-09-01 NOTE — TELEPHONE ENCOUNTER
Pt was exposed 8/22/20 @ a wedding, the exposure  was the reception for hours.   SXS cough upset stomach,weakness since monday

## 2020-09-01 NOTE — TELEPHONE ENCOUNTER
If she is symptomatic, he needs to get tested ASAP. If she has any worsening sx, fever, SOB needs to seek care immediately.

## 2020-09-03 LAB — SARS-COV-2: DETECTED

## 2020-09-04 ENCOUNTER — APPOINTMENT (OUTPATIENT)
Dept: GENERAL RADIOLOGY | Age: 80
DRG: 177 | End: 2020-09-04
Payer: MEDICARE

## 2020-09-04 ENCOUNTER — TELEPHONE (OUTPATIENT)
Dept: FAMILY MEDICINE CLINIC | Age: 80
End: 2020-09-04

## 2020-09-04 ENCOUNTER — HOSPITAL ENCOUNTER (INPATIENT)
Age: 80
LOS: 1 days | Discharge: HOME | DRG: 177 | End: 2020-09-05
Attending: EMERGENCY MEDICINE | Admitting: FAMILY MEDICINE
Payer: MEDICARE

## 2020-09-04 PROBLEM — U07.1 COVID-19: Status: ACTIVE | Noted: 2020-09-04

## 2020-09-04 LAB
ALBUMIN SERPL-MCNC: 3.5 G/DL (ref 3.5–5.1)
ALP BLD-CCNC: 50 U/L (ref 38–126)
ALT SERPL-CCNC: 22 U/L (ref 11–66)
ANION GAP SERPL CALCULATED.3IONS-SCNC: 12 MEQ/L (ref 8–16)
AST SERPL-CCNC: 32 U/L (ref 5–40)
BASOPHILS # BLD: 0.2 %
BASOPHILS ABSOLUTE: 0 THOU/MM3 (ref 0–0.1)
BILIRUB SERPL-MCNC: 0.5 MG/DL (ref 0.3–1.2)
BILIRUBIN DIRECT: < 0.2 MG/DL (ref 0–0.3)
BUN BLDV-MCNC: 15 MG/DL (ref 7–22)
C-REACTIVE PROTEIN: 3.58 MG/DL (ref 0–1)
CALCIUM SERPL-MCNC: 8.4 MG/DL (ref 8.5–10.5)
CHLORIDE BLD-SCNC: 102 MEQ/L (ref 98–111)
CO2: 21 MEQ/L (ref 23–33)
CREAT SERPL-MCNC: 0.7 MG/DL (ref 0.4–1.2)
D-DIMER QUANTITATIVE: 390 NG/ML FEU (ref 0–500)
EKG ATRIAL RATE: 109 BPM
EKG P AXIS: 40 DEGREES
EKG P-R INTERVAL: 142 MS
EKG Q-T INTERVAL: 340 MS
EKG QRS DURATION: 90 MS
EKG QTC CALCULATION (BAZETT): 457 MS
EKG R AXIS: 0 DEGREES
EKG T AXIS: 28 DEGREES
EKG VENTRICULAR RATE: 109 BPM
EOSINOPHIL # BLD: 0.2 %
EOSINOPHILS ABSOLUTE: 0 THOU/MM3 (ref 0–0.4)
ERYTHROCYTE [DISTWIDTH] IN BLOOD BY AUTOMATED COUNT: 13.7 % (ref 11.5–14.5)
ERYTHROCYTE [DISTWIDTH] IN BLOOD BY AUTOMATED COUNT: 44.8 FL (ref 35–45)
GFR SERPL CREATININE-BSD FRML MDRD: 81 ML/MIN/1.73M2
GLUCOSE BLD-MCNC: 135 MG/DL (ref 70–108)
HCT VFR BLD CALC: 41.3 % (ref 37–47)
HEMOGLOBIN: 13.7 GM/DL (ref 12–16)
IMMATURE GRANS (ABS): 0.01 THOU/MM3 (ref 0–0.07)
IMMATURE GRANULOCYTES: 0.2 %
LACTIC ACID: 0.8 MMOL/L (ref 0.5–2.2)
LD: 295 U/L (ref 100–190)
LYMPHOCYTES # BLD: 14.8 %
LYMPHOCYTES ABSOLUTE: 0.6 THOU/MM3 (ref 1–4.8)
MCH RBC QN AUTO: 29.6 PG (ref 26–33)
MCHC RBC AUTO-ENTMCNC: 33.2 GM/DL (ref 32.2–35.5)
MCV RBC AUTO: 89.2 FL (ref 81–99)
MONOCYTES # BLD: 7.4 %
MONOCYTES ABSOLUTE: 0.3 THOU/MM3 (ref 0.4–1.3)
NUCLEATED RED BLOOD CELLS: 0 /100 WBC
OSMOLALITY CALCULATION: 273 MOSMOL/KG (ref 275–300)
PLATELET # BLD: 135 THOU/MM3 (ref 130–400)
PMV BLD AUTO: 11.7 FL (ref 9.4–12.4)
POTASSIUM REFLEX MAGNESIUM: 3.6 MEQ/L (ref 3.5–5.2)
PRO-BNP: 1879 PG/ML (ref 0–1800)
PROCALCITONIN: 0.07 NG/ML (ref 0.01–0.09)
PROCALCITONIN: 0.08 NG/ML (ref 0.01–0.09)
RBC # BLD: 4.63 MILL/MM3 (ref 4.2–5.4)
SEG NEUTROPHILS: 77.2 %
SEGMENTED NEUTROPHILS ABSOLUTE COUNT: 3.2 THOU/MM3 (ref 1.8–7.7)
SODIUM BLD-SCNC: 135 MEQ/L (ref 135–145)
TOTAL CK: 49 U/L (ref 30–135)
TOTAL PROTEIN: 5.9 G/DL (ref 6.1–8)
TROPONIN T: < 0.01 NG/ML
WBC # BLD: 4.2 THOU/MM3 (ref 4.8–10.8)

## 2020-09-04 PROCEDURE — 86140 C-REACTIVE PROTEIN: CPT

## 2020-09-04 PROCEDURE — 99222 1ST HOSP IP/OBS MODERATE 55: CPT | Performed by: PHYSICIAN ASSISTANT

## 2020-09-04 PROCEDURE — 83880 ASSAY OF NATRIURETIC PEPTIDE: CPT

## 2020-09-04 PROCEDURE — 6370000000 HC RX 637 (ALT 250 FOR IP): Performed by: PHYSICIAN ASSISTANT

## 2020-09-04 PROCEDURE — 85379 FIBRIN DEGRADATION QUANT: CPT

## 2020-09-04 PROCEDURE — 36415 COLL VENOUS BLD VENIPUNCTURE: CPT

## 2020-09-04 PROCEDURE — 96374 THER/PROPH/DIAG INJ IV PUSH: CPT

## 2020-09-04 PROCEDURE — 80076 HEPATIC FUNCTION PANEL: CPT

## 2020-09-04 PROCEDURE — 83615 LACTATE (LD) (LDH) ENZYME: CPT

## 2020-09-04 PROCEDURE — 96375 TX/PRO/DX INJ NEW DRUG ADDON: CPT

## 2020-09-04 PROCEDURE — 2580000003 HC RX 258: Performed by: PHYSICIAN ASSISTANT

## 2020-09-04 PROCEDURE — 1200000003 HC TELEMETRY R&B

## 2020-09-04 PROCEDURE — 82550 ASSAY OF CK (CPK): CPT

## 2020-09-04 PROCEDURE — 71045 X-RAY EXAM CHEST 1 VIEW: CPT

## 2020-09-04 PROCEDURE — 2500000003 HC RX 250 WO HCPCS: Performed by: PHYSICIAN ASSISTANT

## 2020-09-04 PROCEDURE — 6370000000 HC RX 637 (ALT 250 FOR IP): Performed by: EMERGENCY MEDICINE

## 2020-09-04 PROCEDURE — 84145 PROCALCITONIN (PCT): CPT

## 2020-09-04 PROCEDURE — 99285 EMERGENCY DEPT VISIT HI MDM: CPT

## 2020-09-04 PROCEDURE — 93005 ELECTROCARDIOGRAM TRACING: CPT | Performed by: STUDENT IN AN ORGANIZED HEALTH CARE EDUCATION/TRAINING PROGRAM

## 2020-09-04 PROCEDURE — 6360000002 HC RX W HCPCS: Performed by: PHYSICIAN ASSISTANT

## 2020-09-04 PROCEDURE — 85025 COMPLETE CBC W/AUTO DIFF WBC: CPT

## 2020-09-04 PROCEDURE — 83605 ASSAY OF LACTIC ACID: CPT

## 2020-09-04 PROCEDURE — 6360000002 HC RX W HCPCS: Performed by: EMERGENCY MEDICINE

## 2020-09-04 PROCEDURE — 84484 ASSAY OF TROPONIN QUANT: CPT

## 2020-09-04 PROCEDURE — 80048 BASIC METABOLIC PNL TOTAL CA: CPT

## 2020-09-04 RX ORDER — ATENOLOL 25 MG/1
25 TABLET ORAL DAILY
Status: DISCONTINUED | OUTPATIENT
Start: 2020-09-04 | End: 2020-09-05 | Stop reason: HOSPADM

## 2020-09-04 RX ORDER — ATORVASTATIN CALCIUM 40 MG/1
40 TABLET, FILM COATED ORAL DAILY
Status: DISCONTINUED | OUTPATIENT
Start: 2020-09-05 | End: 2020-09-05 | Stop reason: HOSPADM

## 2020-09-04 RX ORDER — SODIUM CHLORIDE 0.9 % (FLUSH) 0.9 %
10 SYRINGE (ML) INJECTION EVERY 12 HOURS SCHEDULED
Status: DISCONTINUED | OUTPATIENT
Start: 2020-09-04 | End: 2020-09-05 | Stop reason: HOSPADM

## 2020-09-04 RX ORDER — ZINC SULFATE 50(220)MG
50 CAPSULE ORAL DAILY
Status: DISCONTINUED | OUTPATIENT
Start: 2020-09-04 | End: 2020-09-05 | Stop reason: HOSPADM

## 2020-09-04 RX ORDER — PANTOPRAZOLE SODIUM 40 MG/1
40 TABLET, DELAYED RELEASE ORAL
Status: DISCONTINUED | OUTPATIENT
Start: 2020-09-05 | End: 2020-09-05 | Stop reason: HOSPADM

## 2020-09-04 RX ORDER — HYDRALAZINE HYDROCHLORIDE 50 MG/1
50 TABLET, FILM COATED ORAL 2 TIMES DAILY
Status: DISCONTINUED | OUTPATIENT
Start: 2020-09-04 | End: 2020-09-05 | Stop reason: HOSPADM

## 2020-09-04 RX ORDER — ACETAMINOPHEN 325 MG/1
650 TABLET ORAL EVERY 6 HOURS PRN
Status: DISCONTINUED | OUTPATIENT
Start: 2020-09-04 | End: 2020-09-05 | Stop reason: HOSPADM

## 2020-09-04 RX ORDER — ONDANSETRON 2 MG/ML
4 INJECTION INTRAMUSCULAR; INTRAVENOUS ONCE
Status: COMPLETED | OUTPATIENT
Start: 2020-09-04 | End: 2020-09-04

## 2020-09-04 RX ORDER — PROMETHAZINE HYDROCHLORIDE 25 MG/1
12.5 TABLET ORAL EVERY 6 HOURS PRN
Status: DISCONTINUED | OUTPATIENT
Start: 2020-09-04 | End: 2020-09-05 | Stop reason: HOSPADM

## 2020-09-04 RX ORDER — LISINOPRIL 5 MG/1
5 TABLET ORAL DAILY
Status: DISCONTINUED | OUTPATIENT
Start: 2020-09-05 | End: 2020-09-05 | Stop reason: HOSPADM

## 2020-09-04 RX ORDER — AMLODIPINE BESYLATE 10 MG/1
10 TABLET ORAL DAILY
Status: DISCONTINUED | OUTPATIENT
Start: 2020-09-05 | End: 2020-09-05 | Stop reason: HOSPADM

## 2020-09-04 RX ORDER — ASCORBIC ACID 500 MG
1000 TABLET ORAL DAILY
Status: DISCONTINUED | OUTPATIENT
Start: 2020-09-04 | End: 2020-09-05 | Stop reason: HOSPADM

## 2020-09-04 RX ORDER — DEXAMETHASONE SODIUM PHOSPHATE 4 MG/ML
10 INJECTION, SOLUTION INTRA-ARTICULAR; INTRALESIONAL; INTRAMUSCULAR; INTRAVENOUS; SOFT TISSUE ONCE
Status: COMPLETED | OUTPATIENT
Start: 2020-09-04 | End: 2020-09-04

## 2020-09-04 RX ORDER — LIDOCAINE 40 MG/G
CREAM TOPICAL PRN
Status: DISCONTINUED | OUTPATIENT
Start: 2020-09-04 | End: 2020-09-05 | Stop reason: HOSPADM

## 2020-09-04 RX ORDER — GABAPENTIN 300 MG/1
600 CAPSULE ORAL 3 TIMES DAILY PRN
Status: DISCONTINUED | OUTPATIENT
Start: 2020-09-04 | End: 2020-09-04

## 2020-09-04 RX ORDER — ONDANSETRON 4 MG/1
4 TABLET, ORALLY DISINTEGRATING ORAL EVERY 8 HOURS PRN
Qty: 20 TABLET | Refills: 0 | Status: SHIPPED | OUTPATIENT
Start: 2020-09-04 | End: 2021-07-15 | Stop reason: SDUPTHER

## 2020-09-04 RX ORDER — VITAMIN B COMPLEX
1000 TABLET ORAL DAILY
Status: DISCONTINUED | OUTPATIENT
Start: 2020-09-04 | End: 2020-09-05 | Stop reason: HOSPADM

## 2020-09-04 RX ORDER — SODIUM CHLORIDE 0.9 % (FLUSH) 0.9 %
10 SYRINGE (ML) INJECTION PRN
Status: DISCONTINUED | OUTPATIENT
Start: 2020-09-04 | End: 2020-09-05 | Stop reason: HOSPADM

## 2020-09-04 RX ORDER — SERTRALINE HYDROCHLORIDE 100 MG/1
100 TABLET, FILM COATED ORAL DAILY
Status: DISCONTINUED | OUTPATIENT
Start: 2020-09-05 | End: 2020-09-05 | Stop reason: HOSPADM

## 2020-09-04 RX ORDER — ASPIRIN 81 MG/1
81 TABLET ORAL DAILY
Status: DISCONTINUED | OUTPATIENT
Start: 2020-09-05 | End: 2020-09-05 | Stop reason: HOSPADM

## 2020-09-04 RX ORDER — ACETAMINOPHEN 325 MG/1
650 TABLET ORAL ONCE
Status: COMPLETED | OUTPATIENT
Start: 2020-09-04 | End: 2020-09-04

## 2020-09-04 RX ORDER — ONDANSETRON 2 MG/ML
4 INJECTION INTRAMUSCULAR; INTRAVENOUS EVERY 6 HOURS PRN
Status: DISCONTINUED | OUTPATIENT
Start: 2020-09-04 | End: 2020-09-05 | Stop reason: HOSPADM

## 2020-09-04 RX ORDER — ACETAMINOPHEN 650 MG/1
650 SUPPOSITORY RECTAL EVERY 6 HOURS PRN
Status: DISCONTINUED | OUTPATIENT
Start: 2020-09-04 | End: 2020-09-05 | Stop reason: HOSPADM

## 2020-09-04 RX ORDER — 0.9 % SODIUM CHLORIDE 0.9 %
30 INTRAVENOUS SOLUTION INTRAVENOUS PRN
Status: DISCONTINUED | OUTPATIENT
Start: 2020-09-04 | End: 2020-09-05 | Stop reason: HOSPADM

## 2020-09-04 RX ORDER — DEXAMETHASONE SODIUM PHOSPHATE 4 MG/ML
6 INJECTION, SOLUTION INTRA-ARTICULAR; INTRALESIONAL; INTRAMUSCULAR; INTRAVENOUS; SOFT TISSUE DAILY
Status: DISCONTINUED | OUTPATIENT
Start: 2020-09-04 | End: 2020-09-05 | Stop reason: HOSPADM

## 2020-09-04 RX ADMIN — REMDESIVIR 200 MG: 100 INJECTION, POWDER, LYOPHILIZED, FOR SOLUTION INTRAVENOUS at 22:16

## 2020-09-04 RX ADMIN — SODIUM CHLORIDE, PRESERVATIVE FREE 10 ML: 5 INJECTION INTRAVENOUS at 22:22

## 2020-09-04 RX ADMIN — LIDOCAINE HYDROCHLORIDE: 20 SOLUTION ORAL; TOPICAL at 22:16

## 2020-09-04 RX ADMIN — ACETAMINOPHEN 650 MG: 325 TABLET ORAL at 19:04

## 2020-09-04 RX ADMIN — ONDANSETRON 4 MG: 2 INJECTION INTRAMUSCULAR; INTRAVENOUS at 19:04

## 2020-09-04 RX ADMIN — DEXAMETHASONE SODIUM PHOSPHATE 10 MG: 4 INJECTION, SOLUTION INTRAMUSCULAR; INTRAVENOUS at 19:04

## 2020-09-04 RX ADMIN — DEXAMETHASONE SODIUM PHOSPHATE 6 MG: 4 INJECTION, SOLUTION INTRAMUSCULAR; INTRAVENOUS at 22:16

## 2020-09-04 ASSESSMENT — PAIN SCALES - GENERAL
PAINLEVEL_OUTOF10: 0
PAINLEVEL_OUTOF10: 0

## 2020-09-04 ASSESSMENT — ENCOUNTER SYMPTOMS
VOMITING: 0
BACK PAIN: 0
COUGH: 1
WHEEZING: 0
ABDOMINAL PAIN: 0
CHEST TIGHTNESS: 0
SHORTNESS OF BREATH: 1
RHINORRHEA: 0
NAUSEA: 0

## 2020-09-04 NOTE — TELEPHONE ENCOUNTER
Spoke with pt on the phone today regarding her and Vinny Guan (husbands) COVID test restuls. Tests are positive. Vinny Guan is feeling fine. However, Nghia Miguel is weak, fatigued, fell overnight. She is having nausea and vomiting, not able to keep any fluids down. Doesn't feel she is SOB, but sounds winded trying to hold a conversation. Due to the severity of her symptoms and her fall overnight, I recommend she go to the ER for further evaluation. She may need IVF, O2. She said she will consider it.     Electronically signed by SEAN Guan CNP on 9/4/2020 at 12:19 PM

## 2020-09-04 NOTE — H&P
[facility] Hospitalist History & Physical   9/4/20 21:09  Assessment and Plan:        1. Sepsis / Acute COVID infection   a.  4/4 SIRS (reported fevers, leukopenia WBC 4.2, tachypneic RR 26, tachycardic ) nontoxic, BP stable  b. ddimer 390, troponin neg, , procal neg.   c. Pend CK, CRP, ferritin. Pharmacy to dose remdesivir (daily CMP), dexamethasone daily, Lovenox DVT prophylaxis, Vit D/C/Zync. 2. Acute Hypoxic Respiratory Failure 2/2 COVID-19:   a. Currently requiring 2L via NC with SpO2 of 94%   b. No wheezing auscultated. c. Wean as able for SpO2 >92%. 3. Epigastric Abdominal Pain: likely 2/2 vomiting   a. Benign abdominal  Exam.  b. PRN antiemetics, Resume PPI. Trial GI cocktail. 4. GERD:  Resume PPI  5. Moderate AS (SAM 1.1cm2, mean gradient 20mmHg, transaortic velocity 2.8m/s) per Echo 2019: noted. 5/6 crescendo-decrescendo murmur best heard at RSB 2nd intercostal. Follows with Dr. Soledad Foreman and is being worked up for TAVR. 6. NIDDM type II, controlled: hold home metformin. Check BG daily. Goal BG while hospitalized 140-180. 7. HLD: resume Statin   8. Essential Hypertension, uncontrolled: likely 2/2 to med noncompliance with nausea. resume amloidpine, BB, hydralazine, ACEi  9. Nonobstructive CAD (per Cath 2014): resume ASA. 10. Hx CVA (2014) / traumatic SAH (2019): no residual deficits. 11. Code Status: DNRCCA: confirmed on arrival.   12. Morbid Obesity: BMI 41.  on weight loss. CC:  SOB  HPI: Barbi Vasquez is a 68year old white female nonsmoker with PMH of NIDDM type 2, GERD, HLD, HTN,  Moderate AS, PAF, Hx SAH (2019), CVA (2014) presents to [facility] ED on 9/4/20 c/o SOB, nausea, vomiting, diarrhea, epigastric abdominal pain that is constant but worse with nausea/vomiting, HA, fatigue and weakness leading to a fall from standing yesterday, but did not hit head, have LOC, is not on blood thinners or have any pain from the fall. Exertion worsens her SOB.  Nothing improves her nausea or appetite and has not eaten for the past 3 days or taken any of her medications. She has been able to tolerate fluids. She has known sick contact with covid (her grandson) and believes they got it at her grandson's wedding. Intermittent low grade fevers. Denies CP, lightheadedness, confusion, orthopnea. In the ED, vitals show , /88, RR 26, 93% on 2L and afebrile with Tmax 99.2. Labs show proBNP 1875 (baselin 1879), neg troponin, WBC 4.2, hgb 13.7, plt 135, ddimer 390, . CXR shows few reticular markings in the lung bases. ROS: A 14 point ROS was performed and was negative other than the pertinent positives noted in the HPI  PMH:  Per HPI  SHX:  Never smoked, Denies EtOH use, drug use. FHX: Mother: Arthritis, [place]. Father: Heart disease, HTN.    Allergies: Norco (itching), tramadol (itching), codeine (hives, rash)  Medications:    Current Facility-Administered Medications   Medication Dose Route Frequency Provider Last Rate Last Dose    vitamin C (ASCORBIC ACID) tablet 1,000 mg  1,000 mg Oral Daily [place], [place]        Vitamin D (CHOLECALCIFEROL) tablet 1,000 Units  1,000 Units Oral Daily [place], [place]        zinc sulfate (ZINCATE) capsule 50 mg  50 mg Oral Daily [place], [place]        Dexamethasone Sodium Phosphate injection 6 mg  6 mg Intravenous Daily [place], [place]        sodium chloride flush 0.9 % injection 10 mL  10 mL Intravenous 2 times per day [place], [place]        sodium chloride flush 0.9 % injection 10 mL  10 mL Intravenous PRN [place], [place]        acetaminophen (TYLENOL) tablet 650 mg  650 mg Oral Q6H PRN [place], [place]        Or    acetaminophen (TYLENOL) suppository 650 mg  650 mg Rectal Q6H PRN [place]        promethazine (PHENERGAN) tablet 12.5 mg  12.5 mg Oral Q6H PRN [place]        Or    ondansetron (ZOFRAN) injection 4 mg  4 mg Intravenous Q6H PRN [place]        enoxaparin (LOVENOX) injection 40 mg  40 mg Subcutaneous Daily [place], [place]        amLODIPine (NORVASC) tablet 10 mg  10 mg Oral Daily [place], [place]        aspirin tablet 81 mg  81 mg Oral Daily [place], [place]        atenolol (TENORMIN) tablet 25 mg  25 mg Oral Daily [place], [place]        atorvastatin (LIPITOR) tablet 40 mg  1 tablet Oral Daily [place], [place]        gabapentin (NEURONTIN) capsule 600 mg  600 mg Oral TID PRN [place], [place]        hydrALAZINE (APRESOLINE) tablet 50 mg  50 mg Oral BID [place], [place]        lidocaine (LMX) 4 % cream   Topical PRN [place], [place]        [START ON 9/5/2020] pantoprazole (PROTONIX) tablet 40 mg  40 mg Oral BID [facility], [place]        lisinopril (PRINIVIL;ZESTRIL) tablet 5 mg  5 mg Oral Daily [place], [place]        sertraline (ZOLOFT) tablet 100 mg  100 mg Oral Daily [place], [place]         Current Outpatient Medications   Medication Sig Dispense Refill    ondansetron (ZOFRAN ODT) 4 MG disintegrating tablet Take 1 tablet by mouth every 8 hours as needed for Nausea or Vomiting 20 tablet 0    amLODIPine (NORVASC) 10 MG tablet TAKE 1 TABLET DAILY 90 tablet 3    metFORMIN (GLUCOPHAGE) 500 MG tablet TAKE 1 TABLET TWICE A DAY WITH MEALS 180 tablet 4    atorvastatin (LIPITOR) 40 MG tablet TAKE 1 TABLET DAILY 90 tablet 4    sertraline (ZOLOFT) 100 MG tablet TAKE 2 TABLETS DAILY 180 tablet 4    quinapril (ACCUPRIL) 10 MG tablet TAKE 1 TABLET NIGHTLY 90 tablet 4    pantoprazole (PROTONIX) 40 MG tablet TAKE 1 TABLET TWICE A  tablet 4    hydrALAZINE (APRESOLINE) 50 MG tablet TAKE 1 TABLET TWICE A  tablet 0    nystatin (MYCOSTATIN) 475450 UNIT/GM cream Apply topically 2 times daily. 1 Tube 1    aspirin 81 MG tablet Take 81 mg by mouth daily      lidocaine (LMX) 4 % cream Apply topically 4x daily as needed.  30 g 5    promethazine (PHENERGAN) 25 MG tablet Take 1 tablet by mouth every 6 hours as needed for Nausea 15 tablet 0    gabapentin (NEURONTIN) 300 MG capsule Take 2 capsules by mouth 3 times daily (Patient taking differently: Take 600 mg by mouth 3 times daily as needed. ) 540 capsule 3    ibuprofen (ADVIL;MOTRIN) 600 MG tablet Take 1 tablet by mouth 3 times daily as needed for Pain (Take with food 3 times daily for pain) 15 tablet 0    atenolol (TENORMIN) 25 MG tablet TAKE 1 TABLET DAILY 90 tablet 3    vitamin D (CHOLECALCIFEROL) 1000 UNIT TABS tablet Take 1,000 Units by mouth nightly. Vital Signs:   BP (!) 145/91   Pulse 97   Temp 99.2 °F (37.3 °C) (Oral)   Resp 19   Wt 260 lb (117.9 kg)   SpO2 94%   BMI 41.99 kg/m²    No intake or output data in the 24 hours ending 09/04/20 1910     General:  White female well groomed, well-nourished, well-developed who appears stated age, in no acute distress lying in bed. Head: Normocephalic and atraumatic. EENT: No exophthalmos noted. No scleral or conjunctiva icterus, injection or pallor noted. Dry mucus membranes  Neck: Supple. Trachea midline. No thyromegaly. Thorax/Lungs: Thorax is symmetrical with good expansion. Breath sounds with course rales to right lung base. Speaking in full sentences  No retractions or use of abdominal muscles. Cardiac: S1, S2, RRR with 5/6 crescendo-decrescendo murmur best heard at RSB 2nd intercostal. No rub or gallop. No JVD  Abdomen: Abdomen soft, mildly tender to palpation of epigastric region, without guarding or rigidity. Normoactive BS. Peripheral Vasculature: Extremities warm, dry without edema, no varicosities or stasis changes, DP  pulses 2+ b/l. Brisk capillary refill. Skin:  Skin warm and dry. No lesions, rash. Psych:  Alert and oriented x3. Affect appropriate  Neurologic: No focal deficits. No Seizures.      Data:   Labs:   Results for orders placed or performed during the hospital encounter of 36/01/32   Basic Metabolic Panel w/ Reflex to MG   Result Value Ref Range    Sodium 135 135 - 145 meq/L    Potassium reflex Magnesium 3.6 3.5 - 5.2 meq/L    Chloride 102 98 - 111 meq/L    CO2 21 (L) 23 - 33 meq/L    Glucose 135 (H) 70 - 108 mg/dL    BUN 15 7 - 22 mg/dL    CREATININE 0.7 0.4 - 1.2 mg/dL    Calcium 8.4 (L) 8.5 - 10.5 mg/dL   Brain Natriuretic Peptide   Result Value Ref Range    Pro-BNP 1879.0 (H) 0.0 - 1800.0 pg/mL   CBC Auto Differential   Result Value Ref Range    WBC 4.2 (L) 4.8 - 10.8 thou/mm3    RBC 4.63 4.20 - 5.40 mill/mm3    Hemoglobin 13.7 12.0 - 16.0 gm/dl    Hematocrit 41.3 37.0 - 47.0 %    MCV 89.2 81.0 - 99.0 fL    MCH 29.6 26.0 - 33.0 pg    MCHC 33.2 32.2 - 35.5 gm/dl    RDW-CV 13.7 11.5 - 14.5 %    RDW-SD 44.8 35.0 - 45.0 fL    Platelets 587 106 - 563 thou/mm3    MPV 11.7 9.4 - 12.4 fL    Seg Neutrophils 77.2 %    Lymphocytes 14.8 %    Monocytes 7.4 %    Eosinophils 0.2 %    Basophils 0.2 %    Immature Granulocytes 0.2 %    Segs Absolute 3.2 1.8 - 7.7 thou/mm3    Lymphocytes Absolute 0.6 (L) 1.0 - 4.8 thou/mm3    Monocytes Absolute 0.3 (L) 0.4 - 1.3 thou/mm3    Eosinophils Absolute 0.0 0.0 - 0.4 thou/mm3    Basophils Absolute 0.0 0.0 - 0.1 thou/mm3    Immature Grans (Abs) 0.01 0.00 - 0.07 thou/mm3    nRBC 0 /100 wbc   Troponin   Result Value Ref Range    Troponin T < 0.010 ng/ml   D-Dimer, Quantitative   Result Value Ref Range    D-Dimer, Quant 390.00 0.00 - 500.00 ng/ml FEU   Procalcitonin   Result Value Ref Range    Procalcitonin 0.07 0.01 - 0.09 ng/mL   Lactate Dehydrogenase   Result Value Ref Range     (H) 100 - 190 U/L   Anion Gap   Result Value Ref Range    Anion Gap 12.0 8.0 - 16.0 meq/L   Glomerular Filtration Rate, Estimated   Result Value Ref Range    Est, Glom Filt Rate 81 (A) ml/min/1.73m2   Osmolality   Result Value Ref Range    Osmolality Calc 273.0 (L) 275.0 - 300.0 mOsmol/kg   EKG 12 Lead   Result Value Ref Range    Ventricular Rate 109 BPM    Atrial Rate 109 BPM    P-R Interval 142 ms    QRS Duration 90 ms    Q-T Interval 340 ms    QTc Calculation (Bazett) 457 ms    P Axis 40 degrees R Axis 0 degrees    T Axis 28 degrees       EKG / Radiology:     EKG:  Reviewed by me: Sinus tachycardia,     CXR:   Reviewed by me: CXR shows no acute findings, stable bilateral lung base inc interstitial markings from prior study    Xr Chest Portable    Result Date: 9/4/2020  PROCEDURE: XR CHEST PORTABLE CLINICAL INFORMATION: sob. COMPARISON: April 25, 2019. TECHNIQUE: Portable FINDINGS: Few reticular markings in the lungs bases. Correlate for atelectasis versus minimal infiltrate. Costophrenic angles are preserved. Cardiomediastinal silhouette is within normal limits. No acute osseous findings. Surgical hardware in the lower cervical spine. Degenerative changes thoracic spine. Postsurgical changes right humerus. Few reticular markings in the lungs bases. Correlate for atelectasis versus minimal infiltrate. **This report has been created using voice recognition software. It may contain minor errors which are inherent in voice recognition technology. ** Final report electronically signed by Dr. Maite Nava on 9/4/2020 6:23 PM      Case and Plan discussed with Coleen Corbin MD  Electronically signed by Nicole Bhardwaj on 9/4/2020 at 7:10 PM

## 2020-09-04 NOTE — ED NOTES
ED to inpatient nurses report    Chief Complaint   Patient presents with    Shortness of Breath    Cough    Other     covid+      Present to ED from home  LOC: alert and orientated to name, place, date  Vital signs   Vitals:    09/04/20 1722 09/04/20 1814 09/04/20 1909   BP: (!) 144/88 (!) 145/91    Pulse: 109 98 97   Resp: 26 24 19   Temp: 99.2 °F (37.3 °C)     TempSrc: Oral     SpO2: 93% 93% 94%   Weight: 260 lb (117.9 kg)        Oxygen Baseline non-rereather (EMS applied with 93% at room air)    Current needs required 2.5L NC Bipap/Cpap No  LDAs:   Peripheral IV 12/16/19 Left Hand (Active)       Peripheral IV 09/04/20 Right Upper arm (Active)   Site Assessment Clean;Dry; Intact 09/04/20 1909   Line Status Infusing 09/04/20 1909   Dressing Status Clean;Dry; Intact 09/04/20 1909   Dressing Intervention New 09/04/20 1723       Peripheral IV 09/04/20 Left Hand (Active)   Site Assessment Clean;Dry; Intact 09/04/20 1909   Line Status Normal saline locked; Flushed 09/04/20 1909   Dressing Status Clean;Dry; Intact 09/04/20 1909   Dressing Intervention New 09/04/20 1724     Mobility: Requires assistance * 1  Pending ED orders: none at this time  Present condition: patient resting in cot with VSS. Call light in reach. No other concerns at this time. Will continue to monitor.      Electronically signed by Deshaun Garland RN on 9/4/2020 at 7:38 PM     Lillian Mcmanus RN  09/04/20 1939

## 2020-09-04 NOTE — TELEPHONE ENCOUNTER
----- Message from Vic Schirmer, DO sent at 9/3/2020  9:20 PM EDT -----  Regarding: FW:  ----- Message -----  From: Trupti Auguste Incoming Lab Results From Soft  Sent: 9/3/2020   6:36 PM EDT  To: Vic Schirmer, DO

## 2020-09-04 NOTE — ED PROVIDER NOTES
[facility] ENCOUNTER          Pt Name: Eliana French  MRN: [id number]  [place] 1940  Date of evaluation: 9/4/2020  Emergency Physician: Jacky Napoles, [facility]       Chief Complaint   Patient presents with    Shortness of Breath    Cough    Other     covid+     History obtained from the patient. HISTORY OF PRESENT ILLNESS    HPI  Eliana French is a 68 y.o. female who presents to the emergency department for evaluation of shortness of breath, difficulty in breathing, cough, decreased p.o. intake. Patient was at a wedding in the middle of August.   was exposed [facility].  had a minor cough was tested positive at ambulatory care for her her PCP earlier this week. She states over the last 3 days she has been more short of breath, worsening cough and decreased p.o. intake. She states she fell getting out of bed last night. Denies head injury or loss of consciousness. Denies injury . No blood thinners. she states she feels very fatigued and unsteady on her feet, with worsening shortness of breath with ambulation. Low-grade temperatures approximately . Nausea no vomiting, no diarrhea no abdominal pain no chest pain. The patient has no other acute complaints at this time. REVIEW OF SYSTEMS   Review of Systems   Constitutional: Positive for chills and fever. HENT: Negative for congestion and rhinorrhea. Respiratory: Positive for cough and shortness of breath. Negative for chest tightness and wheezing. Cardiovascular: Negative for chest pain and leg swelling. Gastrointestinal: Negative for abdominal pain, nausea and vomiting. Musculoskeletal: Negative for back pain and myalgias. Skin: Negative for rash. Neurological: Negative for headaches. Psychiatric/Behavioral: Negative for confusion.          PAST MEDICAL AND SURGICAL HISTORY     Past Medical History:   Diagnosis Date    Arthritis  Chronic low back pain     DM type 2, goal A1c below 7     GERD (gastroesophageal reflux disease)     Hyperlipidemia     Hypertension     Loss of vision     Mitral valve disorder     Osteoporosis     PAF (paroxysmal atrial fibrillation) (HCC)     Subarachnoid hemorrhage (HCC) 4/25/2019    TIA (transient ischemic attack)     Type 2 diabetes mellitus with diabetic neuropathy, without long-term current use of insulin [facility] 7/26/2017   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    Urinary incontinence      Past Surgical History:   Procedure Laterality Date    APPENDECTOMY      CARPAL TUNNEL RELEASE Left 2011    CATARACT REMOVAL Bilateral     CERVICAL FUSION  1976    CHOLECYSTECTOMY      COLON SURGERY  2012    COLONOSCOPY  2015    EYE SURGERY      CATARACTS    FINGER SURGERY Right 10/2016    3rd digit    HERNIA REPAIR      HYSTERECTOMY      NERVE SURGERY Right 10/25/2019    Lumbar Facet MBB @ V70-O2,A0-3 L2-3 right side only #1 performed by Yudith Menjivar MD at [facility] 98 Right 12/16/2019    Lumbar Facet MBB @ T54-X8-O7-4, L2-3 RIGHT SIDE ONLY performed by Yudith Menjivar MD at [facility] 113 Right 1/6/2020    Lumbar RFA T12-L1, L1-2, L2-3 Right performed by Yudith Menjivar MD at [address] [place] [address] Al Erica LAMINECTOMY,>2 [facility]      6 FUSIONS    ROTATOR CUFF REPAIR Right 11/28/2014    SHOULDER SURGERY  2014    SPINE SURGERY      TENDON RELEASE Right 05/18/2017    at [address] [place] [address] Bilateral     UPPER GASTROINTESTINAL ENDOSCOPY  2013,2015         MEDICATIONS     Current Facility-Administered Medications:     Dexamethasone Sodium Phosphate injection 10 mg, 10 mg, Intravenous, Once, Matteo Logan, DO    Current Outpatient Medications:     ondansetron (ZOFRAN ODT) 4 MG disintegrating tablet, Take 1 tablet by mouth every 8 hours as needed for Nausea or Vomiting, Disp: 20 tablet, Rfl: 0    amLODIPine (NORVASC) 10 MG tablet, TAKE 1 TABLET DAILY, Disp: 90 tablet, Rfl: 3    metFORMIN (GLUCOPHAGE) 500 MG tablet, TAKE 1 TABLET TWICE A DAY WITH MEALS, Disp: 180 tablet, Rfl: 4    atorvastatin (LIPITOR) 40 MG tablet, TAKE 1 TABLET DAILY, Disp: 90 tablet, Rfl: 4    sertraline (ZOLOFT) 100 MG tablet, TAKE 2 TABLETS DAILY, Disp: 180 tablet, Rfl: 4    quinapril (ACCUPRIL) 10 MG tablet, TAKE 1 TABLET NIGHTLY, Disp: 90 tablet, Rfl: 4    pantoprazole (PROTONIX) 40 MG tablet, TAKE 1 TABLET TWICE A DAY, Disp: 180 tablet, Rfl: 4    hydrALAZINE (APRESOLINE) 50 MG tablet, TAKE 1 TABLET TWICE A DAY, Disp: 180 tablet, Rfl: 0    nystatin (MYCOSTATIN) 254815 UNIT/GM cream, Apply topically 2 times daily. , Disp: 1 Tube, Rfl: 1    aspirin 81 MG tablet, Take 81 mg by mouth daily, Disp: , Rfl:     lidocaine (LMX) 4 % cream, Apply topically 4x daily as needed. , Disp: 30 g, Rfl: 5    promethazine (PHENERGAN) 25 MG tablet, Take 1 tablet by mouth every 6 hours as needed for Nausea, Disp: 15 tablet, Rfl: 0    gabapentin (NEURONTIN) 300 MG capsule, Take 2 capsules by mouth 3 times daily (Patient taking differently: Take 600 mg by mouth 3 times daily as needed. ), Disp: 540 capsule, Rfl: 3    ibuprofen (ADVIL;MOTRIN) 600 MG tablet, Take 1 tablet by mouth 3 times daily as needed for Pain (Take with food 3 times daily for pain), Disp: 15 tablet, Rfl: 0    atenolol (TENORMIN) 25 MG tablet, TAKE 1 TABLET DAILY, Disp: 90 tablet, Rfl: 3    vitamin D (CHOLECALCIFEROL) 1000 UNIT TABS tablet, Take 1,000 Units by mouth nightly., Disp: , Rfl:       SOCIAL HISTORY     Social History     Social History Narrative    Not on file     Social History     Tobacco Use    Smoking status: Never Smoker    Smokeless tobacco: Never Used   Substance Use Topics    Alcohol use: No    Drug use: No         ALLERGIES     Allergies   Allergen Reactions    Norco [Hydrocodone-Acetaminophen] Itching    Tramadol Itching    Codeine Hives and Rash         FAMILY HISTORY     Family History   Problem Relation Age of Onset    Arthritis Mother     Cancer Mother     Heart Disease Father     High Blood Pressure Father          PHYSICAL EXAM     ED Triage Vitals [09/04/20 1722]   BP Temp Temp Source Pulse Resp SpO2 Height Weight   (!) 144/88 99.2 °F (37.3 °C) Oral 109 26 93 % -- 260 lb (117.9 kg)         Additional Vital Signs:  Vitals:    09/04/20 1814   BP: (!) 145/91   Pulse: 98   Resp: 24   Temp:    SpO2: 93%       Physical Exam  Constitutional:       General: She is not in acute distress. Appearance: She is well-developed. She is not diaphoretic. HENT:      Head: Normocephalic and atraumatic. Eyes:      General:         Right eye: No discharge. Left eye: No discharge. Conjunctiva/sclera: Conjunctivae normal.      Pupils: Pupils are equal, round, and reactive to light. Neck:      Musculoskeletal: Normal range of motion and neck supple. Thyroid: No thyromegaly. Vascular: No JVD. Cardiovascular:      Rate and Rhythm: Normal rate and regular rhythm. Heart sounds: Normal heart sounds. Pulmonary:      Effort: Pulmonary effort is normal. No respiratory distress. Breath sounds: Examination of the right-lower field reveals rales. Examination of the left-lower field reveals rales. Rales present. No decreased breath sounds, wheezing or rhonchi. Chest:      Chest wall: No tenderness. Abdominal:      General: Bowel sounds are normal. There is no distension. Palpations: Abdomen is soft. Tenderness: There is no abdominal tenderness. Musculoskeletal: Normal range of motion. General: No tenderness. Right lower leg: No edema. Left lower leg: No edema. Lymphadenopathy:      Cervical: No cervical adenopathy. Skin:     General: Skin is warm and dry. Capillary Refill: Capillary refill takes less than 2 seconds. Findings: No rash.    Neurological: Mental Status: She is alert and oriented to person, place, and time. She is not disoriented. GCS: GCS eye subscore is 4. GCS verbal subscore is 5. GCS motor subscore is 6. Cranial Nerves: No cranial nerve deficit. Sensory: No sensory deficit. Motor: No abnormal muscle tone or seizure activity. Coordination: Coordination normal.         Initial vital signs and nursing assessment reviewed and normal. Pulsoximetry is normal per my interpretation. MEDICAL DECISION MAKING   Initial Assessment: Patient presented for evaluation of cough, shortness of breath, difficulty breathing. The vitals signs and physical exam were notable for borderline O2 sat 90 on room air improved to 93 2 L oxygen tachypnea tachycardic and low-grade fever. Given these findings the emergent condition that needed addressed/further defined were pneumonia versus COVID versus CHF, less likely PE. Plan: To further define disposition and risk stratification will obtain CBC, BMP, ECG, troponin, BNP, d-dimer.         ED RESULTS   Laboratory results:  Labs Reviewed   BASIC METABOLIC PANEL W/ REFLEX TO MG FOR LOW K - Abnormal; Notable for the following components:       Result Value    CO2 21 (*)     Glucose 135 (*)     Calcium 8.4 (*)     All other components within normal limits   BRAIN NATRIURETIC PEPTIDE - Abnormal; Notable for the following components:    Pro-BNP 1879.0 (*)     All other components within normal limits   CBC WITH AUTO DIFFERENTIAL - Abnormal; Notable for the following components:    WBC 4.2 (*)     Lymphocytes Absolute 0.6 (*)     Monocytes Absolute 0.3 (*)     All other components within normal limits   LACTATE DEHYDROGENASE - Abnormal; Notable for the following components:     (*)     All other components within normal limits   GLOMERULAR FILTRATION RATE, ESTIMATED - Abnormal; Notable for the following components:    Est, Glom Filt Rate 81 (*)     All other components within normal limits may have been transcribed with the assistance of an ED scribe. The transcription may contain errors not detected in proofreading.     Electronically Signed: aNte Stoner, 09/04/20, 6:57 PM      Nate Stoner DO  09/04/20 5531

## 2020-09-04 NOTE — ED NOTES
Patient placed on 2L NC due to oxygen down to 90% at room air. Now up to 94%.       Kingsley Mcmanus RN  09/04/20 1259

## 2020-09-05 VITALS
HEART RATE: 66 BPM | OXYGEN SATURATION: 95 % | SYSTOLIC BLOOD PRESSURE: 128 MMHG | BODY MASS INDEX: 43.02 KG/M2 | HEIGHT: 64 IN | DIASTOLIC BLOOD PRESSURE: 62 MMHG | WEIGHT: 252 LBS | RESPIRATION RATE: 26 BRPM | TEMPERATURE: 97.6 F

## 2020-09-05 LAB
ALBUMIN SERPL-MCNC: 2.8 G/DL (ref 3.5–5.1)
ALP BLD-CCNC: 49 U/L (ref 38–126)
ALT SERPL-CCNC: 22 U/L (ref 11–66)
ANION GAP SERPL CALCULATED.3IONS-SCNC: 12 MEQ/L (ref 8–16)
AST SERPL-CCNC: 33 U/L (ref 5–40)
BASOPHILS # BLD: 0.7 %
BASOPHILS ABSOLUTE: 0 THOU/MM3 (ref 0–0.1)
BILIRUB SERPL-MCNC: 0.4 MG/DL (ref 0.3–1.2)
BUN BLDV-MCNC: 16 MG/DL (ref 7–22)
CALCIUM SERPL-MCNC: 8.3 MG/DL (ref 8.5–10.5)
CHLORIDE BLD-SCNC: 105 MEQ/L (ref 98–111)
CO2: 20 MEQ/L (ref 23–33)
CREAT SERPL-MCNC: 0.6 MG/DL (ref 0.4–1.2)
D-DIMER QUANTITATIVE: 340 NG/ML FEU (ref 0–500)
EOSINOPHIL # BLD: 0 %
EOSINOPHILS ABSOLUTE: 0 THOU/MM3 (ref 0–0.4)
ERYTHROCYTE [DISTWIDTH] IN BLOOD BY AUTOMATED COUNT: 13.6 % (ref 11.5–14.5)
ERYTHROCYTE [DISTWIDTH] IN BLOOD BY AUTOMATED COUNT: 46.8 FL (ref 35–45)
FERRITIN: 540 NG/ML (ref 10–291)
GFR SERPL CREATININE-BSD FRML MDRD: > 90 ML/MIN/1.73M2
GLUCOSE BLD-MCNC: 233 MG/DL (ref 70–108)
HCT VFR BLD CALC: 39.9 % (ref 37–47)
HEMOGLOBIN: 12.9 GM/DL (ref 12–16)
IMMATURE GRANS (ABS): 0.01 THOU/MM3 (ref 0–0.07)
IMMATURE GRANULOCYTES: 0.3 %
LYMPHOCYTES # BLD: 14.9 %
LYMPHOCYTES ABSOLUTE: 0.4 THOU/MM3 (ref 1–4.8)
MCH RBC QN AUTO: 29.6 PG (ref 26–33)
MCHC RBC AUTO-ENTMCNC: 32.3 GM/DL (ref 32.2–35.5)
MCV RBC AUTO: 91.5 FL (ref 81–99)
MONOCYTES # BLD: 2.8 %
MONOCYTES ABSOLUTE: 0.1 THOU/MM3 (ref 0.4–1.3)
NUCLEATED RED BLOOD CELLS: 0 /100 WBC
OSMOLALITY CALCULATION: 282.5 MOSMOL/KG (ref 275–300)
PLATELET # BLD: 143 THOU/MM3 (ref 130–400)
PMV BLD AUTO: 12 FL (ref 9.4–12.4)
POTASSIUM SERPL-SCNC: 4.2 MEQ/L (ref 3.5–5.2)
RBC # BLD: 4.36 MILL/MM3 (ref 4.2–5.4)
SEG NEUTROPHILS: 81.3 %
SEGMENTED NEUTROPHILS ABSOLUTE COUNT: 2.4 THOU/MM3 (ref 1.8–7.7)
SODIUM BLD-SCNC: 137 MEQ/L (ref 135–145)
TOTAL PROTEIN: 6 G/DL (ref 6.1–8)
WBC # BLD: 2.9 THOU/MM3 (ref 4.8–10.8)

## 2020-09-05 PROCEDURE — 6370000000 HC RX 637 (ALT 250 FOR IP): Performed by: PHYSICIAN ASSISTANT

## 2020-09-05 PROCEDURE — 80053 COMPREHEN METABOLIC PANEL: CPT

## 2020-09-05 PROCEDURE — 82728 ASSAY OF FERRITIN: CPT

## 2020-09-05 PROCEDURE — 2580000003 HC RX 258: Performed by: PHYSICIAN ASSISTANT

## 2020-09-05 PROCEDURE — 94761 N-INVAS EAR/PLS OXIMETRY MLT: CPT

## 2020-09-05 PROCEDURE — 6360000002 HC RX W HCPCS: Performed by: PHYSICIAN ASSISTANT

## 2020-09-05 PROCEDURE — 85025 COMPLETE CBC W/AUTO DIFF WBC: CPT

## 2020-09-05 PROCEDURE — 85379 FIBRIN DEGRADATION QUANT: CPT

## 2020-09-05 PROCEDURE — 93010 ELECTROCARDIOGRAM REPORT: CPT | Performed by: INTERNAL MEDICINE

## 2020-09-05 PROCEDURE — 99239 HOSP IP/OBS DSCHRG MGMT >30: CPT | Performed by: PHYSICIAN ASSISTANT

## 2020-09-05 PROCEDURE — 36415 COLL VENOUS BLD VENIPUNCTURE: CPT

## 2020-09-05 RX ORDER — DEXAMETHASONE 6 MG/1
6 TABLET ORAL
Qty: 4 TABLET | Refills: 0 | Status: SHIPPED | OUTPATIENT
Start: 2020-09-05 | End: 2020-09-09

## 2020-09-05 RX ORDER — ZINC SULFATE 50(220)MG
50 CAPSULE ORAL DAILY
Qty: 30 CAPSULE | Refills: 3 | COMMUNITY
Start: 2020-09-06 | End: 2021-05-27 | Stop reason: ALTCHOICE

## 2020-09-05 RX ADMIN — HYDRALAZINE HYDROCHLORIDE 50 MG: 50 TABLET, FILM COATED ORAL at 09:58

## 2020-09-05 RX ADMIN — AMLODIPINE BESYLATE 10 MG: 10 TABLET ORAL at 09:58

## 2020-09-05 RX ADMIN — SODIUM CHLORIDE, PRESERVATIVE FREE 10 ML: 5 INJECTION INTRAVENOUS at 12:32

## 2020-09-05 RX ADMIN — SERTRALINE HYDROCHLORIDE 100 MG: 100 TABLET, FILM COATED ORAL at 09:58

## 2020-09-05 RX ADMIN — PANTOPRAZOLE SODIUM 40 MG: 40 TABLET, DELAYED RELEASE ORAL at 16:20

## 2020-09-05 RX ADMIN — PANTOPRAZOLE SODIUM 40 MG: 40 TABLET, DELAYED RELEASE ORAL at 05:08

## 2020-09-05 RX ADMIN — OXYCODONE HYDROCHLORIDE AND ACETAMINOPHEN 1000 MG: 500 TABLET ORAL at 09:58

## 2020-09-05 RX ADMIN — ENOXAPARIN SODIUM 40 MG: 40 INJECTION SUBCUTANEOUS at 09:56

## 2020-09-05 RX ADMIN — ASPIRIN 81 MG: 81 TABLET ORAL at 09:58

## 2020-09-05 RX ADMIN — Medication 50 MG: at 09:58

## 2020-09-05 RX ADMIN — LISINOPRIL 5 MG: 5 TABLET ORAL at 09:58

## 2020-09-05 RX ADMIN — ATENOLOL 25 MG: 25 TABLET ORAL at 09:58

## 2020-09-05 RX ADMIN — DEXAMETHASONE SODIUM PHOSPHATE 6 MG: 4 INJECTION, SOLUTION INTRAMUSCULAR; INTRAVENOUS at 12:30

## 2020-09-05 RX ADMIN — VITAMIN D 1000 UNITS: 25 TAB ORAL at 09:58

## 2020-09-05 ASSESSMENT — PAIN SCALES - GENERAL
PAINLEVEL_OUTOF10: 0

## 2020-09-05 NOTE — PLAN OF CARE
Problem: Airway Clearance - Ineffective  Goal: Achieve or maintain patent airway  9/5/2020 1519 by Moise Temple RN  Outcome: Ongoing  Note: O2 saturation 93-94% on O2 at 2 liters/nasal cannula. Lungs sounds diminished. Nonproductive cough. Mild SOB with exertion.
free from falls  Outcome: Met This Shift  Note: Patient remained free from falls this shift. Used call light appropriately. Wore nonskid socks when ambulating with assistance. Bed alarm on. Call light in reach. Problem: Skin Integrity:  Goal: Absence of new skin breakdown  Description: Absence of new skin breakdown  Outcome: Met This Shift  Note: No skin breakdown noted. Patient turns self in bed. Pillow support provided. Will continue to monitor skin integrity . Problem: Nutrition Deficits  Goal: Optimize nutrtional status  Outcome: Ongoing  Note: Patient c/o decrease appetite over the last 10 days. Patient c/o nausea. GI cocktail provided. Patient able to eat some saurabh crackers and peanut butter after GI cocktail. Will continue to monitor nutrition intake. Problem: Fatigue  Goal: Verbalize increase energy and improved vitality  Outcome: Ongoing     Problem: Skin Integrity:  Goal: Will show no infection signs and symptoms  Description: Will show no infection signs and symptoms  Outcome: Ongoing  Note: Admitted for covid19. Problem: Discharge Planning:  Goal: Discharged to appropriate level of care  Description: Discharged to appropriate level of care  Outcome: Ongoing  Note: Patient from home with . Plans to return home with  at discharge. Possible discharge 2-3 days. Will continue to monitor for discharge needs. Currently requiring O2. May need home O2 eval.      Problem: Falls - Risk of:  Goal: Absence of physical injury  Description: Absence of physical injury  Note: Patient remained free from physical injury this shift. Used call light appropriately. Wore nonskid socks when ambulating with assistance. Bed alarm on. Call light in reach. Pathway clear. Patient participated in plan of care and contributed to goal setting.

## 2020-09-05 NOTE — PROGRESS NOTES
Hospitalist Progress Note      Patient:  Mei De La Rosa    Unit/Bed:6A-14/014-A  YOB: 1940  MRN: [id number]   Acct: [de-identified]   PCP: Shona Laguerre DO  Date of Admission: 9/4/2020    Assessment/Plan:    1. Acute respiratory failure, hypoxia: Patient's baseline is room air. Patient is currently requiring 2 L nasal cannula at rest.  Incentive spirometry. Acapella. Albuterol every 4 hours while awake. Steroids. 2. COVID-19: Patient was exposed to [place] at his grandsons wedding. Pharmacy to dose Remdesvir. 3. Aortic stenosis, moderate: Patient follows with Dr. Cherise Miguel. SAM 1.1 cm². 4. Type 2 diabetes: We will continue to hold oral diabetes medication. Patient might need sliding scale. 5. Hyperlipidemia: Statin. 6. Essential hypertension: Continue home medications. Chief Complaint: SOB    Initial H and P:-    Mei De La Rosa is a 68year old white female nonsmoker with PMH of NIDDM type 2, GERD, HLD, HTN,  Moderate AS, PAF, Hx SAH (2019), CVA (2014) presents to [facility] ED on 9/4/20 c/o SOB, nausea, vomiting, diarrhea, epigastric abdominal pain that is constant but worse with nausea/vomiting, HA, fatigue and weakness leading to a fall from standing yesterday, but did not hit head, have LOC, is not on blood thinners or have any pain from the fall. Exertion worsens her SOB. Nothing improves her nausea or appetite and has not eaten for the past 3 days or taken any of her medications. She has been able to tolerate fluids. She has known sick contact with covid (her grandson) and believes they got it at her grandson's wedding. Intermittent low grade fevers. Denies CP, lightheadedness, confusion, orthopnea.     In the ED, vitals show , /88, RR 26, 93% on 2L and afebrile with Tmax 99.2. Labs show proBNP 1875 (baselin 1879), neg troponin, WBC 4.2, hgb 13.7, plt 135, ddimer 390, .  CXR shows few reticular markings in the lung bases.    Subjective (past 24 hours):   Patient states that she really would like to go home. Patient states she feels better. Patient and this provider spoke and decided if Home O2 eval shows that she does not need oxygen she can go home. Past medical history, family history, social history and allergies reviewed again and is unchanged since admission. ROS patient admits to weakness and fatigue. Patient denies chest pain, abdominal pain, nausea. Medications:  Reviewed    Infusion Medications   Scheduled Medications    vitamin C  1,000 mg Oral Daily    Vitamin D  1,000 Units Oral Daily    zinc sulfate  50 mg Oral Daily    dexamethasone  6 mg Intravenous Daily    sodium chloride flush  10 mL Intravenous 2 times per day    enoxaparin  40 mg Subcutaneous Q12H    amLODIPine  10 mg Oral Daily    aspirin  81 mg Oral Daily    atenolol  25 mg Oral Daily    atorvastatin  40 mg Oral Daily    hydrALAZINE  50 mg Oral BID    pantoprazole  40 mg Oral BID AC    lisinopril  5 mg Oral Daily    sertraline  100 mg Oral Daily    remdesivir IVPB  100 mg Intravenous Q24H     PRN Meds: sodium chloride flush, acetaminophen **OR** acetaminophen, promethazine **OR** ondansetron, lidocaine, sodium chloride      Intake/Output Summary (Last 24 hours) at 9/5/2020 1351  Last data filed at 9/5/2020 1312  Gross per 24 hour   Intake 745 ml   Output 0 ml   Net 745 ml       Diet:  DIET CARB CONTROL; Exam:  BP (!) 141/92   Pulse 65   Temp 97.9 °F (36.6 °C) (Oral)   Resp 20   Ht 5' 4\" (1.626 m)   Wt 252 lb (114.3 kg)   SpO2 93%   BMI 43.26 kg/m²   General appearance: No apparent distress, appears younger than stated age and cooperative. HEENT: Pupils equal, round, and reactive to light. Conjunctivae/corneas clear. Neck: Supple, with full range of motion. No jugular venous distention. Trachea midline. Respiratory:  Normal respiratory effort on 2L NC.  Clear to auscultation, bilaterally without Rales/Wheezes/Rhonchi. Cardiovascular: Regular rate and rhythm with systolic murmur  Abdomen: Soft, non-tender, non-distended with normal bowel sounds. Musculoskeletal: passive and active ROM x 4 extremities. Skin: Skin color, texture, turgor normal.  No rashes or lesions. Neurologic:  Neurovascularly intact without any focal sensory/motor deficits. Cranial nerves: II-XII intact, grossly non-focal.  Psychiatric: Alert and oriented, thought content appropriate, normal insight  Capillary Refill: Brisk,< 3 seconds   Peripheral Pulses: +2 palpable, equal bilaterally     Labs:   Recent Labs     09/04/20 1741 09/05/20  0330   WBC 4.2* 2.9*   HGB 13.7 12.9   HCT 41.3 39.9    143     Recent Labs     09/04/20 1741 09/05/20  0330    137   K 3.6 4.2    105   CO2 21* 20*   BUN 15 16   CREATININE 0.7 0.6   CALCIUM 8.4* 8.3*     Recent Labs     09/04/20 2022 09/05/20  0330   AST 32 33   ALT 22 22   BILIDIR <0.2  --    BILITOT 0.5 0.4   ALKPHOS 50 49     Recent Labs     09/04/20 2022   CKTOTAL 49     Radiology:  XR CHEST PORTABLE   Final Result   Few reticular markings in the lungs bases. Correlate for atelectasis versus minimal infiltrate. **This report has been created using voice recognition software. It may contain minor errors which are inherent in voice recognition technology. **      Final report electronically signed by Dr. Chelo Way on 9/4/2020 6:23 PM        Electronically signed by ELIZABETH Amor on 9/5/2020 at 1:51 PM

## 2020-09-05 NOTE — PROGRESS NOTES
A home oxygen evaluation has been completed. [x]Patient is an inpatient. It is expected that the patient will be discharged within the next 48 hours. Qualified provider to write orders for possible sleep study or home oxygen prescription. Social service/care managers will arrange for home oxygen if ordered. If patient is active, arrange for Home Medical supplier to assess for Oxygen Conserving Device per pulse oximetry. []Patient is an outpatient. Results will be faxed to the ordering provider. Qualified provider to write orders for possible sleep study or home oxygen prescription. Patient was found on room air . SpO2 was 95 % on room air at rest. Patients SpO2 was 89% or above and did not qualify for home oxygen. Patient was walked for 6 minutes. SpO2 was 92 % during walking. Patients SpO2 was 89% or above and did not qualify for home oxygen. Patient does not have a positive pressure airway device at home. Patient is not  diagnosed with Obstructive Sleep Apnea. Patient will not be set up/instructed on a nocturnal study. Results will be given to qualified provider. Note: For any SpO2 at 24% see policy and procedure for possible qualifications.

## 2020-09-05 NOTE — PROGRESS NOTES
Remdesivir Initiation Note    Romina Dubois meets criteria for initiation of remdesivir under the emergency use authorization (EUA) based on the following:  Known or suspected COVID-19  Severe disease (SpO2 ? 94% on RA, requiring supplemental O2, or requiring invasive mechanical ventilation) - SpO2 94% on 2.5 L/min  Acceptable renal function  CrCl ? 30 ml/min based on SCr obtained prior to initiation OR   CrCl < 30 ml/min but the potential benefit of remdesivir outweighs the risk  Acceptable hepatic function (ALT within 5 times ULN) - labs WNL this evening    I have discussed with the patient/proxy information consistent with the Fact Sheet for Patients and Parents/Caregivers\" and the patient/proxy has agreed to initiating remdesivir after being:  Given the Fact Sheet for Patients and Parents/Caregivers  Informed of the alternatives to receiving remdesivir, and   Informed that remdesivir is an unapproved drug that is authorized for use under EUA    Liver function tests will be monitored daily while on remdesivir.     Deb Brewer RPveronica, BCPS, BCGP  9/4/2020     8:56 PM

## 2020-09-05 NOTE — PROGRESS NOTES
Patient admitted to 030 70 25 13 from ED per wheelchair. Patient ambulated from wheelchair to bed. Gait steady. Patient with INT in right upper arm. IV site free of s/s of infection or infiltration. Patient on 2L O2 via nasal cannula. Vitals, assessment and data obtained. Patient oriented to room and call light. Policies and procedures for 6A explained. All questions answered with no further questions at this time. Fall prevention and safety brochure discussed with patient. Bed alarm on. Two nurse skin assessment performed by Blanca ARGUETA and Afshan Aden. Explained patients right to have family, representative or physician notified of their admission. Patient has Declined for physician to be notified. Patient has Declined for family/representative to be notified. Patient would like family notified once per shift?  No

## 2020-09-06 NOTE — DISCHARGE SUMMARY
Hospitalist Discharge Summary        Patient: Barbi Vasquez  YOB: 1940  MRN: [id number]   Acct: [de-identified]    Primary Care Physician: Romaine Roach DO    Admit date  9/4/2020    Discharge date:  9/6/2020 11:47 AM    Chief Complaint on presentation :-  SOB    Discharge Assessment and Plan:-   1. Acute respiratory failure, hypoxia: Patient's baseline is room air. Patient underwent home O2 eval and was deemed not needing oxygen at rest or with walking. Incentive spirometry. Acapella. Albuterol every 4 hours while awake. Steroids. 2. COVID-19: Patient was exposed to [place] at his grandsons wedding. Pharmacy to dose Remdesvir, pt received one dose. 3. Aortic stenosis, moderate: Patient follows with Dr. Soledad Foreman. SAM 1.1 cm². 4. Type 2 diabetes: Continue home medications. 5. Hyperlipidemia: Statin. 6. Essential hypertension: Continue home medications. Initial H and P and Hospital course:-  Barbi Vasquez is a 68year old white female nonsmoker with PMH of NIDDM type 2, GERD, HLD, HTN,  Moderate AS, PAF, Hx SAH (2019), CVA (2014) presents to [facility] ED on 9/4/20 c/o SOB, nausea, vomiting, diarrhea, epigastric abdominal pain that is constant but worse with nausea/vomiting, HA, fatigue and weakness leading to a fall from standing yesterday, but did not hit head, have LOC, is not on blood thinners or have any pain from the fall. Exertion worsens her SOB. Nothing improves her nausea or appetite and has not eaten for the past 3 days or taken any of her medications. She has been able to tolerate fluids. She has known sick contact with covid (her grandson) and believes they got it at her grandson's wedding. Intermittent low grade fevers. Denies CP, lightheadedness, confusion, orthopnea.     In the ED, vitals show , /88, RR 26, 93% on 2L and afebrile with Tmax 99.2. Labs show proBNP 1875 (baselin 1879), neg troponin, WBC 4.2, hgb 13.7, plt 135, ddimer 390, .  CXR shows few reticular markings in the lung bases. Patient was discharged on 9/5. Patient had home O2 eval and patient did not meet the requirements of needing oxygen at rest or walking. Patient understands she is to call her local health department after being discharged. Patient was given COVID-19 information by nursing staff upon discharge. On the day of discharge, it was explained to the patient that it was very important to follow up with his PCP to have continued care. Appointments were made and information was given. Physical Exam:-  General appearance: No apparent distress, appears younger than stated age and cooperative. HEENT: Pupils equal, round, and reactive to light. Conjunctivae/corneas clear. Neck: Supple, with full range of motion. No jugular venous distention. Trachea midline. Respiratory:  Normal respiratory effort on 2L NC. Clear to auscultation, bilaterally without Rales/Wheezes/Rhonchi. Cardiovascular: Regular rate and rhythm with systolic murmur  Abdomen: Soft, non-tender, non-distended with normal bowel sounds. Musculoskeletal: passive and active ROM x 4 extremities. Skin: Skin color, texture, turgor normal.  No rashes or lesions. Neurologic:  Neurovascularly intact without any focal sensory/motor deficits.  Cranial nerves: II-XII intact, grossly non-focal.  Psychiatric: Alert and oriented, thought content appropriate, normal insight  Capillary Refill: Brisk,< 3 seconds   Peripheral Pulses: +2 palpable, equal bilaterally     Vitals:   Patient Vitals for the past 24 hrs:   BP Temp Temp src Pulse Resp SpO2   09/05/20 1625 -- -- -- -- -- 95 %   09/05/20 1615 -- -- -- -- -- 96 %   09/05/20 1504 128/62 97.6 °F (36.4 °C) Oral 66 26 95 %     Weight:   Weight: 252 lb (114.3 kg)   24 hour intake/output:     Intake/Output Summary (Last 24 hours) at 9/6/2020 1147  Last data filed at 9/5/2020 1312  Gross per 24 hour   Intake 130 ml   Output 0 ml   Net 130 ml     Discharge Medications:- Medication List      START taking these medications    ascorbic acid 1000 MG tablet  Commonly known as:  VITAMIN C  Take 1 tablet by mouth daily     dexamethasone 6 MG tablet  Commonly known as:  DECADRON  Take 1 tablet by mouth daily (with breakfast) for 4 days     zinc sulfate 220 (50 Zn) MG capsule  Commonly known as:  ZINCATE  Take 1 capsule by mouth daily        CONTINUE taking these medications    amLODIPine 10 MG tablet  Commonly known as:  NORVASC  TAKE 1 TABLET DAILY     aspirin 81 MG tablet     atenolol 25 MG tablet  Commonly known as:  TENORMIN  TAKE 1 TABLET DAILY     atorvastatin 40 MG tablet  Commonly known as:  LIPITOR  TAKE 1 TABLET DAILY     hydrALAZINE 50 MG tablet  Commonly known as:  APRESOLINE  TAKE 1 TABLET TWICE A DAY     ibuprofen 600 MG tablet  Commonly known as:  ADVIL;MOTRIN  Take 1 tablet by mouth 3 times daily as needed for Pain (Take with food 3 times daily for pain)     lidocaine 4 % cream  Commonly known as:  LMX  Apply topically 4x daily as needed. metFORMIN 500 MG tablet  Commonly known as:  GLUCOPHAGE  TAKE 1 TABLET TWICE A DAY WITH MEALS     nystatin 128378 UNIT/GM cream  Commonly known as:  MYCOSTATIN  Apply topically 2 times daily.      ondansetron 4 MG disintegrating tablet  Commonly known as:  Zofran ODT  Take 1 tablet by mouth every 8 hours as needed for Nausea or Vomiting     pantoprazole 40 MG tablet  Commonly known as:  PROTONIX  TAKE 1 TABLET TWICE A DAY     promethazine 25 MG tablet  Commonly known as:  PHENERGAN  Take 1 tablet by mouth every 6 hours as needed for Nausea     quinapril 10 MG tablet  Commonly known as:  ACCUPRIL  TAKE 1 TABLET NIGHTLY     sertraline 100 MG tablet  Commonly known as:  ZOLOFT  TAKE 2 TABLETS DAILY     vitamin D 1000 UNIT Tabs tablet  Commonly known as:  CHOLECALCIFEROL        STOP taking these medications    gabapentin 300 MG capsule  Commonly known as:  Neurontin           Where to Get Your Medications      These medications were sent thou/mm3    Basophils Absolute 0.0 0.0 - 0.1 thou/mm3    Immature Grans (Abs) 0.01 0.00 - 0.07 thou/mm3    nRBC 0 /100 wbc   Troponin    Collection Time: 09/04/20  5:41 PM   Result Value Ref Range    Troponin T < 0.010 ng/ml   Procalcitonin    Collection Time: 09/04/20  5:41 PM   Result Value Ref Range    Procalcitonin 0.07 0.01 - 0.09 ng/mL   Lactate Dehydrogenase    Collection Time: 09/04/20  5:41 PM   Result Value Ref Range     (H) 100 - 190 U/L   Anion Gap    Collection Time: 09/04/20  5:41 PM   Result Value Ref Range    Anion Gap 12.0 8.0 - 16.0 meq/L   Glomerular Filtration Rate, Estimated    Collection Time: 09/04/20  5:41 PM   Result Value Ref Range    Est, Glom Filt Rate 81 (A) ml/min/1.73m2   Osmolality    Collection Time: 09/04/20  5:41 PM   Result Value Ref Range    Osmolality Calc 273.0 (L) 275.0 - 300.0 mOsmol/kg   D-Dimer, Quantitative    Collection Time: 09/04/20  5:54 PM   Result Value Ref Range    D-Dimer, Quant 390.00 0.00 - 500.00 ng/ml FEU   Procalcitonin    Collection Time: 09/04/20  8:22 PM   Result Value Ref Range    Procalcitonin 0.08 0.01 - 0.09 ng/mL   Lactic acid, plasma    Collection Time: 09/04/20  8:22 PM   Result Value Ref Range    Lactic Acid 0.8 0.5 - 2.2 mmol/L   Hepatic Function Panel    Collection Time: 09/04/20  8:22 PM   Result Value Ref Range    Alb 3.5 3.5 - 5.1 g/dL    Total Bilirubin 0.5 0.3 - 1.2 mg/dL    Bilirubin, Direct <0.2 0.0 - 0.3 mg/dL    Alkaline Phosphatase 50 38 - 126 U/L    AST 32 5 - 40 U/L    ALT 22 11 - 66 U/L    Total Protein 5.9 (L) 6.1 - 8.0 g/dL   C-reactive protein    Collection Time: 09/04/20  8:22 PM   Result Value Ref Range    CRP 3.58 (H) 0.00 - 1.00 mg/dl   CK    Collection Time: 09/04/20  8:22 PM   Result Value Ref Range    Total CK 49 30 - 135 U/L   D-Dimer, Quantitative    Collection Time: 09/05/20  3:30 AM   Result Value Ref Range    D-Dimer, Quant 340.00 0.00 - 500.00 ng/ml FEU   CBC auto differential    Collection Time: 09/05/20  3:30 AM   Result Value Ref Range    WBC 2.9 (L) 4.8 - 10.8 thou/mm3    RBC 4.36 4.20 - 5.40 mill/mm3    Hemoglobin 12.9 12.0 - 16.0 gm/dl    Hematocrit 39.9 37.0 - 47.0 %    MCV 91.5 81.0 - 99.0 fL    MCH 29.6 26.0 - 33.0 pg    MCHC 32.3 32.2 - 35.5 gm/dl    RDW-CV 13.6 11.5 - 14.5 %    RDW-SD 46.8 (H) 35.0 - 45.0 fL    Platelets 264 157 - 294 thou/mm3    MPV 12.0 9.4 - 12.4 fL    Seg Neutrophils 81.3 %    Lymphocytes 14.9 %    Monocytes 2.8 %    Eosinophils 0.0 %    Basophils 0.7 %    Immature Granulocytes 0.3 %    Segs Absolute 2.4 1.8 - 7.7 thou/mm3    Lymphocytes Absolute 0.4 (L) 1.0 - 4.8 thou/mm3    Monocytes Absolute 0.1 (L) 0.4 - 1.3 thou/mm3    Eosinophils Absolute 0.0 0.0 - 0.4 thou/mm3    Basophils Absolute 0.0 0.0 - 0.1 thou/mm3    Immature Grans (Abs) 0.01 0.00 - 0.07 thou/mm3    nRBC 0 /100 wbc   Comprehensive Metabolic Panel    Collection Time: 09/05/20  3:30 AM   Result Value Ref Range    Glucose 233 (H) 70 - 108 mg/dL    CREATININE 0.6 0.4 - 1.2 mg/dL    BUN 16 7 - 22 mg/dL    Sodium 137 135 - 145 meq/L    Potassium 4.2 3.5 - 5.2 meq/L    Chloride 105 98 - 111 meq/L    CO2 20 (L) 23 - 33 meq/L    Calcium 8.3 (L) 8.5 - 10.5 mg/dL    AST 33 5 - 40 U/L    Alkaline Phosphatase 49 38 - 126 U/L    Total Protein 6.0 (L) 6.1 - 8.0 g/dL    Alb 2.8 (L) 3.5 - 5.1 g/dL    Total Bilirubin 0.4 0.3 - 1.2 mg/dL    ALT 22 11 - 66 U/L   Ferritin    Collection Time: 09/05/20  3:30 AM   Result Value Ref Range    Ferritin 540 (H) 10 - 291 ng/mL   Anion Gap    Collection Time: 09/05/20  3:30 AM   Result Value Ref Range    Anion Gap 12.0 8.0 - 16.0 meq/L   Osmolality    Collection Time: 09/05/20  3:30 AM   Result Value Ref Range    Osmolality Calc 282.5 275.0 - 300.0 mOsmol/kg   Glomerular Filtration Rate, Estimated    Collection Time: 09/05/20  3:30 AM   Result Value Ref Range    Est, Glom Filt Rate >90 ml/min/1.73m2      Radiology:-  Xr Chest Portable    Result Date: 9/4/2020  PROCEDURE: XR CHEST PORTABLE CLINICAL INFORMATION: sob. COMPARISON: April 25, 2019. TECHNIQUE: Portable FINDINGS: Few reticular markings in the lungs bases. Correlate for atelectasis versus minimal infiltrate. Costophrenic angles are preserved. Cardiomediastinal silhouette is within normal limits. No acute osseous findings. Surgical hardware in the lower cervical spine. Degenerative changes thoracic spine. Postsurgical changes right humerus. Few reticular markings in the lungs bases. Correlate for atelectasis versus minimal infiltrate. **This report has been created using voice recognition software. It may contain minor errors which are inherent in voice recognition technology. ** Final report electronically signed by Dr. Nic Colin on 9/4/2020 6:23 PM       Follow-up scheduled after discharge :-    [facility] the [facility]    Consultations during this hospital stay:-  [] NONE [] Cardiology  [] Nephrology  [] Hemo onco   [] GI   [] ID  [] Endocrine  [] Pulm    [] Neuro    [] Psych   [] Urology  [] ENT   [] G SURGERY   []Ortho    []CV surg    [] Palliative  [] Hospice [] Pain management   []    []TCU   [] PT/OT  OTHERS:-    Disposition: home  Condition at Discharge: Stable    Time Spent:- 50 minutes    Electronically signed by ELIZABETH Christiansen on 9/6/2020 at 11:47 AM  Discharging Hospitalist

## 2020-09-08 ENCOUNTER — TELEPHONE (OUTPATIENT)
Dept: FAMILY MEDICINE CLINIC | Age: 80
End: 2020-09-08

## 2020-09-08 ENCOUNTER — CARE COORDINATION (OUTPATIENT)
Dept: CASE MANAGEMENT | Age: 80
End: 2020-09-08

## 2020-09-08 NOTE — TELEPHONE ENCOUNTER
Danyell [facility] Transitions Initial Follow Up Call    Outreach made within 2 business days of discharge: Yes    Patient: Tk Pappas Patient : 1940   MRN: [id number]  Reason for Admission: There are no discharge diagnoses documented for the most recent discharge. Discharge Date: 20       Spoke with: Joanell Leyden    Discharge department/facility: [facility]    TCM Interactive Patient Contact:  Was patient able to fill all prescriptions: Yes  Was patient instructed to bring all medications to the follow-up visit: Yes  Is patient taking all medications as directed in the discharge summary?  Yes  Does patient understand their discharge instructions: Yes  Does patient have questions or concerns that need addressed prior to 7-14 day follow up office visit: no    Scheduled appointment with PCP within 7-14 days    Follow Up  Future Appointments   Date Time Provider James Cruz   2020 11:30 AM Julius Coreas MD [facility] C[facility] ([address] [place] [address])

## 2020-09-08 NOTE — CARE COORDINATION
Patient contacted regarding [id number] diagnosis\". Discussed COVID-19 related testing which was available at this time. Test results were positive. Patient informed of results, if available? Yes    Care Transition Nurse/ Ambulatory Care Manager contacted the patient by telephone to perform post discharge assessment. Call within 2 business days of discharge: Yes. Verified name and  with patient as identifiers. Provided introduction to self, and explanation of the CTN/ACM role, and reason for call due to risk factors for infection and/or exposure to COVID-19. Symptoms reviewed with patient who verbalized the following symptoms: no worsening symptoms. Due to no new or worsening symptoms encounter was not routed to provider for escalation. Discussed follow-up appointments. If no appointment was previously scheduled, appointment scheduling offered: [facility] follow up appointment(s):   Future Appointments   Date Time Provider James Cruz   2020 11:30 AM Ines B[facility] MD [facility] [place]       Non-face-to-face services provided:  Obtained and reviewed discharge summary and/or continuity of care documents     Advance Care Planning:   Does patient have an Advance Directive:  reviewed and current. Patient has following risk factors of: acute respiratory failure. CTN/ACM reviewed discharge instructions, medical action plan and red flags such as increased shortness of breath, increasing fever and signs of decompensation with patient who verbalized understanding. Discussed exposure protocols and quarantine with CDC Guidelines What to do if you are sick with coronavirus disease .  Patient was given an opportunity for questions and concerns. The patient agrees to contact the [facility] exposure line [phone], [address] of [facility]: ([phone]) and PCP office for questions related to their healthcare.  CTN/ACM provided contact information for future needs.    Reviewed and educated patient on any new and changed medications related to discharge diagnosis     Patient/family/caregiver given information for Get[employer] Loop and agrees to enroll no      Plan for follow-up call in 5-7 days based on severity of symptoms and risk factors. Patient stated she is doing much better. Denies SOB. She does had a productive cough. She has all her medications and f/u appts. Denies concerns or issues at this time.

## 2020-09-15 ENCOUNTER — CARE COORDINATION (OUTPATIENT)
Dept: CASE MANAGEMENT | Age: 80
End: 2020-09-15

## 2020-09-15 NOTE — CARE COORDINATION
[facility]  Transitions Follow Up Call    9/15/2020    Patient: Jaiden Alcantar  Patient : 1940   MRN: [id number]  Reason for Admission: ARF  Discharge Date: 20 RARS: Readmission Risk Score: 10         Patient contacted regarding COVID-19 risk and screening. Discussed COVID-19 related testing which was available at this time. Test results were negative. Patient informed of results, if available? Yes. Care Transition Nurse/ Ambulatory Care Manager contacted the patient by telephone to perform follow-up assessment. Verified name and  with patient as identifiers. Patient has following risk factors of: acute respiratory failure. Symptoms reviewed with patient who verbalized the following symptoms: no new symptoms. Due to no new or worsening symptoms encounter was not routed to provider for escalation. Education provided regarding infection prevention, and signs and symptoms of COVID-19 and when to seek medical attention with patient who verbalized understanding. Discussed exposure protocols and quarantine from [address] you at higher risk for severe illness  and given an opportunity for questions and concerns. The patient agrees to contact the COVID-19 hotline [phone] or PCP office for questions related to their healthcare. CTN/ACM provided contact information for future reference. From CDC: Are you at higher risk for severe illness?  Wash your hands often.  Avoid close contact (6 feet, which is about two arm lengths) with people who are sick.  Put distance between yourself and other people if COVID-19 is spreading in your community.  Clean and disinfect frequently touched surfaces.  Avoid all cruise travel and non-essential air travel.  Call your healthcare professional if you have concerns about COVID-19 and your underlying condition or if you are sick.     For more information on steps you can take to protect yourself, see CDC's How to Protect Yourself      Patient has no concerns or issues at this time    Care Transitions Subsequent and Final Call    Subsequent and Final Calls  Care Transitions Interventions  Other Interventions:             Follow Up  Future Appointments   Date Time Provider James Cruz   11/12/2020 11:30 AM Harrison Omalley MD [facility] - Tristian Messina RN

## 2020-09-24 RX ORDER — HYDRALAZINE HYDROCHLORIDE 50 MG/1
50 TABLET, FILM COATED ORAL 2 TIMES DAILY
Qty: 180 TABLET | Refills: 0 | Status: SHIPPED | OUTPATIENT
Start: 2020-09-24 | End: 2020-11-11 | Stop reason: SDUPTHER

## 2020-09-24 RX ORDER — ATENOLOL 25 MG/1
25 TABLET ORAL DAILY
Qty: 90 TABLET | Refills: 0 | Status: SHIPPED | OUTPATIENT
Start: 2020-09-24 | End: 2020-11-11 | Stop reason: SDUPTHER

## 2020-11-11 ENCOUNTER — OFFICE VISIT (OUTPATIENT)
Dept: CARDIOLOGY CLINIC | Age: 80
End: 2020-11-11
Payer: MEDICARE

## 2020-11-11 VITALS
SYSTOLIC BLOOD PRESSURE: 130 MMHG | WEIGHT: 257.4 LBS | HEART RATE: 68 BPM | DIASTOLIC BLOOD PRESSURE: 76 MMHG | HEIGHT: 64 IN | BODY MASS INDEX: 43.94 KG/M2

## 2020-11-11 PROCEDURE — G8427 DOCREV CUR MEDS BY ELIG CLIN: HCPCS | Performed by: INTERNAL MEDICINE

## 2020-11-11 PROCEDURE — 1123F ACP DISCUSS/DSCN MKR DOCD: CPT | Performed by: INTERNAL MEDICINE

## 2020-11-11 PROCEDURE — 99213 OFFICE O/P EST LOW 20 MIN: CPT | Performed by: INTERNAL MEDICINE

## 2020-11-11 PROCEDURE — G8417 CALC BMI ABV UP PARAM F/U: HCPCS | Performed by: INTERNAL MEDICINE

## 2020-11-11 PROCEDURE — 1036F TOBACCO NON-USER: CPT | Performed by: INTERNAL MEDICINE

## 2020-11-11 PROCEDURE — G8484 FLU IMMUNIZE NO ADMIN: HCPCS | Performed by: INTERNAL MEDICINE

## 2020-11-11 PROCEDURE — 1090F PRES/ABSN URINE INCON ASSESS: CPT | Performed by: INTERNAL MEDICINE

## 2020-11-11 PROCEDURE — 4040F PNEUMOC VAC/ADMIN/RCVD: CPT | Performed by: INTERNAL MEDICINE

## 2020-11-11 PROCEDURE — G8399 PT W/DXA RESULTS DOCUMENT: HCPCS | Performed by: INTERNAL MEDICINE

## 2020-11-11 RX ORDER — ATENOLOL 25 MG/1
25 TABLET ORAL DAILY
Qty: 90 TABLET | Refills: 3 | Status: ON HOLD | OUTPATIENT
Start: 2020-11-11 | End: 2021-02-04 | Stop reason: SDUPTHER

## 2020-11-11 RX ORDER — HYDRALAZINE HYDROCHLORIDE 50 MG/1
50 TABLET, FILM COATED ORAL 2 TIMES DAILY
Qty: 180 TABLET | Refills: 3 | Status: ON HOLD | OUTPATIENT
Start: 2020-11-11 | End: 2021-02-04 | Stop reason: SDUPTHER

## 2020-11-11 RX ORDER — AMLODIPINE BESYLATE 10 MG/1
TABLET ORAL
Qty: 90 TABLET | Refills: 3 | Status: ON HOLD | OUTPATIENT
Start: 2020-11-11 | End: 2021-02-11 | Stop reason: HOSPADM

## 2020-11-11 NOTE — PROGRESS NOTES
**This is a Medical/ PA/ APRN Student Note and is charted for educational purposes. The non-physician staff attested note is not to be used for billing purposes or to guide patient care. Please see the physician modifications/ attestation for treatment plan/suggestions. This note has been reviewed and feedback has been provided to the student. *      [address] [facility] [address] [place] [address]  Dept: [phone]  Dept Fax: [phone]  Loc: [phone]    Visit Date: 11/11/2020    Ms. Isaura Barber is a 68 y.o. female  who presented for: chest pain  Chief Complaint   Patient presents with    Follow-up    Cardiac Valve Problem     Moderate AS       HPI:   Ms. Isaura Barber is a 68year old female presenting with chest pain that she describes as dull and subtle. Her symptoms occur at rest. She notes that the pain occasional radiates through to her back. The pain started approximately 2 months ago. She admits to headache, lightheadedness, and SOB on exertion. She denies lower extremity edema and n/v. PMH includes HTN, DM COVID-19 (resolved in September), dyslipidemia. She states that she has a mumur but could not hear on ascultation. She has had COVID but feels that the chest discomfort occurred prior to the COVID infection. No arm or jaw radiation. Denies nausea. Ef. 55-60%. Moderate AS (5/2019). 30% obstruction in proximal LAD (2014). NSR. VL LE normal (2014).        Current Outpatient Medications:     hydrALAZINE (APRESOLINE) 50 MG tablet, Take 1 tablet by mouth 2 times daily, Disp: 180 tablet, Rfl: 0    atenolol (TENORMIN) 25 MG tablet, Take 1 tablet by mouth daily, Disp: 90 tablet, Rfl: 0    vitamin C (VITAMIN C) 1000 MG tablet, Take 1 tablet by mouth daily, Disp: 30 tablet, Rfl: 3    zinc sulfate (ZINCATE) 220 (50 Zn) MG capsule, Take 1 capsule by mouth daily, Disp: 30 capsule, Rfl: 3    ondansetron (ZOFRAN ODT) 4 MG disintegrating alcohol or use drugs. Family History  Ottoniel Brothers family history includes Arthritis in her mother; Cancer in her mother; Heart Disease in her father; High Blood Pressure in her father. There is no family history of bicuspid aortic valve, aneurysms, heart transplant, pacemakers, defibrillators, or sudden cardiac death. Past Surgical History   Past Surgical History:   Procedure Laterality Date    APPENDECTOMY      CARPAL TUNNEL RELEASE Left 2011    CATARACT REMOVAL Bilateral     CERVICAL FUSION  1976    CHOLECYSTECTOMY      COLON SURGERY  2012    COLONOSCOPY  2015    EYE SURGERY      CATARACTS    FINGER SURGERY Right 10/2016    3rd digit    HERNIA REPAIR      HYSTERECTOMY      NERVE SURGERY Right 10/25/2019    Lumbar Facet MBB @ R73-A0,Y7-9 L2-3 right side only #1 performed by Elan Monge MD at [facility] 98 Right 12/16/2019    Lumbar Facet MBB @ B91-C0-K8-3, L2-3 RIGHT SIDE ONLY performed by Elan Monge MD at [facility] 113 Right 1/6/2020    Lumbar RFA T12-L1, L1-2, L2-3 Right performed by Elan Monge MD at [address] [place] [address] LAMINECTOMY,>2 [facility]      6 FUSIONS    ROTATOR CUFF REPAIR Right 11/28/2014    SHOULDER SURGERY  2014    SPINE SURGERY      TENDON RELEASE Right 05/18/2017    at [facility] Bilateral     UPPER GASTROINTESTINAL ENDOSCOPY  2013,2015       Review of Systems   Constitutional: Negative for chills and fever  HENT: Negative for congestion, sinus pressure, sneezing and sore throat. Eyes: Negative for pain, discharge, redness and itching. Respiratory: Negative for apnea, cough  Gastrointestinal: Negative for blood in stool, constipation, diarrhea   Endocrine: Negative for cold intolerance, heat intolerance, polydipsia. Genitourinary: Negative for dysuria, enuresis, flank pain and hematuria.    Musculoskeletal: Negative for arthralgias, joint swelling and neck pain. Neurological: Negative for numbness and headaches. Psychiatric/Behavioral: Negative for agitation, confusion, decreased concentration and dysphoric mood. Objective:     /76   Pulse 68   Ht 5' 4\" (1.626 m)   Wt 257 lb 6.4 oz (116.8 kg)   BMI 44.18 kg/m²     Wt Readings from Last 3 Encounters:   11/11/20 257 lb 6.4 oz (116.8 kg)   09/04/20 252 lb (114.3 kg)   07/14/20 260 lb (117.9 kg)     BP Readings from Last 3 Encounters:   11/11/20 130/76   09/05/20 128/62   07/14/20 118/84       Nursing note and vitals reviewed. Physical Exam   Constitutional: Oriented to person, place, and time. Appears well-developed and well-nourished. HENT:   Head: Normocephalic and atraumatic. Eyes: EOM are normal. Pupils are equal, round, and reactive to light. Neck: Normal range of motion. Neck supple. No JVD present. Cardiovascular: Normal rate, regular rhythm, normal heart sounds and intact distal pulses. No murmur heard. Pulmonary/Chest: Effort normal and breath sounds normal. No respiratory distress. No wheezes. No rales. Abdominal: Soft. Bowel sounds are normal. No distension. There is no tenderness. Musculoskeletal: Normal range of motion. No edema. Neurological: Alert and oriented to person, place, and time. No cranial nerve deficit. Coordination normal.   Skin: Skin is warm and dry. Psychiatric: Normal mood and affect.        Lab Results   Component Value Date    CKTOTAL 49 09/04/2020       Lab Results   Component Value Date    WBC 2.9 09/05/2020    RBC 4.36 09/05/2020    RBC 0-5 04/07/2015    HGB 12.9 09/05/2020    HCT 39.9 09/05/2020    MCV 91.5 09/05/2020    MCH 29.6 09/05/2020    MCHC 32.3 09/05/2020    RDW 13.4 01/12/2017     09/05/2020    MPV 12.0 09/05/2020       Lab Results   Component Value Date     09/05/2020    K 4.2 09/05/2020    K 3.6 09/04/2020     09/05/2020    CO2 20 09/05/2020    BUN 16 09/05/2020    LABALBU 2.8 09/05/2020 CREATININE 0.6 09/05/2020    CALCIUM 8.3 09/05/2020    LABGLOM >90 09/05/2020    GLUCOSE 233 09/05/2020       Lab Results   Component Value Date    ALKPHOS 49 09/05/2020    ALT 22 09/05/2020    AST 33 09/05/2020    PROT 6.0 09/05/2020    BILITOT 0.4 09/05/2020    BILITOT NEGATIVE 04/07/2015    BILIDIR <0.2 09/04/2020    LABALBU 2.8 09/05/2020       Lab Results   Component Value Date    MG 1.9 04/25/2019       No results found for: INR, PROTIME      Lab Results   Component Value Date    LABA1C 6.8 01/14/2020       Lab Results   Component Value Date    TRIG 141 05/14/2016    HDL 46 05/14/2016    LDLCALC 70 05/14/2016       Lab Results   Component Value Date    TSH 2.740 05/18/2016         Testing Reviewed:      I have individually reviewed the cardiac test below:    ECHO:   Results for orders placed during the hospital encounter of 05/13/19   Echo 2D w doppler w color complete    Narrative Transthoracic Echocardiography Report (TTE)     Demographics      Patient Name    [facility]  Gender               Female                   K      MR #            [id number]        Race                                                        Ethnicity      Account #       [de-identified]        Room Number      Accession       [id number]        Date of Study        05/13/2019   Number      Date of Birth   1940       Referring Physician  Tristian Grant MD      Age             70 year(s)       Sonographer          José Ruby MD                                    Physician     Procedure    Type of Study      TTE procedure:ECHOCARDIOGRAM COMPLETE 2D W DOPPLER W COLOR. Procedure Date  Date: 05/13/2019 Start: 12:57 PM    Study Location: Echo Lab  Technical Quality: Adequate visualization    Indications: Aortic stenosis. Additional Medical History:Recent syncope, GERD, diabetic, dyslipidemia    Patient Status: Routine    Height: 64 inches Weight: 242.01 pounds BSA: 2.12 m^2 BMI: 41.54 kg/m^2    BP: 150/87 mmHg     Conclusions      Summary   Technically difficult examination. Ejection fraction was estimated at 55-60%. The aortic valve was trileaflet with increased thickness/calcification and   reduced cuspal separation. DOPPLER: Transaortic velocity was 2.8 m/s, mean   gradient 20mmHg, and SAM 1.1 cm2. There is moderate aortic stenosis. IVC size is within normal limits with normal respiratory phasic changes. Signature      ----------------------------------------------------------------   Electronically signed by Kimber Dawson MD (Interpreting   physician) on 05/13/2019 at 04:31 PM   ----------------------------------------------------------------      Findings      Mitral Valve   The mitral valve structure was normal with normal leaflet separation. DOPPLER: The transmitral velocity was within the normal range with no   evidence for mitral stenosis. There was no evidence of mitral   regurgitation. Aortic Valve   The aortic valve was trileaflet with increased thickness/calcification and   reduced cuspal separation. DOPPLER: Transaortic velocity was 2.8 m/s, mean   gradient 20mmHg, and SAM 1.1 cm2. There is moderate aortic stenosis. There   was trace evidence of aortic regurgitation. Tricuspid Valve   The tricuspid valve structure was normal with normal leaflet separation. DOPPLER: There was no evidence of tricuspid stenosis. There was no   evidence of tricuspid regurgitation. Pulmonic Valve   The pulmonic valve leaflets were not well seen. DOPPLER: The transpulmonic   velocity was within the normal range with no evidence for regurgitation. Left Atrium   Left atrial size was normal.      Left Ventricle   Normal left ventricular size and systolic function.    There were no regional wall motion abnormalities. Wall thickness was within normal limits. Ejection fraction was estimated at 55-60%. E/A flow reversal noted. Suggestive of diastolic dysfunction. Right Atrium   Right atrial size was normal.      Right Ventricle   The right ventricular size was normal with normal systolic function and   wall thickness. Pericardial Effusion   The pericardium was normal in appearance with no evidence of a pericardial   effusion. Pleural Effusion   No evidence of pleural effusion. Aorta / Great Vessels   IVC size is within normal limits with normal respiratory phasic changes.      M-Mode/2D Measurements & Calculations      LV Diastolic    LV Systolic Dimension:    AV Cusp Separation: 1 cmLA   Dimension: 4.6  3.2 cm                    Dimension: 3.2 cmAO Root   cm              LV Volume Diastolic: 47.0 Dimension: 2.7 cmLA Area: 18.5   LV FS:30.4 %    ml                        cm^2   LV PW           LV Volume Systolic: 41 ml   Diastolic: 0.8  LV EDV/LV EDV Index: 97.3   cm              ml/46 m^2LV ESV/LV ESV   Septum          Index: 41 ml/19 m^2       RV Diastolic Dimension: 2.7 cm   Diastolic: 1.1  EF Calculated: 57.9 %   cm                                        LA/Aorta: 1.19                                                LA volume/Index: 48.9 ml /23m^2                   LVOT: 1.9 cm     Doppler Measurements & Calculations      MV Peak E-Wave:     AV Peak Velocity: 265    LVOT Peak Velocity: 111 cm/s   66.2 cm/s           cm/s                     LVOT Mean Velocity: 88.1 cm/s   MV Peak A-Wave:     AV Peak Gradient: 28.09  LVOT Peak Gradient: 5   95.8 cm/s           mmHg                     mmHgLVOT Mean Gradient: 3   MV E/A Ratio: 0.69  AV Mean Velocity: 211    mmHg   MV Peak Gradient:   cm/s   1.75 mmHg           AV Mean Gradient: 19     TV Peak E-Wave: 51.4 cm/s                       mmHg                     TV Peak A-Wave: 34.3 cm/s   MV Deceleration     AV VTI: 64.7 cm   Time: 299 msec      AV Area                  TV Peak Gradient: 1.06 mmHg                       (Continuity):1.06 cm^2   TR Velocity:246 cm/s                                                TR Gradient:24.21 mmHg   MV E' Septal        LVOT VTI: 24.1 cm        PV Peak Velocity: 69 cm/s   Velocity: 7.1 cm/s                           PV Peak Gradient: 1.9 mmHg   MV A' Septal   Velocity: 8.3 cm/s   MV E' Lateral       AV DVI (VTI): 0.37AV DVI   Velocity: 6.5 cm/s  (Vmax):0.42   MV A' Lateral   Velocity: 8.8 cm/s   E/E' septal: 9.32   E/E' lateral: 10.18   MR Velocity: 362   cm/s     [id number]        Assessment/Plan   1. Atypical chest pain with associated sob, hx of moderate AS: Lexiscan and TTE. Need to follow the valve as this may be the cause of the symptoms. Will also rule out progressive CAD. 2015 Lexiscan was negative. 2. Hypertension: BP today 130/76. Well controlled with hydralazine, atenolol  3. Dyslipidemia: continue meds. Will need to get lipid panel. Goal LDL <70  4. Hx of CVA in 2000s    Disposition:  6-12 months     Electronically signed by Kemi Elizabeth   11/11/2020 at 11:45 AM EST    Attending Supervising Physician's Attestation Statement  I performed a history and physical examination on the patient and discussed the management with the resident physician/med student. I reviewed and agree with the findings and plan as documented in the resident's/med student's note.     Electronically signed by Hector Sexton MD on 11/11/20 at 8:46 PM EST

## 2020-11-27 ENCOUNTER — HOSPITAL ENCOUNTER (OUTPATIENT)
Dept: NON INVASIVE DIAGNOSTICS | Age: 80
Discharge: HOME | End: 2020-11-27
Payer: MEDICARE

## 2020-11-27 LAB
LV EF: 58 %
LVEF MODALITY: NORMAL

## 2020-11-27 PROCEDURE — 93306 TTE W/DOPPLER COMPLETE: CPT

## 2020-11-27 PROCEDURE — 6360000002 HC RX W HCPCS

## 2020-11-27 PROCEDURE — 3430000000 HC RX DIAGNOSTIC RADIOPHARMACEUTICAL: Performed by: INTERNAL MEDICINE

## 2020-11-27 PROCEDURE — 93017 CV STRESS TEST TRACING ONLY: CPT | Performed by: INTERNAL MEDICINE

## 2020-11-27 PROCEDURE — A9500 TC99M SESTAMIBI: HCPCS | Performed by: INTERNAL MEDICINE

## 2020-11-27 PROCEDURE — 78452 HT MUSCLE IMAGE SPECT MULT: CPT

## 2020-11-27 RX ADMIN — Medication 11 MILLICURIE: at 11:25

## 2020-11-27 RX ADMIN — Medication 34.8 MILLICURIE: at 12:50

## 2020-12-01 PROCEDURE — 93016 CV STRESS TEST SUPVJ ONLY: CPT | Performed by: INTERNAL MEDICINE

## 2020-12-01 PROCEDURE — 78452 HT MUSCLE IMAGE SPECT MULT: CPT | Performed by: INTERNAL MEDICINE

## 2020-12-01 PROCEDURE — 93018 CV STRESS TEST I&R ONLY: CPT | Performed by: INTERNAL MEDICINE

## 2020-12-02 ENCOUNTER — TELEPHONE (OUTPATIENT)
Dept: CARDIOLOGY CLINIC | Age: 80
End: 2020-12-02

## 2020-12-10 ENCOUNTER — OFFICE VISIT (OUTPATIENT)
Dept: CARDIOLOGY CLINIC | Age: 80
End: 2020-12-10
Payer: MEDICARE

## 2020-12-10 VITALS
WEIGHT: 255 LBS | BODY MASS INDEX: 43.54 KG/M2 | DIASTOLIC BLOOD PRESSURE: 82 MMHG | HEIGHT: 64 IN | HEART RATE: 84 BPM | SYSTOLIC BLOOD PRESSURE: 148 MMHG

## 2020-12-10 PROCEDURE — 4040F PNEUMOC VAC/ADMIN/RCVD: CPT | Performed by: INTERNAL MEDICINE

## 2020-12-10 PROCEDURE — G8399 PT W/DXA RESULTS DOCUMENT: HCPCS | Performed by: INTERNAL MEDICINE

## 2020-12-10 PROCEDURE — 99214 OFFICE O/P EST MOD 30 MIN: CPT | Performed by: INTERNAL MEDICINE

## 2020-12-10 PROCEDURE — G8427 DOCREV CUR MEDS BY ELIG CLIN: HCPCS | Performed by: INTERNAL MEDICINE

## 2020-12-10 PROCEDURE — 1036F TOBACCO NON-USER: CPT | Performed by: INTERNAL MEDICINE

## 2020-12-10 PROCEDURE — G8417 CALC BMI ABV UP PARAM F/U: HCPCS | Performed by: INTERNAL MEDICINE

## 2020-12-10 PROCEDURE — 1123F ACP DISCUSS/DSCN MKR DOCD: CPT | Performed by: INTERNAL MEDICINE

## 2020-12-10 PROCEDURE — G8484 FLU IMMUNIZE NO ADMIN: HCPCS | Performed by: INTERNAL MEDICINE

## 2020-12-10 PROCEDURE — 1090F PRES/ABSN URINE INCON ASSESS: CPT | Performed by: INTERNAL MEDICINE

## 2020-12-10 ASSESSMENT — ENCOUNTER SYMPTOMS
DOUBLE VISION: 0
VOMITING: 0
DIARRHEA: 0
NAUSEA: 1
SHORTNESS OF BREATH: 0
ABDOMINAL PAIN: 0
BLURRED VISION: 0
COUGH: 0

## 2020-12-10 NOTE — PROGRESS NOTES
[address] [place] [address] [address]  [place] [address]  Dept: [phone]  Dept Fax: [phone]  Loc: [phone]    Visit Date: 12/10/2020  Patient Name: Darius Vega    Chief Complaint   Patient presents with    Follow-up     AS    Results     Echo,        HPI:   Patient is a 68 y.o. female with PMHx of hypertension, hyperlipidemia, type 2 diabetes mellitus, CVA, diastolic dysfunction who presents to the office today for the evaluation regarding her severe aortic stenosis. Patient recently had a Lexiscan stress test on 11/27/2020 which was negative. She also underwent an echo on 11/27/2020: Showed ejection fraction 55 to 60%, tricuspid aorta with increased thickness and calcification which had a transvelocity of 3.6 m/s and mean gradient of 32 mmHg. Holter monitor in the past showed occasional PACs and PVCs however nothing sustained. Patient continues to experience intermittent presents of left-sided chest pain that radiates to her back, that is accompanied by diaphoresis and nausea. She also complains of having intermittent episodes of headaches as well as creased fatigue. She denies any fever, chills, cough, lower extremity edema, syncope, shortness of breath, vomiting or abdominal pain.          Current Outpatient Medications:     hydrALAZINE (APRESOLINE) 50 MG tablet, Take 1 tablet by mouth 2 times daily, Disp: 180 tablet, Rfl: 3    atenolol (TENORMIN) 25 MG tablet, Take 1 tablet by mouth daily, Disp: 90 tablet, Rfl: 3    amLODIPine (NORVASC) 10 MG tablet, TAKE 1 TABLET DAILY, Disp: 90 tablet, Rfl: 3    vitamin C (VITAMIN C) 1000 MG tablet, Take 1 tablet by mouth daily, Disp: 30 tablet, Rfl: 3    zinc sulfate (ZINCATE) 220 (50 Zn) MG capsule, Take 1 capsule by mouth daily, Disp: 30 capsule, Rfl: 3    ondansetron (ZOFRAN ODT) 4 MG disintegrating tablet, Take 1 tablet by mouth every 8 hours as needed for Nausea or Vomiting, Disp: 20 tablet, Rfl: 0    metFORMIN (GLUCOPHAGE) 500 MG tablet, TAKE 1 TABLET TWICE A DAY WITH MEALS, Disp: 180 tablet, Rfl: 4    atorvastatin (LIPITOR) 40 MG tablet, TAKE 1 TABLET DAILY, Disp: 90 tablet, Rfl: 4    sertraline (ZOLOFT) 100 MG tablet, TAKE 2 TABLETS DAILY, Disp: 180 tablet, Rfl: 4    quinapril (ACCUPRIL) 10 MG tablet, TAKE 1 TABLET NIGHTLY, Disp: 90 tablet, Rfl: 4    pantoprazole (PROTONIX) 40 MG tablet, TAKE 1 TABLET TWICE A DAY, Disp: 180 tablet, Rfl: 4    nystatin (MYCOSTATIN) 232245 UNIT/GM cream, Apply topically 2 times daily. , Disp: 1 Tube, Rfl: 1    aspirin 81 MG tablet, Take 81 mg by mouth daily, Disp: , Rfl:     lidocaine (LMX) 4 % cream, Apply topically 4x daily as needed. , Disp: 30 g, Rfl: 5    promethazine (PHENERGAN) 25 MG tablet, Take 1 tablet by mouth every 6 hours as needed for Nausea, Disp: 15 tablet, Rfl: 0    ibuprofen (ADVIL;MOTRIN) 600 MG tablet, Take 1 tablet by mouth 3 times daily as needed for Pain (Take with food 3 times daily for pain), Disp: 15 tablet, Rfl: 0    vitamin D (CHOLECALCIFEROL) 1000 UNIT TABS tablet, Take 1,000 Units by mouth nightly., Disp: , Rfl:     Past Medical History  Vikki Martinez  has a past medical history of Arthritis, Chronic low back pain, COVID-19, DM type 2, goal A1c below 7, GERD (gastroesophageal reflux disease), Hyperlipidemia, Hypertension, Loss of vision, Mitral valve disorder, Osteoporosis, Subarachnoid hemorrhage ([address] TIA (transient ischemic attack), Type 2 diabetes mellitus with diabetic neuropathy, without long-term current use of insulin ([address] Unspecified cerebral artery occlusion with cerebral infarction, and Urinary incontinence. Social History  Vikki Martinez  reports that she has never smoked. She has never used smokeless tobacco. She reports that she does not drink alcohol or use drugs.     Family History  Angeles's family history includes Arthritis in her mother; Cancer in her mother; Heart Disease in her father; High Blood Pressure in her father. Past Surgical History   Shira Lopez  has a past surgical history that includes Appendectomy; Cholecystectomy; hernia repair; Hysterectomy; Spine surgery; Carpal tunnel release (Left, 2011); pr laminectomy,>2 sgmt,lumbar; cervical fusion (1976); eye surgery; Rotator cuff repair (Right, 11/28/2014); shoulder surgery (2014); Cataract removal (Bilateral); Colon surgery (2012); Colonoscopy (2015); Upper gastrointestinal endoscopy ([id number]); Finger surgery (Right, 10/2016); tendon release (Right, 05/18/2017); Total knee arthroplasty (Bilateral); Nerve Surgery (Right, 10/25/2019); Nerve Surgery (Right, 12/16/2019); and Pain management procedure (Right, 1/6/2020). :     Review of Systems   Constitution: Positive for diaphoresis. Negative for chills and fever. HENT: Negative for congestion. Eyes: Negative for blurred vision and double vision. Cardiovascular: Positive for chest pain and dyspnea on exertion. Negative for leg swelling, palpitations and syncope. Respiratory: Negative for cough and shortness of breath. Skin: Negative for rash. Musculoskeletal: Negative for muscle weakness. Gastrointestinal: Positive for nausea. Negative for abdominal pain, diarrhea and vomiting. Genitourinary: Negative for dysuria and hematuria. Neurological: Positive for headaches. Negative for numbness and paresthesias. Psychiatric/Behavioral: Negative for altered mental status. Objective:     BP (!) 148/82 Comment: did not take BP meds this morning  Pulse 84   Ht 5' 4\" (1.626 m)   Wt 255 lb (115.7 kg)   BMI 43.77 kg/m²     Wt Readings from Last 3 Encounters:   12/10/20 255 lb (115.7 kg)   11/11/20 257 lb 6.4 oz (116.8 kg)   09/04/20 252 lb (114.3 kg)     BP Readings from Last 3 Encounters:   12/10/20 (!) 148/82   11/11/20 130/76   09/05/20 128/62       Physical Exam  Vitals signs reviewed. Constitutional:       General: She is not in acute distress.      Appearance: She is normal weight. She is not ill-appearing, toxic-appearing or diaphoretic. HENT:      Right Ear: External ear normal.      Left Ear: External ear normal.      Nose: Nose normal. No congestion or rhinorrhea. Mouth/Throat:      Mouth: Mucous membranes are dry. Pharynx: Oropharynx is clear. Eyes:      Extraocular Movements: Extraocular movements intact. Conjunctiva/sclera: Conjunctivae normal.   Neck:      Musculoskeletal: Normal range of motion and neck supple. No neck rigidity or muscular tenderness. Cardiovascular:      Rate and Rhythm: Normal rate and regular rhythm. Pulses: Normal pulses. Heart sounds: Murmur present. Pulmonary:      Effort: Pulmonary effort is normal. No respiratory distress. Breath sounds: Normal breath sounds. No stridor. No wheezing, rhonchi or rales. Chest:      Chest wall: No tenderness. Abdominal:      General: Abdomen is flat. Bowel sounds are normal. There is no distension. Palpations: Abdomen is soft. Tenderness: There is no abdominal tenderness. Musculoskeletal:      Right lower leg: No edema. Left lower leg: No edema. Lymphadenopathy:      Cervical: No cervical adenopathy. Skin:     General: Skin is warm and dry. Capillary Refill: Capillary refill takes less than 2 seconds. Findings: No rash. Neurological:      General: No focal deficit present. Mental Status: She is alert and oriented to person, place, and time. Mental status is at baseline.    Psychiatric:         Mood and Affect: Mood normal.         Behavior: Behavior normal.         Results for orders placed or performed during the hospital encounter of 16/83/94   Basic Metabolic Panel w/ Reflex to MG   Result Value Ref Range    Sodium 135 135 - 145 meq/L    Potassium reflex Magnesium 3.6 3.5 - 5.2 meq/L    Chloride 102 98 - 111 meq/L    CO2 21 (L) 23 - 33 meq/L    Glucose 135 (H) 70 - 108 mg/dL    BUN 15 7 - 22 mg/dL    CREATININE 0.7 0.4 - 1.2 mg/dL    Calcium 8.4 (L) 8.5 - 10.5 mg/dL   Brain Natriuretic Peptide   Result Value Ref Range    Pro-BNP 1879.0 (H) 0.0 - 1800.0 pg/mL   CBC Auto Differential   Result Value Ref Range    WBC 4.2 (L) 4.8 - 10.8 thou/mm3    RBC 4.63 4.20 - 5.40 mill/mm3    Hemoglobin 13.7 12.0 - 16.0 gm/dl    Hematocrit 41.3 37.0 - 47.0 %    MCV 89.2 81.0 - 99.0 fL    MCH 29.6 26.0 - 33.0 pg    MCHC 33.2 32.2 - 35.5 gm/dl    RDW-CV 13.7 11.5 - 14.5 %    RDW-SD 44.8 35.0 - 45.0 fL    Platelets 172 322 - 037 thou/mm3    MPV 11.7 9.4 - 12.4 fL    Seg Neutrophils 77.2 %    Lymphocytes 14.8 %    Monocytes 7.4 %    Eosinophils 0.2 %    Basophils 0.2 %    Immature Granulocytes 0.2 %    Segs Absolute 3.2 1.8 - 7.7 thou/mm3    Lymphocytes Absolute 0.6 (L) 1.0 - 4.8 thou/mm3    Monocytes Absolute 0.3 (L) 0.4 - 1.3 thou/mm3    Eosinophils Absolute 0.0 0.0 - 0.4 thou/mm3    Basophils Absolute 0.0 0.0 - 0.1 thou/mm3    Immature Grans (Abs) 0.01 0.00 - 0.07 thou/mm3    nRBC 0 /100 wbc   Troponin   Result Value Ref Range    Troponin T < 0.010 ng/ml   D-Dimer, Quantitative   Result Value Ref Range    D-Dimer, Quant 390.00 0.00 - 500.00 ng/ml FEU   Procalcitonin   Result Value Ref Range    Procalcitonin 0.07 0.01 - 0.09 ng/mL   Lactate Dehydrogenase   Result Value Ref Range     (H) 100 - 190 U/L   D-Dimer, Quantitative   Result Value Ref Range    D-Dimer, Quant 340.00 0.00 - 500.00 ng/ml FEU   Anion Gap   Result Value Ref Range    Anion Gap 12.0 8.0 - 16.0 meq/L   Glomerular Filtration Rate, Estimated   Result Value Ref Range    Est, Glom Filt Rate 81 (A) ml/min/1.73m2   Osmolality   Result Value Ref Range    Osmolality Calc 273.0 (L) 275.0 - 300.0 mOsmol/kg   Procalcitonin   Result Value Ref Range    Procalcitonin 0.08 0.01 - 0.09 ng/mL   Lactic acid, plasma   Result Value Ref Range    Lactic Acid 0.8 0.5 - 2.2 mmol/L   Hepatic Function Panel   Result Value Ref Range    Alb 3.5 3.5 - 5.1 g/dL    Total Bilirubin 0.5 0.3 - 1.2 mg/dL    Bilirubin, Direct <0.2 0.0 - 0.3 mg/dL    Alkaline Phosphatase 50 38 - 126 U/L    AST 32 5 - 40 U/L    ALT 22 11 - 66 U/L    Total Protein 5.9 (L) 6.1 - 8.0 g/dL   CBC auto differential   Result Value Ref Range    WBC 2.9 (L) 4.8 - 10.8 thou/mm3    RBC 4.36 4.20 - 5.40 mill/mm3    Hemoglobin 12.9 12.0 - 16.0 gm/dl    Hematocrit 39.9 37.0 - 47.0 %    MCV 91.5 81.0 - 99.0 fL    MCH 29.6 26.0 - 33.0 pg    MCHC 32.3 32.2 - 35.5 gm/dl    RDW-CV 13.6 11.5 - 14.5 %    RDW-SD 46.8 (H) 35.0 - 45.0 fL    Platelets 639 306 - 718 thou/mm3    MPV 12.0 9.4 - 12.4 fL    Seg Neutrophils 81.3 %    Lymphocytes 14.9 %    Monocytes 2.8 %    Eosinophils 0.0 %    Basophils 0.7 %    Immature Granulocytes 0.3 %    Segs Absolute 2.4 1.8 - 7.7 thou/mm3    Lymphocytes Absolute 0.4 (L) 1.0 - 4.8 thou/mm3    Monocytes Absolute 0.1 (L) 0.4 - 1.3 thou/mm3    Eosinophils Absolute 0.0 0.0 - 0.4 thou/mm3    Basophils Absolute 0.0 0.0 - 0.1 thou/mm3    Immature Grans (Abs) 0.01 0.00 - 0.07 thou/mm3    nRBC 0 /100 wbc   Comprehensive Metabolic Panel   Result Value Ref Range    Glucose 233 (H) 70 - 108 mg/dL    CREATININE 0.6 0.4 - 1.2 mg/dL    BUN 16 7 - 22 mg/dL    Sodium 137 135 - 145 meq/L    Potassium 4.2 3.5 - 5.2 meq/L    Chloride 105 98 - 111 meq/L    CO2 20 (L) 23 - 33 meq/L    Calcium 8.3 (L) 8.5 - 10.5 mg/dL    AST 33 5 - 40 U/L    Alkaline Phosphatase 49 38 - 126 U/L    Total Protein 6.0 (L) 6.1 - 8.0 g/dL    Alb 2.8 (L) 3.5 - 5.1 g/dL    Total Bilirubin 0.4 0.3 - 1.2 mg/dL    ALT 22 11 - 66 U/L   C-reactive protein   Result Value Ref Range    CRP 3.58 (H) 0.00 - 1.00 mg/dl   CK   Result Value Ref Range    Total CK 49 30 - 135 U/L   Ferritin   Result Value Ref Range    Ferritin 540 (H) 10 - 291 ng/mL   Anion Gap   Result Value Ref Range    Anion Gap 12.0 8.0 - 16.0 meq/L   Osmolality   Result Value Ref Range    Osmolality Calc 282.5 275.0 - 300.0 mOsmol/kg   Glomerular Filtration Rate, Estimated   Result Value Ref Range    Est, Glom Filt Rate >90 ml/min/1.73m2   EKG 12 Lead   Result Value Ref Range    Ventricular Rate 109 BPM    Atrial Rate 109 BPM    P-R Interval 142 ms    QRS Duration 90 ms    Q-T Interval 340 ms    QTc Calculation (Bazett) 457 ms    P Axis 40 degrees    R Axis 0 degrees    T Axis 28 degrees       Cardiac Tests:    I have individually reviewed the below cardiac tests    EKG:  ECHO:   Results for orders placed during the hospital encounter of 11/27/20   Echo 2D w doppler w color complete    Narrative Transthoracic Echocardiography Report (TTE)     Demographics      Patient Name    [facility]  Gender                Female                   K      MR #            [id number]        Race                                                         Ethnicity      Account #       [de-identified]        Room Number      Accession       [id number]       Date of Study         11/27/2020   Number      Date of Birth   1940       Referring Physician   Chelsie Singh MD      Age             68 year(s)       Kyle Lawson MD                                    Physician     Procedure    Type of Study      TTE procedure:ECHOCARDIOGRAM COMPLETE 2D W DOPPLER W COLOR. Procedure Date  Date: 11/27/2020 Start: 10:13 AM    Study Location: Echo Lab  Technical Quality: Adequate visualization    Indications:Shortness of breath and Chest pain. Additional Medical History:Hypertension, Hyperlipidemia, GERD, Stroke,  family history of heart disease, COVID, Arthritis    Patient Status: Routine    Height: 63.78 inches Weight: 257.01 pounds BSA: 2.17 m^2 BMI: 44.42 kg/m^2    BP: 130/76 mmHg     Conclusions      Summary   Ejection fraction was estimated at 55-60%. Left atrial size was mildly dilated.    The aortic valve was trileaflet with increased thickness, calcification,   and reduced cuspal AV P1/2t: 632 msec       PV Peak Gradient: 2.33 mmHg   MV E' Septal        IVRT: 85 msec   Velocity: 6.1 cm/s   MV A' Septal   Velocity: 6.4 cm/s  AV DVI (VTI): 0.34AV DVI   MV E' Lateral       (Vmax):0.32   Velocity: 6.4 cm/s   MV A' Lateral   Velocity: 7 cm/s   E/E' septal: 18.85   E/E' lateral: 17.97   MR Velocity: 463   cm/s     [id number]     STRESS: Lexiscan 11/27/2020: Negative. CATH: 2014: Mild pulmonary hypertension, diastolic dysfunction in the left ventricle. Assessment/Plan   Chronic diastolic dysfunction with preserved EF  Severe aortic stenosis: TAVR protocol initiated  CVA 2000s  Occasional PACs, PVCs    Discussed diet/exercise/BP/weight loss/health lifestyle choices/lipids; the patient understands the goals and will try to comply. Disposition:  Follow-up per the TAVR protocol. Electronically signed by Deonte Cevallos MD [facility]  12/10/2020 at 2:22 PM EST      Attending Supervising Physician's Attestation Statement  I performed a history and physical examination on the patient and discussed the management with the resident physician. I reviewed and agree with the findings and plan as documented in the resident's note except for as noted below. Severe Symptomatic Aortic Stenosis - Given the STS scores, frailty, comorbidities, and the imaging findings, the patient is clinically a candidate for TAVR (See PreTAVR Assesment). Discussed transcatheter aortic valve replacement (TAVR) with the patient/family regarding risks, benefits, alternatives to the procedure. The patient understands the associated visits with CT surgery and also understands the need for TAVR CTA and [facility] +/- PCI. The patient is FULL CODE. The POA is listed in the chart. We will schedule the above as appropriate.   Once complete and appropriate based on the findings, a

## 2020-12-14 ENCOUNTER — OFFICE VISIT (OUTPATIENT)
Dept: CARDIOTHORACIC SURGERY | Age: 80
End: 2020-12-14
Payer: MEDICARE

## 2020-12-14 ENCOUNTER — TELEPHONE (OUTPATIENT)
Dept: CARDIOLOGY CLINIC | Age: 80
End: 2020-12-14

## 2020-12-14 VITALS
WEIGHT: 257 LBS | SYSTOLIC BLOOD PRESSURE: 118 MMHG | HEART RATE: 73 BPM | DIASTOLIC BLOOD PRESSURE: 67 MMHG | BODY MASS INDEX: 43.87 KG/M2 | HEIGHT: 64 IN | OXYGEN SATURATION: 96 %

## 2020-12-14 PROCEDURE — G8427 DOCREV CUR MEDS BY ELIG CLIN: HCPCS | Performed by: PHYSICIAN ASSISTANT

## 2020-12-14 PROCEDURE — 1036F TOBACCO NON-USER: CPT | Performed by: PHYSICIAN ASSISTANT

## 2020-12-14 PROCEDURE — 1090F PRES/ABSN URINE INCON ASSESS: CPT | Performed by: PHYSICIAN ASSISTANT

## 2020-12-14 PROCEDURE — G8399 PT W/DXA RESULTS DOCUMENT: HCPCS | Performed by: PHYSICIAN ASSISTANT

## 2020-12-14 PROCEDURE — 99213 OFFICE O/P EST LOW 20 MIN: CPT | Performed by: PHYSICIAN ASSISTANT

## 2020-12-14 PROCEDURE — G8417 CALC BMI ABV UP PARAM F/U: HCPCS | Performed by: PHYSICIAN ASSISTANT

## 2020-12-14 PROCEDURE — 1123F ACP DISCUSS/DSCN MKR DOCD: CPT | Performed by: PHYSICIAN ASSISTANT

## 2020-12-14 PROCEDURE — G8484 FLU IMMUNIZE NO ADMIN: HCPCS | Performed by: PHYSICIAN ASSISTANT

## 2020-12-14 PROCEDURE — 4040F PNEUMOC VAC/ADMIN/RCVD: CPT | Performed by: PHYSICIAN ASSISTANT

## 2020-12-14 NOTE — TELEPHONE ENCOUNTER
Orders received from Dr. Leo Alicia to schedule the patient for a heart cath with COVID test prior, TAVR CTA and US of the Carotids. Cath: 1/182020 at 0700 with an arrival of 0500. COVID: 1/11/2020  US Carotids: 12/21/20 at 1000  TAVR CTA: 12/21/20 at 1100 with an arrival at [address] [place] [address] form faxed to [facility] at [phone].

## 2020-12-14 NOTE — PROGRESS NOTES
CT surgery New Patient Office Appointment        Patient's Name/Date of Birth: James Diaz / 1940 (16 y.o.)    PCP: Concepción Teague,     Date: December 14, 2020     CC: Aortic Stenosis    HPI  We had the pleasure of seeing James Diaz in the office today, as you know this is a very pleasant 68y.o. year old female with a history of aortic stenosis, HTN, hyperlipidemia, DMII, COVID-19, CVA, TIA, subarachnoid hemorrhage, and GERD. The pt is here for evaluation of aortic stenosis. ECHO obtained on 11/27/20 showed EF 55-60%. Transaortic velocity was 3.6 m/s, mean gradient 32 mmHg, SAM 0.9 cm2, consistent with severe aortic stenosis. Patient recently had a Lexiscan stress test on 11/27/2020 which was negative. She is comp;ining of left sided chest pain. She has also noticed increased fatigue. She denies LE edema, fever, chills, SOB. PastMedical History:  Lety Li  has a past medical history of Arthritis, Chronic low back pain, COVID-19, DM type 2, goal A1c below 7, GERD (gastroesophageal reflux disease), Hyperlipidemia, Hypertension, Loss of vision, Mitral valve disorder, Osteoporosis, Subarachnoid hemorrhage ([address] TIA (transient ischemic attack), Type 2 diabetes mellitus with diabetic neuropathy, without long-term current use of insulin ([address] Unspecified cerebral artery occlusion with cerebral infarction, and Urinary incontinence.     Past Surgical History: The patient  has a past surgical history that includes Appendectomy; Cholecystectomy; hernia repair; Hysterectomy; Spine surgery; Carpal tunnel release (Left, 2011); pr laminectomy,>2 sgmt,lumbar; cervical fusion (1976); eye surgery; Rotator cuff repair (Right, 11/28/2014); shoulder surgery (2014); Cataract removal (Bilateral); Colon surgery (2012); Colonoscopy (2015); Upper gastrointestinal endoscopy (9580,3779); Finger surgery (Right, 10/2016); tendon release (Right, 05/18/2017); Total knee arthroplasty (Bilateral); Nerve Surgery (Right, 10/25/2019); Nerve Surgery (Right, 12/16/2019); and Pain management procedure (Right, 1/6/2020). Allergies: The patient is allergic to norco [hydrocodone-acetaminophen]; tramadol; and codeine.     Medications:    Current Outpatient Medications:     hydrALAZINE (APRESOLINE) 50 MG tablet, Take 1 tablet by mouth 2 times daily, Disp: 180 tablet, Rfl: 3    atenolol (TENORMIN) 25 MG tablet, Take 1 tablet by mouth daily, Disp: 90 tablet, Rfl: 3    amLODIPine (NORVASC) 10 MG tablet, TAKE 1 TABLET DAILY, Disp: 90 tablet, Rfl: 3    vitamin C (VITAMIN C) 1000 MG tablet, Take 1 tablet by mouth daily, Disp: 30 tablet, Rfl: 3    zinc sulfate (ZINCATE) 220 (50 Zn) MG capsule, Take 1 capsule by mouth daily, Disp: 30 capsule, Rfl: 3    ondansetron (ZOFRAN ODT) 4 MG disintegrating tablet, Take 1 tablet by mouth every 8 hours as needed for Nausea or Vomiting, Disp: 20 tablet, Rfl: 0    metFORMIN (GLUCOPHAGE) 500 MG tablet, TAKE 1 TABLET TWICE A DAY WITH MEALS, Disp: 180 tablet, Rfl: 4    atorvastatin (LIPITOR) 40 MG tablet, TAKE 1 TABLET DAILY, Disp: 90 tablet, Rfl: 4    sertraline (ZOLOFT) 100 MG tablet, TAKE 2 TABLETS DAILY, Disp: 180 tablet, Rfl: 4    quinapril (ACCUPRIL) 10 MG tablet, TAKE 1 TABLET NIGHTLY, Disp: 90 tablet, Rfl: 4    pantoprazole (PROTONIX) 40 MG tablet, TAKE 1 TABLET TWICE A DAY, Disp: 180 tablet, Rfl: 4   nystatin (MYCOSTATIN) 207401 UNIT/GM cream, Apply topically 2 times daily. , Disp: 1 Tube, Rfl: 1    aspirin 81 MG tablet, Take 81 mg by mouth daily, Disp: , Rfl:     lidocaine (LMX) 4 % cream, Apply topically 4x daily as needed. , Disp: 30 g, Rfl: 5    promethazine (PHENERGAN) 25 MG tablet, Take 1 tablet by mouth every 6 hours as needed for Nausea, Disp: 15 tablet, Rfl: 0    ibuprofen (ADVIL;MOTRIN) 600 MG tablet, Take 1 tablet by mouth 3 times daily as needed for Pain (Take with food 3 times daily for pain), Disp: 15 tablet, Rfl: 0    vitamin D (CHOLECALCIFEROL) 1000 UNIT TABS tablet, Take 1,000 Units by mouth nightly., Disp: , Rfl:     Family History: This patient's family history includes Arthritis in her mother; Cancer in her mother; Heart Disease in her father; High Blood Pressure in her father. Social History:  Keira Haines  reports that she has never smoked. She has never used smokeless tobacco. She reports that she does not drink alcohol or use drugs. Imaging:  ECHO: I have reviewed the ECHO images. Narrative & Impression     Transthoracic Echocardiography Report (TTE)      Demographics      Patient Name    [facility]  Gender                Female                   K      MR #            [id number]        Race                                                         Ethnicity      Account #       [de-identified]        Room Number      Accession       [id number]       Date of Study         11/27/2020   Number      Date of Birth   1940       Referring Physician   Modesta Cordero MD      Age             68 year(s)       Otto Starr MD                                    Physician     Procedure     Type of Study      TTE procedure:ECHOCARDIOGRAM COMPLETE 2D W DOPPLER W COLOR.      Procedure Date Date: 11/27/2020 Start: 10:13 AM     Study Location: Echo Lab  Technical Quality: Adequate visualization     Indications:Shortness of breath and Chest pain.     Additional Medical History:Hypertension, Hyperlipidemia, GERD, Stroke,  family history of heart disease, COVID, Arthritis     Patient Status: Routine     Height: 63.78 inches Weight: 257.01 pounds BSA: 2.17 m^2 BMI: 44.42 kg/m^2     BP: 130/76 mmHg      Conclusions      Summary   Ejection fraction was estimated at 55-60%. Left atrial size was mildly dilated. The aortic valve was trileaflet with increased thickness, calcification,   and reduced cuspal separation. DOPPLER: Transaortic velocity was 3.6 m/s,   mean gradient 32 mmHg, SAM 0.9 cm2, consistent with severe aortic stenosis   (PLFLG). There was trace aortic regurgitation. There was trace mitral regurgitation. There was trace tricuspid regurgitation. Assuming RAP = 5 mmHg, the estimated RVSP = 32 mmHg. IVC size is within normal limits with normal respiratory phasic changes. Signature      ----------------------------------------------------------------   Electronically signed by Kimber Dawson MD (Interpreting   physician) on 11/28/2020 at 02:32 PM   ----------------------------------------------------------------      Findings      Mitral Valve   The mitral valve structure was normal with normal leaflet separation. DOPPLER: The transmitral velocity was within the normal range with no   evidence for mitral stenosis. There was trace mitral regurgitation. Aortic Valve   The aortic valve was trileaflet with increased thickness, calcification,   and reduced cuspal separation. DOPPLER: Transaortic velocity was 3.6 m/s,   mean gradient 32 mmHg, SAM 0.9 cm2, consistent with severe aortic stenosis   (PLFLG). There was trace aortic regurgitation. Tricuspid Valve   The tricuspid valve structure was normal with normal leaflet separation. DOPPLER: There was no evidence of tricuspid stenosis. There was trace   tricuspid regurgitation. Assuming RAP = 5 mmHg, the estimated RVSP = 32   mmHg. Pulmonic Valve   The pulmonic valve leaflets were not well seen. DOPPLER: The transpulmonic   velocity was within the normal range with no evidence for regurgitation. Left Atrium   Left atrial size was mildly dilated. Left Ventricle   Normal left ventricular size and systolic function. There were no regional wall motion abnormalities. Wall thickness was within normal limits. Ejection fraction was estimated at 55-60%. Right Atrium   Right atrial size was normal.      Right Ventricle   The right ventricular size was normal with normal systolic function and   wall thickness. Pericardial Effusion   The pericardium was normal in appearance with no evidence of a pericardial   effusion. Pleural Effusion   No evidence of pleural effusion. Aorta / Great Vessels   IVC size is within normal limits with normal respiratory phasic changes.      M-Mode/2D Measurements & Calculations      LV Diastolic   LV Systolic Dimension:    AV Cusp Separation: 0.8 cmLA   Dimension: 4.8 3.3 cm                    Dimension: 3.6 cmAO Root   cm             LV Volume Diastolic: 211  Dimension: 2.9 cmLA Area: 20.3   LV FS:31.3 %   ml                        cm^2   LV PW          LV Volume Systolic: 17.5   Diastolic: 1   ml   cm             LV EDV/LV EDV Index: 108   Septum         ml/50 m^2LV ESV/LV ESV    RV Diastolic Dimension: 2.7 cm   Diastolic: 1   Index: 09.3 ml/20 m^2   cm             EF Calculated: 59.2 %     LA/Aorta: 1.24                                               LA volume/Index: 62.9 ml /29m^2                     LVOT: 1.9 cm     Doppler Measurements & Calculations      MV Peak E-Wave: 115 AV Peak Velocity: 358    LVOT Peak Velocity: 115 cm/s   cm/s                cm/s                     LVOT Mean Velocity: 80.6 cm/s MV Peak A-Wave: 111 AV Peak Gradient: 51.27  LVOT Peak Gradient: 5   cm/s                mmHg                     mmHgLVOT Mean Gradient: 3   MV E/A Ratio: 1.04  AV Mean Velocity: 271    mmHg   MV Peak Gradient:   cm/s   5.29 mmHg           AV Mean Gradient: 26     TV Peak E-Wave: 64.3 cm/s                       mmHg                     TV Peak A-Wave: 40.3 cm/s   MV Deceleration     AV VTI: 88 cm   Time: 285 msec      AV Area                  TV Peak Gradient: 1.65 mmHg   MV P1/2t: 84 msec   (Continuity):0.95 cm^2   TR Velocity:259 cm/s   MVA by PHT:2.62                              TR Gradient:26.83 mmHg   cm^2                LVOT VTI: 29.5 cm        PV Peak Velocity: 76.3 cm/s                       AV P1/2t: 632 msec       PV Peak Gradient: 2.33 mmHg   MV E' Septal        IVRT: 85 msec   Velocity: 6.1 cm/s   MV A' Septal   Velocity: 6.4 cm/s  AV DVI (VTI): 0.34AV DVI   MV E' Lateral       (Vmax):0.32   Velocity: 6.4 cm/s   MV A' Lateral   Velocity: 7 cm/s   E/E' septal: 18.85   E/E' lateral: 17.97   MR Velocity: 463   cm/s     [email or web address] [id number]%3d%3d           ROS:   Constitutional: Positive for activity change and fatigue. Negative for chills, fever and unexpected weight change. Respiratory: See HPI. Negative for apnea,wheezing and stridor. Cardiovascular: See HPI. Gastrointestinal: Negative for hematochezia, melana, constipation, and N/V/D. Musculoskeletal: Negative for myalgias  Skin: Negative for color change, rash and wound. Neurological: Negative for dizziness or syncope. Vital Signs:   Vitals:    12/14/20 1001   BP: 118/67   Pulse: 73   SpO2: 96%   Weight: 257 lb (116.6 kg)   Height: 5' 4\" (1.626 m)     Physical Exam:  General appearance:  No acute distress, appears stated age and cooperative. Neck: No jugular venous distention. Trachea midline. No carotid bruits. Respiratory:  Normal respiratory effort. Clear to auscultation, bilaterally without Rales/Wheezes/Rhonch. Cardiovascular:  Regular rate and rhythm with normal S1/S2 with 3/6 ORIANA. Abdomen: Soft, non-tender, non-distended with normal bowel sounds. Ext: No clubbing, cyanosis or edema bilaterally. Full range of motion without deformity. Skin: Skin color, texture, turgor normal.  No rashes or lesions. Neurologic:  Neurovascularly intact without any focal sensory/motor deficits. Psychiatric:  Alert and oriented, thought content appropriate, normal insight. Active Problem List:  Patient Active Problem List   Diagnosis    Dyslipidemia    HTN, goal below 140/90    Leg cramps    GERD (gastroesophageal reflux disease)    Major depressive disorder, recurrent episode, mild (HCC)    Type 2 diabetes mellitus with diabetic neuropathy, without long-term current use of insulin (HCC)    Subarachnoid hemorrhage (HCC)    Traumatic hematoma of forehead    Closed head injury    Fall down stairs    Thoracic spondylosis    Lumbar spondylosis    SI (sacroiliac) joint inflammation (HCC)    Morbid obesity with BMI of 40.0-44.9, adult ([facility]    COVID-19       Assessment:   Aortic Stenosis    Plan: 12/14/20  3 68year-old female with severe symptomatic aortic stenosis. Patient would clearly benefit from TAVR for reasons of age, frailty, STS score, and hx of CVA Recommend proceed with evaluation for TAVR.     The plan of care was discussed in detail with Dr. Franky Lee

## 2020-12-21 ENCOUNTER — HOSPITAL ENCOUNTER (OUTPATIENT)
Dept: CT IMAGING | Age: 80
Discharge: HOME | End: 2020-12-21
Payer: MEDICARE

## 2020-12-21 ENCOUNTER — HOSPITAL ENCOUNTER (OUTPATIENT)
Dept: INTERVENTIONAL RADIOLOGY/VASCULAR | Age: 80
Discharge: HOME | End: 2020-12-21
Payer: MEDICARE

## 2020-12-21 LAB — POC CREATININE WHOLE BLOOD: 0.8 MG/DL (ref 0.5–1.2)

## 2020-12-21 PROCEDURE — 74174 CTA ABD&PLVS W/CONTRAST: CPT

## 2020-12-21 PROCEDURE — 6360000004 HC RX CONTRAST MEDICATION: Performed by: INTERNAL MEDICINE

## 2020-12-21 PROCEDURE — 93880 EXTRACRANIAL BILAT STUDY: CPT

## 2020-12-21 PROCEDURE — 71275 CT ANGIOGRAPHY CHEST: CPT

## 2020-12-21 PROCEDURE — 82565 ASSAY OF CREATININE: CPT

## 2020-12-21 RX ADMIN — IOPAMIDOL 125 ML: 755 INJECTION, SOLUTION INTRAVENOUS at 11:19

## 2021-01-08 ENCOUNTER — HOSPITAL ENCOUNTER (OUTPATIENT)
Age: 81
Setting detail: SPECIMEN
Discharge: HOME | End: 2021-01-08
Payer: MEDICARE

## 2021-01-08 PROCEDURE — U0003 INFECTIOUS AGENT DETECTION BY NUCLEIC ACID (DNA OR RNA); SEVERE ACUTE RESPIRATORY SYNDROME CORONAVIRUS 2 (SARS-COV-2) (CORONAVIRUS DISEASE [COVID-19]), AMPLIFIED PROBE TECHNIQUE, MAKING USE OF HIGH THROUGHPUT TECHNOLOGIES AS DESCRIBED BY CMS-2020-01-R: HCPCS

## 2021-01-11 ENCOUNTER — TELEPHONE (OUTPATIENT)
Dept: CARDIOTHORACIC SURGERY | Age: 81
End: 2021-01-11

## 2021-01-11 LAB
PERFORMING LAB: NORMAL
REPORT: NORMAL
SARS-COV-2: NOT DETECTED

## 2021-01-12 ENCOUNTER — PREP FOR PROCEDURE (OUTPATIENT)
Dept: CARDIOLOGY | Age: 81
End: 2021-01-12

## 2021-01-12 RX ORDER — NITROGLYCERIN 0.4 MG/1
0.4 TABLET SUBLINGUAL EVERY 5 MIN PRN
Status: CANCELLED | OUTPATIENT
Start: 2021-01-12

## 2021-01-12 RX ORDER — SODIUM CHLORIDE 0.9 % (FLUSH) 0.9 %
10 SYRINGE (ML) INJECTION EVERY 12 HOURS SCHEDULED
Status: CANCELLED | OUTPATIENT
Start: 2021-01-12

## 2021-01-12 RX ORDER — ASPIRIN 325 MG
325 TABLET ORAL ONCE
Status: CANCELLED | OUTPATIENT
Start: 2021-01-12 | End: 2021-01-12

## 2021-01-12 RX ORDER — SODIUM CHLORIDE 0.9 % (FLUSH) 0.9 %
10 SYRINGE (ML) INJECTION PRN
Status: CANCELLED | OUTPATIENT
Start: 2021-01-12

## 2021-01-12 RX ORDER — SODIUM CHLORIDE 9 MG/ML
INJECTION, SOLUTION INTRAVENOUS CONTINUOUS
Status: CANCELLED | OUTPATIENT
Start: 2021-01-12

## 2021-01-13 ENCOUNTER — TELEPHONE (OUTPATIENT)
Dept: CARDIOLOGY CLINIC | Age: 81
End: 2021-01-13

## 2021-01-13 ENCOUNTER — HOSPITAL ENCOUNTER (OUTPATIENT)
Dept: INPATIENT UNIT | Age: 81
Discharge: HOME | End: 2021-01-13
Attending: INTERNAL MEDICINE | Admitting: INTERNAL MEDICINE
Payer: MEDICARE

## 2021-01-13 VITALS
DIASTOLIC BLOOD PRESSURE: 70 MMHG | BODY MASS INDEX: 41.32 KG/M2 | RESPIRATION RATE: 20 BRPM | OXYGEN SATURATION: 92 % | HEART RATE: 70 BPM | WEIGHT: 242 LBS | TEMPERATURE: 98.4 F | SYSTOLIC BLOOD PRESSURE: 99 MMHG | HEIGHT: 64 IN

## 2021-01-13 DIAGNOSIS — I50.22 CHRONIC SYSTOLIC CONGESTIVE HEART FAILURE (HCC): ICD-10-CM

## 2021-01-13 DIAGNOSIS — R06.02 SOB (SHORTNESS OF BREATH): Primary | ICD-10-CM

## 2021-01-13 LAB
ABO: NORMAL
ANION GAP SERPL CALCULATED.3IONS-SCNC: 14 MEQ/L (ref 8–16)
ANTIBODY SCREEN: NORMAL
APTT: 26.8 SECONDS (ref 22–38)
BUN BLDV-MCNC: 15 MG/DL (ref 7–22)
CALCIUM SERPL-MCNC: 9.6 MG/DL (ref 8.5–10.5)
CHLORIDE BLD-SCNC: 106 MEQ/L (ref 98–111)
CO2: 21 MEQ/L (ref 23–33)
COLLECTED BY:: ABNORMAL
COLLECTED BY:: ABNORMAL
CREAT SERPL-MCNC: 0.8 MG/DL (ref 0.4–1.2)
EKG ATRIAL RATE: 68 BPM
EKG P AXIS: 26 DEGREES
EKG P-R INTERVAL: 152 MS
EKG Q-T INTERVAL: 420 MS
EKG QRS DURATION: 92 MS
EKG QTC CALCULATION (BAZETT): 446 MS
EKG R AXIS: 23 DEGREES
EKG T AXIS: 29 DEGREES
EKG VENTRICULAR RATE: 68 BPM
ERYTHROCYTE [DISTWIDTH] IN BLOOD BY AUTOMATED COUNT: 13.6 % (ref 11.5–14.5)
ERYTHROCYTE [DISTWIDTH] IN BLOOD BY AUTOMATED COUNT: 44.2 FL (ref 35–45)
GFR SERPL CREATININE-BSD FRML MDRD: 69 ML/MIN/1.73M2
GLUCOSE BLD-MCNC: 145 MG/DL (ref 70–108)
HCT VFR BLD CALC: 41.8 % (ref 37–47)
HEMOGLOBIN: 14.1 GM/DL (ref 12–16)
INR BLD: 1.01 (ref 0.85–1.13)
MCH RBC QN AUTO: 30.3 PG (ref 26–33)
MCHC RBC AUTO-ENTMCNC: 33.7 GM/DL (ref 32.2–35.5)
MCV RBC AUTO: 89.7 FL (ref 81–99)
PLATELET # BLD: 175 THOU/MM3 (ref 130–400)
PMV BLD AUTO: 11.3 FL (ref 9.4–12.4)
POC O2 SATURATION: 72 % (ref 94–97)
POC O2 SATURATION: 93 % (ref 94–97)
POTASSIUM REFLEX MAGNESIUM: 4.2 MEQ/L (ref 3.5–5.2)
RBC # BLD: 4.66 MILL/MM3 (ref 4.2–5.4)
RH FACTOR: NORMAL
SODIUM BLD-SCNC: 141 MEQ/L (ref 135–145)
SOURCE, BLOOD GAS: ABNORMAL
SOURCE, BLOOD GAS: ABNORMAL
WBC # BLD: 7.4 THOU/MM3 (ref 4.8–10.8)

## 2021-01-13 PROCEDURE — 36415 COLL VENOUS BLD VENIPUNCTURE: CPT

## 2021-01-13 PROCEDURE — 86850 RBC ANTIBODY SCREEN: CPT

## 2021-01-13 PROCEDURE — C1769 GUIDE WIRE: HCPCS

## 2021-01-13 PROCEDURE — C1887 CATHETER, GUIDING: HCPCS

## 2021-01-13 PROCEDURE — 2709999900 HC NON-CHARGEABLE SUPPLY

## 2021-01-13 PROCEDURE — 85027 COMPLETE CBC AUTOMATED: CPT

## 2021-01-13 PROCEDURE — 93005 ELECTROCARDIOGRAM TRACING: CPT | Performed by: PHYSICIAN ASSISTANT

## 2021-01-13 PROCEDURE — 82810 BLOOD GASES O2 SAT ONLY: CPT

## 2021-01-13 PROCEDURE — 86900 BLOOD TYPING SEROLOGIC ABO: CPT

## 2021-01-13 PROCEDURE — 85730 THROMBOPLASTIN TIME PARTIAL: CPT

## 2021-01-13 PROCEDURE — 6370000000 HC RX 637 (ALT 250 FOR IP): Performed by: PHYSICIAN ASSISTANT

## 2021-01-13 PROCEDURE — 6360000004 HC RX CONTRAST MEDICATION: Performed by: INTERNAL MEDICINE

## 2021-01-13 PROCEDURE — C1894 INTRO/SHEATH, NON-LASER: HCPCS

## 2021-01-13 PROCEDURE — 86901 BLOOD TYPING SEROLOGIC RH(D): CPT

## 2021-01-13 PROCEDURE — 6360000002 HC RX W HCPCS

## 2021-01-13 PROCEDURE — 85610 PROTHROMBIN TIME: CPT

## 2021-01-13 PROCEDURE — 93456 R HRT CORONARY ARTERY ANGIO: CPT | Performed by: INTERNAL MEDICINE

## 2021-01-13 PROCEDURE — 2500000003 HC RX 250 WO HCPCS

## 2021-01-13 PROCEDURE — 2580000003 HC RX 258: Performed by: PHYSICIAN ASSISTANT

## 2021-01-13 PROCEDURE — 80048 BASIC METABOLIC PNL TOTAL CA: CPT

## 2021-01-13 RX ORDER — FUROSEMIDE 40 MG/1
40 TABLET ORAL 2 TIMES DAILY
Qty: 60 TABLET | Refills: 3 | Status: SHIPPED | OUTPATIENT
Start: 2021-01-13 | End: 2021-01-15

## 2021-01-13 RX ORDER — SODIUM CHLORIDE 0.9 % (FLUSH) 0.9 %
10 SYRINGE (ML) INJECTION EVERY 12 HOURS SCHEDULED
Status: DISCONTINUED | OUTPATIENT
Start: 2021-01-13 | End: 2021-01-13

## 2021-01-13 RX ORDER — SODIUM CHLORIDE 0.9 % (FLUSH) 0.9 %
10 SYRINGE (ML) INJECTION PRN
Status: DISCONTINUED | OUTPATIENT
Start: 2021-01-13 | End: 2021-01-13 | Stop reason: HOSPADM

## 2021-01-13 RX ORDER — ACETAMINOPHEN 325 MG/1
650 TABLET ORAL EVERY 4 HOURS PRN
Status: DISCONTINUED | OUTPATIENT
Start: 2021-01-13 | End: 2021-01-13 | Stop reason: HOSPADM

## 2021-01-13 RX ORDER — SODIUM CHLORIDE 0.9 % (FLUSH) 0.9 %
10 SYRINGE (ML) INJECTION PRN
Status: DISCONTINUED | OUTPATIENT
Start: 2021-01-13 | End: 2021-01-13

## 2021-01-13 RX ORDER — POTASSIUM CHLORIDE 20 MEQ/1
20 TABLET, EXTENDED RELEASE ORAL DAILY
Qty: 90 TABLET | Refills: 1 | Status: SHIPPED | OUTPATIENT
Start: 2021-01-13 | End: 2021-01-15

## 2021-01-13 RX ORDER — SODIUM CHLORIDE 9 MG/ML
INJECTION, SOLUTION INTRAVENOUS CONTINUOUS
Status: DISCONTINUED | OUTPATIENT
Start: 2021-01-13 | End: 2021-01-13 | Stop reason: HOSPADM

## 2021-01-13 RX ORDER — SODIUM CHLORIDE 0.9 % (FLUSH) 0.9 %
10 SYRINGE (ML) INJECTION EVERY 12 HOURS SCHEDULED
Status: DISCONTINUED | OUTPATIENT
Start: 2021-01-13 | End: 2021-01-13 | Stop reason: HOSPADM

## 2021-01-13 RX ORDER — NITROGLYCERIN 0.4 MG/1
0.4 TABLET SUBLINGUAL EVERY 5 MIN PRN
Status: DISCONTINUED | OUTPATIENT
Start: 2021-01-13 | End: 2021-01-13 | Stop reason: HOSPADM

## 2021-01-13 RX ORDER — ATROPINE SULFATE 0.4 MG/ML
0.5 AMPUL (ML) INJECTION
Status: DISCONTINUED | OUTPATIENT
Start: 2021-01-13 | End: 2021-01-13 | Stop reason: HOSPADM

## 2021-01-13 RX ORDER — ASPIRIN 325 MG
325 TABLET ORAL ONCE
Status: COMPLETED | OUTPATIENT
Start: 2021-01-13 | End: 2021-01-13

## 2021-01-13 RX ADMIN — SODIUM CHLORIDE: 9 INJECTION, SOLUTION INTRAVENOUS at 09:44

## 2021-01-13 RX ADMIN — IOPAMIDOL 40 ML: 755 INJECTION, SOLUTION INTRAVENOUS at 12:11

## 2021-01-13 RX ADMIN — ASPIRIN 325 MG: 325 TABLET, FILM COATED ORAL at 09:46

## 2021-01-13 NOTE — FLOWSHEET NOTE
Pt admitted to  2E12 ambulatory for cardiac cath. Pt NPO. Patient accompanied by . Vital signs obtained. Assessment and data collection initiated. Oriented to room. Policies and procedures for 2E explained   All questions answered with no further questions at this time. Fall prevention and safety precautions discussed with patient.

## 2021-01-13 NOTE — TELEPHONE ENCOUNTER
Per Dr Brittany Mckinney patient needs referral to CHF clinic for pre TAVR workup. Needs labs done date of appointment.

## 2021-01-13 NOTE — PRE SEDATION
[facility]  Sedation/Analgesia History & Physical    Pt Name: Regina Gonzalez  Account number: [de-identified]  MRN: [id number]  YOB: 1940  Provider Performing Procedure: Beba Miramontes MD  Referring Provider: Beba Miramontes MD   Primary Care Physician: Sylvie Navarro DO  Date: 1/13/2021    PRE-PROCEDURE    Code Status: FULL CODE  Brief History/Pre-Procedure Diagnosis:   Severe AS  PreTAVR     Consent: : I have discussed with the patient risks, benefits, and alternatives (and relevant risks, benefits, and side effects related to alternatives or not receiving care), and likelihood of the success. The patient and/or representative appear to understand and agree to proceed. The discussion encompasses risks, benefits, and side effects related to the alternatives and the risks related to not receiving the proposed care, treatment, and services. has a past surgical history that includes Appendectomy; Cholecystectomy; hernia repair; Hysterectomy; Spine surgery; Carpal tunnel release (Left, 2011); pr laminectomy,>2 sgmt,lumbar; cervical fusion (1976); eye surgery; Rotator cuff repair (Right, 11/28/2014); shoulder surgery (2014); Cataract removal (Bilateral); Colon surgery (2012); Colonoscopy (2015); Upper gastrointestinal endoscopy (9601,1712); Finger surgery (Right, 10/2016); tendon release (Right, 05/18/2017); Total knee arthroplasty (Bilateral); Nerve Surgery (Right, 10/25/2019); Nerve Surgery (Right, 12/16/2019); and Pain management procedure (Right, 1/6/2020). Additional information:       ALLERGIES   Allergies as of 01/13/2021 - Review Complete 01/13/2021   Allergen Reaction Noted    Norco [hydrocodone-acetaminophen] Itching 05/18/2017    Tramadol Itching 05/18/2017    Codeine Hives and Rash 08/30/2013     Additional information:       MEDICATIONS   Aspirin  [x] 81 mg  [] 325 mg  [] None  Antiplatelet drug therapy use last 5 days  [x]No []Yes  Coumadin Use Last 5 Days [x]No []Yes  Other anticoagulant use last 5 days  [x]No []Yes    Current Facility-Administered Medications:     0.9 % sodium chloride infusion, , Intravenous, Continuous, Jozef Alanis PA-C, Last Rate: 75 mL/hr at 01/13/21 0944, New Bag at 01/13/21 0944    nitroGLYCERIN (NITROSTAT) SL tablet 0.4 mg, 0.4 mg, Sublingual, Q5 Min PRN, Jozef Alanis PA-C    sodium chloride flush 0.9 % injection 10 mL, 10 mL, Intravenous, 2 times per day, Jozef Alanis PA-C    sodium chloride flush 0.9 % injection 10 mL, 10 mL, Intravenous, PRN, Jozef Alanis PA-C  Prior to Admission medications    Medication Sig Start Date End Date Taking?  Authorizing Provider   hydrALAZINE (APRESOLINE) 50 MG tablet Take 1 tablet by mouth 2 times daily 11/11/20  Yes Virgen Zamora MD   atenolol (TENORMIN) 25 MG tablet Take 1 tablet by mouth daily 11/11/20  Yes Virgen Zamora MD amLODIPine (NORVASC) 10 MG tablet TAKE 1 TABLET DAILY 11/11/20  Yes She Phelan MD   vitamin C (VITAMIN C) 1000 MG tablet Take 1 tablet by mouth daily 9/6/20  Yes ELIZABETH Ramos   ondansetron (ZOFRAN ODT) 4 MG disintegrating tablet Take 1 tablet by mouth every 8 hours as needed for Nausea or Vomiting 9/4/20  Yes Hanna Rodrigez APRN - CNP   metFORMIN (GLUCOPHAGE) 500 MG tablet TAKE 1 TABLET TWICE A DAY WITH MEALS 3/9/20  Yes Imani Rivera DO   atorvastatin (LIPITOR) 40 MG tablet TAKE 1 TABLET DAILY 3/5/20  Yes Imani Rivera DO   sertraline (ZOLOFT) 100 MG tablet TAKE 2 TABLETS DAILY 3/5/20  Yes Imani Rivera DO   quinapril (ACCUPRIL) 10 MG tablet TAKE 1 TABLET NIGHTLY 3/5/20  Yes Naveed Valentin DO   pantoprazole (PROTONIX) 40 MG tablet TAKE 1 TABLET TWICE A DAY 3/5/20  Yes Imani Rivera DO   aspirin 81 MG tablet Take 81 mg by mouth daily   Yes Historical Provider, MD   promethazine (PHENERGAN) 25 MG tablet Take 1 tablet by mouth every 6 hours as needed for Nausea 4/29/19  Yes ELIZABETH Pascual-LOKI   vitamin D (CHOLECALCIFEROL) 1000 UNIT TABS tablet Take 1,000 Units by mouth nightly. Yes Historical Provider, MD   zinc sulfate (ZINCATE) 220 (50 Zn) MG capsule Take 1 capsule by mouth daily 9/6/20   ELIZABETH Dillon   nystatin (MYCOSTATIN) 947533 UNIT/GM cream Apply topically 2 times daily.  1/14/20   Imani Rivera DO     Additional information:       VITAL SIGNS   Vitals:    01/13/21 0915   BP: (!) 111/53   Pulse: 71   Resp: 18   Temp: 98.4 °F (36.9 °C)   SpO2: 96%       PHYSICAL:   General: No acute distress  HEENT:  Unremarkable for age  Neck: without increased JVD, carotid pulses 2+ bilaterally without bruits  Heart: RRR, S1 & S2 WNL, S4 gallop, 3/6 ORIANA  NYHA: 3  Lungs: Clear to auscultation    Abdomen: BS present, without HSM, masses, or tenderness    Extremities: without C,C,E.  Pulses 2+ bilaterally  Mental Status: Alert & Oriented        PLANNED PROCEDURE

## 2021-01-13 NOTE — FLOWSHEET NOTE
Returned to room from cath lab with armboard and VascBand on right lower arm. Also has dressing to right brachial area. Denied c/o discomfort.  skiln warm and dry. Good circulation/sensation to right arm. Iv infusing w/o s/s of infiltration.

## 2021-01-13 NOTE — BRIEF OP NOTE
[address] [address]  Sedation/Analgesia Post Sedation Record    Pt Name: Ari Anderson  Account number: [de-identified]  MRN: [id number]  YOB: 1940  Procedure Performed By: Miky Kaba, [address]  Primary Care Physician: Chaim Hull DO  Date: 1/13/2021    POST-PROCEDURE    Physicians/Assistants: Miky Kaba, MARY    Procedure Performed:Cath      Sedation/Anesthesia: Versed/ Fentanyl and 2% xylocaine local anesthesia. Estimated Blood Loss: < 50 ml. Specimens Removed: None         Disposition of Specimen: N/A        Complications: No Immediate Complications.        Post-procedure Diagnosis/Findings:     No sig CAD  Non-dom RCA               Miky Kaba, MARY  Electronically signed 1/13/2021 at 12:15 PM  Interventional Cardiology

## 2021-01-13 NOTE — PROCEDURES
[address] [place] [address]                            CARDIAC CATHETERIZATION    PATIENT NAME: Padmini Durham                  :        1940  MED REC NO:   [id number]                           ROOM:       0012  ACCOUNT NO:   [de-identified]                           ADMIT DATE: 2021  PROVIDER:     Pat Arroyo MD    DATE OF PROCEDURE:  2021    INDICATION FOR PROCEDURE:  Severe symptomatic aortic stenosis, NYHA  class III symptoms, pre-TAVR. DESCRIPTION OF PROCEDURE:  After informed consent was obtained from the  patient, she was brought to the cardiac catheterization laboratory and  prepped in a sterile fashion. Right brachial vein and right radial  artery were chosen as the primary points of access. Preprocedure  timeout was completed. After infiltration of the right brachial region  with 2% lidocaine using micropuncture and modified Seldinger technique  under fluoroscopic guidance and ultrasound guidance, I was able to  insert a 5-Polish sheath in the right brachial vein. After infiltration  of the right radial artery region with 2% lidocaine using micropuncture  and modified Seldinger technique under fluoroscopic guidance and  ultrasound guidance, I was able to insert a 6-Polish Slender sheath into  the right radial artery. Standard antispasmodic/antithrombotic cocktail  was given intraarterially. I then performed right heart catheterization  using 5-Polish Wang balloon wedge-tipped catheter and left heart  catheterization using 5-Polish JL3.5 and 5-Polish JR4 catheters. RIGHT HEART CATHETERIZATION:  RA 20, RV 47/19, PA 43/20 with a mean of  35 and wedge pressure of 28, cardiac output 4.7, cardiac index 2.2, and  PA saturation is 72%. CORONARY ANGIOGRAM:  LEFT MAIN:  Patent without any significant obstruction. LAD:  Patent without any significant obstruction.   There are mild luminal irregularities throughout, but no significant obstruction. Small diagonal branches without any significant obstruction. LCX:  No significant obstruction. Gives rise to large OM1 and OM2  systems without any significant obstruction. There is a left PDA  without any significant obstruction. LCX is dominant. RCA:  Nondominant, small without any significant obstruction. IMMEDIATE COMPLICATIONS:  None. MEDICATIONS:  See EMR. ACCESS:  Manual pressure was used for the right brachial vein and Vasc  Band was used for the right radial artery. ESTIMATED BLOOD LOSS:  Less than 10 mL. SUMMARY:  Nonobstructive coronary artery disease; severe aortic  stenosis, elevated right heart cath pressures with preserved cardiac  output. PLAN:  1. Bedrest.  2.  Optimal medical therapy. 3.  Risk factor management. 4.  Routine access site care. 5.  Guideline-directed therapy for heart failure. 6.  CHF management and volume adjustment with diuretics. 7.  Continue GI workup. All the above was explained to the patient and patient's family. They  are agreeable and amenable to the plan.         Soni Tinajero MD    D: 01/13/2021 12:30:47       T: 01/13/2021 13:09:03     [email or web address]  Job#: [id number]     Doc#: [id number]    CC:

## 2021-01-14 ENCOUNTER — TELEPHONE (OUTPATIENT)
Dept: FAMILY MEDICINE CLINIC | Age: 81
End: 2021-01-14

## 2021-01-14 NOTE — TELEPHONE ENCOUNTER
[facility]    Outreach made within 2 business days of discharge: Yes    Patient: Anastasiia Johns Patient : 1940   MRN: [id number]  Reason for Admission: There are no discharge diagnoses documented for the most recent discharge. Discharge Date: 21       Spoke with: May Sterling    Discharge department/facility: [facility]    TCM Interactive Patient Contact:  Was patient able to fill all prescriptions: Yes  Was patient instructed to bring all medications to the follow-up visit: Yes  Is patient taking all medications as directed in the discharge summary?  Yes  Does patient understand their discharge instructions: Yes  Does patient have questions or concerns that need addressed prior to 7-14 day follow up office visit: no    Scheduled appointment with PCP within 7-14 days    Follow Up  Future Appointments   Date Time Provider James Cruz   1/15/2021  8:00  [facility], APRN - CNP N RICHARD[facility] CHF JOSHUAP Lawrence GREEN [facility]   2021 11:45 AM Marky Abreu MD N SRPX Heart KHRIS Arora CMA ([address] [place] [address])

## 2021-01-15 ENCOUNTER — TELEPHONE (OUTPATIENT)
Dept: CARDIOLOGY CLINIC | Age: 81
End: 2021-01-15

## 2021-01-15 ENCOUNTER — OFFICE VISIT (OUTPATIENT)
Dept: CARDIOLOGY CLINIC | Age: 81
End: 2021-01-15
Payer: MEDICARE

## 2021-01-15 VITALS
WEIGHT: 259.6 LBS | OXYGEN SATURATION: 92 % | HEART RATE: 83 BPM | BODY MASS INDEX: 44.32 KG/M2 | SYSTOLIC BLOOD PRESSURE: 110 MMHG | HEIGHT: 64 IN | DIASTOLIC BLOOD PRESSURE: 60 MMHG

## 2021-01-15 DIAGNOSIS — I10 ESSENTIAL HYPERTENSION: ICD-10-CM

## 2021-01-15 DIAGNOSIS — I50.32 CHF (CONGESTIVE HEART FAILURE), NYHA CLASS II, CHRONIC, DIASTOLIC (HCC): Primary | ICD-10-CM

## 2021-01-15 DIAGNOSIS — I35.0 SEVERE AORTIC STENOSIS: ICD-10-CM

## 2021-01-15 DIAGNOSIS — E66.01 MORBID OBESITY WITH BMI OF 40.0-44.9, ADULT (HCC): ICD-10-CM

## 2021-01-15 LAB
ANION GAP SERPL CALCULATED.3IONS-SCNC: 14 MEQ/L (ref 8–16)
BUN BLDV-MCNC: 17 MG/DL (ref 7–22)
CALCIUM SERPL-MCNC: 9.9 MG/DL (ref 8.5–10.5)
CHLORIDE BLD-SCNC: 101 MEQ/L (ref 98–111)
CO2: 26 MEQ/L (ref 23–33)
CREAT SERPL-MCNC: 0.9 MG/DL (ref 0.4–1.2)
GFR SERPL CREATININE-BSD FRML MDRD: 60 ML/MIN/1.73M2
GLUCOSE BLD-MCNC: 210 MG/DL (ref 70–108)
MAGNESIUM: 1.6 MG/DL (ref 1.6–2.4)
POTASSIUM SERPL-SCNC: 4.1 MEQ/L (ref 3.5–5.2)
PRO-BNP: 87.9 PG/ML (ref 0–1800)
SODIUM BLD-SCNC: 141 MEQ/L (ref 135–145)

## 2021-01-15 PROCEDURE — 1123F ACP DISCUSS/DSCN MKR DOCD: CPT | Performed by: NURSE PRACTITIONER

## 2021-01-15 PROCEDURE — 1036F TOBACCO NON-USER: CPT | Performed by: NURSE PRACTITIONER

## 2021-01-15 PROCEDURE — 4040F PNEUMOC VAC/ADMIN/RCVD: CPT | Performed by: NURSE PRACTITIONER

## 2021-01-15 PROCEDURE — G8484 FLU IMMUNIZE NO ADMIN: HCPCS | Performed by: NURSE PRACTITIONER

## 2021-01-15 PROCEDURE — G8417 CALC BMI ABV UP PARAM F/U: HCPCS | Performed by: NURSE PRACTITIONER

## 2021-01-15 PROCEDURE — G8427 DOCREV CUR MEDS BY ELIG CLIN: HCPCS | Performed by: NURSE PRACTITIONER

## 2021-01-15 PROCEDURE — G8399 PT W/DXA RESULTS DOCUMENT: HCPCS | Performed by: NURSE PRACTITIONER

## 2021-01-15 PROCEDURE — 36415 COLL VENOUS BLD VENIPUNCTURE: CPT | Performed by: NURSE PRACTITIONER

## 2021-01-15 PROCEDURE — 1090F PRES/ABSN URINE INCON ASSESS: CPT | Performed by: NURSE PRACTITIONER

## 2021-01-15 PROCEDURE — 99215 OFFICE O/P EST HI 40 MIN: CPT | Performed by: NURSE PRACTITIONER

## 2021-01-15 RX ORDER — POTASSIUM CHLORIDE 20 MEQ/1
10 TABLET, EXTENDED RELEASE ORAL DAILY
Qty: 90 TABLET | Refills: 1
Start: 2021-01-15 | End: 2021-04-14

## 2021-01-15 RX ORDER — MULTIVITAMIN/IRON/FOLIC ACID 18MG-0.4MG
250 TABLET ORAL 2 TIMES DAILY
Qty: 60 TABLET | Refills: 5 | Status: SHIPPED | OUTPATIENT
Start: 2021-01-15 | End: 2021-05-02

## 2021-01-15 RX ORDER — FUROSEMIDE 40 MG/1
20 TABLET ORAL DAILY
Qty: 60 TABLET | Refills: 3
Start: 2021-01-15 | End: 2021-04-14

## 2021-01-15 ASSESSMENT — ENCOUNTER SYMPTOMS
NAUSEA: 0
BACK PAIN: 1
COUGH: 0
SHORTNESS OF BREATH: 1
ABDOMINAL DISTENTION: 0
ABDOMINAL PAIN: 0
COLOR CHANGE: 0
APNEA: 0
CHEST TIGHTNESS: 0
WHEEZING: 0

## 2021-01-15 NOTE — PATIENT INSTRUCTIONS
You may receive a survey regarding the care you received during your visit. Your input is valuable to us. We encourage you to complete and return your survey. We hope you will choose us in the future for your healthcare needs. NEW ORDERS FROM TODAY:    Continue:  · Take all medications as prescribed   · Daily weights and record - please try to weigh yourself upon awakening before eating or drinking   · Fluid restriction of 2 Liters per day (64 oz)  · Limit sodium in diet to around 5325-4397 mg/day  · Monitor BP - take BP 1 hour after meds  · Increase activity as tolerated     Call the [facility] for any of the following symptoms: [phone]  ? Weight gain of 3 pounds in 1 day or 5 pounds in 1 week  ? Increased shortness of breath  ? Shortness of breath while laying down  ? Cough  ? Chest pain  ? Swelling in feet, ankles or legs  ? Tenderness or bloating in the abdomen  ? Fatigue   ? Decreased appetite or feeling \"full\"   ? Nausea   ? Confusion     **PLEASE bring all medications in original bottles with you to your next appointment**    Educational websites:    http://[email or web address].Playtika/. org (American Heart Association)    PromotionalYouGov. com (Entrest[employer]/Novartis)    State.com (CardioMEMS)    Too much sodium causes your body to hold on to extra water. This can raise your blood pressure and force your heart and kidneys to work harder. In very serious cases, this could cause you to be put in the hospital. It might even be life-threatening. By limiting sodium, you will feel better and lower your risk of serious problems. The most common source of sodium is salt. People get most of the salt in their diet from canned, prepared, and packaged foods. Fast food and restaurant meals also are very high in sodium. Your doctor will probably limit your sodium to less than 2,000 milligrams (mg) a day. This limit counts all the sodium in prepared and packaged foods and any salt you add to your food. Follow-up care is a key part of your treatment and safety. Be sure to make and go to all appointments, and call your doctor if you are having problems. It's also a good idea to know your test results and keep a list of the medicines you take. How can you care for yourself at home? Read food labels  · Read labels on cans and food packages. The labels tell you how much sodium is in each serving. Make sure that you look at the serving size. If you eat more than the serving size, you have eaten more sodium. · Food labels also tell you the Percent Daily Value for sodium. Choose products with low Percent Daily Values for sodium. · Be aware that sodium can come in forms other than salt, including monosodium glutamate (MSG), sodium citrate, and sodium bicarbonate (baking soda). MSG is often added to Asian food. When you eat out, you can sometimes ask for food without MSG or added salt. Buy low-sodium foods  · Buy foods that are labeled \"unsalted\" (no salt added), \"sodium-free\" (less than 5 mg of sodium per serving), or \"low-sodium\" (less than 140 mg of sodium per serving). Foods labeled \"reduced-sodium\" and \"light sodium\" may still have too much sodium. Be sure to read the label to see how much sodium you are getting. · Buy fresh vegetables, or frozen vegetables without added sauces. Buy low-sodium versions of canned vegetables, soups, and other canned goods. Prepare low-sodium meals  · Cut back on the amount of salt you use in cooking. This will help you adjust to the taste. Do not add salt after cooking. One teaspoon of salt has about 2,300 mg of sodium. · Take the salt shaker off the table. · Flavor your food with garlic, lemon juice, onion, vinegar, herbs, and spices. Do not use soy sauce, lite soy sauce, steak sauce, onion salt, garlic salt, celery salt, mustard, or ketchup on your food. · Use low-sodium salad dressings, sauces, and ketchup. Or make your own salad dressings and sauces without adding salt. · Use less salt (or none) when recipes call for it. You can often use half the salt a recipe calls for without losing flavor. Other foods such as rice, pasta, and grains do not need added salt. · Rinse canned vegetables, and cook them in fresh water. This removes somebut not allof the salt. · Avoid water that is naturally high in sodium or that has been treated with water softeners, which add sodium. Call your local water company to find out the sodium content of your water supply. If you buy bottled water, read the label and choose a sodium-free brand. Avoid high-sodium foods  · Avoid eating:  ? Smoked, cured, salted, and canned meat, fish, and poultry. ? Ham, ayala, hot dogs, and luncheon meats. ? Regular, hard, and processed cheese and regular peanut butter. ? Crackers with salted tops, and other salted snack foods such as pretzels, chips, and salted popcorn. ? Frozen prepared meals, unless labeled low-sodium. ? Canned and dried soups, broths, and bouillon, unless labeled sodium-free or low-sodium. ? Canned vegetables, unless labeled sodium-free or low-sodium. ? [place] fries, pizza, tacos, and other fast foods. ? Pickles, olives, ketchup, and other condiments, especially soy sauce, unless labeled sodium-free or low-sodium.

## 2021-01-15 NOTE — PROGRESS NOTES
[place]       Visit Date: 1/15/2021  Cardiologist:  Dr. Venkatesh Tobin   Primary Care Physician: Dr. Juan Carlos Durand, Negin Champion is a 68 y.o. female who presents today for:  Chief Complaint   Patient presents with    Congestive Heart Failure       HPI: Obtained from patient and chart    Josef Holcomb is a 68 y.o. female who presents to the office for a new patient visit in the heart failure clinic. Hx HTN, DM, EF 55% RSVP 32 mmHg, severe AS SAM 0.9 cm, non obstructive CAD per [address] [facility] [address] 1/13/21, RHC revealed PA 43/20 (35) wedge 28. Had COVID Sept 2020, TIA, SAH d/t fall. She is being worked up for TAVR. Due to elevated RHC pressures she was started on Lasix and referred to us. She can perform ADLs, walked at least 75 yards without SOB or fatigue. She uses a cane d/t her back. She has no swelling today. She feels like she has not recovered from [facility]\"" completely, still has a residual cough. She sleeps in a chair for comfort d/t back pain but does also lay on the couch. Denies orthopnea or PND.         Accompanied by:   Last hospital admission related to Heart Failure:  none  Chest Pain: no  Worsening SOB: no  Worsening Orthopnea/PND: no  Edema: no  Any extra diuretic use: see hpi  Weight gain: unknown - has not been weighing  Fatigue: yes  Abdominal bloating: sometimes   Appetite: good  ALLISON: no  Cough: occasional   Compliant checking home weight: no  Compliant checking blood pressure: no      Past Medical History:   Diagnosis Date    Arthritis     Chronic low back pain     COVID-19     DM type 2, goal A1c below 7     GERD (gastroesophageal reflux disease)     Hyperlipidemia     Hypertension     Loss of vision     Mitral valve disorder     Osteoporosis     Subarachnoid hemorrhage ([address] 4/25/2019    TIA (transient ischemic attack)     Type 2 diabetes mellitus with diabetic neuropathy, without long-term current use of insulin ([address] 7/26/2017 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    Urinary incontinence      Past Surgical History:   Procedure Laterality Date    APPENDECTOMY      CARPAL TUNNEL RELEASE Left 2011    CATARACT REMOVAL Bilateral     CERVICAL FUSION  1976    CHOLECYSTECTOMY      COLON SURGERY  2012    COLONOSCOPY  2015    EYE SURGERY      CATARACTS    FINGER SURGERY Right 10/2016    3rd digit    HERNIA REPAIR      HYSTERECTOMY      NERVE SURGERY Right 10/25/2019    Lumbar Facet MBB @ B78-O4,V8-9 L2-3 right side only #1 performed by Dee Pratt MD at [facility] Right 12/16/2019    Lumbar Facet MBB @ F16-U2-N0-6, L2-3 RIGHT SIDE ONLY performed by Dee Prtat MD at [facility] Right 1/6/2020    Lumbar RFA T12-L1, L1-2, L2-3 Right performed by Dee Pratt MD at [address] [place], [place] [address]atib LAMINECTOMY,>2 [facility]      6 FUSIONS    ROTATOR CUFF REPAIR Right 11/28/2014    SHOULDER SURGERY  2014    SPINE SURGERY      TENDON RELEASE Right 05/18/2017    at [facility] Bilateral     UPPER GASTROINTESTINAL ENDOSCOPY  2013,2015     Family History   Problem Relation Age of Onset    Arthritis Mother     Cancer Mother     Heart Disease Father     High Blood Pressure Father      Social History     Tobacco Use    Smoking status: Never Smoker    Smokeless tobacco: Never Used   Substance Use Topics    Alcohol use: No     Current Outpatient Medications   Medication Sig Dispense Refill    furosemide (LASIX) 40 MG tablet Take 1 tablet by mouth 2 times daily 60 tablet 3    potassium chloride (KLOR-CON M) 20 MEQ extended release tablet Take 1 tablet by mouth daily 90 tablet 1    hydrALAZINE (APRESOLINE) 50 MG tablet Take 1 tablet by mouth 2 times daily 180 tablet 3    atenolol (TENORMIN) 25 MG tablet Take 1 tablet by mouth daily 90 tablet 3  amLODIPine (NORVASC) 10 MG tablet TAKE 1 TABLET DAILY 90 tablet 3    vitamin C (VITAMIN C) 1000 MG tablet Take 1 tablet by mouth daily 30 tablet 3    ondansetron (ZOFRAN ODT) 4 MG disintegrating tablet Take 1 tablet by mouth every 8 hours as needed for Nausea or Vomiting 20 tablet 0    metFORMIN (GLUCOPHAGE) 500 MG tablet TAKE 1 TABLET TWICE A DAY WITH MEALS 180 tablet 4    atorvastatin (LIPITOR) 40 MG tablet TAKE 1 TABLET DAILY 90 tablet 4    sertraline (ZOLOFT) 100 MG tablet TAKE 2 TABLETS DAILY 180 tablet 4    quinapril (ACCUPRIL) 10 MG tablet TAKE 1 TABLET NIGHTLY 90 tablet 4    pantoprazole (PROTONIX) 40 MG tablet TAKE 1 TABLET TWICE A  tablet 4    nystatin (MYCOSTATIN) 382635 UNIT/GM cream Apply topically 2 times daily. 1 Tube 1    aspirin 81 MG tablet Take 81 mg by mouth daily      promethazine (PHENERGAN) 25 MG tablet Take 1 tablet by mouth every 6 hours as needed for Nausea 15 tablet 0    vitamin D (CHOLECALCIFEROL) 1000 UNIT TABS tablet Take 1,000 Units by mouth nightly.  zinc sulfate (ZINCATE) 220 (50 Zn) MG capsule Take 1 capsule by mouth daily (Patient not taking: Reported on 1/15/2021) 30 capsule 3     No current facility-administered medications for this visit. Allergies   Allergen Reactions    Norco [Hydrocodone-Acetaminophen] Itching    Tramadol Itching    Codeine Hives and Rash       SUBJECTIVE:   Review of Systems   Constitutional: Positive for fatigue. Negative for activity change, appetite change and fever. HENT: Negative for congestion. Respiratory: Positive for shortness of breath (with exertion ). Negative for apnea, cough, chest tightness and wheezing. Cardiovascular: Negative for chest pain, palpitations and leg swelling. Gastrointestinal: Negative for abdominal distention, abdominal pain and nausea. Genitourinary: Negative for difficulty urinating and dysuria. Musculoskeletal: Positive for back pain (chronic ) and gait problem (cane). Negative for arthralgias. Skin: Negative for color change. Neurological: Negative for dizziness, numbness and headaches. Psychiatric/Behavioral: Negative for agitation, confusion and sleep disturbance. The patient is not nervous/anxious. OBJECTIVE:   Today's Vitals:  /60   Pulse 83   Ht 5' 4\" (1.626 m)   Wt 259 lb 9.6 oz (117.8 kg)   SpO2 92%   BMI 44.56 kg/m²     Physical Exam  Vitals signs reviewed. Constitutional:       Appearance: She is well-developed. HENT:      Head: Normocephalic and atraumatic. Eyes:      Pupils: Pupils are equal, round, and reactive to light. Neck:      Musculoskeletal: Normal range of motion. Vascular: No JVD. Cardiovascular:      Rate and Rhythm: Normal rate and regular rhythm. Heart sounds: Murmur present. Pulmonary:      Effort: Pulmonary effort is normal. No respiratory distress. Breath sounds: No rales. Abdominal:      General: There is no distension. Palpations: Abdomen is soft. Tenderness: There is no abdominal tenderness. Musculoskeletal:         General: No tenderness. Right lower leg: No edema. Left lower leg: No edema. Skin:     General: Skin is warm and dry. Capillary Refill: Capillary refill takes less than 2 seconds. Neurological:      Mental Status: She is alert and oriented to person, place, and time. Psychiatric:         Mood and Affect: Mood normal.         Behavior: Behavior normal.         Wt Readings from Last 3 Encounters:   01/15/21 259 lb 9.6 oz (117.8 kg)   01/13/21 242 lb (109.8 kg)   12/14/20 257 lb (116.6 kg)     BP Readings from Last 3 Encounters:   01/15/21 110/60   01/13/21 99/70   12/14/20 118/67     Pulse Readings from Last 3 Encounters:   01/15/21 83   01/13/21 70   12/14/20 73     Body mass index is 44.56 kg/m². ECHO:   11/27/20   Summary   Ejection fraction was estimated at 55-60%. Left atrial size was mildly dilated. The aortic valve was trileaflet with increased thickness, calcification,   and reduced cuspal separation. DOPPLER: Transaortic velocity was 3.6 m/s,   mean gradient 32 mmHg, SAM 0.9 cm2, consistent with severe aortic stenosis   (PLFLG). There was trace aortic regurgitation. There was trace mitral regurgitation. There was trace tricuspid regurgitation. Assuming RAP = 5 mmHg, the estimated RVSP = 32 mmHg. IVC size is within normal limits with normal respiratory phasic changes. Signature      ----------------------------------------------------------------   Electronically signed by Tom Bejarano MD (Interpreting   physician) on 11/28/2020 at 02:32 PM   ----------------------------------------------------------------      Findings      Mitral Valve   The mitral valve structure was normal with normal leaflet separation. DOPPLER: The transmitral velocity was within the normal range with no   evidence for mitral stenosis. There was trace mitral regurgitation. Aortic Valve   The aortic valve was trileaflet with increased thickness, calcification,   and reduced cuspal separation. DOPPLER: Transaortic velocity was 3.6 m/s,   mean gradient 32 mmHg, SAM 0.9 cm2, consistent with severe aortic stenosis   (PLFLG). There was trace aortic regurgitation. Tricuspid Valve   The tricuspid valve structure was normal with normal leaflet separation. DOPPLER: There was no evidence of tricuspid stenosis. There was trace   tricuspid regurgitation. Assuming RAP = 5 mmHg, the estimated RVSP = 32   mmHg. Pulmonic Valve   The pulmonic valve leaflets were not well seen. DOPPLER: The transpulmonic   velocity was within the normal range with no evidence for regurgitation. Left Atrium   Left atrial size was mildly dilated. Left Ventricle   Normal left ventricular size and systolic function. There were no regional wall motion abnormalities. Wall thickness was within normal limits. Ejection fraction was estimated at 55-60%. Right Atrium   Right atrial size was normal.      Right Ventricle   The right ventricular size was normal with normal systolic function and   wall thickness. Pericardial Effusion   The pericardium was normal in appearance with no evidence of a pericardial   effusion. Pleural Effusion   No evidence of pleural effusion. Aorta / Great Vessels   IVC size is within normal limits with normal respiratory phasic changes.        RHC/LHC 1/13/21  RIGHT HEART CATHETERIZATION:  RA 20, RV 47/19, PA 43/20 with a mean of  35 and wedge pressure of 28, cardiac output 4.7, cardiac index 2.2, and  PA saturation is 72%.       SUMMARY:  Nonobstructive coronary artery disease; severe aortic  stenosis, elevated right heart cath pressures with preserved cardiac  output.     Results reviewed:  BNP:   Lab Results   Component Value Date    PROBNP 1879.0 (H) 09/04/2020     CBC:   Lab Results   Component Value Date    WBC 7.4 01/13/2021    RBC 4.66 01/13/2021    RBC 0-5 04/07/2015    HGB 14.1 01/13/2021    HCT 41.8 01/13/2021     01/13/2021     CMP:    Lab Results   Component Value Date     01/13/2021    K 4.2 01/13/2021     01/13/2021    CO2 21 01/13/2021    BUN 15 01/13/2021    CREATININE 0.8 01/13/2021    LABGLOM 69 01/13/2021    GLUCOSE 145 01/13/2021    CALCIUM 9.6 01/13/2021     Hepatic Function Panel:    Lab Results   Component Value Date    ALKPHOS 49 09/05/2020    ALT 22 09/05/2020    AST 33 09/05/2020    PROT 6.0 09/05/2020    BILITOT 0.4 09/05/2020    BILITOT NEGATIVE 04/07/2015    BILIDIR <0.2 09/04/2020    LABALBU 2.8 09/05/2020     Magnesium:    Lab Results   Component Value Date    MG 1.9 04/25/2019     PT/INR:    Lab Results   Component Value Date    INR 1.01 01/13/2021     Lipids:    Lab Results   Component Value Date    TRIG 141 05/14/2016    HDL 46 05/14/2016    [address] 70 05/14/2016 ASSESSMENT AND PLAN:   The patient's condition/symptoms are Stable      Diagnosis Orders   1. CHF (congestive heart failure), NYHA class II, chronic, diastolic (HCC)  Brain Natriuretic Peptide    Basic Metabolic Panel    Magnesium   2. Essential hypertension     3. Severe aortic stenosis     4. Morbid obesity with BMI of 40.0-44.9, adult ([facility]         Plan:  · GDMT   · ACE/ARB/ARNi: Quinapril 10 mg daily  · Beta Blocker: Atenolol 25 mg daily  · Aldosterone antagonist: no  · Diuretic/Potassium: Lasix 40 mg bid, Potassium 20 mEq daily  · Hydralazine/Nitrate: Hydralazine 50 mg bid, no Nitrates severe AS  · Other: Amlodipine 10 mg daily, Lipitor, ASA 81 mg  · No obvious signs of fluid overload. BNP, BMP and Mg today. Further recs pending labs. BP controlled. · HF Zones reviewed. · Daily weights and record  · Fluid restriction of 2 Liters per day (64 oz)   · Limit sodium in diet to 0056-2878 mg/day  · Healthier food options were discussed   · Monitor BP daily 1 hour after meds are taken  · Increase activity as tolerated     Patient was instructed to call the [facility] for changes in the following symptoms:   ? Weight gain of 3 pounds in 1 day or 5 pounds in 1 week  ? Increased shortness of breath  ? Shortness of breath while laying down  ? Cough  ? Chest pain  ? Swelling in feet, ankles or legs  ? Tenderness or bloating in the abdomen  ? Fatigue   ? Decreased appetite or feeling \"full\"  ? Nausea   ? Confusion      Return in about 2 months (around 3/15/2021). or sooner if needed     Patient given educational materials - see patient instructions. We discussed the importance of weighing oneself and recording daily. We also discussed the importance of a low sodium diet, higher sodium foods to avoid and appropriate low sodium food choices. Discussed use, benefit, and side effects of prescribed medications. All patient questions answered. Patient verbalizes understanding of plan of care using teach back method, and is agreeable to the treatment plan. Greater then 45 minutes of time was spent reviewing the chart and educating the patient about HF, medications, diet, exercise, and discussing the plan of care. I personally spent more then 50% of the appt time face to face with the patient counseling/coordinating patient's care.       Electronicallysigned by SEAN Almazan CNP on 1/15/2021 at 8:49 AM

## 2021-01-15 NOTE — TELEPHONE ENCOUNTER
I called and spoke to [facility] reviewed  BNP WNL  Kidney function stable except GFR has decreased  Mg low normal 1.6  Add Mag ox 250 mg bid  Decrease Lasix to 40 mg daily and Potassium to 10 mEq daily  Repeat BMP in 10 days

## 2021-01-15 NOTE — PROGRESS NOTES
Venipuncture obtained from [address] [place] [address]. Patient tolerated procedure without complications or complaints.

## 2021-01-21 NOTE — PROGRESS NOTES
Patient refused all oral medications this shift, stating her stomach felt to poor to take any. Anti-nausea medication offered per [facility]. Patient refused anti-nausea medication. Education provided on importance of medication compliance, patient verbalized understanding of education and stated she would try in the morning to take her PO medication, however, she was not going to take them tonight. Christen Omalley Detail Level: Detailed When Should The Patient Follow-Up For Their Next Full-Body Skin Exam?: 6 Months Quality 137: Melanoma: Continuity Of Care - Recall System: Patient information entered into a recall system that includes: target date for the next exam specified AND a process to follow up with patients regarding missed or unscheduled appointments Detail Level: Generalized

## 2021-01-26 ENCOUNTER — TELEPHONE (OUTPATIENT)
Dept: CARDIOLOGY CLINIC | Age: 81
End: 2021-01-26

## 2021-01-26 ENCOUNTER — HOSPITAL ENCOUNTER (OUTPATIENT)
Age: 81
Discharge: HOME | End: 2021-01-26
Payer: MEDICARE

## 2021-01-26 DIAGNOSIS — I50.32 CHF (CONGESTIVE HEART FAILURE), NYHA CLASS II, CHRONIC, DIASTOLIC (HCC): ICD-10-CM

## 2021-01-26 DIAGNOSIS — Z13.9 SCREENING PROCEDURE: Primary | ICD-10-CM

## 2021-01-26 LAB
ANION GAP SERPL CALCULATED.3IONS-SCNC: 13 MEQ/L (ref 8–16)
BUN BLDV-MCNC: 21 MG/DL (ref 7–22)
CALCIUM SERPL-MCNC: 9.7 MG/DL (ref 8.5–10.5)
CHLORIDE BLD-SCNC: 107 MEQ/L (ref 98–111)
CO2: 23 MEQ/L (ref 23–33)
CREAT SERPL-MCNC: 0.9 MG/DL (ref 0.4–1.2)
GFR SERPL CREATININE-BSD FRML MDRD: 60 ML/MIN/1.73M2
GLUCOSE BLD-MCNC: 143 MG/DL (ref 70–108)
POTASSIUM SERPL-SCNC: 4.3 MEQ/L (ref 3.5–5.2)
SODIUM BLD-SCNC: 143 MEQ/L (ref 135–145)

## 2021-01-26 PROCEDURE — 80048 BASIC METABOLIC PNL TOTAL CA: CPT

## 2021-01-26 PROCEDURE — 36415 COLL VENOUS BLD VENIPUNCTURE: CPT

## 2021-01-26 NOTE — TELEPHONE ENCOUNTER
Orders received from Dr. Baldemar Wood to schedule this patient for her TAVR, obtain a COVID test prior, have the patient hold her Lasix, Metformin and Quinapril the morning of the procedure. COVID: 1/27/21  TAVR: 2/3/21 at 0900 with an arrival at 0700.

## 2021-01-26 NOTE — TELEPHONE ENCOUNTER
Patient notified and verbalized understanding of lab work. SOB is the same, the same it has been forever. She has not been weighing since everything with her  has been happening but did say the last time she did weigh she was down a couple pounds that she could remember. Denies swelling, stated she never had any swelling to begin with. Has been \"peeing a lot\" but feels a little better since starting medication.

## 2021-01-27 ENCOUNTER — HOSPITAL ENCOUNTER (OUTPATIENT)
Age: 81
Setting detail: SPECIMEN
Discharge: HOME | End: 2021-01-27
Payer: MEDICARE

## 2021-01-27 PROCEDURE — U0003 INFECTIOUS AGENT DETECTION BY NUCLEIC ACID (DNA OR RNA); SEVERE ACUTE RESPIRATORY SYNDROME CORONAVIRUS 2 (SARS-COV-2) (CORONAVIRUS DISEASE [COVID-19]), AMPLIFIED PROBE TECHNIQUE, MAKING USE OF HIGH THROUGHPUT TECHNOLOGIES AS DESCRIBED BY CMS-2020-01-R: HCPCS

## 2021-01-29 LAB — SARS-COV-2: NOT DETECTED

## 2021-02-01 ENCOUNTER — TELEPHONE (OUTPATIENT)
Dept: CARDIOLOGY CLINIC | Age: 81
End: 2021-02-01

## 2021-02-02 ENCOUNTER — PREP FOR PROCEDURE (OUTPATIENT)
Dept: CARDIOLOGY | Age: 81
End: 2021-02-02

## 2021-02-02 RX ORDER — SODIUM CHLORIDE 0.9 % (FLUSH) 0.9 %
10 SYRINGE (ML) INJECTION PRN
Status: CANCELLED | OUTPATIENT
Start: 2021-02-02

## 2021-02-02 RX ORDER — SODIUM CHLORIDE 0.9 % (FLUSH) 0.9 %
10 SYRINGE (ML) INJECTION EVERY 12 HOURS SCHEDULED
Status: CANCELLED | OUTPATIENT
Start: 2021-02-02

## 2021-02-02 RX ORDER — CHLORHEXIDINE GLUCONATE 4 G/100ML
SOLUTION TOPICAL ONCE
Status: CANCELLED | OUTPATIENT
Start: 2021-02-02 | End: 2021-02-02

## 2021-02-02 RX ORDER — SODIUM CHLORIDE 9 MG/ML
INJECTION, SOLUTION INTRAVENOUS PRN
Status: CANCELLED | OUTPATIENT
Start: 2021-02-02

## 2021-02-02 RX ORDER — SODIUM CHLORIDE 9 MG/ML
INJECTION, SOLUTION INTRAVENOUS CONTINUOUS
Status: CANCELLED | OUTPATIENT
Start: 2021-02-02

## 2021-02-03 ENCOUNTER — APPOINTMENT (OUTPATIENT)
Dept: CARDIAC CATH/INVASIVE PROCEDURES | Age: 81
DRG: 267 | End: 2021-02-03
Attending: INTERNAL MEDICINE
Payer: MEDICARE

## 2021-02-03 ENCOUNTER — HOSPITAL ENCOUNTER (INPATIENT)
Dept: INPATIENT UNIT | Age: 81
LOS: 2 days | Discharge: HOME | DRG: 267 | End: 2021-02-05
Attending: INTERNAL MEDICINE | Admitting: INTERNAL MEDICINE
Payer: MEDICARE

## 2021-02-03 DIAGNOSIS — Z95.3 STATUS POST TRANSCATHETER AORTIC VALVE REPLACEMENT (TAVR) USING BIOPROSTHESIS: Primary | ICD-10-CM

## 2021-02-03 LAB
ABO: NORMAL
ACTIVATED CLOTTING TIME: 351 SECONDS (ref 1–150)
ALBUMIN SERPL-MCNC: 4.2 G/DL (ref 3.5–5.1)
ALP BLD-CCNC: 57 U/L (ref 38–126)
ALT SERPL-CCNC: 32 U/L (ref 11–66)
ANION GAP SERPL CALCULATED.3IONS-SCNC: 10 MEQ/L (ref 8–16)
ANTIBODY SCREEN: NORMAL
APTT: 27.5 SECONDS (ref 22–38)
AST SERPL-CCNC: 30 U/L (ref 5–40)
BILIRUB SERPL-MCNC: 0.4 MG/DL (ref 0.3–1.2)
BUN BLDV-MCNC: 22 MG/DL (ref 7–22)
CALCIUM SERPL-MCNC: 9.5 MG/DL (ref 8.5–10.5)
CHLORIDE BLD-SCNC: 106 MEQ/L (ref 98–111)
CO2: 23 MEQ/L (ref 23–33)
CREAT SERPL-MCNC: 0.8 MG/DL (ref 0.4–1.2)
EKG ATRIAL RATE: 64 BPM
EKG ATRIAL RATE: 76 BPM
EKG P AXIS: 33 DEGREES
EKG P AXIS: 75 DEGREES
EKG P-R INTERVAL: 152 MS
EKG P-R INTERVAL: 220 MS
EKG Q-T INTERVAL: 426 MS
EKG Q-T INTERVAL: 458 MS
EKG QRS DURATION: 152 MS
EKG QRS DURATION: 94 MS
EKG QTC CALCULATION (BAZETT): 439 MS
EKG QTC CALCULATION (BAZETT): 515 MS
EKG R AXIS: 21 DEGREES
EKG R AXIS: 3 DEGREES
EKG T AXIS: 107 DEGREES
EKG T AXIS: 22 DEGREES
EKG VENTRICULAR RATE: 64 BPM
EKG VENTRICULAR RATE: 76 BPM
ERYTHROCYTE [DISTWIDTH] IN BLOOD BY AUTOMATED COUNT: 13.7 % (ref 11.5–14.5)
ERYTHROCYTE [DISTWIDTH] IN BLOOD BY AUTOMATED COUNT: 43.8 FL (ref 35–45)
GFR SERPL CREATININE-BSD FRML MDRD: 69 ML/MIN/1.73M2
GLUCOSE BLD-MCNC: 139 MG/DL (ref 70–108)
HCT VFR BLD CALC: 38.9 % (ref 37–47)
HEMOGLOBIN: 13.2 GM/DL (ref 12–16)
INR BLD: 0.95 (ref 0.85–1.13)
MCH RBC QN AUTO: 30.1 PG (ref 26–33)
MCHC RBC AUTO-ENTMCNC: 33.9 GM/DL (ref 32.2–35.5)
MCV RBC AUTO: 88.6 FL (ref 81–99)
PLATELET # BLD: 199 THOU/MM3 (ref 130–400)
PMV BLD AUTO: 11.4 FL (ref 9.4–12.4)
POTASSIUM SERPL-SCNC: 4.1 MEQ/L (ref 3.5–5.2)
PRO-BNP: 259.8 PG/ML (ref 0–1800)
RBC # BLD: 4.39 MILL/MM3 (ref 4.2–5.4)
RH FACTOR: NORMAL
SODIUM BLD-SCNC: 139 MEQ/L (ref 135–145)
TOTAL PROTEIN: 7 G/DL (ref 6.1–8)
WBC # BLD: 7.3 THOU/MM3 (ref 4.8–10.8)

## 2021-02-03 PROCEDURE — 85610 PROTHROMBIN TIME: CPT

## 2021-02-03 PROCEDURE — 85347 COAGULATION TIME ACTIVATED: CPT

## 2021-02-03 PROCEDURE — 33361 REPLACE AORTIC VALVE PERQ: CPT | Performed by: INTERNAL MEDICINE

## 2021-02-03 PROCEDURE — 93005 ELECTROCARDIOGRAM TRACING: CPT | Performed by: INTERNAL MEDICINE

## 2021-02-03 PROCEDURE — C1769 GUIDE WIRE: HCPCS

## 2021-02-03 PROCEDURE — 2140000000 HC CCU INTERMEDIATE R&B

## 2021-02-03 PROCEDURE — 6360000002 HC RX W HCPCS: Performed by: PHYSICIAN ASSISTANT

## 2021-02-03 PROCEDURE — 2580000003 HC RX 258: Performed by: INTERNAL MEDICINE

## 2021-02-03 PROCEDURE — 94761 N-INVAS EAR/PLS OXIMETRY MLT: CPT

## 2021-02-03 PROCEDURE — 2500000003 HC RX 250 WO HCPCS

## 2021-02-03 PROCEDURE — C1760 CLOSURE DEV, VASC: HCPCS

## 2021-02-03 PROCEDURE — 93010 ELECTROCARDIOGRAM REPORT: CPT | Performed by: NUCLEAR MEDICINE

## 2021-02-03 PROCEDURE — 86923 COMPATIBILITY TEST ELECTRIC: CPT

## 2021-02-03 PROCEDURE — 85027 COMPLETE CBC AUTOMATED: CPT

## 2021-02-03 PROCEDURE — 93005 ELECTROCARDIOGRAM TRACING: CPT | Performed by: PHYSICIAN ASSISTANT

## 2021-02-03 PROCEDURE — 2580000003 HC RX 258: Performed by: PHYSICIAN ASSISTANT

## 2021-02-03 PROCEDURE — 33361 REPLACE AORTIC VALVE PERQ: CPT | Performed by: THORACIC SURGERY (CARDIOTHORACIC VASCULAR SURGERY)

## 2021-02-03 PROCEDURE — 02RF38Z REPLACEMENT OF AORTIC VALVE WITH ZOOPLASTIC TISSUE, PERCUTANEOUS APPROACH: ICD-10-PCS | Performed by: INTERNAL MEDICINE

## 2021-02-03 PROCEDURE — 2720000010 HC SURG SUPPLY STERILE

## 2021-02-03 PROCEDURE — C1894 INTRO/SHEATH, NON-LASER: HCPCS

## 2021-02-03 PROCEDURE — 36415 COLL VENOUS BLD VENIPUNCTURE: CPT

## 2021-02-03 PROCEDURE — 83880 ASSAY OF NATRIURETIC PEPTIDE: CPT

## 2021-02-03 PROCEDURE — 2780000006 HC MISC HEART VALVE

## 2021-02-03 PROCEDURE — 86900 BLOOD TYPING SEROLOGIC ABO: CPT

## 2021-02-03 PROCEDURE — 6360000002 HC RX W HCPCS: Performed by: INTERNAL MEDICINE

## 2021-02-03 PROCEDURE — 80053 COMPREHEN METABOLIC PANEL: CPT

## 2021-02-03 PROCEDURE — 96365 THER/PROPH/DIAG IV INF INIT: CPT

## 2021-02-03 PROCEDURE — 2709999900 HC NON-CHARGEABLE SUPPLY

## 2021-02-03 PROCEDURE — 86850 RBC ANTIBODY SCREEN: CPT

## 2021-02-03 PROCEDURE — 85730 THROMBOPLASTIN TIME PARTIAL: CPT

## 2021-02-03 PROCEDURE — 6360000002 HC RX W HCPCS

## 2021-02-03 PROCEDURE — 6370000000 HC RX 637 (ALT 250 FOR IP): Performed by: INTERNAL MEDICINE

## 2021-02-03 PROCEDURE — 6360000004 HC RX CONTRAST MEDICATION: Performed by: INTERNAL MEDICINE

## 2021-02-03 PROCEDURE — 86901 BLOOD TYPING SEROLOGIC RH(D): CPT

## 2021-02-03 RX ORDER — ONDANSETRON 2 MG/ML
4 INJECTION INTRAMUSCULAR; INTRAVENOUS EVERY 4 HOURS PRN
Status: DISCONTINUED | OUTPATIENT
Start: 2021-02-03 | End: 2021-02-05 | Stop reason: HOSPADM

## 2021-02-03 RX ORDER — ASPIRIN 81 MG/1
81 TABLET ORAL DAILY
Status: DISCONTINUED | OUTPATIENT
Start: 2021-02-04 | End: 2021-02-03

## 2021-02-03 RX ORDER — CHLORHEXIDINE GLUCONATE 4 G/100ML
SOLUTION TOPICAL ONCE
Status: DISCONTINUED | OUTPATIENT
Start: 2021-02-03 | End: 2021-02-05 | Stop reason: HOSPADM

## 2021-02-03 RX ORDER — MIDAZOLAM HYDROCHLORIDE 2 MG/2ML
1 INJECTION, SOLUTION INTRAMUSCULAR; INTRAVENOUS EVERY 6 HOURS PRN
Status: DISCONTINUED | OUTPATIENT
Start: 2021-02-03 | End: 2021-02-03

## 2021-02-03 RX ORDER — ONDANSETRON 4 MG/1
4 TABLET, ORALLY DISINTEGRATING ORAL EVERY 8 HOURS PRN
Status: DISCONTINUED | OUTPATIENT
Start: 2021-02-03 | End: 2021-02-05 | Stop reason: HOSPADM

## 2021-02-03 RX ORDER — SODIUM CHLORIDE 0.9 % (FLUSH) 0.9 %
10 SYRINGE (ML) INJECTION EVERY 12 HOURS SCHEDULED
Status: DISCONTINUED | OUTPATIENT
Start: 2021-02-03 | End: 2021-02-05 | Stop reason: HOSPADM

## 2021-02-03 RX ORDER — SODIUM CHLORIDE 0.9 % (FLUSH) 0.9 %
10 SYRINGE (ML) INJECTION PRN
Status: DISCONTINUED | OUTPATIENT
Start: 2021-02-03 | End: 2021-02-05 | Stop reason: HOSPADM

## 2021-02-03 RX ORDER — MULTIVITAMIN/IRON/FOLIC ACID 18MG-0.4MG
250 TABLET ORAL 2 TIMES DAILY
Status: DISCONTINUED | OUTPATIENT
Start: 2021-02-03 | End: 2021-02-05 | Stop reason: HOSPADM

## 2021-02-03 RX ORDER — SODIUM CHLORIDE 9 MG/ML
INJECTION, SOLUTION INTRAVENOUS CONTINUOUS
Status: DISCONTINUED | OUTPATIENT
Start: 2021-02-03 | End: 2021-02-05 | Stop reason: HOSPADM

## 2021-02-03 RX ORDER — ACETAMINOPHEN 325 MG/1
650 TABLET ORAL EVERY 4 HOURS PRN
Status: DISCONTINUED | OUTPATIENT
Start: 2021-02-03 | End: 2021-02-03

## 2021-02-03 RX ORDER — ASCORBIC ACID 500 MG
1000 TABLET ORAL DAILY
Status: DISCONTINUED | OUTPATIENT
Start: 2021-02-04 | End: 2021-02-05 | Stop reason: HOSPADM

## 2021-02-03 RX ORDER — SODIUM CHLORIDE 0.9 % (FLUSH) 0.9 %
10 SYRINGE (ML) INJECTION EVERY 12 HOURS SCHEDULED
Status: DISCONTINUED | OUTPATIENT
Start: 2021-02-03 | End: 2021-02-03

## 2021-02-03 RX ORDER — SODIUM CHLORIDE 9 MG/ML
INJECTION, SOLUTION INTRAVENOUS CONTINUOUS
Status: DISCONTINUED | OUTPATIENT
Start: 2021-02-03 | End: 2021-02-03

## 2021-02-03 RX ORDER — VITAMIN B COMPLEX
1000 TABLET ORAL DAILY
Status: DISCONTINUED | OUTPATIENT
Start: 2021-02-04 | End: 2021-02-05 | Stop reason: HOSPADM

## 2021-02-03 RX ORDER — OXYCODONE HYDROCHLORIDE AND ACETAMINOPHEN 5; 325 MG/1; MG/1
2 TABLET ORAL EVERY 4 HOURS PRN
Status: DISCONTINUED | OUTPATIENT
Start: 2021-02-03 | End: 2021-02-05 | Stop reason: HOSPADM

## 2021-02-03 RX ORDER — ATORVASTATIN CALCIUM 40 MG/1
40 TABLET, FILM COATED ORAL NIGHTLY
Status: DISCONTINUED | OUTPATIENT
Start: 2021-02-03 | End: 2021-02-05 | Stop reason: HOSPADM

## 2021-02-03 RX ORDER — ASPIRIN 81 MG/1
81 TABLET, CHEWABLE ORAL NIGHTLY
Status: DISCONTINUED | OUTPATIENT
Start: 2021-02-03 | End: 2021-02-05 | Stop reason: HOSPADM

## 2021-02-03 RX ORDER — POTASSIUM CHLORIDE 750 MG/1
10 TABLET, FILM COATED, EXTENDED RELEASE ORAL DAILY
Status: DISCONTINUED | OUTPATIENT
Start: 2021-02-03 | End: 2021-02-05 | Stop reason: HOSPADM

## 2021-02-03 RX ORDER — HYDRALAZINE HYDROCHLORIDE 20 MG/ML
10 INJECTION INTRAMUSCULAR; INTRAVENOUS EVERY 10 MIN PRN
Status: DISCONTINUED | OUTPATIENT
Start: 2021-02-03 | End: 2021-02-05 | Stop reason: HOSPADM

## 2021-02-03 RX ORDER — SODIUM CHLORIDE 9 MG/ML
INJECTION, SOLUTION INTRAVENOUS PRN
Status: DISCONTINUED | OUTPATIENT
Start: 2021-02-03 | End: 2021-02-05 | Stop reason: HOSPADM

## 2021-02-03 RX ORDER — SODIUM CHLORIDE 0.9 % (FLUSH) 0.9 %
10 SYRINGE (ML) INJECTION PRN
Status: DISCONTINUED | OUTPATIENT
Start: 2021-02-03 | End: 2021-02-03

## 2021-02-03 RX ORDER — ATROPINE SULFATE 0.4 MG/ML
0.5 AMPUL (ML) INJECTION
Status: ACTIVE | OUTPATIENT
Start: 2021-02-03 | End: 2021-02-03

## 2021-02-03 RX ORDER — ALPRAZOLAM 0.25 MG/1
0.25 TABLET ORAL 3 TIMES DAILY PRN
Status: DISCONTINUED | OUTPATIENT
Start: 2021-02-03 | End: 2021-02-05 | Stop reason: HOSPADM

## 2021-02-03 RX ORDER — ZINC SULFATE 50(220)MG
50 CAPSULE ORAL DAILY
Status: DISCONTINUED | OUTPATIENT
Start: 2021-02-04 | End: 2021-02-05 | Stop reason: HOSPADM

## 2021-02-03 RX ORDER — CLOPIDOGREL BISULFATE 75 MG/1
75 TABLET ORAL DAILY
Status: DISCONTINUED | OUTPATIENT
Start: 2021-02-04 | End: 2021-02-05 | Stop reason: HOSPADM

## 2021-02-03 RX ORDER — ONDANSETRON 2 MG/ML
4 INJECTION INTRAMUSCULAR; INTRAVENOUS EVERY 6 HOURS PRN
Status: DISCONTINUED | OUTPATIENT
Start: 2021-02-03 | End: 2021-02-03

## 2021-02-03 RX ORDER — ACETAMINOPHEN 325 MG/1
650 TABLET ORAL EVERY 4 HOURS PRN
Status: DISCONTINUED | OUTPATIENT
Start: 2021-02-03 | End: 2021-02-05 | Stop reason: HOSPADM

## 2021-02-03 RX ORDER — FENTANYL CITRATE 50 UG/ML
50 INJECTION, SOLUTION INTRAMUSCULAR; INTRAVENOUS
Status: DISCONTINUED | OUTPATIENT
Start: 2021-02-03 | End: 2021-02-03

## 2021-02-03 RX ORDER — PROMETHAZINE HYDROCHLORIDE 25 MG/1
25 TABLET ORAL EVERY 6 HOURS PRN
Status: DISCONTINUED | OUTPATIENT
Start: 2021-02-03 | End: 2021-02-05 | Stop reason: HOSPADM

## 2021-02-03 RX ORDER — OXYCODONE HYDROCHLORIDE AND ACETAMINOPHEN 5; 325 MG/1; MG/1
1 TABLET ORAL EVERY 4 HOURS PRN
Status: DISCONTINUED | OUTPATIENT
Start: 2021-02-03 | End: 2021-02-05 | Stop reason: HOSPADM

## 2021-02-03 RX ADMIN — CEFAZOLIN 2000 MG: 10 INJECTION, POWDER, FOR SOLUTION INTRAVENOUS at 08:36

## 2021-02-03 RX ADMIN — SODIUM CHLORIDE, PRESERVATIVE FREE 10 ML: 5 INJECTION INTRAVENOUS at 23:09

## 2021-02-03 RX ADMIN — Medication 250 MG: at 23:06

## 2021-02-03 RX ADMIN — SERTRALINE 50 MG: 50 TABLET, FILM COATED ORAL at 21:39

## 2021-02-03 RX ADMIN — CEFAZOLIN 2000 MG: 10 INJECTION, POWDER, FOR SOLUTION INTRAVENOUS at 23:06

## 2021-02-03 RX ADMIN — ATORVASTATIN CALCIUM 40 MG: 40 TABLET, FILM COATED ORAL at 21:39

## 2021-02-03 RX ADMIN — ONDANSETRON 4 MG: 2 INJECTION INTRAMUSCULAR; INTRAVENOUS at 17:08

## 2021-02-03 RX ADMIN — SODIUM CHLORIDE: 9 INJECTION, SOLUTION INTRAVENOUS at 07:55

## 2021-02-03 RX ADMIN — ASPIRIN 81 MG: 81 TABLET, CHEWABLE ORAL at 21:39

## 2021-02-03 RX ADMIN — CEFAZOLIN 2000 MG: 10 INJECTION, POWDER, FOR SOLUTION INTRAVENOUS at 16:11

## 2021-02-03 RX ADMIN — ALPRAZOLAM 0.25 MG: 0.25 TABLET ORAL at 13:35

## 2021-02-03 RX ADMIN — ALPRAZOLAM 0.25 MG: 0.25 TABLET ORAL at 21:39

## 2021-02-03 RX ADMIN — ONDANSETRON 4 MG: 2 INJECTION INTRAMUSCULAR; INTRAVENOUS at 11:54

## 2021-02-03 RX ADMIN — IOPAMIDOL 100 ML: 755 INJECTION, SOLUTION INTRAVENOUS at 11:21

## 2021-02-03 RX ADMIN — SODIUM CHLORIDE, PRESERVATIVE FREE 10 ML: 5 INJECTION INTRAVENOUS at 17:08

## 2021-02-03 RX ADMIN — SODIUM CHLORIDE, PRESERVATIVE FREE 10 ML: 5 INJECTION INTRAVENOUS at 11:55

## 2021-02-03 RX ADMIN — ACETAMINOPHEN 650 MG: 325 TABLET ORAL at 16:16

## 2021-02-03 ASSESSMENT — PAIN SCALES - GENERAL: PAINLEVEL_OUTOF10: 8

## 2021-02-03 NOTE — PROGRESS NOTES
Patient vomited about 100 ml of yellowish emesis. zofran given. Groin sites intact. Will continue to assess.

## 2021-02-03 NOTE — BRIEF OP NOTE
[address] [address]  Sedation/Analgesia Post Sedation Record    Pt Name: Jb Dupree  Account number: [de-identified]  MRN: [id number]  YOB: 1940  Procedure Performed By: Maurizio Ludwig, [address]  Primary Care Physician: Romi Bynum DO  Date: 2/3/2021    POST-PROCEDURE    Physicians/Assistants: Maurizio Ludwig, MARY    Procedure Performed:TAVR      Sedation/Anesthesia: Versed/ Fentanyl and 2% xylocaine local anesthesia. Estimated Blood Loss: < 50 ml. Specimens Removed: None         Disposition of Specimen: N/A        Complications: No Immediate Complications.        Post-procedure Diagnosis/Findings:     EPRO+ 32               Marky Ludwig, MARY  Electronically signed 2/3/2021 at 11:08 AM  Interventional Cardiology

## 2021-02-03 NOTE — FLOWSHEET NOTE
Pt admitted to  2E17 ambulatory for TAVR. Pt NPO. Patient accompanied by family. Vital signs obtained. Assessment and data collection initiated. Oriented to room. Policies and procedures for 2E explained   All questions answered with no further questions at this time. Fall prevention and safety precautions discussed with patient.

## 2021-02-03 NOTE — PRE SEDATION
[facility]  Sedation/Analgesia History & Physical    Pt Name: Jacquelin Moralez  Account number: [de-identified]  MRN: [id number]  YOB: 1940  Provider Performing Procedure: Marybel Cam MD  Referring Provider: Marybel Cam MD   Primary Care Physician: Lin Lew,   Date: 2/3/2021    PRE-PROCEDURE    Code Status: FULL CODE  Brief History/Pre-Procedure Diagnosis:   Severe AS  NYHA III CHF    Consent: : I have discussed with the patient risks, benefits, and alternatives (and relevant risks, benefits, and side effects related to alternatives or not receiving care), and likelihood of the success. The patient and/or representative appear to understand and agree to proceed. The discussion encompasses risks, benefits, and side effects related to the alternatives and the risks related to not receiving the proposed care, treatment, and services. has a past surgical history that includes Appendectomy; Cholecystectomy; hernia repair; Hysterectomy; Spine surgery; Carpal tunnel release (Left, 2011); pr laminectomy,>2 sgmt,lumbar; cervical fusion (1976); eye surgery; Rotator cuff repair (Right, 11/28/2014); shoulder surgery (2014); Cataract removal (Bilateral); Colon surgery (2012); Colonoscopy (2015); Upper gastrointestinal endoscopy (4629,1484); Finger surgery (Right, 10/2016); tendon release (Right, 05/18/2017); Total knee arthroplasty (Bilateral); Nerve Surgery (Right, 10/25/2019); Nerve Surgery (Right, 12/16/2019); and Pain management procedure (Right, 1/6/2020). Additional information:       ALLERGIES   Allergies as of 02/03/2021 - Review Complete 02/03/2021   Allergen Reaction Noted    Norco [hydrocodone-acetaminophen] Itching 05/18/2017    Tramadol Itching 05/18/2017    Codeine Hives and Rash 08/30/2013     Additional information:       MEDICATIONS   Aspirin  [x] 81 mg  [] 325 mg  [] None  Antiplatelet drug therapy use last 5 days  [x]No []Yes  Coumadin Use Last 5 Days [x]No []Yes  Other anticoagulant use last 5 days  [x]No []Yes    Current Facility-Administered Medications:     0.9 % sodium chloride infusion, , Intravenous, Continuous, Harlon Kussmaul, PA-C, Last Rate: 75 mL/hr at 02/03/21 0755, New Bag at 02/03/21 0755    ceFAZolin (ANCEF) 2000 mg in dextrose 5 % 50 mL IVPB, 2,000 mg, Intravenous, On Call to OR, Harlon Kussmaul, PA-C    chlorhexidine (HIBICLENS) 4 % liquid, , Topical, Once, Harlon Kussmaul, PA-C    sodium chloride flush 0.9 % injection 10 mL, 10 mL, Intravenous, 2 times per day, Harlon Kussmaul, PA-C    sodium chloride flush 0.9 % injection 10 mL, 10 mL, Intravenous, PRN, Harlon Kussmaul, PA-LOKI    0.9 % sodium chloride infusion, , Intravenous, PRN, Harlon Kussmaul, PA-LOKI  Prior to Admission medications    Medication Sig Start Date End Date Taking?  Authorizing Provider furosemide (LASIX) 40 MG tablet Take 0.5 tablets by mouth daily 1/15/21  Yes SEAN Fried CNP   potassium chloride (KLOR-CON M) 20 MEQ extended release tablet Take 0.5 tablets by mouth daily 1/15/21  Yes SEAN Fried CNP   Magnesium Oxide 250 MG TABS Take 1 tablet by mouth 2 times daily 1/15/21  Yes SEAN Fried CNP   hydrALAZINE (APRESOLINE) 50 MG tablet Take 1 tablet by mouth 2 times daily 11/11/20  Yes Robel Yu MD   atenolol (TENORMIN) 25 MG tablet Take 1 tablet by mouth daily 11/11/20  Yes Robel Yu MD   amLODIPine (NORVASC) 10 MG tablet TAKE 1 TABLET DAILY 11/11/20  Yes Robel Yu MD   vitamin C (VITAMIN C) 1000 MG tablet Take 1 tablet by mouth daily 9/6/20  Yes ELIZABETH Harper   zinc sulfate (ZINCATE) 220 (50 Zn) MG capsule Take 1 capsule by mouth daily 9/6/20  Yes ELIZABETH Dillon   ondansetron (ZOFRAN ODT) 4 MG disintegrating tablet Take 1 tablet by mouth every 8 hours as needed for Nausea or Vomiting 9/4/20  Yes SEAN Prabhakar CNP   metFORMIN (GLUCOPHAGE) 500 MG tablet TAKE 1 TABLET TWICE A DAY WITH MEALS 3/9/20  Yes La Russell,    atorvastatin (LIPITOR) 40 MG tablet TAKE 1 TABLET DAILY 3/5/20  Yes La Russell,    sertraline (ZOLOFT) 100 MG tablet TAKE 2 TABLETS DAILY 3/5/20  Yes La Russell,    quinapril (ACCUPRIL) 10 MG tablet TAKE 1 TABLET NIGHTLY 3/5/20  Yes Naveed Valentin,    pantoprazole (PROTONIX) 40 MG tablet TAKE 1 TABLET TWICE A DAY 3/5/20  Yes La Russell,    aspirin 81 MG tablet Take 81 mg by mouth daily   Yes Historical Provider, MD   promethazine (PHENERGAN) 25 MG tablet Take 1 tablet by mouth every 6 hours as needed for Nausea 4/29/19  Yes Licha Merino PA-C   vitamin D (CHOLECALCIFEROL) 1000 UNIT TABS tablet Take 1,000 Units by mouth nightly. Yes Historical Provider, MD   nystatin (MYCOSTATIN) 777369 UNIT/GM cream Apply topically 2 times daily.  1/14/20   La Russell, DO Additional information:       VITAL SIGNS   There were no vitals filed for this visit. PHYSICAL:   General: No acute distress  HEENT:  Unremarkable for age  Neck: without increased JVD, carotid pulses 2+ bilaterally without bruits  Heart: RRR, S1 & S2 WNL, S4 gallop, 3/6 ORIANA   NYHA: 3  Lungs: Clear to auscultation    Abdomen: BS present, without HSM, masses, or tenderness    Extremities: without C,C,E.  Pulses 2+ bilaterally  Mental Status: Alert & Oriented        PLANNED PROCEDURE   []Cath  []PCI                []Pacemaker/AICD  []RADHA             []Cardioversion []Peripheral angiography/PTA  [x]Other: TAVR     SEDATION  Planned agent:[x]Midazolam []Meperidine [x]Sublimaze []Morphine  []Diazepam  []Other:     ASA Classification:  []1 []2 [x]3 []4 []5  Class 1: A normal healthy patient  Class 2: Pt with mild to moderate systemic disease  Class 3: Severe systemic disease or disturbance  Class 4: Severe systemic disorders that are already life threatening. Class 5: Moribund pt with little chances of survival, for more than 24 hours. Mallampati I Airway Classification:   []1 []2 [x]3 []4    [x]Pre-procedure diagnostic studies complete and results available. Comment:    [x]Previous sedation/anesthesia experiences assessed. Comment:    [x]The patient is an appropriate candidate to undergo the planned procedure sedation and anesthesia. (Refer to nursing sedation/analgesia documentation record)  [x]Formulation and discussion of sedation/procedure plan, risks, and expectations with patient and/or responsible adult completed. [x]Patient examined immediately prior to the procedure.  (Refer to nursing sedation/analgesia documentation record)    Miky Ocampo MD   Electronically signed 2/3/2021 at 8:23 AM

## 2021-02-04 ENCOUNTER — TELEPHONE (OUTPATIENT)
Dept: FAMILY MEDICINE CLINIC | Age: 81
End: 2021-02-04

## 2021-02-04 ENCOUNTER — TELEPHONE (OUTPATIENT)
Dept: CARDIOLOGY CLINIC | Age: 81
End: 2021-02-04

## 2021-02-04 DIAGNOSIS — Z95.2 S/P TAVR (TRANSCATHETER AORTIC VALVE REPLACEMENT): Primary | ICD-10-CM

## 2021-02-04 LAB
ANION GAP SERPL CALCULATED.3IONS-SCNC: 12 MEQ/L (ref 8–16)
APTT: 25.5 SECONDS (ref 22–38)
BUN BLDV-MCNC: 16 MG/DL (ref 7–22)
CALCIUM SERPL-MCNC: 9.1 MG/DL (ref 8.5–10.5)
CHLORIDE BLD-SCNC: 105 MEQ/L (ref 98–111)
CO2: 22 MEQ/L (ref 23–33)
CREAT SERPL-MCNC: 0.6 MG/DL (ref 0.4–1.2)
EKG ATRIAL RATE: 75 BPM
EKG P AXIS: 41 DEGREES
EKG P-R INTERVAL: 178 MS
EKG Q-T INTERVAL: 462 MS
EKG QRS DURATION: 156 MS
EKG QTC CALCULATION (BAZETT): 515 MS
EKG R AXIS: -17 DEGREES
EKG T AXIS: 119 DEGREES
EKG VENTRICULAR RATE: 75 BPM
ERYTHROCYTE [DISTWIDTH] IN BLOOD BY AUTOMATED COUNT: 14 % (ref 11.5–14.5)
ERYTHROCYTE [DISTWIDTH] IN BLOOD BY AUTOMATED COUNT: 45 FL (ref 35–45)
GFR SERPL CREATININE-BSD FRML MDRD: > 90 ML/MIN/1.73M2
GLUCOSE BLD-MCNC: 140 MG/DL (ref 70–108)
HCT VFR BLD CALC: 37.6 % (ref 37–47)
HEMOGLOBIN: 12.8 GM/DL (ref 12–16)
INR BLD: 1.03 (ref 0.85–1.13)
LV EF: 58 %
LVEF MODALITY: NORMAL
MAGNESIUM: 1.7 MG/DL (ref 1.6–2.4)
MCH RBC QN AUTO: 30.5 PG (ref 26–33)
MCHC RBC AUTO-ENTMCNC: 34 GM/DL (ref 32.2–35.5)
MCV RBC AUTO: 89.7 FL (ref 81–99)
PLATELET # BLD: 161 THOU/MM3 (ref 130–400)
PMV BLD AUTO: 11.6 FL (ref 9.4–12.4)
POTASSIUM REFLEX MAGNESIUM: 3.4 MEQ/L (ref 3.5–5.2)
RBC # BLD: 4.19 MILL/MM3 (ref 4.2–5.4)
SODIUM BLD-SCNC: 139 MEQ/L (ref 135–145)
WBC # BLD: 10.5 THOU/MM3 (ref 4.8–10.8)

## 2021-02-04 PROCEDURE — 2580000003 HC RX 258: Performed by: INTERNAL MEDICINE

## 2021-02-04 PROCEDURE — 93306 TTE W/DOPPLER COMPLETE: CPT

## 2021-02-04 PROCEDURE — 80048 BASIC METABOLIC PNL TOTAL CA: CPT

## 2021-02-04 PROCEDURE — 85027 COMPLETE CBC AUTOMATED: CPT

## 2021-02-04 PROCEDURE — 93005 ELECTROCARDIOGRAM TRACING: CPT | Performed by: INTERNAL MEDICINE

## 2021-02-04 PROCEDURE — 6370000000 HC RX 637 (ALT 250 FOR IP): Performed by: INTERNAL MEDICINE

## 2021-02-04 PROCEDURE — 36415 COLL VENOUS BLD VENIPUNCTURE: CPT

## 2021-02-04 PROCEDURE — 83735 ASSAY OF MAGNESIUM: CPT

## 2021-02-04 PROCEDURE — APPSS60 APP SPLIT SHARED TIME 46-60 MINUTES: Performed by: PHYSICIAN ASSISTANT

## 2021-02-04 PROCEDURE — 93010 ELECTROCARDIOGRAM REPORT: CPT | Performed by: NUCLEAR MEDICINE

## 2021-02-04 PROCEDURE — 85730 THROMBOPLASTIN TIME PARTIAL: CPT

## 2021-02-04 PROCEDURE — 6360000002 HC RX W HCPCS: Performed by: INTERNAL MEDICINE

## 2021-02-04 PROCEDURE — 2140000000 HC CCU INTERMEDIATE R&B

## 2021-02-04 PROCEDURE — 6370000000 HC RX 637 (ALT 250 FOR IP): Performed by: PHYSICIAN ASSISTANT

## 2021-02-04 PROCEDURE — 94760 N-INVAS EAR/PLS OXIMETRY 1: CPT

## 2021-02-04 PROCEDURE — 85610 PROTHROMBIN TIME: CPT

## 2021-02-04 PROCEDURE — 6360000002 HC RX W HCPCS: Performed by: PHYSICIAN ASSISTANT

## 2021-02-04 RX ORDER — HYDRALAZINE HYDROCHLORIDE 50 MG/1
50 TABLET, FILM COATED ORAL 2 TIMES DAILY
Qty: 180 TABLET | Refills: 3 | Status: SHIPPED | OUTPATIENT
Start: 2021-02-09 | End: 2021-02-17

## 2021-02-04 RX ORDER — POTASSIUM CHLORIDE 7.45 MG/ML
10 INJECTION INTRAVENOUS PRN
Status: DISCONTINUED | OUTPATIENT
Start: 2021-02-04 | End: 2021-02-05 | Stop reason: HOSPADM

## 2021-02-04 RX ORDER — QUINAPRIL 10 MG/1
10 TABLET ORAL DAILY
Qty: 90 TABLET | Refills: 4 | Status: SHIPPED | OUTPATIENT
Start: 2021-02-09 | End: 2021-02-05 | Stop reason: SDUPTHER

## 2021-02-04 RX ORDER — CLOPIDOGREL BISULFATE 75 MG/1
75 TABLET ORAL DAILY
Qty: 30 TABLET | Refills: 3 | Status: SHIPPED | OUTPATIENT
Start: 2021-02-04 | End: 2021-05-10 | Stop reason: SDUPTHER

## 2021-02-04 RX ORDER — BUTALBITAL, ACETAMINOPHEN AND CAFFEINE 50; 325; 40 MG/1; MG/1; MG/1
1 TABLET ORAL ONCE
Status: COMPLETED | OUTPATIENT
Start: 2021-02-04 | End: 2021-02-04

## 2021-02-04 RX ORDER — KETOROLAC TROMETHAMINE 30 MG/ML
15 INJECTION, SOLUTION INTRAMUSCULAR; INTRAVENOUS EVERY 6 HOURS PRN
Status: DISCONTINUED | OUTPATIENT
Start: 2021-02-04 | End: 2021-02-05 | Stop reason: HOSPADM

## 2021-02-04 RX ORDER — HYDRALAZINE HYDROCHLORIDE 50 MG/1
50 TABLET, FILM COATED ORAL 2 TIMES DAILY
Qty: 180 TABLET | Refills: 3 | Status: CANCELLED | OUTPATIENT
Start: 2021-02-09

## 2021-02-04 RX ORDER — AMLODIPINE BESYLATE 10 MG/1
10 TABLET ORAL DAILY
Status: DISCONTINUED | OUTPATIENT
Start: 2021-02-04 | End: 2021-02-05 | Stop reason: HOSPADM

## 2021-02-04 RX ORDER — POTASSIUM CHLORIDE 20 MEQ/1
40 TABLET, EXTENDED RELEASE ORAL PRN
Status: DISCONTINUED | OUTPATIENT
Start: 2021-02-04 | End: 2021-02-05 | Stop reason: HOSPADM

## 2021-02-04 RX ORDER — HYDRALAZINE HYDROCHLORIDE 50 MG/1
50 TABLET, FILM COATED ORAL 2 TIMES DAILY
Status: DISCONTINUED | OUTPATIENT
Start: 2021-02-04 | End: 2021-02-04 | Stop reason: ALTCHOICE

## 2021-02-04 RX ORDER — ATENOLOL 25 MG/1
25 TABLET ORAL DAILY
Qty: 90 TABLET | Refills: 3 | Status: SHIPPED | OUTPATIENT
Start: 2021-02-08 | End: 2021-12-21

## 2021-02-04 RX ADMIN — ASPIRIN 81 MG: 81 TABLET, CHEWABLE ORAL at 22:32

## 2021-02-04 RX ADMIN — OXYCODONE HYDROCHLORIDE AND ACETAMINOPHEN 1000 MG: 500 TABLET ORAL at 10:13

## 2021-02-04 RX ADMIN — PROMETHAZINE HYDROCHLORIDE 25 MG: 25 TABLET ORAL at 22:52

## 2021-02-04 RX ADMIN — SODIUM CHLORIDE, PRESERVATIVE FREE 10 ML: 5 INJECTION INTRAVENOUS at 08:38

## 2021-02-04 RX ADMIN — BUTALBITAL, ACETAMINOPHEN, AND CAFFEINE 1 TABLET: 50; 325; 40 TABLET ORAL at 22:53

## 2021-02-04 RX ADMIN — ONDANSETRON 4 MG: 4 TABLET, ORALLY DISINTEGRATING ORAL at 19:49

## 2021-02-04 RX ADMIN — CEFAZOLIN 2000 MG: 10 INJECTION, POWDER, FOR SOLUTION INTRAVENOUS at 08:37

## 2021-02-04 RX ADMIN — AMLODIPINE BESYLATE 10 MG: 10 TABLET ORAL at 10:13

## 2021-02-04 RX ADMIN — ONDANSETRON 4 MG: 2 INJECTION INTRAMUSCULAR; INTRAVENOUS at 08:30

## 2021-02-04 RX ADMIN — Medication 50 MG: at 10:12

## 2021-02-04 RX ADMIN — Medication 250 MG: at 22:32

## 2021-02-04 RX ADMIN — SERTRALINE 50 MG: 50 TABLET, FILM COATED ORAL at 10:12

## 2021-02-04 RX ADMIN — ONDANSETRON 4 MG: 2 INJECTION INTRAMUSCULAR; INTRAVENOUS at 03:52

## 2021-02-04 RX ADMIN — POTASSIUM CHLORIDE 10 MEQ: 750 TABLET, EXTENDED RELEASE ORAL at 10:13

## 2021-02-04 RX ADMIN — KETOROLAC TROMETHAMINE 15 MG: 30 INJECTION, SOLUTION INTRAMUSCULAR at 15:32

## 2021-02-04 RX ADMIN — KETOROLAC TROMETHAMINE 15 MG: 30 INJECTION, SOLUTION INTRAMUSCULAR at 08:51

## 2021-02-04 RX ADMIN — ATORVASTATIN CALCIUM 40 MG: 40 TABLET, FILM COATED ORAL at 22:32

## 2021-02-04 RX ADMIN — CLOPIDOGREL BISULFATE 75 MG: 75 TABLET ORAL at 10:12

## 2021-02-04 RX ADMIN — SODIUM CHLORIDE, PRESERVATIVE FREE 10 ML: 5 INJECTION INTRAVENOUS at 19:46

## 2021-02-04 RX ADMIN — Medication 1000 UNITS: at 10:12

## 2021-02-04 RX ADMIN — Medication 250 MG: at 10:13

## 2021-02-04 RX ADMIN — SODIUM CHLORIDE: 9 INJECTION, SOLUTION INTRAVENOUS at 15:35

## 2021-02-04 ASSESSMENT — PAIN DESCRIPTION - ONSET
ONSET: ON-GOING
ONSET: ON-GOING

## 2021-02-04 ASSESSMENT — PAIN SCALES - GENERAL
PAINLEVEL_OUTOF10: 5
PAINLEVEL_OUTOF10: 10
PAINLEVEL_OUTOF10: 10
PAINLEVEL_OUTOF10: 0

## 2021-02-04 ASSESSMENT — PAIN DESCRIPTION - PROGRESSION
CLINICAL_PROGRESSION: NOT CHANGED
CLINICAL_PROGRESSION: GRADUALLY IMPROVING

## 2021-02-04 ASSESSMENT — PAIN DESCRIPTION - PAIN TYPE: TYPE: ACUTE PAIN

## 2021-02-04 ASSESSMENT — PAIN DESCRIPTION - ORIENTATION
ORIENTATION: POSTERIOR
ORIENTATION: RIGHT

## 2021-02-04 ASSESSMENT — PAIN DESCRIPTION - DESCRIPTORS
DESCRIPTORS: ACHING
DESCRIPTORS: ACHING

## 2021-02-04 ASSESSMENT — PAIN DESCRIPTION - FREQUENCY
FREQUENCY: CONTINUOUS
FREQUENCY: INTERMITTENT
FREQUENCY: CONTINUOUS

## 2021-02-04 NOTE — FLOWSHEET NOTE
[facility]. [facility]      Patient: Ari Anderson  Room #: 3B-26/026-A            YOB: 1940  Age: 68 y.o. Gender: female            Admit Date & Time: 2/3/2021  7:00 AM    Assessment:  Abhilash Liz is a 68year old female who is in bed on 3B. Her  is in the room with her. Abhilash Liz was rather sleepy but did carry on a conversation with this . She is tired. Her  is hungry as it was around lunch time. Interventions: They belong to [facility] and stated that their  and [employer] are aware that she is here. Pt welcomed prayer for healing. Outcomes:    encouraged  Plan: 1. Care Plan:  Continue spiritual and emotional care for patient and family. Including prayers.      Electronically signed by Gillian Brambila, on 2/4/2021 at 1:18 PM.  William Sosa  [phone]

## 2021-02-04 NOTE — TELEPHONE ENCOUNTER
from [facility] calling to schedule Follow Up appt for patient 7-10 days post procedure. Screened green 2/4/21/Hosp follow up/ TABR. Showing No Avail until 2/27. Please call the patient to schedule appt.

## 2021-02-04 NOTE — CARE COORDINATION
DISASTER CHARTING    2/4/21, 8:57 AM EST    DISCHARGE ONGOING EVALUATION:     Jaswant Pro day: 1  Location: [facility]26/026-A Reason for admit: Status post transcatheter aortic valve replacement (TAVR) using bioprosthesis [Z95.3]   Barriers to Discharge: Here for elective procedure, POD #1 TAVR. Echo to be done this am. Nursing reports pt having headache and nausea. Possible discharge home later. PCP: Madelyn Cortes DO  Readmission Risk Score: 9%  Patient Goals/Plan/Treatment Preferences: Spoke with pt and . They live at home together. Pt is independent, drives and cares for self. They have canes, shower chair and walkers at home, but do not use at this time. Declines need for HH. Pt is current with Dr. Keri Chavez as her PCP.     2/4/21, 10:19 AM EST    Patient goals/plan/ treatment preferences discussed by  and . Patient goals/plan/ treatment preferences reviewed with patient/ family. Patient/ family verbalize understanding of discharge plan and are in agreement with goal/plan/treatment preferences. Understanding was demonstrated using the teach back method. AVS provided by RN at time of discharge, which includes all necessary medical information pertaining to the patients current course of illness, treatment, post-discharge goals of care, and treatment preferences.         IMM Letter  IMM Letter given to Patient/Family/Significant other/Guardian/POA/by[de-identified] ( form given to pt.)  IMM Letter date given[de-identified] 02/04/21  IMM Letter time given[de-identified] 1005     Electronically signed by Ginny Smallwood RN on 2/4/2021 at 10:20 AM

## 2021-02-04 NOTE — DISCHARGE SUMMARY
Structural Heart/Cardiology Progress Note    2021 9:02 AM    Procedure:  TAVR        POD #:  1    Subjective: The pt is resting comfortably in bed, alert, and in no acute distress. Hemodynamically stable. NSR with new LBBB seen on EKG today. Her only compliant today is headache located along distribution next to temporary PPM.  She has associated nausea/vomiting with the headache. Vital Signs: /75   Pulse 75   Temp 98.2 °F (36.8 °C) (Oral)   Resp 18   Ht 5' 4\" (1.626 m)   Wt 252 lb (114.3 kg)   SpO2 93%   BMI 43.26 kg/m²    Temp (24hrs), Av °F (36.7 °C), Min:97.6 °F (36.4 °C), Max:98.2 °F (36.8 °C)    Weight: 252 lb (114.3 kg)       Labs:   CBC:  Recent Labs     21  0731 21  0459   WBC 7.3 10.5   HGB 13.2 12.8   HCT 38.9 37.6   MCV 88.6 89.7    161     BMP:  Recent Labs     21  0731 21  0459    139   K 4.1 3.4*    105   CO2 23 22*   BUN 22 16   CREATININE 0.8 0.6   MG  --  1.7       Intake/Output Summary (Last 24 hours) at 2021 5830  Last data filed at 2021 0422  Gross per 24 hour   Intake 1153.61 ml   Output 800 ml   Net 353.61 ml     Scheduled Meds:    hydrALAZINE  50 mg Oral BID    amLODIPine  10 mg Oral Daily    potassium replacement protocol   Other RX Placeholder    chlorhexidine   Topical Once    sodium chloride flush  10 mL Intravenous 2 times per day    ceFAZolin (ANCEF) IVPB  2,000 mg Intravenous Q8H    clopidogrel  75 mg Oral Daily    Vitamin D  1,000 Units Oral Daily    aspirin  81 mg Oral Nightly    atorvastatin  40 mg Oral Nightly    sertraline  50 mg Oral Daily    ascorbic acid  1,000 mg Oral Daily    zinc sulfate  50 mg Oral Daily    potassium chloride  10 mEq Oral Daily    Magnesium Oxide  250 mg Oral BID     ROS: All neg unless specifically mentioned in subjective section.      Exam:   General Appearance: alert ,conversing, in no acute distress Cardiovascular: normalrate, regular rhythm, normal S1 and S2, no murmurs, rubs, clicks, or gallops  Pulmonary/Chest: clear to auscultation bilaterally- no wheezes, rales or rhonchi, normal air movement, no respiratory distress  Abdomen:soft, non-tender, non-distended, normal bowel sounds, no bruits,   Extremities: no cyanosis, clubbing or edema. Pulses: Bilateral radial, femoral, DP, and PT pulses intact. No femoral bruits noted. Skin: warm and dry, no rash or erythema  Head: normocephalic and atraumatic  Neck: supple and non-tender without mass, no thyromegaly, no JVD   Musculoskeletal: normal range of motion, no joint swelling, deformity or tenderness. Neurological: alert, oriented, normal speech, no focal findings or movement disorder noted  Groin: Wounds healing appropriately. No signs of infection or hematoma. Assessment:   Patient Active Problem List   Diagnosis    Dyslipidemia    HTN, goal below 140/90    Leg cramps    GERD (gastroesophageal reflux disease)    Major depressive disorder, recurrent episode, mild (HCC)    Type 2 diabetes mellitus with diabetic neuropathy, without long-term current use of insulin (HCC)    Subarachnoid hemorrhage (HCC)    Traumatic hematoma of forehead    Closed head injury    Fall down stairs    Thoracic spondylosis    Lumbar spondylosis    SI (sacroiliac) joint inflammation (HCC)    Morbid obesity with BMI of 40.0-44.9, adult ([facility] [address]    COVID-19    CHF (congestive heart failure), NYHA class II, chronic, diastolic (HCC)    Essential hypertension    Severe aortic stenosis    Status post transcatheter aortic valve replacement (TAVR) using bioprosthesis     Plan:   1. Continue DAPT   2. Echo reviewed  3. Discharge home as long as headache resolves and pt ambulating comfortably  4. Pt's home medications of Atenolol, Quinapril, Hydralazine will need readdressed at follow up [address] on Monday as these medications scheduled to be restarted on Tuesday. 5. New LBBB -  Pt will have Zio patch placed at discharge    The plan of care was discussed in detail with Dr. Marian Manning and Dr. Whitman Enter

## 2021-02-04 NOTE — PROGRESS NOTES
Inpatient Cardiac Rehabilitation Consult    Received consult for Phase II Cardiac Rehabilitation. Cardiac Rehab education completed with patient. Patient agreeable to participation, we will follow up with patient at home to schedule. Brochure given.

## 2021-02-04 NOTE — PLAN OF CARE
Problem: Pain - Acute:  Goal: Pain level will decrease  Description: Pain level will decrease  Outcome: Met This Shift     Problem: Discharge Planning:  Goal: Ability to perform activities of daily living will improve  Description: Ability to perform activities of daily living will improve  Outcome: Ongoing  Note: Patient plans to return home with spouse. Goal: Absence of nausea/vomiting  Description: Absence of nausea/vomiting  Outcome: Ongoing     Problem: Tissue Perfusion - Cardiopulmonary, Altered:  Goal: Circulatory function within specified parameters  Description: Circulatory function within specified parameters  Outcome: Ongoing     Problem: Falls - Risk of:  Goal: Will remain free from falls  Description: Will remain free from falls  Outcome: Ongoing  Note: Assessment & interventions provided throughout shift. Bed locked & in low position, call light in reach, side-rails up x2, bed/chair alarm utilized, non-slip socks on when ambulating, reminded patient to use call light to call for assistance. Care plan reviewed with patient. Patient verbalized understanding of the plan of care and contribute to goal setting.

## 2021-02-04 NOTE — PROCEDURES
[address] [place] [address]                            CARDIAC CATHETERIZATION    PATIENT NAME: Sara Galeana                  :        1940  MED REC NO:   [id number]                           ROOM:       3429  ACCOUNT NO:   [de-identified]                           ADMIT DATE: 2021  PROVIDER:     Elias Dietz MD    DATE OF PROCEDURE:  2021    PROCEDURE:  Transcatheter aortic valve replacement. INDICATION:  Severe symptomatic aortic stenosis, NYHA class III, CHF,  not a candidate for cardiac surgery. :  Elias Dietz MD    SECONDARY :  Viola Barahona MD    DESCRIPTION OF PROCEDURE:  After informed consent was obtained from the  patient, she was brought to the cardiac catheterization laboratory and  prepped in sterile fashion. Right internal jugular vein and bilateral  femoral arterial access were chosen as the primary points of access. Preprocedure timeout was completed. After infiltration of the right  neck with 2% lidocaine using micropuncture and modified Seldinger  technique under fluoroscopic guidance and ultrasound guidance, I was  able to insert a 6-Syrian locking sheath in the right internal jugular  vein. I floated a 5-Syrian balloon-tipped pacemaker into the right  ventricular apex. Pacing thresholds were checked. Pacemaker capture  lasted less than 1 mA output. I then turned my attention to the femoral arterial access. After  infiltration of the left inguinal region with 2% lidocaine using  micropuncture and modified Seldinger technique under fluoroscopic  guidance and ultrasound guidance, I was able to insert a 6-Syrian long  sheath over 0.035 Supra Core wire due to tortuosity in the abdominal  aorta into the left femoral artery. Pressures were transduced. I then turned my attention to the right femoral artery.   After infiltration of the right inguinal region with 2% lidocaine using  micropuncture and modified Seldinger technique under fluoroscopic  guidance and ultrasound guidance, I was able to insert a 4-Guyanese  micropuncture sheath in the right femoral artery. I then performed  angiography demonstrating common femoral artery puncture. I then used a  0.035 Supra Core wire and inserted a 6-Guyanese sheath and I upsized the  access. I then deployed two Perclose sutures. I then used an AL1  catheter to traverse the abdominal aortic tortuosity, exchanged out for  a double-curved Lunderquist wire and then ultimately inserted an  18-Guyanese sheath into the right femoral artery. Heparin IV was given. ACT was confirmed to be above 250 seconds. I then placed a pigtail via  the left femoral artery noncoronary cusp. Via the right femoral artery,  I used a 6-Guyanese AL1 catheter and a 0.035 straight-tipped guidewire  across the aortic valve without any complication. I then exchanged out  for an angled pigtail. Hemodynamics were captured. Heparin IV was  given. ACT was confirmed to be above 250 seconds. Based on  preprocedure measurements, an Evolut Pro Plus 26 valve was chosen and  loaded per [facility] [employer] recommendations. Loading was checked on  fluoroscopy and deemed to be appropriate. I then exchanged out for a  double-curved Lunderquist wire and placed in left ventricular apex. I  then inserted the TAVR prosthesis via the right femoral artery into the  sheath. I crossed the arch in an Upper sorbian projection. I then went across  the overlap view. I ensured my pigtail was at the bottom of the cusp  and then started to began to deploy the valve. We paced the patient at  120 paces per minute to remain ectopy and at the end of 80% deployment,  the valve was at 0 on the noncoronary cusp. Therefore, I elected to  recapture the valve.   The second time we deployed, we paced the patient again. We paced the patient at 140 to remain ectopy, but again, the  valve was deployed upwards. We recaptured them again and at this time I  elected to place the valve lower on the annulus and we paced the patient  sequentially averaging 140 to 180 beats per minute and then we were able  to advance the valve in the appropriate position. We then centralized  the nose cone and under rapid pacing at 180 beats per minute, we were  able to cross the valve. I then performed recapture of the nose cone. Hemodynamics were transduced. There was no LV to AO systolic gradient. TTE demonstrated no  pericardial effusion, no change in LVEF, no LV to AO systolic gradient. No significant perivalvular leak. No change in mitral valve apparatus. Given these findings, all equipments were removed from the from the  right femoral artery, and both Perclose sutures were completed. The  left femoral artery, I did do angiography of the abdominal  aorta to ensure there was no damage or flow limitation, there was not  and therefore the sheath was removed and 6-Chadian Angio-Seal was used  for hemostasis. The patient had widening of the QRS,   therefore the right internal jugular vein sheath and TVP was sutured in place. ____    IMMEDIATE COMPLICATIONS:  None. MEDICATIONS:  See EMR. ACCESS:  See above. ESTIMATED BLOOD LOSS:  Less than 50 mL. SUMMARY:  Successful percutaneous transcatheter aortic valve replacement  with Evolut Pro Plus 26. PLAN:  1. Bedrest.  2.  Optimal medical therapy. 3.  Risk factor management. 4.  Routine access site care. 5.  DAPT. 6.  IV fluids. 7.  Restart home meds. 8.  Avoid AV carmen blockers. 9.  Cardiac rehab. 10.  Postprocedure antibiotics. 11.  TTE in the a.m.  12.  Follow up with me in one week. All the above was explained to the patient and the patient's family. They are agreeable and amenable to the above plan.         Phyllis Garcia MD D:  02/03/2021 14:59:17       T: 02/03/2021 16:02:40     [email or web address]  Job#: [id number]     Doc#: [id number]

## 2021-02-05 VITALS
OXYGEN SATURATION: 94 % | HEIGHT: 64 IN | BODY MASS INDEX: 45.87 KG/M2 | DIASTOLIC BLOOD PRESSURE: 70 MMHG | WEIGHT: 268.7 LBS | TEMPERATURE: 98.1 F | SYSTOLIC BLOOD PRESSURE: 160 MMHG | RESPIRATION RATE: 16 BRPM | HEART RATE: 75 BPM

## 2021-02-05 LAB
ANION GAP SERPL CALCULATED.3IONS-SCNC: 8 MEQ/L (ref 8–16)
BUN BLDV-MCNC: 12 MG/DL (ref 7–22)
CALCIUM SERPL-MCNC: 8.8 MG/DL (ref 8.5–10.5)
CHLORIDE BLD-SCNC: 105 MEQ/L (ref 98–111)
CO2: 24 MEQ/L (ref 23–33)
CREAT SERPL-MCNC: 0.5 MG/DL (ref 0.4–1.2)
EKG ATRIAL RATE: 71 BPM
EKG P AXIS: 16 DEGREES
EKG P-R INTERVAL: 186 MS
EKG Q-T INTERVAL: 450 MS
EKG QRS DURATION: 146 MS
EKG QTC CALCULATION (BAZETT): 489 MS
EKG R AXIS: -18 DEGREES
EKG T AXIS: 107 DEGREES
EKG VENTRICULAR RATE: 71 BPM
ERYTHROCYTE [DISTWIDTH] IN BLOOD BY AUTOMATED COUNT: 13.8 % (ref 11.5–14.5)
ERYTHROCYTE [DISTWIDTH] IN BLOOD BY AUTOMATED COUNT: 46.3 FL (ref 35–45)
GFR SERPL CREATININE-BSD FRML MDRD: > 90 ML/MIN/1.73M2
GLUCOSE BLD-MCNC: 142 MG/DL (ref 70–108)
HCT VFR BLD CALC: 35 % (ref 37–47)
HEMOGLOBIN: 11.5 GM/DL (ref 12–16)
MCH RBC QN AUTO: 29.9 PG (ref 26–33)
MCHC RBC AUTO-ENTMCNC: 32.9 GM/DL (ref 32.2–35.5)
MCV RBC AUTO: 90.9 FL (ref 81–99)
PLATELET # BLD: 151 THOU/MM3 (ref 130–400)
PMV BLD AUTO: 11.2 FL (ref 9.4–12.4)
POTASSIUM REFLEX MAGNESIUM: 3.9 MEQ/L (ref 3.5–5.2)
RBC # BLD: 3.85 MILL/MM3 (ref 4.2–5.4)
SODIUM BLD-SCNC: 137 MEQ/L (ref 135–145)
WBC # BLD: 7.4 THOU/MM3 (ref 4.8–10.8)

## 2021-02-05 PROCEDURE — 6370000000 HC RX 637 (ALT 250 FOR IP): Performed by: INTERNAL MEDICINE

## 2021-02-05 PROCEDURE — 6370000000 HC RX 637 (ALT 250 FOR IP): Performed by: PHYSICIAN ASSISTANT

## 2021-02-05 PROCEDURE — 93246 EXT ECG>7D<15D RECORDING: CPT

## 2021-02-05 PROCEDURE — 85027 COMPLETE CBC AUTOMATED: CPT

## 2021-02-05 PROCEDURE — APPSS60 APP SPLIT SHARED TIME 46-60 MINUTES: Performed by: PHYSICIAN ASSISTANT

## 2021-02-05 PROCEDURE — 36415 COLL VENOUS BLD VENIPUNCTURE: CPT

## 2021-02-05 PROCEDURE — 93005 ELECTROCARDIOGRAM TRACING: CPT | Performed by: INTERNAL MEDICINE

## 2021-02-05 PROCEDURE — 93010 ELECTROCARDIOGRAM REPORT: CPT | Performed by: NUCLEAR MEDICINE

## 2021-02-05 PROCEDURE — 80048 BASIC METABOLIC PNL TOTAL CA: CPT

## 2021-02-05 RX ORDER — LOSARTAN POTASSIUM 25 MG/1
25 TABLET ORAL DAILY
Status: DISCONTINUED | OUTPATIENT
Start: 2021-02-05 | End: 2021-02-05 | Stop reason: ALTCHOICE

## 2021-02-05 RX ORDER — HYDRALAZINE HYDROCHLORIDE 50 MG/1
50 TABLET, FILM COATED ORAL EVERY 12 HOURS SCHEDULED
Qty: 90 TABLET | Refills: 3 | Status: ON HOLD | OUTPATIENT
Start: 2021-02-05 | End: 2021-02-08

## 2021-02-05 RX ORDER — TRAMADOL HYDROCHLORIDE 50 MG/1
50 TABLET ORAL EVERY 8 HOURS PRN
Qty: 15 TABLET | Refills: 0 | Status: ON HOLD | OUTPATIENT
Start: 2021-02-05 | End: 2021-02-11 | Stop reason: HOSPADM

## 2021-02-05 RX ORDER — HYDRALAZINE HYDROCHLORIDE 50 MG/1
50 TABLET, FILM COATED ORAL EVERY 12 HOURS SCHEDULED
Status: DISCONTINUED | OUTPATIENT
Start: 2021-02-05 | End: 2021-02-05 | Stop reason: HOSPADM

## 2021-02-05 RX ORDER — TRAMADOL HYDROCHLORIDE 50 MG/1
50 TABLET ORAL EVERY 8 HOURS PRN
Qty: 15 TABLET | Refills: 0 | Status: SHIPPED | OUTPATIENT
Start: 2021-02-05 | End: 2021-02-05 | Stop reason: SDUPTHER

## 2021-02-05 RX ORDER — ISOSORBIDE MONONITRATE 30 MG/1
30 TABLET, EXTENDED RELEASE ORAL DAILY
Status: DISCONTINUED | OUTPATIENT
Start: 2021-02-05 | End: 2021-02-05 | Stop reason: ALTCHOICE

## 2021-02-05 RX ORDER — LISINOPRIL 10 MG/1
10 TABLET ORAL DAILY
Status: DISCONTINUED | OUTPATIENT
Start: 2021-02-05 | End: 2021-02-05 | Stop reason: HOSPADM

## 2021-02-05 RX ORDER — QUINAPRIL 10 MG/1
10 TABLET ORAL DAILY
Qty: 90 TABLET | Refills: 4 | Status: SHIPPED | OUTPATIENT
Start: 2021-02-09 | End: 2022-03-28 | Stop reason: SDUPTHER

## 2021-02-05 RX ADMIN — POTASSIUM CHLORIDE 10 MEQ: 750 TABLET, EXTENDED RELEASE ORAL at 09:36

## 2021-02-05 RX ADMIN — LISINOPRIL 10 MG: 10 TABLET ORAL at 12:00

## 2021-02-05 RX ADMIN — Medication 50 MG: at 09:44

## 2021-02-05 RX ADMIN — AMLODIPINE BESYLATE 10 MG: 10 TABLET ORAL at 09:36

## 2021-02-05 RX ADMIN — CLOPIDOGREL BISULFATE 75 MG: 75 TABLET ORAL at 09:36

## 2021-02-05 RX ADMIN — HYDRALAZINE HYDROCHLORIDE 50 MG: 50 TABLET, FILM COATED ORAL at 09:37

## 2021-02-05 RX ADMIN — Medication 250 MG: at 09:36

## 2021-02-05 RX ADMIN — OXYCODONE HYDROCHLORIDE AND ACETAMINOPHEN 1000 MG: 500 TABLET ORAL at 09:36

## 2021-02-05 RX ADMIN — SERTRALINE 50 MG: 50 TABLET, FILM COATED ORAL at 09:36

## 2021-02-05 RX ADMIN — Medication 1000 UNITS: at 09:36

## 2021-02-05 ASSESSMENT — PAIN DESCRIPTION - DESCRIPTORS: DESCRIPTORS: ACHING

## 2021-02-05 ASSESSMENT — PAIN SCALES - GENERAL
PAINLEVEL_OUTOF10: 3
PAINLEVEL_OUTOF10: 3

## 2021-02-05 ASSESSMENT — PAIN DESCRIPTION - FREQUENCY: FREQUENCY: INTERMITTENT

## 2021-02-05 ASSESSMENT — PAIN DESCRIPTION - PROGRESSION: CLINICAL_PROGRESSION: OTHER (COMMENT)

## 2021-02-05 ASSESSMENT — PAIN DESCRIPTION - PAIN TYPE: TYPE: ACUTE PAIN

## 2021-02-05 ASSESSMENT — PAIN DESCRIPTION - ONSET: ONSET: ON-GOING

## 2021-02-05 NOTE — DISCHARGE INSTR - DIET
? Good nutrition is important when healing from an illness, injury, or surgery. Follow any nutrition recommendations given to you during your hospital stay. ? If you were given an oral nutrition supplement while in the hospital, continue to take this supplement at home. You can take it with meals, in-between meals, and/or before bedtime. These supplements can be purchased at most local grocery stores, pharmacies, and chain [employer]-stores. ? If you have any questions about your diet or nutrition, call the hospital and ask for the dietitian. ?  Heart healthy diet:  Low fat, low sodium

## 2021-02-05 NOTE — PROGRESS NOTES
Orders received from Dr. Radha Fitzpatrick to start the patient on Losartan 25mg daily and Imdur 30 daily. Orders placed. Dr. Radha Fitzpatrick voiced to order change the order to Quinapril 10mg daily and Hydralazine 50mg BID. Order changed.

## 2021-02-05 NOTE — PROGRESS NOTES
Old Dressings removed from right, and left groin and also right neck at IJ puncture site. Old bloody drainage noted from each site. Band- aids placed at sites. The pt. Has had bouts of nausea this AM but has not had emesis. She states that the  right side of her neck is  when palpitated. Her headache is present but tolerable at this time. Discharge instructions were given to the pt. And her  Tenisha Arias, using teach back method. The pt. Was not particularly paying to much attention during this process,  but her  took responsibility and signed the discharge instructions for her. He was able to recall activity, medications and follow up appointments for her. Both the pt. And Tenisha Arias, verbalized understanding and declined further questions. 1245-  Taken by wheelchair to [facility]. Mary Armendariz.

## 2021-02-05 NOTE — TELEPHONE ENCOUNTER
Spoke with pt she is still admitted in the hospital and will contact office upon discharge pt stated she is not aware of when she will be discharged

## 2021-02-05 NOTE — DISCHARGE SUMMARY
regular rhythm, normal S1 and S2, no murmurs, rubs, clicks, or gallops  Pulmonary/Chest: clear to auscultation bilaterally- no wheezes, rales or rhonchi, normal air movement, no respiratory distress  Abdomen:soft, non-tender, non-distended, normal bowel sounds, no bruits,   Extremities: no cyanosis, clubbing or edema. Pulses: Bilateral radial, femoral, DP, and PT pulses intact. No femoral bruits noted. Skin: warm and dry, no rash or erythema  Head: normocephalic and atraumatic  Neck: supple and non-tender without mass, no thyromegaly, no JVD   Musculoskeletal: normal range of motion, no joint swelling, deformity or tenderness. Neurological: alert, oriented, normal speech, no focal findings or movement disorder noted  Groin: Wounds healing appropriately. No signs of infection or hematoma. Assessment:   Patient Active Problem List   Diagnosis    Dyslipidemia    HTN, goal below 140/90    Leg cramps    GERD (gastroesophageal reflux disease)    Major depressive disorder, recurrent episode, mild (HCC)    Type 2 diabetes mellitus with diabetic neuropathy, without long-term current use of insulin (HCC)    Subarachnoid hemorrhage (HCC)    Traumatic hematoma of forehead    Closed head injury    Fall down stairs    Thoracic spondylosis    Lumbar spondylosis    SI (sacroiliac) joint inflammation (HCC)    Morbid obesity with BMI of 40.0-44.9, adult ([facility] [address]    COVID-19    CHF (congestive heart failure), NYHA class II, chronic, diastolic (HCC)    Essential hypertension    Severe aortic stenosis    Status post transcatheter aortic valve replacement (TAVR) using bioprosthesis     Plan:   1. Continue DAPT   2. Echo reviewed  3. Discharge home today  4. Pt's home medication, Atenolol,  will need readdressed at follow up [address]   5. New LBBB -  Pt will have Zio patch placed at discharge    The plan of care was discussed in detail with Dr. Skye Santiago and Dr. Eder Duran

## 2021-02-05 NOTE — CARE COORDINATION
2/5/21, 10:37 AM EST    Patient goals/plan/ treatment preferences discussed by  and . Patient goals/plan/ treatment preferences reviewed with patient/ family. Patient/ family verbalize understanding of discharge plan and are in agreement with goal/plan/treatment preferences. Understanding was demonstrated using the teach back method. AVS provided by RN at time of discharge, which includes all necessary medical information pertaining to the patients current course of illness, treatment, post-discharge goals of care, and treatment preferences. IMM Letter  IMM Letter given to Patient/Family/Significant other/Guardian/POA/by[de-identified] ( form given to pt.)  IMM Letter date given[de-identified] 02/04/21  IMM Letter time given[de-identified] [address] discharge home today. Pt was kept overnight due to headache and nausea. Pt denies any needs.      Electronically signed by Sully Avila RN on 2/5/2021 at 10:39 AM

## 2021-02-07 ENCOUNTER — HOSPITAL ENCOUNTER (EMERGENCY)
Age: 81
Discharge: HOME | DRG: 065 | End: 2021-02-08
Attending: EMERGENCY MEDICINE
Payer: MEDICARE

## 2021-02-07 ENCOUNTER — APPOINTMENT (OUTPATIENT)
Dept: CT IMAGING | Age: 81
DRG: 065 | End: 2021-02-07
Payer: MEDICARE

## 2021-02-07 ENCOUNTER — APPOINTMENT (OUTPATIENT)
Dept: GENERAL RADIOLOGY | Age: 81
DRG: 065 | End: 2021-02-07
Payer: MEDICARE

## 2021-02-07 VITALS
SYSTOLIC BLOOD PRESSURE: 171 MMHG | RESPIRATION RATE: 17 BRPM | OXYGEN SATURATION: 95 % | DIASTOLIC BLOOD PRESSURE: 93 MMHG | BODY MASS INDEX: 41.54 KG/M2 | WEIGHT: 242 LBS | HEART RATE: 77 BPM | TEMPERATURE: 98 F

## 2021-02-07 DIAGNOSIS — E86.0 DEHYDRATION: ICD-10-CM

## 2021-02-07 DIAGNOSIS — R10.84 GENERALIZED ABDOMINAL PAIN: ICD-10-CM

## 2021-02-07 DIAGNOSIS — R11.0 NAUSEA: Primary | ICD-10-CM

## 2021-02-07 LAB
ALBUMIN SERPL-MCNC: 4.2 G/DL (ref 3.5–5.1)
ALP BLD-CCNC: 65 U/L (ref 38–126)
ALT SERPL-CCNC: 19 U/L (ref 11–66)
ANION GAP SERPL CALCULATED.3IONS-SCNC: 13 MEQ/L (ref 8–16)
AST SERPL-CCNC: 25 U/L (ref 5–40)
BACTERIA: ABNORMAL
BASOPHILS # BLD: 0.4 %
BASOPHILS ABSOLUTE: 0 THOU/MM3 (ref 0–0.1)
BILIRUB SERPL-MCNC: 0.9 MG/DL (ref 0.3–1.2)
BILIRUBIN URINE: NEGATIVE
BLOOD, URINE: NEGATIVE
BUN BLDV-MCNC: 17 MG/DL (ref 7–22)
CALCIUM SERPL-MCNC: 9.4 MG/DL (ref 8.5–10.5)
CASTS: ABNORMAL /LPF
CASTS: ABNORMAL /LPF
CHARACTER, URINE: CLEAR
CHLORIDE BLD-SCNC: 96 MEQ/L (ref 98–111)
CO2: 24 MEQ/L (ref 23–33)
COLOR: YELLOW
CREAT SERPL-MCNC: 0.6 MG/DL (ref 0.4–1.2)
CRYSTALS: ABNORMAL
EKG ATRIAL RATE: 76 BPM
EKG P AXIS: -2 DEGREES
EKG P-R INTERVAL: 166 MS
EKG Q-T INTERVAL: 430 MS
EKG QRS DURATION: 142 MS
EKG QTC CALCULATION (BAZETT): 483 MS
EKG R AXIS: -35 DEGREES
EKG T AXIS: 99 DEGREES
EKG VENTRICULAR RATE: 76 BPM
EOSINOPHIL # BLD: 0.9 %
EOSINOPHILS ABSOLUTE: 0.1 THOU/MM3 (ref 0–0.4)
EPITHELIAL CELLS, UA: ABNORMAL /HPF
ERYTHROCYTE [DISTWIDTH] IN BLOOD BY AUTOMATED COUNT: 13.3 % (ref 11.5–14.5)
ERYTHROCYTE [DISTWIDTH] IN BLOOD BY AUTOMATED COUNT: 42.3 FL (ref 35–45)
GFR SERPL CREATININE-BSD FRML MDRD: > 90 ML/MIN/1.73M2
GLUCOSE BLD-MCNC: 149 MG/DL (ref 70–108)
GLUCOSE, URINE: NEGATIVE MG/DL
HCT VFR BLD CALC: 40.5 % (ref 37–47)
HEMOGLOBIN: 13.9 GM/DL (ref 12–16)
IMMATURE GRANS (ABS): 0.04 THOU/MM3 (ref 0–0.07)
IMMATURE GRANULOCYTES: 0.4 %
KETONES, URINE: NEGATIVE
LACTIC ACID: 1.1 MMOL/L (ref 0.5–2.2)
LEUKOCYTE ESTERASE, URINE: NEGATIVE
LIPASE: 18.7 U/L (ref 5.6–51.3)
LYMPHOCYTES # BLD: 11.5 %
LYMPHOCYTES ABSOLUTE: 1.2 THOU/MM3 (ref 1–4.8)
MCH RBC QN AUTO: 30 PG (ref 26–33)
MCHC RBC AUTO-ENTMCNC: 34.3 GM/DL (ref 32.2–35.5)
MCV RBC AUTO: 87.5 FL (ref 81–99)
MISCELLANEOUS LAB TEST RESULT: ABNORMAL
MONOCYTES # BLD: 8.8 %
MONOCYTES ABSOLUTE: 0.9 THOU/MM3 (ref 0.4–1.3)
NITRITE, URINE: NEGATIVE
NUCLEATED RED BLOOD CELLS: 0 /100 WBC
OSMOLALITY CALCULATION: 270.7 MOSMOL/KG (ref 275–300)
PH UA: 7.5 (ref 5–9)
PLATELET # BLD: 204 THOU/MM3 (ref 130–400)
PMV BLD AUTO: 10.1 FL (ref 9.4–12.4)
POTASSIUM REFLEX MAGNESIUM: 4.1 MEQ/L (ref 3.5–5.2)
PROTEIN UA: 100 MG/DL
RBC # BLD: 4.63 MILL/MM3 (ref 4.2–5.4)
RBC URINE: ABNORMAL /HPF
RENAL EPITHELIAL, UA: ABNORMAL
SEG NEUTROPHILS: 78 %
SEGMENTED NEUTROPHILS ABSOLUTE COUNT: 8.3 THOU/MM3 (ref 1.8–7.7)
SODIUM BLD-SCNC: 133 MEQ/L (ref 135–145)
SPECIFIC GRAVITY UA: 1.02 (ref 1–1.03)
TOTAL PROTEIN: 7.1 G/DL (ref 6.1–8)
TROPONIN T: 0.03 NG/ML
TROPONIN T: 0.04 NG/ML
UROBILINOGEN, URINE: 0.2 EU/DL (ref 0–1)
WBC # BLD: 10.6 THOU/MM3 (ref 4.8–10.8)
WBC UA: ABNORMAL /HPF
YEAST: ABNORMAL

## 2021-02-07 PROCEDURE — 2580000003 HC RX 258: Performed by: STUDENT IN AN ORGANIZED HEALTH CARE EDUCATION/TRAINING PROGRAM

## 2021-02-07 PROCEDURE — 93010 ELECTROCARDIOGRAM REPORT: CPT | Performed by: NUCLEAR MEDICINE

## 2021-02-07 PROCEDURE — 93005 ELECTROCARDIOGRAM TRACING: CPT | Performed by: STUDENT IN AN ORGANIZED HEALTH CARE EDUCATION/TRAINING PROGRAM

## 2021-02-07 PROCEDURE — 71045 X-RAY EXAM CHEST 1 VIEW: CPT

## 2021-02-07 PROCEDURE — 80053 COMPREHEN METABOLIC PANEL: CPT

## 2021-02-07 PROCEDURE — 83605 ASSAY OF LACTIC ACID: CPT

## 2021-02-07 PROCEDURE — 6370000000 HC RX 637 (ALT 250 FOR IP): Performed by: STUDENT IN AN ORGANIZED HEALTH CARE EDUCATION/TRAINING PROGRAM

## 2021-02-07 PROCEDURE — 6360000004 HC RX CONTRAST MEDICATION: Performed by: EMERGENCY MEDICINE

## 2021-02-07 PROCEDURE — 36415 COLL VENOUS BLD VENIPUNCTURE: CPT

## 2021-02-07 PROCEDURE — 74177 CT ABD & PELVIS W/CONTRAST: CPT

## 2021-02-07 PROCEDURE — 84484 ASSAY OF TROPONIN QUANT: CPT

## 2021-02-07 PROCEDURE — 6360000002 HC RX W HCPCS

## 2021-02-07 PROCEDURE — 85025 COMPLETE CBC W/AUTO DIFF WBC: CPT

## 2021-02-07 PROCEDURE — 83690 ASSAY OF LIPASE: CPT

## 2021-02-07 PROCEDURE — 81001 URINALYSIS AUTO W/SCOPE: CPT

## 2021-02-07 RX ORDER — ONDANSETRON 2 MG/ML
INJECTION INTRAMUSCULAR; INTRAVENOUS
Status: COMPLETED
Start: 2021-02-07 | End: 2021-02-07

## 2021-02-07 RX ORDER — SODIUM CHLORIDE 9 MG/ML
INJECTION, SOLUTION INTRAVENOUS CONTINUOUS
Status: DISCONTINUED | OUTPATIENT
Start: 2021-02-07 | End: 2021-02-08 | Stop reason: HOSPADM

## 2021-02-07 RX ORDER — ONDANSETRON 2 MG/ML
4 INJECTION INTRAMUSCULAR; INTRAVENOUS ONCE
Status: COMPLETED | OUTPATIENT
Start: 2021-02-07 | End: 2021-02-07

## 2021-02-07 RX ADMIN — LIDOCAINE HYDROCHLORIDE: 20 SOLUTION ORAL; TOPICAL at 19:40

## 2021-02-07 RX ADMIN — ONDANSETRON 4 MG: 2 INJECTION INTRAMUSCULAR; INTRAVENOUS at 19:31

## 2021-02-07 RX ADMIN — IOPAMIDOL 80 ML: 755 INJECTION, SOLUTION INTRAVENOUS at 20:58

## 2021-02-07 RX ADMIN — SODIUM CHLORIDE: 9 INJECTION, SOLUTION INTRAVENOUS at 19:31

## 2021-02-07 ASSESSMENT — ENCOUNTER SYMPTOMS
DIARRHEA: 0
SORE THROAT: 0
SINUS PRESSURE: 0
EYE PAIN: 0
RHINORRHEA: 0
EYE DISCHARGE: 0
BACK PAIN: 0
NAUSEA: 1
COLOR CHANGE: 0
VOMITING: 1
COUGH: 0
ABDOMINAL PAIN: 1
CONSTIPATION: 0
SHORTNESS OF BREATH: 0
WHEEZING: 0
CHEST TIGHTNESS: 0
SINUS PAIN: 0
EYE REDNESS: 0

## 2021-02-08 ENCOUNTER — APPOINTMENT (OUTPATIENT)
Dept: GENERAL RADIOLOGY | Age: 81
DRG: 065 | End: 2021-02-08
Payer: MEDICARE

## 2021-02-08 ENCOUNTER — APPOINTMENT (OUTPATIENT)
Dept: CT IMAGING | Age: 81
DRG: 065 | End: 2021-02-08
Payer: MEDICARE

## 2021-02-08 ENCOUNTER — HOSPITAL ENCOUNTER (INPATIENT)
Age: 81
LOS: 3 days | Discharge: HOME | DRG: 065 | End: 2021-02-11
Attending: EMERGENCY MEDICINE | Admitting: HOSPITALIST
Payer: MEDICARE

## 2021-02-08 ENCOUNTER — APPOINTMENT (OUTPATIENT)
Dept: MRI IMAGING | Age: 81
DRG: 065 | End: 2021-02-08
Payer: MEDICARE

## 2021-02-08 ENCOUNTER — TELEPHONE (OUTPATIENT)
Dept: CARDIOLOGY CLINIC | Age: 81
End: 2021-02-08

## 2021-02-08 ENCOUNTER — OFFICE VISIT (OUTPATIENT)
Dept: CARDIOTHORACIC SURGERY | Age: 81
End: 2021-02-08

## 2021-02-08 VITALS — SYSTOLIC BLOOD PRESSURE: 145 MMHG | HEART RATE: 79 BPM | DIASTOLIC BLOOD PRESSURE: 84 MMHG

## 2021-02-08 DIAGNOSIS — R11.2 NAUSEA AND VOMITING, INTRACTABILITY OF VOMITING NOT SPECIFIED, UNSPECIFIED VOMITING TYPE: ICD-10-CM

## 2021-02-08 DIAGNOSIS — R10.10 UPPER ABDOMINAL PAIN: ICD-10-CM

## 2021-02-08 DIAGNOSIS — I35.0 NONRHEUMATIC AORTIC VALVE STENOSIS: Primary | ICD-10-CM

## 2021-02-08 DIAGNOSIS — R77.8 ELEVATED TROPONIN: ICD-10-CM

## 2021-02-08 DIAGNOSIS — Z95.2 S/P TAVR (TRANSCATHETER AORTIC VALVE REPLACEMENT): Primary | ICD-10-CM

## 2021-02-08 LAB
ALBUMIN SERPL-MCNC: 4.3 G/DL (ref 3.5–5.1)
ALP BLD-CCNC: 69 U/L (ref 38–126)
ALT SERPL-CCNC: 21 U/L (ref 11–66)
AMYLASE: 17 U/L (ref 20–104)
ANION GAP SERPL CALCULATED.3IONS-SCNC: 15 MEQ/L (ref 8–16)
AST SERPL-CCNC: 38 U/L (ref 5–40)
BASOPHILS # BLD: 0.3 %
BASOPHILS ABSOLUTE: 0 THOU/MM3 (ref 0–0.1)
BILIRUB SERPL-MCNC: 0.9 MG/DL (ref 0.3–1.2)
BILIRUBIN DIRECT: < 0.2 MG/DL (ref 0–0.3)
BUN BLDV-MCNC: 19 MG/DL (ref 7–22)
CALCIUM SERPL-MCNC: 9.6 MG/DL (ref 8.5–10.5)
CHLORIDE BLD-SCNC: 96 MEQ/L (ref 98–111)
CO2: 21 MEQ/L (ref 23–33)
CREAT SERPL-MCNC: 0.6 MG/DL (ref 0.4–1.2)
EKG ATRIAL RATE: 71 BPM
EKG P AXIS: 23 DEGREES
EKG P-R INTERVAL: 158 MS
EKG Q-T INTERVAL: 458 MS
EKG QRS DURATION: 156 MS
EKG QTC CALCULATION (BAZETT): 497 MS
EKG R AXIS: -25 DEGREES
EKG T AXIS: 94 DEGREES
EKG VENTRICULAR RATE: 71 BPM
EOSINOPHIL # BLD: 1.2 %
EOSINOPHILS ABSOLUTE: 0.2 THOU/MM3 (ref 0–0.4)
ERYTHROCYTE [DISTWIDTH] IN BLOOD BY AUTOMATED COUNT: 13.4 % (ref 11.5–14.5)
ERYTHROCYTE [DISTWIDTH] IN BLOOD BY AUTOMATED COUNT: 42.5 FL (ref 35–45)
GFR SERPL CREATININE-BSD FRML MDRD: > 90 ML/MIN/1.73M2
GLUCOSE BLD-MCNC: 137 MG/DL (ref 70–108)
GLUCOSE BLD-MCNC: 158 MG/DL (ref 70–108)
HCT VFR BLD CALC: 41.9 % (ref 37–47)
HEMOGLOBIN: 14.5 GM/DL (ref 12–16)
IMMATURE GRANS (ABS): 0.04 THOU/MM3 (ref 0–0.07)
IMMATURE GRANULOCYTES: 0.3 %
LIPASE: 20 U/L (ref 5.6–51.3)
LYMPHOCYTES # BLD: 10.1 %
LYMPHOCYTES ABSOLUTE: 1.3 THOU/MM3 (ref 1–4.8)
MAGNESIUM: 2.1 MG/DL (ref 1.6–2.4)
MCH RBC QN AUTO: 30.3 PG (ref 26–33)
MCHC RBC AUTO-ENTMCNC: 34.6 GM/DL (ref 32.2–35.5)
MCV RBC AUTO: 87.7 FL (ref 81–99)
MONOCYTES # BLD: 7.8 %
MONOCYTES ABSOLUTE: 1 THOU/MM3 (ref 0.4–1.3)
NUCLEATED RED BLOOD CELLS: 0 /100 WBC
OSMOLALITY CALCULATION: 270.1 MOSMOL/KG (ref 275–300)
PLATELET # BLD: 206 THOU/MM3 (ref 130–400)
PMV BLD AUTO: 10.1 FL (ref 9.4–12.4)
POTASSIUM SERPL-SCNC: 4.5 MEQ/L (ref 3.5–5.2)
PRO-BNP: 509.7 PG/ML (ref 0–1800)
RBC # BLD: 4.78 MILL/MM3 (ref 4.2–5.4)
SEG NEUTROPHILS: 80.3 %
SEGMENTED NEUTROPHILS ABSOLUTE COUNT: 10.1 THOU/MM3 (ref 1.8–7.7)
SODIUM BLD-SCNC: 132 MEQ/L (ref 135–145)
TOTAL PROTEIN: 7.6 G/DL (ref 6.1–8)
TROPONIN T: 0.04 NG/ML
WBC # BLD: 12.6 THOU/MM3 (ref 4.8–10.8)

## 2021-02-08 PROCEDURE — 99283 EMERGENCY DEPT VISIT LOW MDM: CPT

## 2021-02-08 PROCEDURE — 71046 X-RAY EXAM CHEST 2 VIEWS: CPT

## 2021-02-08 PROCEDURE — 99024 POSTOP FOLLOW-UP VISIT: CPT | Performed by: PHYSICIAN ASSISTANT

## 2021-02-08 PROCEDURE — 80053 COMPREHEN METABOLIC PANEL: CPT

## 2021-02-08 PROCEDURE — 6370000000 HC RX 637 (ALT 250 FOR IP): Performed by: PHYSICIAN ASSISTANT

## 2021-02-08 PROCEDURE — C9113 INJ PANTOPRAZOLE SODIUM, VIA: HCPCS | Performed by: EMERGENCY MEDICINE

## 2021-02-08 PROCEDURE — 6370000000 HC RX 637 (ALT 250 FOR IP): Performed by: HOSPITALIST

## 2021-02-08 PROCEDURE — 93010 ELECTROCARDIOGRAM REPORT: CPT | Performed by: INTERNAL MEDICINE

## 2021-02-08 PROCEDURE — 83735 ASSAY OF MAGNESIUM: CPT

## 2021-02-08 PROCEDURE — 82248 BILIRUBIN DIRECT: CPT

## 2021-02-08 PROCEDURE — 83690 ASSAY OF LIPASE: CPT

## 2021-02-08 PROCEDURE — 99222 1ST HOSP IP/OBS MODERATE 55: CPT | Performed by: PSYCHIATRY & NEUROLOGY

## 2021-02-08 PROCEDURE — 1200000003 HC TELEMETRY R&B

## 2021-02-08 PROCEDURE — 85025 COMPLETE CBC W/AUTO DIFF WBC: CPT

## 2021-02-08 PROCEDURE — 36415 COLL VENOUS BLD VENIPUNCTURE: CPT

## 2021-02-08 PROCEDURE — 6360000002 HC RX W HCPCS: Performed by: HOSPITALIST

## 2021-02-08 PROCEDURE — 83880 ASSAY OF NATRIURETIC PEPTIDE: CPT

## 2021-02-08 PROCEDURE — 70450 CT HEAD/BRAIN W/O DYE: CPT

## 2021-02-08 PROCEDURE — 82150 ASSAY OF AMYLASE: CPT

## 2021-02-08 PROCEDURE — 93005 ELECTROCARDIOGRAM TRACING: CPT | Performed by: EMERGENCY MEDICINE

## 2021-02-08 PROCEDURE — 99223 1ST HOSP IP/OBS HIGH 75: CPT | Performed by: HOSPITALIST

## 2021-02-08 PROCEDURE — 2580000003 HC RX 258: Performed by: EMERGENCY MEDICINE

## 2021-02-08 PROCEDURE — 70544 MR ANGIOGRAPHY HEAD W/O DYE: CPT

## 2021-02-08 PROCEDURE — 82948 REAGENT STRIP/BLOOD GLUCOSE: CPT

## 2021-02-08 PROCEDURE — 84484 ASSAY OF TROPONIN QUANT: CPT

## 2021-02-08 PROCEDURE — 6360000002 HC RX W HCPCS: Performed by: EMERGENCY MEDICINE

## 2021-02-08 PROCEDURE — 70547 MR ANGIOGRAPHY NECK W/O DYE: CPT

## 2021-02-08 PROCEDURE — 74018 RADEX ABDOMEN 1 VIEW: CPT

## 2021-02-08 RX ORDER — HYDRALAZINE HYDROCHLORIDE 50 MG/1
50 TABLET, FILM COATED ORAL EVERY 12 HOURS SCHEDULED
Status: DISCONTINUED | OUTPATIENT
Start: 2021-02-08 | End: 2021-02-11 | Stop reason: HOSPADM

## 2021-02-08 RX ORDER — ACETAMINOPHEN 650 MG/1
650 SUPPOSITORY RECTAL EVERY 6 HOURS PRN
Status: DISCONTINUED | OUTPATIENT
Start: 2021-02-08 | End: 2021-02-11 | Stop reason: HOSPADM

## 2021-02-08 RX ORDER — ACETAMINOPHEN 325 MG/1
650 TABLET ORAL EVERY 6 HOURS PRN
Status: DISCONTINUED | OUTPATIENT
Start: 2021-02-08 | End: 2021-02-11 | Stop reason: HOSPADM

## 2021-02-08 RX ORDER — PANTOPRAZOLE SODIUM 40 MG/10ML
40 INJECTION, POWDER, LYOPHILIZED, FOR SOLUTION INTRAVENOUS ONCE
Status: COMPLETED | OUTPATIENT
Start: 2021-02-08 | End: 2021-02-08

## 2021-02-08 RX ORDER — MULTIVITAMIN/IRON/FOLIC ACID 18MG-0.4MG
250 TABLET ORAL 2 TIMES DAILY
Status: DISCONTINUED | OUTPATIENT
Start: 2021-02-08 | End: 2021-02-11 | Stop reason: HOSPADM

## 2021-02-08 RX ORDER — AMLODIPINE BESYLATE 10 MG/1
10 TABLET ORAL DAILY
Status: DISCONTINUED | OUTPATIENT
Start: 2021-02-08 | End: 2021-02-11 | Stop reason: HOSPADM

## 2021-02-08 RX ORDER — POLYETHYLENE GLYCOL 3350 17 G/17G
17 POWDER, FOR SOLUTION ORAL DAILY PRN
Status: DISCONTINUED | OUTPATIENT
Start: 2021-02-08 | End: 2021-02-11 | Stop reason: HOSPADM

## 2021-02-08 RX ORDER — CLOPIDOGREL BISULFATE 75 MG/1
75 TABLET ORAL DAILY
Status: DISCONTINUED | OUTPATIENT
Start: 2021-02-08 | End: 2021-02-11 | Stop reason: HOSPADM

## 2021-02-08 RX ORDER — VITAMIN B COMPLEX
1000 TABLET ORAL NIGHTLY
Status: DISCONTINUED | OUTPATIENT
Start: 2021-02-08 | End: 2021-02-11 | Stop reason: HOSPADM

## 2021-02-08 RX ORDER — ONDANSETRON 2 MG/ML
4 INJECTION INTRAMUSCULAR; INTRAVENOUS ONCE
Status: COMPLETED | OUTPATIENT
Start: 2021-02-08 | End: 2021-02-08

## 2021-02-08 RX ORDER — ATORVASTATIN CALCIUM 40 MG/1
40 TABLET, FILM COATED ORAL NIGHTLY
Status: DISCONTINUED | OUTPATIENT
Start: 2021-02-08 | End: 2021-02-11 | Stop reason: HOSPADM

## 2021-02-08 RX ORDER — PANTOPRAZOLE SODIUM 40 MG/1
40 TABLET, DELAYED RELEASE ORAL
Status: DISCONTINUED | OUTPATIENT
Start: 2021-02-08 | End: 2021-02-11 | Stop reason: HOSPADM

## 2021-02-08 RX ORDER — LACTULOSE 10 G/15ML
20 SOLUTION ORAL 3 TIMES DAILY PRN
Status: DISCONTINUED | OUTPATIENT
Start: 2021-02-08 | End: 2021-02-11 | Stop reason: HOSPADM

## 2021-02-08 RX ORDER — SERTRALINE HYDROCHLORIDE 100 MG/1
100 TABLET, FILM COATED ORAL DAILY
Status: DISCONTINUED | OUTPATIENT
Start: 2021-02-08 | End: 2021-02-11 | Stop reason: HOSPADM

## 2021-02-08 RX ORDER — SODIUM CHLORIDE 9 MG/ML
INJECTION, SOLUTION INTRAVENOUS CONTINUOUS
Status: DISCONTINUED | OUTPATIENT
Start: 2021-02-08 | End: 2021-02-10

## 2021-02-08 RX ORDER — DIAZEPAM 5 MG/1
5 TABLET ORAL ONCE
Status: COMPLETED | OUTPATIENT
Start: 2021-02-08 | End: 2021-02-08

## 2021-02-08 RX ORDER — SODIUM CHLORIDE 0.9 % (FLUSH) 0.9 %
10 SYRINGE (ML) INJECTION PRN
Status: DISCONTINUED | OUTPATIENT
Start: 2021-02-08 | End: 2021-02-11 | Stop reason: HOSPADM

## 2021-02-08 RX ORDER — PROMETHAZINE HYDROCHLORIDE 25 MG/1
12.5 TABLET ORAL EVERY 6 HOURS PRN
Status: DISCONTINUED | OUTPATIENT
Start: 2021-02-08 | End: 2021-02-11 | Stop reason: HOSPADM

## 2021-02-08 RX ORDER — ASPIRIN 81 MG/1
81 TABLET, CHEWABLE ORAL DAILY
Status: DISCONTINUED | OUTPATIENT
Start: 2021-02-08 | End: 2021-02-11 | Stop reason: HOSPADM

## 2021-02-08 RX ORDER — ATENOLOL 25 MG/1
25 TABLET ORAL DAILY
Status: DISCONTINUED | OUTPATIENT
Start: 2021-02-08 | End: 2021-02-11 | Stop reason: HOSPADM

## 2021-02-08 RX ORDER — HYDRALAZINE HYDROCHLORIDE 20 MG/ML
5 INJECTION INTRAMUSCULAR; INTRAVENOUS EVERY 6 HOURS PRN
Status: DISCONTINUED | OUTPATIENT
Start: 2021-02-08 | End: 2021-02-11 | Stop reason: HOSPADM

## 2021-02-08 RX ORDER — SODIUM CHLORIDE 0.9 % (FLUSH) 0.9 %
10 SYRINGE (ML) INJECTION EVERY 12 HOURS SCHEDULED
Status: DISCONTINUED | OUTPATIENT
Start: 2021-02-08 | End: 2021-02-11 | Stop reason: HOSPADM

## 2021-02-08 RX ORDER — ONDANSETRON 2 MG/ML
4 INJECTION INTRAMUSCULAR; INTRAVENOUS EVERY 6 HOURS PRN
Status: DISCONTINUED | OUTPATIENT
Start: 2021-02-08 | End: 2021-02-11 | Stop reason: HOSPADM

## 2021-02-08 RX ADMIN — PANTOPRAZOLE SODIUM 40 MG: 40 TABLET, DELAYED RELEASE ORAL at 16:23

## 2021-02-08 RX ADMIN — LACTULOSE 20 G: 20 SOLUTION ORAL at 18:18

## 2021-02-08 RX ADMIN — SODIUM CHLORIDE: 9 INJECTION, SOLUTION INTRAVENOUS at 13:17

## 2021-02-08 RX ADMIN — AMLODIPINE BESYLATE 10 MG: 10 TABLET ORAL at 16:23

## 2021-02-08 RX ADMIN — Medication 250 MG: at 21:53

## 2021-02-08 RX ADMIN — ASPIRIN 81 MG: 81 TABLET, CHEWABLE ORAL at 16:23

## 2021-02-08 RX ADMIN — CLOPIDOGREL BISULFATE 75 MG: 75 TABLET ORAL at 16:23

## 2021-02-08 RX ADMIN — Medication 1000 UNITS: at 21:53

## 2021-02-08 RX ADMIN — SERTRALINE 100 MG: 100 TABLET, FILM COATED ORAL at 16:23

## 2021-02-08 RX ADMIN — DIAZEPAM 5 MG: 5 TABLET ORAL at 20:42

## 2021-02-08 RX ADMIN — ONDANSETRON 4 MG: 2 INJECTION INTRAMUSCULAR; INTRAVENOUS at 19:54

## 2021-02-08 RX ADMIN — PANTOPRAZOLE SODIUM 40 MG: 40 INJECTION, POWDER, FOR SOLUTION INTRAVENOUS at 13:17

## 2021-02-08 RX ADMIN — ATENOLOL 25 MG: 25 TABLET ORAL at 16:23

## 2021-02-08 RX ADMIN — ATORVASTATIN CALCIUM 40 MG: 40 TABLET, FILM COATED ORAL at 21:53

## 2021-02-08 RX ADMIN — ONDANSETRON 4 MG: 2 INJECTION INTRAMUSCULAR; INTRAVENOUS at 13:15

## 2021-02-08 RX ADMIN — HYDRALAZINE HYDROCHLORIDE 50 MG: 50 TABLET, FILM COATED ORAL at 21:53

## 2021-02-08 ASSESSMENT — ENCOUNTER SYMPTOMS
DIARRHEA: 0
RHINORRHEA: 0
TROUBLE SWALLOWING: 0
VOMITING: 1
SINUS PRESSURE: 0
SORE THROAT: 0
CHEST TIGHTNESS: 0
BACK PAIN: 0
CONSTIPATION: 0
NAUSEA: 1
WHEEZING: 0
VOICE CHANGE: 0
COUGH: 0
ABDOMINAL PAIN: 0
SHORTNESS OF BREATH: 0

## 2021-02-08 ASSESSMENT — PAIN SCALES - GENERAL: PAINLEVEL_OUTOF10: 0

## 2021-02-08 NOTE — ED NOTES
Pt medicated with zofran and fluids at this time. X ray at bedside.      Hansel Johns RN  02/07/21 1936

## 2021-02-08 NOTE — ED NOTES
Patient presents to ED for nausea and fatigue. Reports she had a valve replacement last Wednesday and was discharged home on Friday. Patient states she was evaluated in ER last night for same thing and refused admission. Patient states, \"I should've stayed last night. I can't get rid of this nausea and vomiting. \"  Denies any pain. Was seen by Dr. Venkatesh Tobin today and told to come back to ED for admission. Shows no signs of distress. Skin warm and dry. Respirations easy and unlabored.       Lali Swan RN  02/08/21 [id number]

## 2021-02-08 NOTE — ED NOTES
Called [facility] and informed Maxime Jay that the patient is on their way to the unit. Patient transported via bed in a stable condition.       Blaine Sierra  02/08/21 1327

## 2021-02-08 NOTE — ED NOTES
Pt medicated with GI cocktail at this time. VS obtained. No further needs voiced. Will monitor.      Bradly Garnica RN  02/07/21 1943

## 2021-02-08 NOTE — PROGRESS NOTES
Patient arrived per cart to 3B. Heart monitor applied and vitals taken. Admission paperwork completed. Explained to patient that [facility] is not responsible for any lost or stolen items. Patient verbalized understanding. Oriented to room and use of call light and bed controls. Patient denies pain or needs. No signs of distress noted. Bed locked & in low position, side-rails up x2. Call light in reach. Reminded patient to call nurse if any needs arise. 2 person skin assessment performed by this nurse and Stan Hurt. Explained patients right to have family, representative or physician notified of their admission. Patient has N/A for physician to be notified. Patient has N/A for family/representative to be notified.

## 2021-02-08 NOTE — ED NOTES
Bed: 008A  Expected date: 2/7/21  Expected time:   Means of arrival: [facility]  Comments:      Tabatha De La Cruz RN  02/07/21 [id number]

## 2021-02-08 NOTE — H&P
HISTORY AND PHYSICAL             Date: 2/8/2021        Patient Name: Kaitlynn De La Cruz     YOB: 1940      Age:  68 y.o. Chief Complaint     Chief Complaint   Patient presents with    Nausea    Fatigue          History Obtained From   patient, electronic medical record    History of Present Illness   Pt s/p TACR on 2/3/21; descrives ongoing nausea since procedure which continued through hospital stay on 2/4; pt was discharged home w/ no further W/U; describes worseing nausea f/b bilious vomiting; denies hematemesis/melena/BRBPR/CP/SOB/palpitation/presyncope. Pt denies BM since last Thursday; reports flatus daily. Denies dysuria/urgency/frequency/hematuria/lower abdo or flank pain. Describes mild colicky abdo pain. Denies fever/chills. Could only tolerated fluids and oatmeal. Small slip and near fall from chair this a.m. but required assistance to get up d/t weakness.  Denies headache/dysarthria/dysphonia/focal neuro deficit/vertigo/tinnitus/hearing loss    Past Medical History     Past Medical History:   Diagnosis Date    Arthritis     Chronic low back pain     COVID-19     DM type 2, goal A1c below 7     GERD (gastroesophageal reflux disease)     Hyperlipidemia     Hypertension     Loss of vision     Mitral valve disorder     Osteoporosis     Subarachnoid hemorrhage (HCC) 4/25/2019    TIA (transient ischemic attack)     Type 2 diabetes mellitus with diabetic neuropathy, without long-term current use of insulin ([facility] [address] 7/26/2017   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    Urinary incontinence         Past Surgical History     Past Surgical History:   Procedure Laterality Date    APPENDECTOMY      CARPAL TUNNEL RELEASE Left 2011    CATARACT REMOVAL Bilateral     CERVICAL FUSION  1976    CHOLECYSTECTOMY      COLON SURGERY  2012    COLONOSCOPY  2015    EYE SURGERY      CATARACTS    FINGER SURGERY Right 10/2016    3rd digit    HERNIA REPAIR  HYSTERECTOMY      NERVE SURGERY Right 10/25/2019    Lumbar Facet MBB @ M01-G0,V6-0 L2-3 right side only #1 performed by Riddhi Sahni MD at [facility] Right 12/16/2019    Lumbar Facet MBB @ Q81-H4-C9-5, L2-3 RIGHT SIDE ONLY performed by Riddhi [facility]ni MD at [facility] Right 1/6/2020    Lumbar RFA T12-L1, L1-2, L2-3 Right performed by Riddhi Sahni MD at  [address] [facility],>[facility]      6 FUSIONS    ROTATOR CUFF REPAIR Right 11/28/2014    SHOULDER SURGERY  2014    SPINE SURGERY      TENDON RELEASE Right 05/18/2017    [facility] Bilateral     UPPER GASTROINTESTINAL ENDOSCOPY  2013,2015        Medications Prior to Admission     Prior to Admission medications    Medication Sig Start Date End Date Taking? Authorizing Provider   quinapril (ACCUPRIL) 10 MG tablet Take 1 tablet by mouth daily 2/9/21   Madina Lovell PA-C   traMADol (ULTRAM) 50 MG tablet Take 1 tablet by mouth every 8 hours as needed for Pain for up to 5 days. Intended supply: 5 days.  Take lowest dose possible to manage pain 2/5/21 2/10/21  Madina Lovell PA-C   hydrALAZINE (APRESOLINE) 50 MG tablet Take 1 tablet by mouth every 12 hours 2/5/21   Madina Lovell PA-C   atenolol (TENORMIN) 25 MG tablet Take 1 tablet by mouth daily 2/8/21   Madina Lovell PA-C   clopidogrel (PLAVIX) 75 MG tablet Take 1 tablet by mouth daily 2/4/21   Madina Lovell PA-C   hydrALAZINE (APRESOLINE) 50 MG tablet Take 1 tablet by mouth 2 times daily 2/9/21   Madina Lovell PA-C   furosemide (LASIX) 40 MG tablet Take 0.5 tablets by mouth daily 1/15/21   SEAN Fried - CNP   potassium chloride (KLOR-CON M) 20 MEQ extended release tablet Take 0.5 tablets by mouth daily 1/15/21   Beverley Valiente APRN - CNP   Magnesium Oxide 250 MG TABS Take 1 tablet by mouth 2 times daily 1/15/21   Beverley Valiente APRN - NADYA amLODIPine (NORVASC) 10 MG tablet TAKE 1 TABLET DAILY 11/11/20   [facility] [address] [place]   vitamin C (VITAMIN C) 1000 MG tablet Take 1 tablet by mouth daily 9/6/20   ELIZABETH Dillon   zinc sulfate (ZINCATE) 220 (50 Zn) MG capsule Take 1 capsule by mouth daily 9/6/20   ELIZABETH Dillon   ondansetron (ZOFRAN ODT) 4 MG disintegrating tablet Take 1 tablet by mouth every 8 hours as needed for Nausea or Vomiting 9/4/20   SEAN Thomsa - CNP   metFORMIN (GLUCOPHAGE) 500 MG tablet TAKE 1 TABLET TWICE A DAY WITH MEALS 3/9/20   [facility], DO   atorvastatin (LIPITOR) 40 MG tablet TAKE 1 TABLET DAILY 3/5/20   [facility], DO   sertraline (ZOLOFT) 100 MG tablet TAKE 2 TABLETS DAILY 3/5/20   [facility], DO   pantoprazole (PROTONIX) 40 MG tablet TAKE 1 TABLET TWICE A DAY 3/5/20   [facility], DO   nystatin (MYCOSTATIN) 102101 UNIT/GM cream Apply topically 2 times daily. 1/14/20   [facility], DO   aspirin 81 MG tablet Take 81 mg by mouth daily    Historical Provider, MD   promethazine (PHENERGAN) 25 MG tablet Take 1 tablet by mouth every 6 hours as needed for Nausea 4/29/19   Dulce Kern PA-C   vitamin D (CHOLECALCIFEROL) 1000 UNIT TABS tablet Take 1,000 Units by mouth nightly.     Historical Provider, MD        Allergies   Norco [hydrocodone-acetaminophen], Tramadol, and Codeine    Social History     Social History     Tobacco History     Smoking Status  Never Smoker    Smokeless Tobacco Use  Never Used          Alcohol History     Alcohol Use Status  No          Drug Use     Drug Use Status  No          Sexual Activity     Sexually Active  Not Asked                Family History     Family History   Problem Relation Age of Onset    Arthritis Mother     Cancer Mother     Heart Disease Father     High Blood Pressure Father        Review of Systems   All other systems were reviewed and were negative except otherwise discussed in HPI      Physical Exam BP (!) 159/83   Pulse 74   Temp 97.8 °F (36.6 °C) (Oral)   Resp 18   Ht 5' 4\" (1.626 m)   Wt 242 lb (109.8 kg)   SpO2 98%   BMI 41.54 kg/m²     General appearance: Obese, A&O x3, Not ill or toxic, in no apparent distress  HEENT:  Normal cephalic, atraumatic without obvious deformity. Pupils equal, round, and reactive to light. Extra ocular muscles intact. Conjunctivae/corneas clear. Neck: Supple, with full range of motion. No jugular venous distention. Trachea midline. Respiratory:  Normal respiratory effort. NL A/E bilat with no adventitious sounds Cardiovascular:  normal S1/S2 with no murmurs/gallops  Abdomen: Soft, round, obese, mildly tender over epigastric and LUQ area, non-distended, no rigidity or peritoneal signs  Musculoskeletal: NL symmetrical A/PROM bilat U/L extremities   Skin: No rashes or lesions. No edema  Neurologic:  CN II-XII intact. NL symmetrical reflexes. NL gait and stance. NL Cerebellar exam. Power 5/5 all muscle groups U/L extremities.  Toes downgoing  Psychiatric: Alert and oriented, thought content appropriate, normal insight  Capillary Refill: Brisk,< 3 seconds   Peripheral Pulses: +2 palpable, equal bilaterally       Labs      Recent Results (from the past 24 hour(s))   EKG 12 Lead    Collection Time: 02/07/21  7:25 PM   Result Value Ref Range    Ventricular Rate 76 BPM    Atrial Rate 76 BPM    P-R Interval 166 ms    QRS Duration 142 ms    Q-T Interval 430 ms    QTc Calculation (Bazett) 483 ms    P Axis -2 degrees    R Axis -35 degrees    T Axis 99 degrees   CBC Auto Differential    Collection Time: 02/07/21  7:56 PM   Result Value Ref Range    WBC 10.6 4.8 - 10.8 thou/mm3    RBC 4.63 4.20 - 5.40 mill/mm3    Hemoglobin 13.9 12.0 - 16.0 gm/dl    Hematocrit 40.5 37.0 - 47.0 %    MCV 87.5 81.0 - 99.0 fL    MCH 30.0 26.0 - 33.0 pg    MCHC 34.3 32.2 - 35.5 gm/dl    RDW-CV 13.3 11.5 - 14.5 %    RDW-SD 42.3 35.0 - 45.0 fL    Platelets 335 183 - 194 thou/mm3    MPV 10.1 9.4 - 12.4 fL Seg Neutrophils 78.0 %    Lymphocytes 11.5 %    Monocytes 8.8 %    Eosinophils 0.9 %    Basophils 0.4 %    Immature Granulocytes 0.4 %    Segs Absolute 8.3 (H) 1.8 - 7.7 thou/mm3    Lymphocytes Absolute 1.2 1.0 - 4.8 thou/mm3    Monocytes Absolute 0.9 0.4 - 1.3 thou/mm3    Eosinophils Absolute 0.1 0.0 - 0.4 thou/mm3    Basophils Absolute 0.0 0.0 - 0.1 thou/mm3    Immature Grans (Abs) 0.04 0.00 - 0.07 thou/mm3    nRBC 0 /100 wbc   Lactic Acid, Plasma    Collection Time: 02/07/21  7:56 PM   Result Value Ref Range    Lactic Acid 1.1 0.5 - 2.2 mmol/L   Comprehensive Metabolic Panel w/ Reflex to MG    Collection Time: 02/07/21  7:57 PM   Result Value Ref Range    Glucose 149 (H) 70 - 108 mg/dL    CREATININE 0.6 0.4 - 1.2 mg/dL    BUN 17 7 - 22 mg/dL    Sodium 133 (L) 135 - 145 meq/L    Potassium reflex Magnesium 4.1 3.5 - 5.2 meq/L    Chloride 96 (L) 98 - 111 meq/L    CO2 24 23 - 33 meq/L    Calcium 9.4 8.5 - 10.5 mg/dL    AST 25 5 - 40 U/L    Alkaline Phosphatase 65 38 - 126 U/L    Total Protein 7.1 6.1 - 8.0 g/dL    Albumin 4.2 3.5 - 5.1 g/dL    Total Bilirubin 0.9 0.3 - 1.2 mg/dL    ALT 19 11 - 66 U/L   Troponin    Collection Time: 02/07/21  7:57 PM   Result Value Ref Range    Troponin T 0.033 (A) ng/ml   Lipase    Collection Time: 02/07/21  7:57 PM   Result Value Ref Range    Lipase 18.7 5.6 - 51.3 U/L   Anion Gap    Collection Time: 02/07/21  7:57 PM   Result Value Ref Range    Anion Gap 13.0 8.0 - 16.0 meq/L   Glomerular Filtration Rate, Estimated    Collection Time: 02/07/21  7:57 PM   Result Value Ref Range    Est, Glom Filt Rate >90 ml/min/1.73m2   Osmolality    Collection Time: 02/07/21  7:57 PM   Result Value Ref Range    Osmolality Calc 270.7 (L) 275.0 - 300.0 mOsmol/kg   Urinalysis with microscopic    Collection Time: 02/07/21  9:50 PM   Result Value Ref Range    Glucose, Urine NEGATIVE NEGATIVE mg/dl    Bilirubin Urine NEGATIVE NEGATIVE    Ketones, Urine NEGATIVE NEGATIVE Specific Gravity, UA 1.019 1.002 - 1.030    Blood, Urine NEGATIVE NEGATIVE    pH, UA 7.5 5.0 - 9.0    Protein,  (A) NEGATIVE mg/dl    Urobilinogen, Urine 0.2 0.0 - 1.0 eu/dl    Nitrite, Urine NEGATIVE NEGATIVE    Leukocyte Esterase, Urine NEGATIVE NEGATIVE    Color, UA YELLOW YELLOW-STRAW    Character, Urine CLEAR [id number]    RBC, UA 0-2 0-2/hpf /hpf    WBC, UA 0-2 0-4/hpf /hpf    Epithelial Cells, UA 0-2 3-5/hpf /hpf    Bacteria, UA FEW FEW/NONE SEEN    Casts NONE SEEN NONE SEEN /lpf    Crystals NONE SEEN NONE SEEN    Renal Epithelial, UA NONE SEEN NONE SEEN    Yeast, UA NONE SEEN NONE SEEN    Casts NONE SEEN /lpf    Miscellaneous Lab Test Result NONE SEEN    Troponin    Collection Time: 02/07/21 10:49 PM   Result Value Ref Range    Troponin T 0.036 (A) ng/ml   CBC auto differential    Collection Time: 02/08/21 11:38 AM   Result Value Ref Range    WBC 12.6 (H) 4.8 - 10.8 thou/mm3    RBC 4.78 4.20 - 5.40 mill/mm3    Hemoglobin 14.5 12.0 - 16.0 gm/dl    Hematocrit 41.9 37.0 - 47.0 %    MCV 87.7 81.0 - 99.0 fL    MCH 30.3 26.0 - 33.0 pg    MCHC 34.6 32.2 - 35.5 gm/dl    RDW-CV 13.4 11.5 - 14.5 %    RDW-SD 42.5 35.0 - 45.0 fL    Platelets 189 939 - 977 thou/mm3    MPV 10.1 9.4 - 12.4 fL    Seg Neutrophils 80.3 %    Lymphocytes 10.1 %    Monocytes 7.8 %    Eosinophils 1.2 %    Basophils 0.3 %    Immature Granulocytes 0.3 %    Segs Absolute 10.1 (H) 1.8 - 7.7 thou/mm3    Lymphocytes Absolute 1.3 1.0 - 4.8 thou/mm3    Monocytes Absolute 1.0 0.4 - 1.3 thou/mm3    Eosinophils Absolute 0.2 0.0 - 0.4 thou/mm3    Basophils Absolute 0.0 0.0 - 0.1 thou/mm3    Immature Grans (Abs) 0.04 0.00 - 0.07 thou/mm3    nRBC 0 /100 wbc   Lipase    Collection Time: 02/08/21 11:38 AM   Result Value Ref Range    Lipase 20.0 5.6 - 51.3 U/L   Amylase    Collection Time: 02/08/21 11:38 AM   Result Value Ref Range    Amylase 17 (L) 20 - 104 U/L        Imaging/Diagnostics Last 24 Hours   Xr Chest (2 Vw)    Result Date: 2/8/2021 PROCEDURE: XR CHEST (2 VW) CLINICAL INFORMATION: Nausea vomiting elevated troponin status post TAVR. COMPARISON: Multiple overlying wires. February 7, 2021. TECHNIQUE: PA and lateral views the chest. FINDINGS: Aortic stent graft. No lobar consolidation. Costophrenic recesses are preserved. No cardiomegaly. Degenerative changes thoracic spine. IMPRESSION: No lobar consolidation. **This report has been created using voice recognition software. It may contain minor errors which are inherent in voice recognition technology. ** Final report electronically signed by Dr. Spencer Zepeda on 2/8/2021 12:32 PM    Ct Abdomen Pelvis W Iv Contrast    Result Date: 2/7/2021  CT abdomen and pelvis with contrast Comparison: 12/21/2020 Findings: Lung bases are clear. No acute bony abnormalities. Degenerative change noted throughout the visualized spine. Fusion hardware L2-L4 with laminectomy defect. Liver and spleen are unremarkable. Cholecystectomy. Pancreas and adrenal glands unremarkable. Multiple bilateral pararenal cysts have been noted on previous studies. Stones noted left collecting system without ureteral distention. Bilateral proximal periureteral stranding noted. This may be infectious. No aortic aneurysm or dissection noted. No significant free fluid or adenopathy noted in the pelvis. No evidence for diverticulitis. Appendix unremarkable. Prior hysterectomy. Impression: Bilateral proximal periureteral stranding, possibly infectious. This document has been electronically signed by: Jonel Quintana MD on 02/07/2021 09:26 PM All CT scans at this facility use dose modulation, iterative reconstruction, and/or weight-based dosing when appropriate to reduce radiation dose to as low as reasonably achievable. Xr Chest Portable    Result Date: 2/7/2021  PROCEDURE: XR CHEST PORTABLE CLINICAL INFORMATION: abdominal pain, n/v, recent tavr COMPARISON: 9/4/2020 TECHNIQUE: A single mobile view of the chest was obtained. 1. Normal heart size. An electronic device or monitoring device of some sort projects over the mid chest. Prior anterior cervical fusion. Prior surgery right shoulder. 2. No acute findings. No infiltrates or effusions are seen. **This report has been created using voice recognition software. It may contain minor errors which are inherent in voice recognition technology. ** Final report electronically signed by Dr. Collin Nick on 2/7/2021 7:43 PM      Assessment        Plan   1. Nausea/voming: NYD etiology; likely 2/2 severe constipation/Garfield's picture vs pSBO/ileus vs others (UTI/PNA, increased ICH, etc). CT A/P yesterday; however, no report of bowels; will liaise w/ radiology, also KUB ordered along w/ CT head w/o to r/o acute intracerebral processes given recent TAVR . CXR,lipase/ biochemistry non-contributory. Laxatives (including fleet enema) ordered. Placed on IVF. NPO for now. Will start on CL if no obstruction. If N/V worsening will consider NGT  2. S/p TAVR   3. HypoNa: likely 2/2 diuretics/dehydration; IVF, held diuretics will reassess response  4. Hx of DM: well-controlled on OHA w/ recent A1C 6.8%. held metformin; SSI. diabetic diet, SSI, accuchecks, and hypoglycemia orders in place, A1C ordered  5. Hx of HTN: resumed home meds; will monitor  6. Hx of chronic HFpEF: well-compensated; held diuretics for now  7. Hx of non-obstructive-CAD/stroke; on guideline-directed treatment; trop mildly elevate, denies CP, EKG negative for new/dynamic ischemic changes, recent C unremarkable; will monitor  8. Hx of traumatic SAH  9. Hx of GERD: resumed home BID PPI  10. Hx of urinary incontinence  11. Code status: DNR-CCA as confirmed by pt and family along w/ advanced directive  15. PT/OT to see    Consultations Ordered:  None  With RN in room, patient,  and son were  updated about and agreed upon the treatment plan, all the questions and concerns were addressed. Electronically signed by Heather Coffman MD on 2/8/21 at 12:56 PM EST

## 2021-02-08 NOTE — ACP (ADVANCE CARE PLANNING)
Length of ACP Conversation in minutes:  39  Conversation Outcomes:  [x] ACP discussion completed  [x] Existing advance directive reviewed with patient; no changes to patient's previously recorded wishes  [] New Advance Directive completed  [] Portable Do Not Rescitate prepared for Provider review and signature  [] POLST/POST/MOLST/MOST prepared for Provider review and signature      Follow-up plan:    [] Schedule follow-up conversation to continue planning  [] Referred individual to Provider for additional questions/concerns   [] Advised patient/agent/surrogate to review completed ACP document and update if needed with changes in condition, patient preferences or care setting    [x] This note routed to one or more involved healthcare providers       Patient originally from [place] and had advance directives completed there. She decided on DNR-CCA this past September. I had form signed by MD and copy sent to Medical Records this date. Consult for spiritual care to follow up with [facility].

## 2021-02-08 NOTE — ED NOTES
Updated pt on plan of care. No further needs voiced at this time. Will monitor.      Mariana Marshall RN  02/07/21 [id number]

## 2021-02-08 NOTE — ED PROVIDER NOTES
[address] [facility] COMPLAINT    Chief Complaint   Patient presents with    Nausea    Fatigue       Nurses Notes reviewed and I agree except as noted in the HPI. HPI    lAexia Lau is a 68 y.o. female who presents for evaluation of nausea and dry heaving, vomiting since had TAVR by Dr. Brittany Mckinney 5 days ago. Patient has been having dry heaves since then with pain when tenderness on the epigastric and upper quadrant. Patient states that her nausea vomiting started after the TAVR. Patient denies any chest pain, patient's feel weak no shortness of breath denies any diarrhea melena hematochezia. Patient was evaluated last night however the patient declined admission and went home. This morning continues to have dry heaves and went to see Dr. Brittany Mckinney today and his office and was sent here for admission. Patient's feels fatigued and weak    REVIEW OF SYSTEMS    Review of Systems   Constitutional: Positive for fatigue. Negative for appetite change, chills, diaphoresis and fever. HENT: Negative for congestion, ear pain, postnasal drip, rhinorrhea, sinus pressure, sneezing, sore throat, trouble swallowing and voice change. Respiratory: Negative for cough, chest tightness, shortness of breath and wheezing. Cardiovascular: Negative for chest pain, palpitations and leg swelling. Gastrointestinal: Positive for nausea and vomiting. Negative for abdominal pain, constipation and diarrhea. Musculoskeletal: Negative for arthralgias, back pain, joint swelling, myalgias, neck pain and neck stiffness. Neurological: Positive for weakness. Negative for dizziness, syncope, light-headedness, numbness and headaches.        PAST MEDICAL HISTORY has a past medical history of Arthritis, Chronic low back pain, COVID-19, DM type 2, goal A1c below 7, GERD (gastroesophageal reflux disease), Hyperlipidemia, Hypertension, Loss of vision, Mitral valve disorder, Osteoporosis, Subarachnoid hemorrhage (HCC), TIA (transient ischemic attack), Type 2 diabetes mellitus with diabetic neuropathy, without long-term current use of insulin ([facility] [address] Unspecified cerebral artery occlusion with cerebral infarction, and Urinary incontinence. SURGICAL HISTORY   has a past surgical history that includes Appendectomy; Cholecystectomy; hernia repair; Hysterectomy; Spine surgery; Carpal tunnel release (Left, 2011); pr laminectomy,>2 sgmt,lumbar; cervical fusion (1976); eye surgery; Rotator cuff repair (Right, 11/28/2014); shoulder surgery (2014); Cataract removal (Bilateral); Colon surgery (2012); Colonoscopy (2015); Upper gastrointestinal endoscopy (8782,8971); Finger surgery (Right, 10/2016); tendon release (Right, 05/18/2017); Total knee arthroplasty (Bilateral); Nerve Surgery (Right, 10/25/2019); Nerve Surgery (Right, 12/16/2019); and Pain management procedure (Right, 1/6/2020).     CURRENT MEDICATIONS    Previous Medications    AMLODIPINE (NORVASC) 10 MG TABLET    TAKE 1 TABLET DAILY    ASPIRIN 81 MG TABLET    Take 81 mg by mouth daily    ATENOLOL (TENORMIN) 25 MG TABLET    Take 1 tablet by mouth daily    ATORVASTATIN (LIPITOR) 40 MG TABLET    TAKE 1 TABLET DAILY    CLOPIDOGREL (PLAVIX) 75 MG TABLET    Take 1 tablet by mouth daily    FUROSEMIDE (LASIX) 40 MG TABLET    Take 0.5 tablets by mouth daily    HYDRALAZINE (APRESOLINE) 50 MG TABLET    Take 1 tablet by mouth 2 times daily    HYDRALAZINE (APRESOLINE) 50 MG TABLET    Take 1 tablet by mouth every 12 hours    MAGNESIUM OXIDE 250 MG TABS    Take 1 tablet by mouth 2 times daily    METFORMIN (GLUCOPHAGE) 500 MG TABLET    TAKE 1 TABLET TWICE A DAY WITH MEALS NYSTATIN (MYCOSTATIN) 891587 UNIT/GM CREAM    Apply topically 2 times daily. ONDANSETRON (ZOFRAN ODT) 4 MG DISINTEGRATING TABLET    Take 1 tablet by mouth every 8 hours as needed for Nausea or Vomiting    PANTOPRAZOLE (PROTONIX) 40 MG TABLET    TAKE 1 TABLET TWICE A DAY    POTASSIUM CHLORIDE (KLOR-CON M) 20 MEQ EXTENDED RELEASE TABLET    Take 0.5 tablets by mouth daily    PROMETHAZINE (PHENERGAN) 25 MG TABLET    Take 1 tablet by mouth every 6 hours as needed for Nausea    QUINAPRIL (ACCUPRIL) 10 MG TABLET    Take 1 tablet by mouth daily    SERTRALINE (ZOLOFT) 100 MG TABLET    TAKE 2 TABLETS DAILY    TRAMADOL (ULTRAM) 50 MG TABLET    Take 1 tablet by mouth every 8 hours as needed for Pain for up to 5 days. Intended supply: 5 days. Take lowest dose possible to manage pain    VITAMIN C (VITAMIN C) 1000 MG TABLET    Take 1 tablet by mouth daily    VITAMIN D (CHOLECALCIFEROL) 1000 UNIT TABS TABLET    Take 1,000 Units by mouth nightly. ZINC SULFATE (ZINCATE) 220 (50 ZN) MG CAPSULE    Take 1 capsule by mouth daily       ALLERGIES    is allergic to norco [hydrocodone-acetaminophen]; tramadol; and codeine. FAMILY HISTORY    She indicated that her mother is . She indicated that her father is . She indicated that her brother is alive. family history includes Arthritis in her mother; Cancer in her mother; Heart Disease in her father; High Blood Pressure in her father. SOCIAL HISTORY     reports that she has never smoked. She has never used smokeless tobacco. She reports that she does not drink alcohol or use drugs. PHYSICAL EXAM      INITIAL VITALS: BP (!) 159/83   Pulse 74   Temp 97.8 °F (36.6 °C) (Oral)   Resp 18   Ht 5' 4\" (1.626 m)   Wt 242 lb (109.8 kg)   SpO2 98%   BMI 41.54 kg/m² Estimated body mass index is 41.54 kg/m² as calculated from the following:    Height as of this encounter: 5' 4\" (1.626 m). Weight as of this encounter: 242 lb (109.8 kg).     Physical Exam Vitals signs reviewed. Constitutional:       Appearance: She is well-developed. HENT:      Head: Normocephalic and atraumatic. Right Ear: External ear normal.      Left Ear: External ear normal.      Nose: Nose normal.   Eyes:      General: No scleral icterus. Conjunctiva/sclera: Conjunctivae normal.      Pupils: Pupils are equal, round, and reactive to light. Neck:      Musculoskeletal: Normal range of motion and neck supple. Thyroid: No thyromegaly. Vascular: No JVD. Cardiovascular:      Rate and Rhythm: Normal rate and regular rhythm. Heart sounds: No murmur. No friction rub. Pulmonary:      Effort: Pulmonary effort is normal.      Breath sounds: Normal breath sounds. No wheezing or rales. Chest:      Chest wall: No tenderness. Abdominal:      General: Bowel sounds are normal.      Palpations: Abdomen is soft. There is no mass. Tenderness: There is abdominal tenderness in the right upper quadrant, epigastric area and left upper quadrant. Lymphadenopathy:      Cervical: No cervical adenopathy. Skin:     Findings: No rash. Neurological:      Mental Status: She is alert and oriented to person, place, and time. Psychiatric:         Behavior: Behavior is cooperative.          MEDICAL DECISION MAKING    DIFFERENTIAL DIAGNOSIS:  Persistent nausea vomiting, upper abdominal pain gastritis GERD gallbladder disease pancreatitis status post TAVR day 5, ACS      DIAGNOSTIC RESULTS    EKG   Interpreted by Kranthi Thompson MD      Rhythm: normal sinus   Rate: normal  Axis: Left axis deviation  Ectopy: none  Conduction: normal  ST Segments: no acute change  T Waves: no acute change  Q Waves: none    Clinical Impression: Normal sinus rhythm with left bundle branch block, left axis deviation, abnormal EKG      Kranthi Thompson MD      RADIOLOGY: The pt was seen and evaluated by me. Within the department, I observed the pt's vitalsigns to be within acceptable range. Laboratory and Radiological studies were performed, results were reviewed with the patient. Within the department, the pt was treated with IV fluid hydration, Zofran, Protonix. I observed the pt's condition to be hemodynamically stable during the duration of their stay. I explained my proposed course of treatment to the pt, and they were amenable to my decision. The patient's is referred to the hospitalist services, Dr. Diane Molina graciously admitted the patient. CRITICAL CARE:   None. CONSULTS:  Dr. Diane Molina the hospitalist services    PROCEDURES:  None. FINAL IMPRESSION       1. S/P TAVR (transcatheter aortic valve replacement) day 5    2. Nausea and vomiting, intractability of vomiting not specified, unspecified vomiting type    3. Elevated troponin    4. Upper abdominal pain          DISPOSITION/PLAN  PATIENT REFERRED TO: Patient is admitted to the hospitalist.      (Please note that portions of this note were completed with a voice recognition program and electronically transcribed. Efforts were [facility] edit the dictations but occasionally words are mis-transcribed . The transcription may contain errors not detected in proofreading.   This transcription was electronically signed.)     02/08/21 12:40 PM      Victor Hugo Starr MD      Emergency room physician           Victor Hugo Starr MD  02/10/21 [id number]

## 2021-02-08 NOTE — PROGRESS NOTES
Structural Heart/Cardiology Follow up    2/8/2021 8:45 AM    Patient's Name/Date of Birth: Danny Lucero / 1940 (74 y.o.)    PCP: Jaylene Drew,       Date: February 8, 2021     HPI  We had the pleasure of seeing Danny Lucero in the office today, as you know this is a very pleasant 68y.o. year old female with a history of aortic stenosis who underwent a successful TAVR on 02/03/21. She went to the ED last night for emesis and nausea. She looks very ill today in the office. She is pale and still unable to keep any food down. She has been nauseous and vomitting since TAVR procedure. She did have covid 6 months ago and reports similar symptoms then. She does report sliding out of her chair this am son had to pick her up. She denies hitting head. The pt denies chest pressure, palpitations, SOB, fever, chills, N/V/D. PastMedical History:  Soheila Morales  has a past medical history of Arthritis, Chronic low back pain, COVID-19, DM type 2, goal A1c below 7, GERD (gastroesophageal reflux disease), Hyperlipidemia, Hypertension, Loss of vision, Mitral valve disorder, Osteoporosis, Subarachnoid hemorrhage ([address] TIA (transient ischemic attack), Type 2 diabetes mellitus with diabetic neuropathy, without long-term current use of insulin ([address] Unspecified cerebral artery occlusion with cerebral infarction, and Urinary incontinence.     Past Surgical History: BP: (!) 145/84   Pulse: 79     Physical Exam:   General Appearance: Pale. In distress  Cardiovascular: normal rate, regular rhythm, normal S1 and S2, no murmurs, rubs, clicks, or gallops  Pulmonary/Chest: clear to auscultation bilaterally- no wheezes, rales or rhonchi, normal air movement, no respiratory distress  Abdomen:soft, non-tender, non-distended, normal bowel sounds, no bruits,   Extremities: no cyanosis, clubbing or edema. Pulses: Bilateral radial, femoral, DP, and PT pulses intact. No femoral bruits noted. Skin: warm and dry, no rash or erythema  Head: normocephalic and atraumatic  Neck: supple and non-tender without mass, no thyromegaly, no JVD   Musculoskeletal: normal range of motion, no joint swelling, deformity or tenderness. Neurological: alert, oriented, normal speech, no focal findings or movement disorder noted. Groin: Wounds healing appropriately. No signs of infection or hematoma. Assessment:   TAVR F/u office appointment. Plan:   1. Spoke with the hospitalist to see about direct admission. She asked that we send the pt to the ed first for initial evaluation. Will send pt to ED due to continued N/V and fall.  May need repeat COVID test.   2.  Restart atenolol    The plan of care was discussed in detail with Dr. Jarocho Melvin and Dr. Angela Bonilla

## 2021-02-08 NOTE — ED NOTES
ED to inpatient nurses report    Chief Complaint   Patient presents with    Nausea    Fatigue      Present to ED from home  LOC: alert and orientated to name, place, date  Vital signs   Vitals:    02/08/21 1126 02/08/21 1314   BP: (!) 159/83 (!) 157/79   Pulse: 74 73   Resp: 18 18   Temp: 97.8 °F (36.6 °C)    TempSrc: Oral    SpO2: 98% 96%   Weight: 242 lb (109.8 kg)    Height: 5' 4\" (1.626 m)       Oxygen Baseline roomair    Current needs required none   Bipap/Cpap No  LDAs:   Peripheral IV 02/08/21 Right Antecubital (Active)   Site Assessment Clean;Dry; Intact 02/08/21 1318   Line Status Normal saline locked; Infusing 02/08/21 1318   Dressing Status Clean;Dry; Intact 02/08/21 1318   Dressing Intervention New 02/08/21 1138     Mobility: Requires assistance * 1  Pending ED orders: none  Present condition: stable      Electronically signed by Ulises Taylor RN on 2/8/2021 at 1:18 PM       Ulises Taylor RN  02/08/21 131

## 2021-02-08 NOTE — ED PROVIDER NOTES
[place] ENCOUNTER          Pt Name: Kaitlnyn De La Cruz  MRN: [id number]  [place] 1940  Date of evaluation: 2/7/2021  Treating Resident Physician: Ayah Briceno DO  Supervising Physician: Mariella Dorsey       Chief Complaint   Patient presents with    Nausea    Emesis     History obtained from the patient. HISTORY OF PRESENT ILLNESS    HPI  Kaitlynn De La Cruz is a 68 y.o. female who presents to the emergency department for evaluation of nausea and vomiting. Admits to associated abdominal pain that is epigastric in nature, waxes and wanes and at worst a 7 out of 10. Patient recently had a CABG with Dr. Marbella Sterling on the third. She was having difficulty eating and drinking so he told her be evaluated emergency department. She denies any chest pain, headache, fever, difficulty breathing. She denies any pain currently, but does have some nausea. The patient has no other acute complaints at this time. REVIEW OF SYSTEMS   Review of Systems   Constitutional: Positive for fatigue. Negative for chills and fever. HENT: Negative for ear discharge, ear pain, postnasal drip, rhinorrhea, sinus pressure, sinus pain and sore throat. Eyes: Negative for pain, discharge, redness and visual disturbance. Respiratory: Negative for cough, chest tightness, shortness of breath and wheezing. Cardiovascular: Negative for chest pain, palpitations and leg swelling. Gastrointestinal: Positive for abdominal pain, nausea and vomiting. Negative for constipation and diarrhea. Endocrine: Negative for cold intolerance and heat intolerance. Genitourinary: Negative for difficulty urinating, dysuria, flank pain and urgency. Musculoskeletal: Negative for back pain, neck pain and neck stiffness. Skin: Negative for color change and rash. Neurological: Negative for dizziness, syncope, weakness, light-headedness, numbness and headaches. PAST MEDICAL AND SURGICAL HISTORY     Past Medical History:   Diagnosis Date    Arthritis     Chronic low back pain     COVID-19     DM type 2, goal A1c below 7     GERD (gastroesophageal reflux disease)     Hyperlipidemia     Hypertension     Loss of vision     Mitral valve disorder     Osteoporosis     Subarachnoid hemorrhage ([facility] [address] 4/25/2019    TIA (transient ischemic attack)     Type 2 diabetes mellitus with diabetic neuropathy, without long-term current use of insulin ([facility] [address] 7/26/2017   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    Urinary incontinence      Past Surgical History:   Procedure Laterality Date    APPENDECTOMY      CARPAL TUNNEL RELEASE Left 2011    CATARACT REMOVAL Bilateral     CERVICAL FUSION  1976    CHOLECYSTECTOMY      COLON SURGERY  2012    COLONOSCOPY  2015    EYE SURGERY      CATARACTS    FINGER SURGERY Right 10/2016    3rd digit    HERNIA REPAIR      HYSTERECTOMY      NERVE SURGERY Right 10/25/2019    Lumbar Facet MBB @ X58-Y1,O6-9 L2-3 right side only #1 performed by Reza Parsons MD at [facility] Right 12/16/2019    Lumbar Facet MBB @ E32-I2-N8-5, L2-3 RIGHT SIDE ONLY performed by Reza Parsons MD at [facility] Right 1/6/2020    Lumbar RFA T12-L1, L1-2, L2-3 Right performed by Reza Parsons MD at  [address] LAMINECTOMY,>2 [facility]      6 FUSIONS   [address] Right 11/28/2014    SHOULDER SURGERY  2014    SPINE SURGERY      TENDON RELEASE Right 05/18/2017    at [facility] Bilateral     UPPER GASTROINTESTINAL ENDOSCOPY  2013,2015         MEDICATIONS     Current Facility-Administered Medications:     0.9 % sodium chloride infusion, , Intravenous, Continuous, Lyle Feliciano DO, Last Rate: 125 mL/hr at 02/07/21 1931, New Bag at 02/07/21 1931    Current Outpatient Medications:   [START ON 2/9/2021] quinapril (ACCUPRIL) 10 MG tablet, Take 1 tablet by mouth daily, Disp: 90 tablet, Rfl: 4    traMADol (ULTRAM) 50 MG tablet, Take 1 tablet by mouth every 8 hours as needed for Pain for up to 5 days. Intended supply: 5 days.  Take lowest dose possible to manage pain, Disp: 15 tablet, Rfl: 0    hydrALAZINE (APRESOLINE) 50 MG tablet, Take 1 tablet by mouth every 12 hours, Disp: 90 tablet, Rfl: 3    [START ON 2/8/2021] atenolol (TENORMIN) 25 MG tablet, Take 1 tablet by mouth daily, Disp: 90 tablet, Rfl: 3    clopidogrel (PLAVIX) 75 MG tablet, Take 1 tablet by mouth daily, Disp: 30 tablet, Rfl: 3    [START ON 2/9/2021] hydrALAZINE (APRESOLINE) 50 MG tablet, Take 1 tablet by mouth 2 times daily, Disp: 180 tablet, Rfl: 3    furosemide (LASIX) 40 MG tablet, Take 0.5 tablets by mouth daily, Disp: 60 tablet, Rfl: 3    potassium chloride (KLOR-CON M) 20 MEQ extended release tablet, Take 0.5 tablets by mouth daily, Disp: 90 tablet, Rfl: 1    Magnesium Oxide 250 MG TABS, Take 1 tablet by mouth 2 times daily, Disp: 60 tablet, Rfl: 5    amLODIPine (NORVASC) 10 MG tablet, TAKE 1 TABLET DAILY, Disp: 90 tablet, Rfl: 3    vitamin C (VITAMIN C) 1000 MG tablet, Take 1 tablet by mouth daily, Disp: 30 tablet, Rfl: 3    zinc sulfate (ZINCATE) 220 (50 Zn) MG capsule, Take 1 capsule by mouth daily, Disp: 30 capsule, Rfl: 3    ondansetron (ZOFRAN ODT) 4 MG disintegrating tablet, Take 1 tablet by mouth every 8 hours as needed for Nausea or Vomiting, Disp: 20 tablet, Rfl: 0    metFORMIN (GLUCOPHAGE) 500 MG tablet, TAKE 1 TABLET TWICE A DAY WITH MEALS, Disp: 180 tablet, Rfl: 4    atorvastatin (LIPITOR) 40 MG tablet, TAKE 1 TABLET DAILY, Disp: 90 tablet, Rfl: 4    sertraline (ZOLOFT) 100 MG tablet, TAKE 2 TABLETS DAILY, Disp: 180 tablet, Rfl: 4    pantoprazole (PROTONIX) 40 MG tablet, TAKE 1 TABLET TWICE A DAY, Disp: 180 tablet, Rfl: 4   nystatin (MYCOSTATIN) 387664 UNIT/GM cream, Apply topically 2 times daily. , Disp: 1 Tube, Rfl: 1    aspirin 81 MG tablet, Take 81 mg by mouth daily, Disp: , Rfl:     promethazine (PHENERGAN) 25 MG tablet, Take 1 tablet by mouth every 6 hours as needed for Nausea, Disp: 15 tablet, Rfl: 0    vitamin D (CHOLECALCIFEROL) 1000 UNIT TABS tablet, Take 1,000 Units by mouth nightly., Disp: , Rfl:       SOCIAL HISTORY     Social History     Social History Narrative    Not on file     Social History     Tobacco Use    Smoking status: Never Smoker    Smokeless tobacco: Never Used   Substance Use Topics    Alcohol use: No    Drug use: No         ALLERGIES     Allergies   Allergen Reactions    Norco [Hydrocodone-Acetaminophen] Itching    Tramadol Itching    Codeine Hives and Rash         FAMILY HISTORY     Family History   Problem Relation Age of Onset    Arthritis Mother     Cancer Mother     Heart Disease Father     High Blood Pressure Father          PREVIOUS RECORDS   Previous records reviewed: Reviewed patient's TAVR history with Dr. Digna Bravo. Rashida Allred PHYSICAL EXAM     ED Triage Vitals   BP Temp Temp Source Pulse Resp SpO2 Height Weight   02/07/21 1907 02/07/21 1905 02/07/21 1905 02/07/21 1905 02/07/21 1905 02/07/21 1905 -- 02/07/21 1905   (!) 156/85 98 °F (36.7 °C) Oral 86 22 96 %  242 lb (109.8 kg)     Initial vital signs and nursing assessment reviewed and normal. Body mass index is 41.54 kg/m². Pulsoximetry is normal per my interpretation. Additional Vital Signs:  Vitals:    02/07/21 2321   BP: (!) 171/93   Pulse: 77   Resp: 17   Temp:    SpO2: 95%       Physical Exam  Vitals signs and nursing note reviewed. Constitutional:       General: She is not in acute distress. Appearance: She is obese. She is not toxic-appearing or diaphoretic. HENT:      Mouth/Throat:      Mouth: Mucous membranes are moist.      Pharynx: Oropharynx is clear. No oropharyngeal exudate or posterior oropharyngeal erythema. Eyes:      General: No scleral icterus. Right eye: No discharge. Left eye: No discharge. Extraocular Movements: Extraocular movements intact. Conjunctiva/sclera: Conjunctivae normal.   Neck:      Musculoskeletal: Normal range of motion. No neck rigidity. Cardiovascular:      Rate and Rhythm: Normal rate and regular rhythm. Pulses: Normal pulses. Heart sounds: No murmur. No friction rub. No gallop. Pulmonary:      Effort: Pulmonary effort is normal.      Breath sounds: No wheezing, rhonchi or rales. Abdominal:      General: Bowel sounds are normal.      Palpations: Abdomen is soft. Tenderness: There is abdominal tenderness (Generalized). There is no guarding or rebound. Skin:     General: Skin is warm and dry. Capillary Refill: Capillary refill takes less than 2 seconds. Neurological:      General: No focal deficit present. Mental Status: She is alert and oriented to person, place, and time. MEDICAL DECISION MAKING   Initial Assessment: Pleasant 79-year-old female. Recent TAVR, patient of Dr. Kareem Tinajero. Nausea, no vomiting in the emergency department over the last 5 hours. Epigastric pain nonacute abdomen. Stable vitals without fever or tachycardia. GLOMERULAR FILTRATION RATE, ESTIMATED       Radiologic studies results:  CT ABDOMEN PELVIS W IV CONTRAST   Final Result   Impression:      Bilateral proximal periureteral stranding, possibly infectious. This document has been electronically signed by: Elli Rocha MD on    02/07/2021 09:26 PM      All CT scans at this facility use dose modulation, iterative    reconstruction, and/or weight-based   dosing when appropriate to reduce radiation dose to as low as reasonably    achievable. XR CHEST PORTABLE   Final Result   1. Normal heart size. An electronic device or monitoring device of some sort projects over the mid chest. Prior anterior cervical fusion. Prior surgery right shoulder. 2. No acute findings. No infiltrates or effusions are seen. **This report has been created using voice recognition software. It may contain minor errors which are inherent in voice recognition technology. **      Final report electronically signed by Dr. Ekaterina Lara on 2/7/2021 7:43 PM          ED Medications administered this visit:   Medications   0.9 % sodium chloride infusion ( Intravenous New Bag 2/7/21 1931)   ondansetron (ZOFRAN) injection 4 mg (4 mg Intravenous Given 2/7/21 1931)   aluminum & magnesium hydroxide-simethicone (MAALOX) 30 mL, lidocaine viscous hcl (XYLOCAINE) 5 mL (GI COCKTAIL) ( Oral Given 2/7/21 1940)   iopamidol (ISOVUE-370) 76 % injection 80 mL (80 mLs Intravenous Given 2/7/21 2058)         ED COURSE          Strict return precautions and follow up instructions were discussed with the patient prior to discharge, with which the patient agrees.         FINAL DISPOSITION     Final diagnoses:   Nausea   Dehydration   Generalized abdominal pain     Condition: condition: stable  Dispo: Discharge to home This transcription was electronically signed. Parts of this transcriptions may have been dictated by use of voice recognition software and electronically transcribed, and parts may have been transcribed with the assistance of an ED scribe. The transcription may contain errors not detected in proofreading. Please refer to my supervising physician's documentation if my documentation differs.     Electronically Signed: Minerva Werner, 02/07/21, 11:54 PM       Minerva Werner DO  Resident  02/07/21 7943

## 2021-02-08 NOTE — TELEPHONE ENCOUNTER
Patient called in this morning stating that she is still having N/V and as she was getting out of her recliner today she fell and did a slow roll to the floor. She states that she did not hit her head or any injuries came out of it. Saadia was strongly encouraged to come in for her visit for a further evaluation.

## 2021-02-09 ENCOUNTER — APPOINTMENT (OUTPATIENT)
Dept: MRI IMAGING | Age: 81
DRG: 065 | End: 2021-02-09
Payer: MEDICARE

## 2021-02-09 LAB
ANION GAP SERPL CALCULATED.3IONS-SCNC: 10 MEQ/L (ref 8–16)
BACTERIA: ABNORMAL /HPF
BASOPHILS # BLD: 0.4 %
BASOPHILS ABSOLUTE: 0 THOU/MM3 (ref 0–0.1)
BILIRUBIN URINE: NEGATIVE
BLOOD, URINE: NEGATIVE
BUN BLDV-MCNC: 18 MG/DL (ref 7–22)
CALCIUM SERPL-MCNC: 8.4 MG/DL (ref 8.5–10.5)
CASTS 2: ABNORMAL /LPF
CASTS UA: ABNORMAL /LPF
CHARACTER, URINE: ABNORMAL
CHLORIDE BLD-SCNC: 102 MEQ/L (ref 98–111)
CO2: 23 MEQ/L (ref 23–33)
COLOR: YELLOW
CREAT SERPL-MCNC: 0.7 MG/DL (ref 0.4–1.2)
CRYSTALS, UA: ABNORMAL
EKG ATRIAL RATE: 68 BPM
EKG P AXIS: -15 DEGREES
EKG P-R INTERVAL: 184 MS
EKG Q-T INTERVAL: 470 MS
EKG QRS DURATION: 152 MS
EKG QTC CALCULATION (BAZETT): 499 MS
EKG R AXIS: -24 DEGREES
EKG T AXIS: 96 DEGREES
EKG VENTRICULAR RATE: 68 BPM
EOSINOPHIL # BLD: 3.1 %
EOSINOPHILS ABSOLUTE: 0.3 THOU/MM3 (ref 0–0.4)
EPITHELIAL CELLS, UA: ABNORMAL /HPF
ERYTHROCYTE [DISTWIDTH] IN BLOOD BY AUTOMATED COUNT: 13.8 % (ref 11.5–14.5)
ERYTHROCYTE [DISTWIDTH] IN BLOOD BY AUTOMATED COUNT: 43.9 FL (ref 35–45)
GFR SERPL CREATININE-BSD FRML MDRD: 81 ML/MIN/1.73M2
GLUCOSE BLD-MCNC: 132 MG/DL (ref 70–108)
GLUCOSE URINE: NEGATIVE MG/DL
HCT VFR BLD CALC: 34.4 % (ref 37–47)
HEMOGLOBIN: 11.8 GM/DL (ref 12–16)
IMMATURE GRANS (ABS): 0.04 THOU/MM3 (ref 0–0.07)
IMMATURE GRANULOCYTES: 0.4 %
KETONES, URINE: NEGATIVE
LEUKOCYTE ESTERASE, URINE: ABNORMAL
LYMPHOCYTES # BLD: 18.4 %
LYMPHOCYTES ABSOLUTE: 2 THOU/MM3 (ref 1–4.8)
MCH RBC QN AUTO: 30.2 PG (ref 26–33)
MCHC RBC AUTO-ENTMCNC: 34.3 GM/DL (ref 32.2–35.5)
MCV RBC AUTO: 88 FL (ref 81–99)
MISCELLANEOUS 2: ABNORMAL
MONOCYTES # BLD: 8.1 %
MONOCYTES ABSOLUTE: 0.9 THOU/MM3 (ref 0.4–1.3)
NITRITE, URINE: NEGATIVE
NUCLEATED RED BLOOD CELLS: 0 /100 WBC
PH UA: 5.5 (ref 5–9)
PLATELET # BLD: 176 THOU/MM3 (ref 130–400)
PMV BLD AUTO: 10.1 FL (ref 9.4–12.4)
POTASSIUM SERPL-SCNC: 3.7 MEQ/L (ref 3.5–5.2)
PROTEIN UA: 100
RBC # BLD: 3.91 MILL/MM3 (ref 4.2–5.4)
RBC URINE: ABNORMAL /HPF
RENAL EPITHELIAL, UA: ABNORMAL
SEG NEUTROPHILS: 69.6 %
SEGMENTED NEUTROPHILS ABSOLUTE COUNT: 7.7 THOU/MM3 (ref 1.8–7.7)
SODIUM BLD-SCNC: 135 MEQ/L (ref 135–145)
SPECIFIC GRAVITY, URINE: 1.02 (ref 1–1.03)
UROBILINOGEN, URINE: 0.2 EU/DL (ref 0–1)
WBC # BLD: 11 THOU/MM3 (ref 4.8–10.8)
WBC UA: ABNORMAL /HPF
YEAST: ABNORMAL

## 2021-02-09 PROCEDURE — 70551 MRI BRAIN STEM W/O DYE: CPT

## 2021-02-09 PROCEDURE — 87186 SC STD MICRODIL/AGAR DIL: CPT

## 2021-02-09 PROCEDURE — 2580000003 HC RX 258: Performed by: HOSPITALIST

## 2021-02-09 PROCEDURE — 99233 SBSQ HOSP IP/OBS HIGH 50: CPT | Performed by: HOSPITALIST

## 2021-02-09 PROCEDURE — 99222 1ST HOSP IP/OBS MODERATE 55: CPT | Performed by: NEUROLOGICAL SURGERY

## 2021-02-09 PROCEDURE — 93010 ELECTROCARDIOGRAM REPORT: CPT | Performed by: INTERNAL MEDICINE

## 2021-02-09 PROCEDURE — 80048 BASIC METABOLIC PNL TOTAL CA: CPT

## 2021-02-09 PROCEDURE — 87086 URINE CULTURE/COLONY COUNT: CPT

## 2021-02-09 PROCEDURE — 6370000000 HC RX 637 (ALT 250 FOR IP): Performed by: HOSPITALIST

## 2021-02-09 PROCEDURE — 85025 COMPLETE CBC W/AUTO DIFF WBC: CPT

## 2021-02-09 PROCEDURE — 99223 1ST HOSP IP/OBS HIGH 75: CPT | Performed by: PSYCHIATRY & NEUROLOGY

## 2021-02-09 PROCEDURE — 36415 COLL VENOUS BLD VENIPUNCTURE: CPT

## 2021-02-09 PROCEDURE — 6360000002 HC RX W HCPCS: Performed by: HOSPITALIST

## 2021-02-09 PROCEDURE — 93005 ELECTROCARDIOGRAM TRACING: CPT | Performed by: HOSPITALIST

## 2021-02-09 PROCEDURE — 81001 URINALYSIS AUTO W/SCOPE: CPT

## 2021-02-09 PROCEDURE — 30901 CONTROL OF NOSEBLEED: CPT | Performed by: NURSE PRACTITIONER

## 2021-02-09 PROCEDURE — 1200000003 HC TELEMETRY R&B

## 2021-02-09 PROCEDURE — 99231 SBSQ HOSP IP/OBS SF/LOW 25: CPT | Performed by: NURSE PRACTITIONER

## 2021-02-09 PROCEDURE — 87077 CULTURE AEROBIC IDENTIFY: CPT

## 2021-02-09 RX ORDER — NYSTATIN 100000 U/G
CREAM TOPICAL PRN
COMMUNITY
End: 2021-02-17

## 2021-02-09 RX ORDER — LORAZEPAM 2 MG/ML
0.5 INJECTION INTRAMUSCULAR ONCE
Status: COMPLETED | OUTPATIENT
Start: 2021-02-09 | End: 2021-02-09

## 2021-02-09 RX ADMIN — SODIUM CHLORIDE, PRESERVATIVE FREE 10 ML: 5 INJECTION INTRAVENOUS at 14:23

## 2021-02-09 RX ADMIN — SODIUM CHLORIDE: 9 INJECTION, SOLUTION INTRAVENOUS at 01:03

## 2021-02-09 RX ADMIN — Medication 250 MG: at 09:32

## 2021-02-09 RX ADMIN — ATORVASTATIN CALCIUM 40 MG: 40 TABLET, FILM COATED ORAL at 20:29

## 2021-02-09 RX ADMIN — PANTOPRAZOLE SODIUM 40 MG: 40 TABLET, DELAYED RELEASE ORAL at 15:57

## 2021-02-09 RX ADMIN — ASPIRIN 81 MG: 81 TABLET, CHEWABLE ORAL at 09:57

## 2021-02-09 RX ADMIN — LORAZEPAM 0.5 MG: 2 INJECTION INTRAMUSCULAR; INTRAVENOUS at 14:21

## 2021-02-09 RX ADMIN — ACETAMINOPHEN 650 MG: 325 TABLET ORAL at 09:57

## 2021-02-09 RX ADMIN — SODIUM CHLORIDE, PRESERVATIVE FREE 10 ML: 5 INJECTION INTRAVENOUS at 20:29

## 2021-02-09 RX ADMIN — PANTOPRAZOLE SODIUM 40 MG: 40 TABLET, DELAYED RELEASE ORAL at 06:02

## 2021-02-09 RX ADMIN — HYDRALAZINE HYDROCHLORIDE 50 MG: 50 TABLET, FILM COATED ORAL at 09:32

## 2021-02-09 RX ADMIN — ATENOLOL 25 MG: 25 TABLET ORAL at 09:32

## 2021-02-09 RX ADMIN — Medication 250 MG: at 20:29

## 2021-02-09 RX ADMIN — Medication 1000 UNITS: at 20:29

## 2021-02-09 RX ADMIN — ENOXAPARIN SODIUM 40 MG: 40 INJECTION SUBCUTANEOUS at 20:31

## 2021-02-09 RX ADMIN — SERTRALINE 100 MG: 100 TABLET, FILM COATED ORAL at 09:32

## 2021-02-09 RX ADMIN — CLOPIDOGREL BISULFATE 75 MG: 75 TABLET ORAL at 09:57

## 2021-02-09 ASSESSMENT — PAIN SCALES - GENERAL
PAINLEVEL_OUTOF10: 0
PAINLEVEL_OUTOF10: 3
PAINLEVEL_OUTOF10: 0

## 2021-02-09 ASSESSMENT — PAIN DESCRIPTION - LOCATION: LOCATION: CHEST

## 2021-02-09 ASSESSMENT — PAIN DESCRIPTION - ORIENTATION: ORIENTATION: MID

## 2021-02-09 ASSESSMENT — PAIN DESCRIPTION - DESCRIPTORS: DESCRIPTORS: STABBING

## 2021-02-09 NOTE — CARE COORDINATION
2/9/21, 7:28 AM EST  DISCHARGE PLANNING EVALUATION:    Talia Youssef       Admitted: 2/8/2021/ [address] [facility] [address] day: 1   Location: [facility]25/025-A Reason for admit: Nausea and vomiting [R11.2]   PMH:  has a past medical history of Arthritis, Chronic low back pain, COVID-19, DM type 2, goal A1c below 7, GERD (gastroesophageal reflux disease), Hyperlipidemia, Hypertension, Loss of vision, Mitral valve disorder, Osteoporosis, Subarachnoid hemorrhage ([facility] TIA (transient ischemic attack), Type 2 diabetes mellitus with diabetic neuropathy, without long-term current use of insulin ([facility] Unspecified cerebral artery occlusion with cerebral infarction, and Urinary incontinence. Procedure: None - pt had TAVR done 2/3.  2/8 CT head - possible acute/subacute infarction - left cerebellum. Barriers to Discharge:  Pt admitted through ED with nausea and fatigue. BP upon arrival 174/69, down to 128/68 this am. Room air, sats 94%. UA sent - many bacteria and moderate leukocytes. Sodium 132, CO2 21, Troponin 0.036. WBC 12.6. IVF. Lovenox, Plavix. Consulting ENT for posterior nose bleed. Consulting Neurology and Neurosurgery for possible stroke. PCP: Theo Hansen DO  Readmission Risk Score: 20%    Patient Goals/Plan/Treatment Preferences: Spoke with pt and . They live at home together. Pt is independent, drives and cares for self. They have canes, shower chair and walkers at home, but do not use at this time. Pt is current with Dr. Amrik Spivey as her PCP. Pt is open to [facility] if needed. Transportation/Food Security/Housekeeping Addressed:  No issues identified.

## 2021-02-09 NOTE — CARE COORDINATION
02/09/21 1036   Readmission Assessment   Number of Days since last admission? 1-7 days   Previous Disposition Home with Family   Who is being Interviewed Patient   What was the patient's/caregiver's perception as to why they think they needed to return back to the hospital? Other (Comment)  (Vomiting, nausea)   Did you visit your Primary Care Physician after you left the hospital, before you returned this time? No   Why weren't you able to visit your PCP? Other (Comment)  (Pt was readmitted prior to appt)   Did you see a specialist, such as Cardiac, Pulmonary, Orthopedic Physician, etc. after you left the hospital? Yes   Who advised the patient to return to the hospital? Physician;Self-referral   Does the patient report anything that got in the way of taking their medications? No  (Had some vomiting)   In our efforts to provide the best possible care to you and others like you, can you think of anything that we could have done to help you after you left the hospital the first time, so that you might not have needed to return so soon?  Other (Comment)  (Nothing.)

## 2021-02-09 NOTE — VIRTUAL HEALTH
potassium chloride (KLOR-CON M) 20 MEQ extended release tablet Take 0.5 tablets by mouth daily 1/15/21  Yes SEAN Fried CNP   Magnesium Oxide 250 MG TABS Take 1 tablet by mouth 2 times daily 1/15/21  Yes SEAN Fried CNP   amLODIPine (NORVASC) 10 MG tablet TAKE 1 TABLET DAILY 11/11/20  Yes Abdi Reed MD   vitamin C (VITAMIN C) 1000 MG tablet Take 1 tablet by mouth daily 9/6/20  Yes ELIZABETH Sparrow   zinc sulfate (ZINCATE) 220 (50 Zn) MG capsule Take 1 capsule by mouth daily 9/6/20  Yes ELIZABETH Dillon   ondansetron (ZOFRAN ODT) 4 MG disintegrating tablet Take 1 tablet by mouth every 8 hours as needed for Nausea or Vomiting 9/4/20  Yes SEAN Nava CNP   metFORMIN (GLUCOPHAGE) 500 MG tablet TAKE 1 TABLET TWICE A DAY WITH MEALS 3/9/20  Yes Naveed Valentin, DO   atorvastatin (LIPITOR) 40 MG tablet TAKE 1 TABLET DAILY 3/5/20  Yes [place] Dense, DO   sertraline (ZOLOFT) 100 MG tablet TAKE 2 TABLETS DAILY 3/5/20  Yes Naveed Valentin, DO   pantoprazole (PROTONIX) 40 MG tablet TAKE 1 TABLET TWICE A DAY 3/5/20  Yes Naveed Valentin, DO   nystatin (MYCOSTATIN) 596505 UNIT/GM cream Apply topically 2 times daily. Patient taking differently: Apply topically as needed Apply topically 2 times daily. 1/14/20  Yes [place] Dense, DO   aspirin 81 MG tablet Take 81 mg by mouth daily   Yes Historical Provider, MD   promethazine (PHENERGAN) 25 MG tablet Take 1 tablet by mouth every 6 hours as needed for Nausea 4/29/19  Yes Gibson Bhardwaj PA-C   vitamin D (CHOLECALCIFEROL) 1000 UNIT TABS tablet Take 1,000 Units by mouth nightly.    Yes Historical Provider, MD    Scheduled Meds:   [Held by provider] amLODIPine  10 mg Oral Daily    aspirin  81 mg Oral Daily    atenolol  25 mg Oral Daily    atorvastatin  40 mg Oral Nightly    clopidogrel  75 mg Oral Daily    hydrALAZINE  50 mg Oral 2 times per day    Magnesium Oxide  250 mg Oral BID    pantoprazole  40 mg Oral BID AC  sertraline  100 mg Oral Daily    Vitamin D  1,000 Units Oral Nightly    sodium chloride flush  10 mL Intravenous 2 times per day    enoxaparin  40 mg Subcutaneous Daily     Continuous Infusions:   sodium chloride 100 mL/hr at 02/08/21 1327     PRN Meds:.sodium chloride flush, promethazine **OR** ondansetron, polyethylene glycol, acetaminophen **OR** acetaminophen, lactulose, hydrALAZINE  Past Medical History   has a past medical history of Arthritis, Chronic low back pain, COVID-19, DM type 2, goal A1c below 7, GERD (gastroesophageal reflux disease), Hyperlipidemia, Hypertension, Loss of vision, Mitral valve disorder, Osteoporosis, Subarachnoid hemorrhage (HCC), TIA (transient ischemic attack), Type 2 diabetes mellitus with diabetic neuropathy, without long-term [facility] Unspecified cerebral artery occlusion with cerebral infarction, and Urinary incontinence.   Social History  Social History     Socioeconomic History    Marital status:      Spouse name: Rich Gómez Number of children: 3    Years of education: Not on file    Highest education level: Not on file   Occupational History    Not on file   Social Needs    Financial resource strain: Not on file    Food insecurity     Worry: Not on file     Inability: Not on file   [employer] needs     Medical: Not on file     Non-medical: Not on file   Tobacco Use    Smoking status: Never Smoker    Smokeless tobacco: Never Used   Substance and Sexual Activity    Alcohol use: No    Drug use: No    Sexual activity: Not on file   Lifestyle    Physical activity     Days per week: Not on file     Minutes per session: Not on file    Stress: Not on file   Relationships    Social connections     Talks on phone: Not on file     Gets together: Not on file     Attends Denominational service: Not on file     Active member of club or organization: Not on file     Attends meetings of clubs or organizations: Not on file Relationship status: Not on file    Intimate partner violence     Fear of current or ex partner: Not on file     Emotionally abused: Not on file     Physically abused: Not on file     Forced sexual activity: Not on file   Other Topics Concern    Not on file   Social History Narrative    Not on file     Family History      Problem Relation Age of Onset    Arthritis Mother     Cancer Mother     Heart Disease Father     High Blood Pressure Father        OBJECTIVE  BP (!) 148/78 Comment: manual  Pulse 67   Temp 98.3 °F (36.8 °C) (Oral)   Resp 18   Ht 5' 4\" (1.626 m)   Wt 248 lb 4.8 oz (112.6 kg)   SpO2 95%   BMI 42.62 kg/m²   NIH Stroke Scale Total:  1a.  Level of consciousness:  0  1b. Level of consciousness questions:  0   1c. Level of consciousness questions:  0   2. Best Gaze:  0   3. Visual:  0   4. Facial Palsy:  0   5a. Motor left arm:  0  5b. Motor right arm:  0  6a. Motor left le  6b. Motor right le  7. Limb Ataxia:  0 - absent  8. Sensory:  0 - normal; no sensory loss  9. Best Language:  0  10. Dysarthria:  0  11. Extinction and Inattention: 0     Total: 0    Imaging:  Images were personally reviewed with PACS used to review images including:  CT brain without contrast: No acute bleeding. She has left cerebellar ischemic stroke/subacute on PICA distribution  MRA imaging: No large vessel occlusion. Assessment    Differential DDx:  1. Acute/subacute left cerebellar ischemic stroke  2. Left PICA ischemic stroke. 3. Cerebellar edema. Recommendations:  1. NIH 0  2. Recommend Inpatient Neurology Consult for further assessment and evaluation   3. Monitor neuro exam with frequent exams. 4. IV hydration. 5. Neurosurgery consult  6. Consider moving the patient to ICU for close monitoring. 7. Neurology consult. Discussed with Stroke team/nurse practitioner. At least 30 min of Telemedicine and time in conversation directly with ED staff and physician for the patient who is in imminent and life threatening deterioration without further treatment and evaluation. This Virtual Visit was conducted with patient's (and/or legal guardian's) consent, to provide telestroke consultation and necessary medical care.   Time spent examining patient, reviewing the images personally, reviewing the chart, perform high complexity decision making and speaking with the nursing staff regarding recommendations    Evelina Navas MD   Stroke, Neurocritical Care And/or [address] [place] [address] Stroke Network  [address] [place]  Electronically signed 2/8/2021 at 10:57 PM

## 2021-02-09 NOTE — FLOWSHEET NOTE
Mateo Chirinos was resting in her bed. She states she hasn't slept in several nights and is not up to updating her AD tonight. Requests someone check in with her tomorrow as her  will be here nad can do his, too. They have existing ADs from [place]. ACP spec suggested they update them as they have lived in [place] for several years. Of note, patient's daughter  about 8 years ago and a son  last year of pancreatic cancer.

## 2021-02-09 NOTE — CONSULTS
Department of Otolaryngology  Consult Note    Reason for Consult:  Nosebleed  Requesting Physician:  Hospitalist    CHIEF COMPLAINT:  Nosebleed    History Obtained From:  patient, electronic medical record    HISTORY OF PRESENT ILLNESS:                The patient is a 68 y.o. female who was admitted to [address] [place] [address] yesterday for nausea, vomiting and fatigue. She is status post TAVR on 2/3/2021 with Dr. Markus Mccray. She is currently on aspirin 81 mg, Plavix and Lovenox post procedure. She denies issues with nosebleeds in the past.  She experienced left sided nosebleed yesterday that flowed anteriorly first, then down the back of her throat. She was able to apply pressure and get the nosebleed to stop within 10 minutes or so. She denies any nasal trauma. She denies any other nasal symptoms. No bleeding since that time.     Past Medical History:        Diagnosis Date    Arthritis     Chronic low back pain     COVID-19     DM type 2, goal A1c below 7     GERD (gastroesophageal reflux disease)     Hyperlipidemia     Hypertension     Loss of vision     Mitral valve disorder     Osteoporosis     Subarachnoid hemorrhage (HCC) 4/25/2019    TIA (transient ischemic attack)     Type 2 diabetes mellitus with diabetic neuropathy, without long-term current use of insulin ([facility] [address] 7/26/2017   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    Urinary incontinence        Past Surgical History:        Procedure Laterality Date    APPENDECTOMY      CARDIAC SURGERY      TAVR    CARPAL TUNNEL RELEASE Left 2011    CATARACT REMOVAL Bilateral     CERVICAL FUSION  1976    CHOLECYSTECTOMY      COLON SURGERY  2012    COLONOSCOPY  2015    ENDOSCOPY, COLON, DIAGNOSTIC      EYE SURGERY      CATARACTS    FINGER SURGERY Right 10/2016    3rd digit    HERNIA REPAIR      HYSTERECTOMY      JOINT REPLACEMENT      both knees    NERVE SURGERY Right 10/25/2019 Lumbar Facet MBB @ W56-F8,H3-7 L2-3 right side only #1 performed by [facility] Right 12/16/2019    Lumbar Facet MBB @ B51-K5-I9-3, L2-3 RIGHT SIDE ONLY performed by Charlene Vigil MD at [facility] 113 Right 1/6/2020    Lumbar RFA T12-L1, L1-2, L2-3 Right [facility] LAMINECTOMY,>2 [facility]      6 FUSIONS    ROTATOR CUFF REPAIR Right 11/28/2014    SHOULDER SURGERY  2014    SPINE SURGERY      TENDON RELEASE Right 05/18/2017    at [address] [place] [address] Bilateral     UPPER GASTROINTESTINAL ENDOSCOPY  2013,2015       Current Medications:   Current Facility-Administered Medications: mupirocin (BACTROBAN) 2 % nasal ointment, , Each Nostril, BID  0.9 % sodium chloride infusion, , Intravenous, Continuous  [Held by provider] amLODIPine (NORVASC) tablet 10 mg, 10 mg, Oral, Daily  aspirin chewable tablet 81 mg, 81 mg, Oral, Daily  atenolol (TENORMIN) tablet 25 mg, 25 mg, Oral, Daily  atorvastatin (LIPITOR) tablet 40 mg, 40 mg, Oral, Nightly  clopidogrel (PLAVIX) tablet 75 mg, 75 mg, Oral, Daily  hydrALAZINE (APRESOLINE) tablet 50 mg, 50 mg, Oral, 2 times per day  Magnesium Oxide (MAGNESIUM-OXIDE) 250 mg, 250 mg, Oral, BID  pantoprazole (PROTONIX) tablet 40 mg, 40 mg, Oral, BID AC  sertraline (ZOLOFT) tablet 100 mg, 100 mg, Oral, Daily  vitamin D (CHOLECALCIFEROL) tablet 1,000 Units, 1,000 Units, Oral, Nightly  sodium chloride flush 0.9 % injection 10 mL, 10 mL, Intravenous, 2 times per day  sodium chloride flush 0.9 % injection 10 mL, 10 mL, Intravenous, PRN  enoxaparin (LOVENOX) injection 40 mg, 40 mg, Subcutaneous, Daily  promethazine (PHENERGAN) tablet 12.5 mg, 12.5 mg, Oral, Q6H PRN **OR** ondansetron (ZOFRAN) injection 4 mg, 4 mg, Intravenous, Q6H PRN  polyethylene glycol (GLYCOLAX) packet 17 g, 17 g, Oral, Daily PRN acetaminophen (TYLENOL) tablet 650 mg, 650 mg, Oral, Q6H PRN **OR** acetaminophen (TYLENOL) suppository 650 mg, 650 mg, Rectal, Q6H PRN  lactulose (CHRONULAC) 10 GM/15ML solution 20 g, 20 g, Oral, TID PRN  hydrALAZINE (APRESOLINE) injection 5 mg, 5 mg, Intravenous, Q6H PRN    Allergies:  Review of patient's allergies indicates no known allergies. Social History:    TOBACCO:   reports that she has never smoked. She has never used smokeless tobacco.  ETOH:   reports no history of alcohol use. DRUGS:   reports no history of drug use. Family History:       Problem Relation Age of Onset    Arthritis Mother     Cancer Mother     Heart Disease Father     High Blood Pressure Father        REVIEW OF SYSTEMS:    A complete multi-organ review of systems was performed using a new patient questionnaire, and reviewed by me. ENT:  negative except as noted in HPI  CONSTITUTIONAL:  negative except as noted in HPI  EYES:  negative except as noted in HPI  RESPIRATORY:  negative except as noted in HPI  CARDIOVASCULAR:  negative except as noted in HPI  GASTROINTESTINAL:  negative except as noted in HPI  GENITOURINARY:  negative except as noted in HPI  MUSCULOSKELETAL:  negative except as noted in HPI  SKIN:  negative except as noted in HPI  ENDOCRINE/METABOLIC: negative except as noted in HPI  HEMATOLOGIC/LYMPHATIC:  negative except as noted in HPI  ALLERGY/IMMUN: negative except as noted in HPI  NEUROLOGICAL:  negative except as noted in HPI  BEHAVIOR/PSYCH:  negative except as noted in HPI    PHYSICAL EXAM:    VITALS:  /66   Pulse 62   Temp 96.6 °F (35.9 °C) (Oral)   Resp 18   Ht 5' 4\" (1.626 m)   Wt 248 lb 4.8 oz (112.6 kg)   SpO2 96%   BMI 42.62 kg/m²   Very pleasant 47-year-old female lying in bed; alert and oriented. No active nasal bleeding at this time. External nose is normal.  Right nasal mucosa is clear. Bleeding point left anterior septum. After discussion with patient, cotton pledget with oxymetazoline and lidocaine placed over bleeding point then removed after appropriate amount of time. Silver nitrate stick was used to cauterize bleeding point. Moist cotton tip applicator used to remove excess silver nitrate. Patient tolerated very well. No active bleeding. DATA:    CBC with Differential:    Lab Results   Component Value Date    WBC 11.0 02/09/2021    RBC 3.91 02/09/2021    RBC 0-5 04/07/2015    HGB 11.8 02/09/2021    HCT 34.4 02/09/2021     02/09/2021    MCV 88.0 02/09/2021    MCH 30.2 02/09/2021    MCHC 34.3 02/09/2021    RDW 13.4 01/12/2017    NRBC 0 02/09/2021    SEGSPCT 69.6 02/09/2021    MONOPCT 8.1 02/09/2021    MONOSABS 0.9 02/09/2021    LYMPHSABS 2.0 02/09/2021    EOSABS 0.3 02/09/2021    BASOSABS 0.0 02/09/2021       IMPRESSION/RECOMMENDATIONS:      Acute anterior epistaxis    Long-term antiplatelet therapy with recent TAVR procedure on 2/3/2021  - Currently on aspirin 81 mg, Plavix 75 mg and Lovenox 40 mg daily.  - Ultimately she is at risk for recurrent epistaxis, potentially life-threatening. At this time she had small bleeding point on left anterior septum that was cauterized effectively and I do not anticipate recurrence of bleeding to the level of concern that would consider stopping any of her antiplatelet therapies. Please call ENT service with any bleeding recurrence.     Electronically signed by SEAN Tierney CNP on 2/9/2021 at 3:06 PM

## 2021-02-09 NOTE — PROGRESS NOTES
Messaged [facility]	[place] PA pt is suppose to go for a MRA but is claustaphobic can I get something to help? but they are ruling out a stroke, along with the pt having a posterior nose bleed that was not stopping so the evening dose of Lovenox was held. See MAR for orders. 2250::Also had the tele-stroke robot to come look at the patient and received orders to hourly check on the patient and do neuro checks, a lot with consults for neurosurgery and neurology, and wanted her to go to ICU for closer monitoring, but there were not ICU beds open at this time. Pt. Was put on the rapid watch list to have more eyes on her.

## 2021-02-09 NOTE — PROGRESS NOTES
Patient is alert and oriented x4. Pupils are both round, 3mm, and reactive to light. Arm drift is negative. Skin turgor and capillary refill are both less than 3 seconds. Upper extremities are warm, dry, and appropriate for ethnicity. Upper extremities are both full sensation hand  strong and equal bilaterally. S1 and S2 audible, respiration depths are normal and symmetrical bilaterally. Bowel sounds are active and audible in all four quadrants. Lower extremities are warm, dry, and appropriate color for ethnicity. Pedal push and pull are equal and strong bilaterally. Lower extremities have +1 pitting edema. Capillary refill of lower extremities is less than 3 seconds.

## 2021-02-09 NOTE — PROGRESS NOTES
Hospitalist Progress Note      Patient:  Jacquelin Moralez    Unit/Bed:3B-25/025-A  YOB: 1940  MRN: [id number]   Acct: [de-identified]   PCP: Lin Lew,   Date of Admission: 2/8/2021    Assessment/Plan:    1. Nausea/voming: NYD etiology; likely 2/2 severe constipation/Byrnedale's picture vs pSBO/ileus vs others (UTI/PNA, increased ICH, etc). CT A/P yesterday; however, no report of bowels; will liaise w/ radiology, also KUB ordered along w/ CT head w/o to r/o acute intracerebral processes given recent TAVR . CXR,lipase/ biochemistry non-contributory. Laxatives (including fleet enema) ordered. Placed on IVF. NPO for now. Will start on CL if no obstruction. If N/V worsening will consider NGT  2/9: much improved; + large BM; tolerating CL diet; will advance gradually as tolerated; no further abdo imaging warranted at this time. Of note: CT head w/o showed a Wedge-shaped low-attenuation in left cerebellum c/w acute/subacute infarction; no hemorrhage. MRA brain/neck non-contributory; noted MR brain w/o missed; ordered this a.m. will follow up. Noted neurology and neuroSx consulted overnight?? Will follow up on results. PT/OT to see. Permissive HTN for now    2. Query stroke: possible explanation for possible increased ICH and ensuing ongoing N/V? managing as dicussed above. Pt already on DAPT/statin  3. S/p TAVR   4. HypoNa: likely 2/2 diuretics/dehydration; IVF, held diuretics will reassess response  2/9: resolvd. . As advancing diet; will reduce rate and later d/c IVF and trend serum Na  5. Hx of DM: well-controlled on OHA w/ recent A1C 6.8%. held metformin; SSI. diabetic diet, SSI, accuchecks, and hypoglycemia orders in place, A1C 6.8%  2/9: BS fairly well controlled.  [facility] 6. Hx of HTN: home meds held later yesterday after suspicious stroke; BP remains well-controlled. CCM. Prn hydralazine in place for SBP greater than 180 and DBP greater than 110 mmHg  7. Hx of chronic HFpEF: well-compensated; held diuretics for now  8. Hx of non-obstructive-CAD/stroke; on guideline-directed treatment; trop mildly elevate, denies CP, EKG negative for new/dynamic ischemic changes, recent LHC unremarkable; will monitor  9. Hx of traumatic SAH  10. Hx of GERD: resumed home BID PPI  11. Hx of urinary incontinence  12. Code status: DNR-CCA as confirmed by pt and family along w/ advanced directive  15. PT/OT to see       Chief Complaint: N/V  Initial H and P:-    Admitted for evaluation and management of ongoing N/V. Subjective (past 24 hours):   Pt feels much better; N/V much improved. + large BM yesterday. Denies abdo pain/fever/chills/vertigo/visual loss      Past medical history, family history, social history and allergies reviewed again and is unchanged since admission. ROS (12 point review of systems completed. Pertinent positives noted.  Otherwise ROS is negative)     Medications:  Reviewed    Infusion Medications    sodium chloride 100 mL/hr at 02/09/21 0103     Scheduled Medications    [Held by provider] amLODIPine  10 mg Oral Daily    aspirin  81 mg Oral Daily    atenolol  25 mg Oral Daily    atorvastatin  40 mg Oral Nightly    clopidogrel  75 mg Oral Daily    hydrALAZINE  50 mg Oral 2 times per day    Magnesium Oxide  250 mg Oral BID    pantoprazole  40 mg Oral BID AC    sertraline  100 mg Oral Daily    Vitamin D  1,000 Units Oral Nightly    sodium chloride flush  10 mL Intravenous 2 times per day    enoxaparin  40 mg Subcutaneous Daily     PRN Meds: sodium chloride flush, promethazine **OR** ondansetron, polyethylene glycol, acetaminophen **OR** acetaminophen, lactulose, hydrALAZINE      Intake/Output Summary (Last 24 hours) at 2/9/2021 5084 Last data filed at 2/9/2021 0322  Gross per 24 hour   Intake 1440.97 ml   Output 850 ml   Net 590.97 ml       Diet:  DIET GENERAL; No Added Salt (3-4 GM)    Exam:  /70   Pulse 68   Temp 97.4 °F (36.3 °C) (Oral)   Resp 16   Ht 5' 4\" (1.626 m)   Wt 248 lb 4.8 oz (112.6 kg)   SpO2 97%   BMI 42.62 kg/m²   General appearance: No apparent distress, appears stated age and cooperative. HEENT: Pupils equal, round, and reactive to light. Conjunctivae/corneas clear. Neck: Supple, with full range of motion. No jugular venous distention. Trachea midline. Respiratory:  Normal respiratory effort. Clear to auscultation, bilaterally without Rales/Wheezes/Rhonchi. Cardiovascular: Regular rate and rhythm with normal S1/S2 without murmurs, rubs or gallops. Abdomen: Soft, non-tender, non-distended with normal bowel sounds. Musculoskeletal: passive and active ROM x 4 extremities. Skin: Skin color, texture, turgor normal.  No rashes or lesions. Neurologic:  Neurovascularly intact without any focal sensory/motor deficits. Cranial nerves: II-XII intact, grossly non-focal.  Psychiatric: Alert and oriented, thought content appropriate, normal insight  Capillary Refill: Brisk,< 3 seconds   Peripheral Pulses: +2 palpable, equal bilaterally      Labs:   Recent Labs     02/07/21 1956 02/08/21 1138 02/09/21  0419   WBC 10.6 12.6* 11.0*   HGB 13.9 14.5 11.8*   HCT 40.5 41.9 34.4*    206 176     Recent Labs     02/07/21 1957 02/08/21  1138 02/09/21  0419   * 132* 135   K 4.1 4.5 3.7   CL 96* 96* 102   CO2 24 21* 23   BUN 17 19 18   CREATININE 0.6 0.6 0.7   CALCIUM 9.4 9.6 8.4*     Recent Labs     02/07/21 1957 02/08/21  1138   AST 25 38   ALT 19 21   BILIDIR  --  <0.2   BILITOT 0.9 0.9   ALKPHOS 65 69     No results for input(s): INR in the last 72 hours. No results for input(s): Susan Chris in the last 72 hours.     Microbiology:    Blood culture #1: No results found for: [facility] Blood culture #2:No results found for: BLOODCULT2    Organism:  Lab Results   Component Value Date    ORG Escherichia coli 04/29/2019       No results found for: LABGRAM    MRSA culture only:No results found for: [facility]    Urine culture:   Lab Results   Component Value Date    LABURIN [place] count: >100,000 CFU/mL 05/18/2017    LABURIN 94.5 mg 06/13/2016       Respiratory culture: No results found for: CULTRESP    Aerobic and Anaerobic :  No results found for: LABAERO  No results found for: LABANAE    Urinalysis:      Lab Results   Component Value Date    NITRU NEGATIVE 02/09/2021    WBCUA 5-9 02/09/2021    BACTERIA MANY 02/09/2021    RBCUA NONE SEEN 02/09/2021    BLOODU NEGATIVE 02/09/2021    SPECGRAV 1.019 02/07/2021    GLUCOSEU NEGATIVE 02/09/2021       Radiology:    Xr Chest (2 Vw)    Result Date: 2/8/2021  PROCEDURE: XR CHEST (2 VW) CLINICAL INFORMATION: Nausea vomiting elevated troponin status post TAVR. COMPARISON: Multiple overlying wires. February 7, 2021. TECHNIQUE: PA and lateral views the chest. FINDINGS: Aortic stent graft. No lobar consolidation. Costophrenic recesses are preserved. No cardiomegaly. Degenerative changes thoracic spine. IMPRESSION: No lobar consolidation. **This report has been created using voice recognition software. It may contain minor errors which are inherent in voice recognition technology. ** Final report electronically signed by Dr. Sherrell Casarez on 2/8/2021 12:32 PM    Xr Abdomen (kub) (single Ap View)    Result Date: 2/8/2021  PROCEDURE: XR ABDOMEN (KUB) (SINGLE AP VIEW) CLINICAL INFORMATION: N/V. COMPARISON: April 29, 2019 TECHNIQUE: A supine AP view of the abdomen was obtained. FINDINGS: Postsurgical changes lower lumbar spine with fusion L2-L4. Mild lumbar scoliosis. Surgical clips in the right upper quadrant. Stool throughout the colon correlate for constipation. No acute osseous findings. IMPRESSION: Stool throughout the colon. Correlate for constipation. Otherwise, nonspecific bowel gas pattern. **This report has been created using voice recognition software. It may contain minor errors which are inherent in voice recognition technology. ** Final report electronically signed by Dr. Lynda Al on 2/8/2021 2:03 PM    Ct Head Wo Contrast    Addendum Date: 2/8/2021        ** ADDENDUM #1   **   Findings reported to as Tay Height CNP 8:04 PM on 02/08/2021. MRI is advised if clinically warrented. Final report electronically signed by Dr. Lynda Al on 2/8/2021 8:07 PM     ** ORIGINAL REPORT     ** PROCEDURE: CT HEAD WO CONTRAST CLINICAL INFORMATION: r/o acute. COMPARISON: April 29, 2019 TECHNIQUE: Noncontrast 5 mm axial images were obtained through the brain. All CT scans at this facility use dose modulation, iterative reconstruction, and/or weight-based dosing when appropriate to reduce radiation dose to as low as reasonably achievable. FINDINGS: Mild generalized volume loss. Small vessel disease. Wedge-shaped low-attenuation the left cerebellum correlate for acute subacute infarction. No acute hemorrhage or midline shift. Ventricles are within normal limits for age. Paranasal sinuses are clear. Mastoid air cells are patent. Skull: Unremarkable. Soft tissues: Unremarkable.      Result Date: 2/8/2021 PROCEDURE: CT HEAD WO CONTRAST CLINICAL INFORMATION: r/o acute. COMPARISON: April 29, 2019 TECHNIQUE: Noncontrast 5 mm axial images were obtained through the brain. All CT scans at this facility use dose modulation, iterative reconstruction, and/or weight-based dosing when appropriate to reduce radiation dose to as low as reasonably achievable. FINDINGS: Mild generalized volume loss. Small vessel disease. Wedge-shaped low-attenuation the left cerebellum correlate for acute subacute infarction. No acute hemorrhage or midline shift. Ventricles are within normal limits for age. Paranasal sinuses are clear. Mastoid air cells are patent. Skull: Unremarkable. Soft tissues: Unremarkable. Wedge-shaped low-attenuation the left cerebellum correlate for acute/subacute infarction. No acute hemorrhage. **This report has been created using voice recognition software. It may contain minor errors which are inherent in voice recognition technology. ** Final report electronically signed by Dr. Jalyn Wray on 2/8/2021 6:20 PM    Mra Head Wo Contrast    Result Date: 2/8/2021  MR angiogram head/[place] without contrast Comparison: None Findings: Normal configuration noted in the [place] vessels. No filling defect, vessel truncation or significant stenosis. No focal aneurysm or central vascular abnormality. No peripheral vascular malformations demonstrated. Impression: No significant abnormalities. This document has been electronically signed by: Carmen Garza MD on 02/08/2021 09:54 PM     Mra Neck Wo Contrast    Result Date: 2/8/2021  MR angiogram of the neck/carotid arteries without contrast Comparison: None Findings: There is motion artifact adversely affecting multiple series. Aortic arch not included on the study. There is no evidence for carotid bifurcation stenosis. Bilateral vertebral arteries patent, left vertebral dominant. Impression: Somewhat limited study by motion artifact. No significant stenosis. This document has been electronically signed by: Fran Ortega MD on 02/08/2021 09:56 PM Carotid stenosis and measurements are in accordance with the NASCET criteria. With RN in room, patient was updated about and agreed upon the treatment plan, all the questions and concerns were addressed.     Electronically signed by Rachel Arvizu MD on 2/9/2021 at 8:46 AM

## 2021-02-09 NOTE — FLOWSHEET NOTE
Advanced Directives Consult: I made two trips trying to get it completed for her but she said that her  took the copy home and will be here this afternoon. Her nurse will call the  when her  gets here. She was dealing with nausea and vomiting and wanted prayer to [facility]     02/09/21 1138   Encounter Summary   Services provided to: Patient   Referral/Consult From: Rounding   Support System Spouse   Continue Visiting Yes  (2/9)   Complexity of Encounter Moderate   Length of Encounter 30 minutes   Advance Care Planning Yes   Spiritual/Orthodox   Type Spiritual support   Assessment Approachable;Calm   Intervention Prayer;Nurtured hope; Active listening;Empowerment;Sustaining presence/ Ministry of presence   Outcome Connection/belonging;Expressed gratitude;Encouraged; Hopeful   pe and heal. I prayed asking God to heal her.

## 2021-02-09 NOTE — PROGRESS NOTES
Hourly neuro checks done as ordered by Dr. Bryan Becerra. On the hourly checks there were not changes from baseline. Repeat NIH was  Done this morning still remaining at 0.

## 2021-02-10 LAB
ANION GAP SERPL CALCULATED.3IONS-SCNC: 9 MEQ/L (ref 8–16)
BASOPHILS # BLD: 0.3 %
BASOPHILS ABSOLUTE: 0 THOU/MM3 (ref 0–0.1)
BUN BLDV-MCNC: 20 MG/DL (ref 7–22)
CALCIUM SERPL-MCNC: 8.6 MG/DL (ref 8.5–10.5)
CHLORIDE BLD-SCNC: 106 MEQ/L (ref 98–111)
CO2: 22 MEQ/L (ref 23–33)
CREAT SERPL-MCNC: 0.8 MG/DL (ref 0.4–1.2)
EOSINOPHIL # BLD: 6.8 %
EOSINOPHILS ABSOLUTE: 0.6 THOU/MM3 (ref 0–0.4)
ERYTHROCYTE [DISTWIDTH] IN BLOOD BY AUTOMATED COUNT: 13.8 % (ref 11.5–14.5)
ERYTHROCYTE [DISTWIDTH] IN BLOOD BY AUTOMATED COUNT: 45.6 FL (ref 35–45)
GFR SERPL CREATININE-BSD FRML MDRD: 69 ML/MIN/1.73M2
GLUCOSE BLD-MCNC: 109 MG/DL (ref 70–108)
HCT VFR BLD CALC: 35.4 % (ref 37–47)
HEMOGLOBIN: 11.7 GM/DL (ref 12–16)
IMMATURE GRANS (ABS): 0.04 THOU/MM3 (ref 0–0.07)
IMMATURE GRANULOCYTES: 0.5 %
LYMPHOCYTES # BLD: 24.4 %
LYMPHOCYTES ABSOLUTE: 2.1 THOU/MM3 (ref 1–4.8)
MCH RBC QN AUTO: 30.2 PG (ref 26–33)
MCHC RBC AUTO-ENTMCNC: 33.1 GM/DL (ref 32.2–35.5)
MCV RBC AUTO: 91.2 FL (ref 81–99)
MONOCYTES # BLD: 8 %
MONOCYTES ABSOLUTE: 0.7 THOU/MM3 (ref 0.4–1.3)
NUCLEATED RED BLOOD CELLS: 0 /100 WBC
PLATELET # BLD: 164 THOU/MM3 (ref 130–400)
PMV BLD AUTO: 10.3 FL (ref 9.4–12.4)
POTASSIUM SERPL-SCNC: 4.1 MEQ/L (ref 3.5–5.2)
RBC # BLD: 3.88 MILL/MM3 (ref 4.2–5.4)
SEG NEUTROPHILS: 60 %
SEGMENTED NEUTROPHILS ABSOLUTE COUNT: 5.2 THOU/MM3 (ref 1.8–7.7)
SODIUM BLD-SCNC: 137 MEQ/L (ref 135–145)
WBC # BLD: 8.7 THOU/MM3 (ref 4.8–10.8)

## 2021-02-10 PROCEDURE — 36415 COLL VENOUS BLD VENIPUNCTURE: CPT

## 2021-02-10 PROCEDURE — 97110 THERAPEUTIC EXERCISES: CPT

## 2021-02-10 PROCEDURE — 6370000000 HC RX 637 (ALT 250 FOR IP): Performed by: HOSPITALIST

## 2021-02-10 PROCEDURE — 99231 SBSQ HOSP IP/OBS SF/LOW 25: CPT | Performed by: FAMILY MEDICINE

## 2021-02-10 PROCEDURE — 80048 BASIC METABOLIC PNL TOTAL CA: CPT

## 2021-02-10 PROCEDURE — 1200000003 HC TELEMETRY R&B

## 2021-02-10 PROCEDURE — 6370000000 HC RX 637 (ALT 250 FOR IP): Performed by: NURSE PRACTITIONER

## 2021-02-10 PROCEDURE — 6360000002 HC RX W HCPCS: Performed by: HOSPITALIST

## 2021-02-10 PROCEDURE — 97166 OT EVAL MOD COMPLEX 45 MIN: CPT

## 2021-02-10 PROCEDURE — 85025 COMPLETE CBC W/AUTO DIFF WBC: CPT

## 2021-02-10 PROCEDURE — 2580000003 HC RX 258: Performed by: HOSPITALIST

## 2021-02-10 RX ADMIN — PANTOPRAZOLE SODIUM 40 MG: 40 TABLET, DELAYED RELEASE ORAL at 08:11

## 2021-02-10 RX ADMIN — ASPIRIN 81 MG: 81 TABLET, CHEWABLE ORAL at 11:26

## 2021-02-10 RX ADMIN — ATENOLOL 25 MG: 25 TABLET ORAL at 11:23

## 2021-02-10 RX ADMIN — Medication 250 MG: at 11:26

## 2021-02-10 RX ADMIN — PROMETHAZINE HYDROCHLORIDE 12.5 MG: 25 TABLET ORAL at 22:21

## 2021-02-10 RX ADMIN — PANTOPRAZOLE SODIUM 40 MG: 40 TABLET, DELAYED RELEASE ORAL at 17:36

## 2021-02-10 RX ADMIN — MUPIROCIN: 20 OINTMENT TOPICAL at 11:29

## 2021-02-10 RX ADMIN — ATORVASTATIN CALCIUM 40 MG: 40 TABLET, FILM COATED ORAL at 22:28

## 2021-02-10 RX ADMIN — Medication 1000 UNITS: at 21:09

## 2021-02-10 RX ADMIN — ACETAMINOPHEN 650 MG: 325 TABLET ORAL at 21:37

## 2021-02-10 RX ADMIN — SODIUM CHLORIDE, PRESERVATIVE FREE 10 ML: 5 INJECTION INTRAVENOUS at 11:31

## 2021-02-10 RX ADMIN — ONDANSETRON 4 MG: 2 INJECTION INTRAMUSCULAR; INTRAVENOUS at 11:29

## 2021-02-10 RX ADMIN — SODIUM CHLORIDE, PRESERVATIVE FREE 10 ML: 5 INJECTION INTRAVENOUS at 21:10

## 2021-02-10 RX ADMIN — ACETAMINOPHEN 650 MG: 325 TABLET ORAL at 11:30

## 2021-02-10 RX ADMIN — Medication 250 MG: at 21:09

## 2021-02-10 RX ADMIN — CLOPIDOGREL BISULFATE 75 MG: 75 TABLET ORAL at 11:27

## 2021-02-10 RX ADMIN — ENOXAPARIN SODIUM 40 MG: 40 INJECTION SUBCUTANEOUS at 22:22

## 2021-02-10 RX ADMIN — HYDRALAZINE HYDROCHLORIDE 50 MG: 50 TABLET, FILM COATED ORAL at 21:09

## 2021-02-10 RX ADMIN — HYDRALAZINE HYDROCHLORIDE 50 MG: 50 TABLET, FILM COATED ORAL at 11:25

## 2021-02-10 RX ADMIN — SERTRALINE 100 MG: 100 TABLET, FILM COATED ORAL at 11:28

## 2021-02-10 ASSESSMENT — PAIN DESCRIPTION - LOCATION
LOCATION: HEAD
LOCATION: NECK
LOCATION: HEAD

## 2021-02-10 ASSESSMENT — PAIN DESCRIPTION - PROGRESSION
CLINICAL_PROGRESSION: NOT CHANGED

## 2021-02-10 ASSESSMENT — PAIN DESCRIPTION - DESCRIPTORS
DESCRIPTORS: CONSTANT

## 2021-02-10 ASSESSMENT — PAIN SCALES - GENERAL
PAINLEVEL_OUTOF10: 2
PAINLEVEL_OUTOF10: 3

## 2021-02-10 ASSESSMENT — ENCOUNTER SYMPTOMS
CHEST TIGHTNESS: 0
COLOR CHANGE: 0
BACK PAIN: 0
ABDOMINAL PAIN: 0
TROUBLE SWALLOWING: 0

## 2021-02-10 ASSESSMENT — PAIN DESCRIPTION - ONSET
ONSET: ON-GOING
ONSET: ON-GOING
ONSET: GRADUAL
ONSET: ON-GOING

## 2021-02-10 ASSESSMENT — PAIN - FUNCTIONAL ASSESSMENT: PAIN_FUNCTIONAL_ASSESSMENT: ACTIVITIES ARE NOT PREVENTED

## 2021-02-10 ASSESSMENT — PAIN DESCRIPTION - FREQUENCY
FREQUENCY: CONTINUOUS
FREQUENCY: CONTINUOUS

## 2021-02-10 ASSESSMENT — PAIN DESCRIPTION - ORIENTATION
ORIENTATION: POSTERIOR
ORIENTATION: POSTERIOR

## 2021-02-10 NOTE — PLAN OF CARE
Problem: Skin Integrity:  Goal: Will show no infection signs and symptoms  Description: Will show no infection signs and symptoms  Outcome: Met This Shift  Goal: Absence of new skin breakdown  Description: Absence of new skin breakdown  Outcome: Met This Shift  Note: No s/s of skin breakdown. Surgery puncture sites are healing. Greenish/yellow bruising are seen. Problem: HEMODYNAMIC STATUS  Goal: Patient has stable vital signs and fluid balance  Outcome: Met This Shift  Note: Hardy Montesinos is eating and taking in oral fluids. Appetite good. Out put is adequate. Problem: ACTIVITY INTOLERANCE/IMPAIRED MOBILITY  Goal: Mobility/activity is maintained at optimum level for patient  Outcome: Met This Shift  Note: The pt is up with walker     Problem: COMMUNICATION IMPAIRMENT  Goal: Ability to express needs and understand communication  Outcome: Met This Shift  Note: The pt. And her  have both verbalized understanding of material in strike folder and verbal information. Problem: Skin Integrity:  Goal: Will show no infection signs and symptoms  Description: Will show no infection signs and symptoms  Outcome: Met This Shift  Goal: Absence of new skin breakdown  Description: Absence of new skin breakdown  Outcome: Met This Shift  Note: No s/s of skin breakdown. Surgery puncture sites are healing. Greenish/yellow bruising are seen. Problem: HEMODYNAMIC STATUS  Goal: Patient has stable vital signs and fluid balance  Outcome: Met This Shift  Note: Hardy Montesinos is eating and taking in oral fluids. Appetite good. Out put is adequate.      Problem: ACTIVITY INTOLERANCE/IMPAIRED MOBILITY  Goal: Mobility/activity is maintained at optimum level for patient  Outcome: Met This Shift  Note: The pt is up with walker     Problem: COMMUNICATION IMPAIRMENT  Goal: Ability to express needs and understand communication  Outcome: Met This Shift

## 2021-02-10 NOTE — PROGRESS NOTES
Patient is laying in bed, states that she is going to take a nap.  is in the room visiting. The patient's pathway is clear, possessions are within reach, the patient states that she is comfortable, call light is within reach, denies the need to use the restroom. SBAR report given to primary nurse. Kimberly HASSAN/NAREN.

## 2021-02-10 NOTE — CONSULTS
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    Urinary incontinence            Procedure Laterality Date    APPENDECTOMY      CARDIAC SURGERY      TAVR    CARPAL TUNNEL RELEASE Left 2011    CATARACT REMOVAL Bilateral     CERVICAL FUSION  1976    CHOLECYSTECTOMY      COLON SURGERY  2012    COLONOSCOPY  2015    ENDOSCOPY, COLON, DIAGNOSTIC      EYE SURGERY      CATARACTS    FINGER SURGERY Right 10/2016    3rd digit    HERNIA REPAIR      HYSTERECTOMY      JOINT REPLACEMENT      both knees    NERVE SURGERY Right 10/25/2019    Lumbar Facet MBB @ O26-I1,S9-5 L2-3 right side only #1 performed by Reza Parsons MD at [facility] 98 Right 12/16/2019    Lumbar Facet MBB @ X59-S4-R5-3, L2-3 RIGHT SIDE ONLY performed by Reza Parsons MD at [facility] 113 Right 1/6/2020    Lumbar RFA T12-L1, L1-2, L2-3 Right performed by Reza Parsons MD at [address] LAMINECTOMY,>2 [facility]      6 FUSIONS    ROTATOR CUFF REPAIR Right 11/28/2014    SHOULDER SURGERY  2014    SPINE SURGERY      TENDON RELEASE Right 05/18/2017    at [address] Bilateral     UPPER GASTROINTESTINAL ENDOSCOPY  2013,2015       Allergies:     Allergies   Allergen Reactions    Norco [Hydrocodone-Acetaminophen] Itching    Tramadol Itching    Codeine Hives and Rash        Current Medications:       mupirocin (BACTROBAN) 2 % ointment, BID      0.9 % sodium chloride infusion, Continuous      [Held by provider] amLODIPine (NORVASC) tablet 10 mg, Daily      aspirin chewable tablet 81 mg, Daily      atenolol (TENORMIN) tablet 25 mg, Daily      atorvastatin (LIPITOR) tablet 40 mg, Nightly      clopidogrel (PLAVIX) tablet 75 mg, Daily      hydrALAZINE (APRESOLINE) tablet 50 mg, 2 times per day      Magnesium Oxide (MAGNESIUM-OXIDE) 250 mg, BID   pantoprazole (PROTONIX) tablet 40 mg, BID AC      sertraline (ZOLOFT) tablet 100 mg, Daily      vitamin D (CHOLECALCIFEROL) tablet 1,000 Units, Nightly      sodium chloride flush 0.9 % injection 10 mL, 2 times per day      sodium chloride flush 0.9 % injection 10 mL, PRN      enoxaparin (LOVENOX) injection 40 mg, Daily      promethazine (PHENERGAN) tablet 12.5 mg, Q6H PRN    Or      ondansetron (ZOFRAN) injection 4 mg, Q6H PRN      polyethylene glycol (GLYCOLAX) packet 17 g, Daily PRN      acetaminophen (TYLENOL) tablet 650 mg, Q6H PRN    Or      acetaminophen (TYLENOL) suppository 650 mg, Q6H PRN      lactulose (CHRONULAC) 10 GM/15ML solution 20 g, TID PRN      hydrALAZINE (APRESOLINE) injection 5 mg, Q6H PRN         Social History:  Social History     Tobacco Use   Smoking Status Never Smoker   Smokeless Tobacco Never Used     Social History     Substance and Sexual Activity   Alcohol Use No     Social History     Substance and Sexual Activity   Drug Use No         Family History:       Problem Relation Age of Onset    Arthritis Mother     Cancer Mother     Heart Disease Father     High Blood Pressure Father        Review of Systems:  All systems reviewed and are all negative except what is mentioned in history of present illness. I reviewed the patient PMH,medications, family history, social history. Physical Exam:  BP (!) 122/58   Pulse 67   Temp 97.4 °F (36.3 °C) (Oral)   Resp 18   Ht 5' 4\" (1.626 m)   Wt 248 lb 4.8 oz (112.6 kg)   SpO2 95%   BMI 42.62 kg/m²  I Body mass index is 42.62 kg/m².  I   Wt Readings from Last 1 Encounters:   02/08/21 248 lb 4.8 oz (112.6 kg)        General appearance - alert, in no distress, oriented to  person, place, and time and over weight, she is sitting up on the edge of the bed  Mental status -Level of Alertness: awake  Orientation: person, place, time  Memory: normal  Fund of Knowledge: normal  Attention/Concentration: normal Language: normal. Mood is normal  Neck - supple, no neck adenopathy, carotids upstroke normal bilaterally, no carotid bruits. There is no LAP in the neck. There is no thyroid enlargement. Neurological -   Cranial Klszso-CZ-TON:   Cranial nerve II: Normal. There is full visual fields  Cranial nerve III: Pupils: equal, round, reactive to light   Cranial nerves III, IV, VI: Extraocular Movements: intact   Cranial nerve V: Facial sensation: intact   Cranial nerve VII:Facial strength: intact   Cranial nerve VIII: Hearing: intact   Cranial nerve IX: Palate Elevation intact bilaterally  Cranial nerve XI: Shoulder shrug intact bilaterally  Cranial nerve XII: Tongue midline   neck supple without rigidity  DTR's are decreased distal and symmetric  Babinski sign is negative on bilaterally. Motor exam is 5/5 in the upper and lower extremities. Normal muscle tone. There is no muscle atrophy. There is no muscle fasciculation    Sensory is intact forlight touch. Coordination: finger to nose intact  Gait and station not tested  Abnormal movement none. Long tracts are  deferred. Skin - no rashes or lesions, warm and dry to touch  Superficial temporal artery pulses are normal.   Musculoskeletal: Has +ve bialteral hand arthritis, no limitation of ROM in any of the four extremities. no joint tenderness, deformity or swelling. There is no leg edema. The Heart was regular in rate and rhythm. Chest- Clear, good effort.          Labs:    CBC:   Recent Labs     02/07/21 1956 02/08/21 1138 02/09/21 0419   WBC 10.6 12.6* 11.0*   HGB 13.9 14.5 11.8*    206 176   MCV 87.5 87.7 88.0   MCH 30.0 30.3 30.2   MCHC 34.3 34.6 34.3     CMP:  Recent Labs     02/07/21 1957 02/08/21 1138 02/09/21  0419   * 132* 135   K 4.1 4.5 3.7   CL 96* 96* 102   CO2 24 21* 23   BUN 17 19 18   CREATININE 0.6 0.6 0.7   LABGLOM >90 >90 81*   GLUCOSE 149* 158* 132*   CALCIUM 9.4 9.6 8.4*     Liver:   Recent Labs     02/08/21  1138 AST 38   ALT 21   ALKPHOS 69   PROT 7.6   LABALBU 4.3   BILITOT 0.9     Reviewed   Results for orders placed during the hospital encounter of 02/08/21   MRA HEAD WO CONTRAST    Narrative MR angiogram head/[place] without contrast    Comparison: None    Findings:    Normal configuration noted in the [place] vessels. No filling defect, vessel truncation or significant stenosis. No focal aneurysm or central vascular abnormality. No peripheral vascular malformations demonstrated. Impression Impression:    No significant abnormalities. This document has been electronically signed by: Jovany Jimenez MD on   02/08/2021 09:54 PM       Reviewed, agree with interpretation. Results for orders placed during the hospital encounter of 02/08/21   MRI BRAIN WO CONTRAST    Narrative PROCEDURE: MRI BRAIN WO CONTRAST    CLINICAL INFORMATION left cerebellar stroke. COMPARISON: CT scan of the brain dated 8 February 2021. TECHNIQUE: Multiplanar and multiple spin echo MRI images were obtained of the brain without contrast.    FINDINGS:        The diffusion-weighted images demonstrate restricted diffusion in the left cerebellar hemisphere inferiorly consistent with an acute infarct. There are smaller areas of restricted diffusion in the right cerebellar hemisphere, in the right posterior   temporal and occipital and parietal lobes and in the left side of the asmita consistent with areas of acute ischemia or infarcts The brain volume is normal. There is a mild amount of signal hyperintensity on the FLAIR and T2-weighted sequences in the white   matter of the brain. This is consistent with mild severity chronic small vessel ischemic changes. There are no intra-or extra-axial collections. There is no hydrocephalus, midline shift or mass effect. There are no areas of susceptibility artifact noted. The major intracranial vascular flow voids are present. The midline craniocervical junction structures are normal.  The pituitary gland and brainstem are normal.            Impression    1. 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 2. Smaller areas of restricted diffusion in the right cerebellar hemisphere, right posterior temporal and occipital lobes, right parietal parasagittal region and in the left side of the asmita consistent with areas of acute ischemia or infarcts. 3. Probable ischemic changes in the periventricular white matter. 4. Otherwise negative MRI scan  of the brain. **This report has been created using voice recognition software. It may contain minor errors which are inherent in voice recognition technology. **    Final report electronically signed by DR Kiara Aguero on 2/9/2021 4:00 PM       Results for orders placed during the hospital encounter of 02/08/21   CT HEAD WO CONTRAST    Addendum ** ADDENDUM #1 **  Findings reported to as Tay Height CNP 8:04 PM on 02/08/2021. MRI is advised if clinically warrented. Final report electronically signed by Dr. Lynda Al on 2/8/2021 8:07 PM  ** ORIGINAL REPORT ** PROCEDURE: CT HEAD WO CONTRAST  CLINICAL INFORMATION: r/o acute. COMPARISON: April 29, 2019 TECHNIQUE: Noncontrast 5 mm axial images were  obtained through the brain. All CT scans at this facility use dose modulation, iterative  reconstruction, and/or weight-based dosing when appropriate to reduce  radiation dose to as low as reasonably achievable. FINDINGS: Mild generalized volume loss. Small vessel disease. Wedge-shaped  low-attenuation the left cerebellum correlate for acute subacute  infarction. No acute hemorrhage or midline shift. Ventricles are within normal limits for age. Paranasal sinuses are clear. Mastoid air cells are patent. Skull: Unremarkable. Soft tissues: Unremarkable. Delisa Colorado MD 2/8/2021  8:09 PM          Narrative PROCEDURE: CT HEAD WO CONTRAST    CLINICAL INFORMATION: r/o acute. COMPARISON: April 29, 2019 TECHNIQUE: Noncontrast 5 mm axial images were obtained through the brain. All CT scans at this facility use dose modulation, iterative reconstruction, and/or weight-based dosing when appropriate to reduce radiation dose to as low as reasonably achievable. FINDINGS:  Mild generalized volume loss. Small vessel disease. Wedge-shaped low-attenuation the left cerebellum correlate for acute subacute infarction. No acute hemorrhage or midline shift. Ventricles are within normal limits for age. Paranasal sinuses are clear. Mastoid air cells are patent. Skull: Unremarkable. Soft tissues: Unremarkable. Impression Wedge-shaped low-attenuation the left cerebellum correlate for acute/subacute infarction. No acute hemorrhage. **This report has been created using voice recognition software. It may contain minor errors which are inherent in voice recognition technology. **    Final report electronically signed by Dr. 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  * This is a 77-year-old female who presents with nausea vomiting that has been ongoing for the last 6 days following TAVR procedure, her symptoms have been the same over that time. She came is when her symptoms did not improve. She underwent a CT head done yesterday that showed acute to subacute left cerebellar ischemic stroke,  I reviewed the CT brain and agree with interpretation. She was evaluated by interventional neurology and underwent an MRA head and neck that showed no LVO. She was determined not to be a candidate for intervention as her symptoms have been ongoing for the last 6 days. Her neurologic exam is nonfocal.  She will need to undergo stroke work-up as outlined below. After detailed discussion with patient we agreed on the following plan. Recommendations:                                              1. Antiplatelets/anticoagulation. 2. MRI brain without contrast with diffusion sequence. 3. Fasting lipid profile/ start Lipid lowering medication  4. Target LDL below 70.   5. Physical therapy and Occupational Therapy  6. Modify Risk factors (example Hypertension, Diabetes, hyperlipidemia, Smoking Cessation)  7. MRA head &Neck, reviewed  8. Cardiac echo with bubble study. 9. 30 day event monitor (after discharge to evaluate for occult cardiac arrhythmia/cryptogenic stroke)  10. Permissive hypertension for up to one week desired -180. 11. DVT prophylaxis. 12. Homocysteine Level, Start Folic acid 1 mg Daily until results obtained. 13. Patient questions were answered. 14. Will follow please call if any questions. 15. Discussed with primary service. 16. Please call if any questions.            Electronically signed by Candelario Childress MD on 2/9/2021 at 7:45 PM

## 2021-02-10 NOTE — PROGRESS NOTES
Nel[facility] Dic[facility] 62  INPATIENT OCCUPATIONAL THERAPY  [facility] CCU-STEPDOWN 3B  EVALUATION    Time:    Time In: 820  Time Out: 9  Timed Code Treatment Minutes: 12 Minutes  Minutes: 27          Date: 2/10/2021  Patient Name: Tita Doherty,   Gender: female      MRN: [id number]  : 1940  (68 y.o.)  Referring Practitioner: Dr. Farrukh Dooley  Diagnosis: nausea & vomiting  Additional Pertinent Hx: Per Neuro note: 68 y.o. female admitted to [facility] on 2021. She presents for evaluation of nausea and dry heaving, vomiting since had TAVR 5 days ago (2/3/2021). Patient has been having dry heaves since then with pain when tenderness on the epigastric and upper quadrant. Patient states that her nausea vomiting started after the TAVR. Patient denies any chest pain, patient's feel weak no shortness of breath denies any diarrhea melena hematochezia. Patient was evaluated last night however the patient declined admission and went home. This morning continues to have dry heaves and went to see Dr. Nalini Voss today and his office and was sent here for admission. CT head done yesterday showed Wedge-shaped low-attenuation the left cerebellum correlate for acute subacute infarction.     Restrictions/Precautions:  Restrictions/Precautions: Fall Risk    Subjective  Chart Reviewed: Yes, Orders, Progress Notes, History and Physical  Patient assessed for rehabilitation services?: Yes  Family / Caregiver Present: No    Subjective: cooperative    Pain:  Pain Assessment  Patient Currently in Pain: Yes(headache)  Pain Level: 3    Social/Functional History:  Lives With: Spouse  Type of Home: House  Home Layout: One level  Home Access: (1)  Home Equipment: Cane, 4 wheeled walker(Pt describes a merry walker for 1 of their walkers)   Bathroom Shower/Tub: Tub/Shower unit  Bathroom Toilet: Standard  Bathroom Equipment: Tub transfer bench, Grab bars in shower, 3-in-1 commode       ADL Assistance: Independent Homemaking Assistance: Needs assistance(sharies duties with )  Ambulation Assistance: Independent  Transfer Assistance: Independent          Additional Comments: Pt reports using cane sometimes in home, uses walker if goes outside to get mail. Pt reports indep with ADLs. VISION:WFL    HEARING:  WFL    COGNITION: Decreased Safety Awareness    RANGE OF MOTION:  Bilateral Upper Extremity:  WFL    STRENGTH:  Bilateral Upper Extremity:  WFL    SENSATION:   WFL    ADL:   Lower Extremity Dressing: Stand By Assistance. able to adjust slipper socks with S. BALANCE:  Sitting Balance:  Independent. Standing Balance: Stand By Assistance. static    BED MOBILITY:  Supine to Sit: Stand By Assistance HOB elevated    TRANSFERS:  Sit to Stand:  Stand By Assistance. from EOB    FUNCTIONAL MOBILITY:  Assistive Device: Rolling Walker  Assist Level:  Contact Guard Assistance. Distance: 40ft in hallway; tendency to push walker too far ahead, vcs for walker safety throughout session      Activity Tolerance:  Patient tolerance of  treatment: fair. Reports LE feel weak       Assessment:  Assessment: Pt  demo mildly decreased ADL  & functional mobility status over PLOF d/t decreased balance, endurance, & safety awareness. Continued OT recommended to educate Pt on safety & adaptative strategies for returning home. Performance deficits / Impairments: Decreased safe awareness, Decreased functional mobility , Decreased ADL status, Decreased endurance, Decreased balance  Prognosis: Fair  REQUIRES OT FOLLOW UP: Yes  Decision Making: Medium Complexity  Safety Devices in place: Yes  Type of devices:  All fall risk precautions in place, Call light within reach, Gait belt, Chair alarm in place, Nurse notified Treatment Initiated: Treatment and education initiated within context of evaluation. Evaluation time included review of current medical information, gathering information related to past medical, social and functional history, completion of standardized testing, formal and informal observation of tasks, assessment of data and development of plan of care and goals. Treatment time included skilled education and facilitation of tasks to increase safety and independence with ADL's for improved functional independence and quality of life. Discharge Recommendations:  Home with Home health OT, Home with assist PRN    Patient Education:  OT Education: OT Role, Plan of Care, ADL Adaptive Strategies, Transfer Training  Barriers to Learning: decreased safety awareness    Equipment Recommendations: Other: Monitor pending progress    Plan:  Times per week: 6x  Current Treatment Recommendations: Balance Training, Functional Mobility Training, Safety Education & Training, Self-Care / ADL, Endurance Training. See long-term goal time frame for expected duration of plan of care. If no long-term goals established, a short length of stay is anticipated. Goals:  Patient goals : go home ASAP  Short term goals  Time Frame for Short term goals: until discharge  Short term goal 1: Complete various t/fs including toilet with S & 0-2 vcs for safe technique  Short term goal 2: Complete 2-3 min standing ADL task with S for increased ease of sinkside grooming  Short term goal 3: Complete BADL routine with S & 0-1 vcs for safety  Short term goal 4: Complete mobility to/from bathroom + with ADL items retrieval with RW, S, & 0-2 vcs for safety  Long term goals  Time Frame for Long term goals : No LTG set d/t short ELOS         Following session, patient left in safe position with all fall risk precautions in place.

## 2021-02-10 NOTE — OP NOTE
DATE: 02/10/21    PATIENT: Мария Salguero    MRN: [id number]     Acct: [de-identified]    PREOP DIAGNOSIS:   Severe aortic stenosis    Postop diagnosis:  Severe aortic stenosis    Procedure:  Transcatheter aortic valve replacement with a Medtronic 26 mm Evolut Pro+ prosthesis    OPERATORS:  Sarahi Foley MD; Alison Nguyen MD    ESTIMATED BLOOD LOSS: 50 ml    A pre-procedure discussion was conducted with the patient to confirm/exclude candidacy for open surgical salvage in case of procedure failure. After informed consent was obtained from the patient, the patient was brought to the hybrid operating room and prepped in sterile fashion. Infiltration of the bilateral inguinal regions was accomplished with 2% lidocaine. Using micropuncture and modified Seldinger technique, a 6 Fr sheath was inserted into the right internal jugular vein. A 5 Fr pacemaker was inserted into position under fluoroscopic guidance into the right ventricle, with verification of appropriate pacing thresholds. Using the same technique under fluoroscopic guidance, a 6 Fr sheath was inserted into the left common femoral artery. Similarly, a 4 Fr sheath, followed after contrast verification by a 5 Fr sheath was inserted into the right common femoral artery. This was replaced with a 6 Fr J4 catheter over a guidewire, and an AL1 wire was used to traverse the tortuous abdominal aorta. We then performed standard preclose technique by deploying two Percloses in orthogonal fashion and leaving them incomplete. A 18 Fr sheath was inserted over the wire under guidance. The patient was heparinized to an ACT above 250 seconds. A straight pigtail was placed via the left femoral artery and aortography was performed in a coplanar view to ensure alignment. The aortic valve was crossed using a straight wire through an AL1 catheter. The guidewire was changed to a J-tipped wire, on which was inserted an angled pigtail. Direct hemodynamic measurements were obtained. The valve was then checked for appropriate loading on fluoroscopy. A Lunderquist wire was inserted into the angled pigtail, with proper final positioning in the LV apex. The transcatheter valve was advanced through the 18 Fr sheath on the wire. Under fluoroscopic guidance, the transcatheter valve was advanced around the arch without difficulty, and positioned appropriately through the annulus under a coplanar view. Adjustment was made to remove parallax. Slow valve deployment was initiated, with angiographic verification of proper position. The valve released without difficulty. Echocardiography was done thereafter, demonstrating that there was no significant paravalvular leak, that the ejection fraction was unchanged, there was no regional wall motion abnormality, no pericardial effusion, and there was no change in mitral regurgitation. Subsequent hemodynamics confirmed no significant pressure gradient between the LV and the AO, and no change in the left ventricular end-diastolic pressure. All equipment was removed from the patient. Protamine 50 mg was administered. We then proceeded with closure of the right groin by closing the Percloses in sequential fashion over a 0.035 guidewire. The contralateral femoral artery was closed with a 6 Fr Angioseal after angiography confirmed no vascular injury. Neurologic evaluation post the procedure was completely intact with a good motor sensation and mental status. IMMEDIATE COMPLICATIONS:  None. MEDICATIONS:  See EMR. SUMMARY:  Successful transcatheter aortic valve replacement with a 26 mm Evolut Pro+ valve via transfemoral approach.

## 2021-02-10 NOTE — PROGRESS NOTES
The patient states posterior head pain. Medication helps relieve the patient's pain. Patient states eating also helps distract from the pain. The upper extremities are warm, dry, with full sensation present. Patient states no numbness or tingling in upper extremities. Capillary refill and skin turgor are both less than 3 seconds. Patient has full range of motion in upper extremities. Heart sounds have regular rate and rhythm. Lung sounds are clear throughout. Bowel sounds heard in all four quadrants. Lower extremities are warm, dry, full sensation present with no numbness or tingling. Skin over coccyx and heels is intact. The patient's pathway is clear, possessions are within reach, the patient states that she is comfortable, call light is within reach. Kia HASSAN/RSC.

## 2021-02-10 NOTE — PLAN OF CARE
Problem: Falls - Risk of:  Goal: Will remain free from falls  Description: Will remain free from falls  Note: The pt. Is increasing activity with no deficits from CVA noted. She is using a walker with gait belt ,and has full motion. Problem: Falls - Risk of:  Goal: Will remain free from falls  Description: Will remain free from falls  Note: The pt. Is increasing activity with no deficits from CVA noted. She is using a walker with gait belt ,and has full motion.

## 2021-02-10 NOTE — CARE COORDINATION
2/10/21, 10:49 AM EST    DISCHARGE ON GOING EVALUATION    Martin Blocker day: 2  Location: -25/025-A Reason for admit: Nausea and vomiting [R11.2]   Procedure: None - pt had TAVR done 2/3.  2/8 CT head - possible acute/subacute infarction - left cerebellum. 2/9 MRI - Large acute infarct in the left cerebellar hemisphere inferiorly. Smaller areas of restricted diffusion in the right cerebellar hemisphere, right posterior temporal and occipital lobes, right parietal parasagittal region and in the left side of the asmita consistent with areas of acute ischemia or infarcts. Probable ischemic changes in the periventricular white matter. Barriers to Discharge: MRI showed an acute infarct. PT/OT. Neurology following with Hospitalist. Lovenox. Plavix. PCP: Jaylene Drew DO  Readmission Risk Score: 18%  Patient Goals/Plan/Treatment Preferences: Plans home with . [facility], waiting therapy recommendations.

## 2021-02-10 NOTE — PROGRESS NOTES
Hospitalist Progress Note      Patient:  Janell Bautistakeley    Unit/Bed:3B-25/025-A  YOB: 1940  MRN: [id number]   Acct: [de-identified]   PCP: Julissa De La Cruz DO  Date of Admission: 2/8/2021    Assessment and Plan:        1. Nausea/voming: NYD etiology; likely 2/2 severe constipation/Lillian's picture vs pSBO/ileus vs others (UTI/PNA, increased ICH, etc). CT A/P yesterday; however, no report of bowels; will liaise w/ radiology, also KUB ordered along w/ CT head w/o to r/o acute intracerebral processes given recent TAVR .  CXR,lipase/ biochemistry non-contributory. Laxatives (including fleet enema) ordered. Placed on IVF. NPO for now. Will start on CL if no obstruction. If N/V worsening will consider NGT  2/9: much improved; + large BM; tolerating CL diet; will advance gradually as tolerated; no further abdo imaging warranted at this time. Of note: CT head w/o showed a Wedge-shaped low-attenuation in left cerebellum c/w acute/subacute infarction; no hemorrhage. MRA brain/neck non-contributory; noted MR brain w/o missed; ordered this a.m. will follow up. Noted neurology and neuroSx consulted overnight?? Will follow up on results. PT/OT to see. Permissive HTN for now     2. Large acute infarct left cerebellum hemisphere: possible explanation for possible increased ICH and ensuing ongoing N/V? managing as dicussed above. Pt already on DAPT/statin  2/10/2021: Already has residual neurological findings with left eye blurry vision since the previous CVA. Otherwise normal currently. Currently on antiplatelets dual in addition to high intensity lipid-lowering therapy, PT/OT to evaluate the patient. Appreciate neurology team recommendations. 3. S/p TAVR   4. HypoNa: likely 2/2 diuretics/dehydration; IVF, held diuretics will reassess response  2/9: resolvd. .  As advancing diet; will reduce rate and later d/c IVF and trend serum Na 5. Hx of DM: well-controlled on OHA w/ recent A1C 6.8%. held metformin; SSI. diabetic diet, SSI, accuchecks, and hypoglycemia orders in place, A1C 6.8%  2/9: BS fairly well controlled. CCM  6. Hx of HTN: home meds held later yesterday after suspicious stroke; BP remains well-controlled. CCM. Prn hydralazine in place for SBP greater than 180 and DBP greater than 110 mmHg  7. Hx of chronic HFpEF: well-compensated; held diuretics for now  8. Hx of non-obstructive-CAD/stroke; on guideline-directed treatment; trop mildly elevate, denies CP, EKG negative for new/dynamic ischemic changes, recent LHC unremarkable; will monitor  9. Hx of traumatic SAH  10. Hx of GERD: resumed home BID PPI  11. Hx of urinary incontinence  12. Code status: DNR-CCA as confirmed by pt and family along w/ advanced directive  15. Disposition: PT/OT to evaluate the patient before discharge. Patient wanted to go home with physical therapy and occupational therapist but I waiting for further recommendation from them before discharge. PMH, PSH, SH, FHX reviewed in chart review snap shot in EPIC    CC:  Large acute infarct left cerebellum hemisphere    HPI: Initial admission HPI by admitting physician reviewed as below:  Pt s/p TACR on 2/3/21; descrives ongoing nausea since procedure which continued through hospital stay on 2/4; pt was discharged home w/ no further W/U; describes worseing nausea f/b bilious vomiting; denies hematemesis/melena/BRBPR/CP/SOB/palpitation/presyncope. Pt denies BM since last Thursday; reports flatus daily. Denies dysuria/urgency/frequency/hematuria/lower abdo or flank pain. Describes mild colicky abdo pain. Denies fever/chills. Could only tolerated fluids and oatmeal. Small slip and near fall from chair this a.m. but required assistance to get up d/t weakness.  Denies headache/dysarthria/dysphonia/focal neuro deficit/vertigo/tinnitus/hearing loss Abdomen: soft. Nontender. Bowel sounds positive. Extremities:  No clubbing, cyanosis, or edema in all extremities. Vasculature: capillary refill < 3 seconds. Pedal pulses intact bilaterally. Skin:  warm and dry. Not clammy. Psych:  Alert to time, person and place. Communicable. Affect appropriate  Lymph:  No supraclavicular ,and no anterior cervical lymphadenopathy. Neurologic:  No focal deficit. CN II-XII grossly intact. Motor and Sensory capacity intact in all extremities. Labs:   Recent Labs     02/08/21  1138 02/09/21  0419 02/10/21  0337   WBC 12.6* 11.0* 8.7   HGB 14.5 11.8* 11.7*   HCT 41.9 34.4* 35.4*    176 164     Recent Labs     02/08/21  1138 02/09/21  0419 02/10/21  0337   * 135 137   K 4.5 3.7 4.1   CL 96* 102 106   CO2 21* 23 22*   BUN 19 18 20   CREATININE 0.6 0.7 0.8   CALCIUM 9.6 8.4* 8.6     Recent Labs     02/07/21 1957 02/08/21  1138   AST 25 38   ALT 19 21   BILIDIR  --  <0.2   BILITOT 0.9 0.9   ALKPHOS 65 69     No results for input(s): INR in the last 72 hours. No results for input(s): Tenna Pinta in the last 72 hours.     Microbiology:    Blood culture #1: No results found for: BC    Blood culture #2:No results found for: BLOODCULT2    Organism:  Lab Results   Component Value Date    ORG Escherichia coli 04/29/2019       No results found for: LABGRAM    MRSA culture only:No results found for: [address] [place] [address]    Urine culture:   Lab Results   Component Value Date    LABURIN [place] count: >100,000 CFU/mL 05/18/2017    LABURIN 94.5 mg 06/13/2016       Respiratory culture: No results found for: CULTRESP    Aerobic and Anaerobic :  No results found for: LABAERO  No results found for: LABANAE    Urinalysis:      Lab Results   Component Value Date    NITRU NEGATIVE 02/09/2021    WBCUA 5-9 02/09/2021    BACTERIA MANY 02/09/2021    RBCUA NONE SEEN 02/09/2021    BLOODU NEGATIVE 02/09/2021    SPECGRAV 1.019 02/07/2021    Enma Tellez 994 NEGATIVE 02/09/2021       Radiology: MRI BRAIN WO CONTRAST   Final Result       1. 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 2. Smaller areas of restricted diffusion in the right cerebellar hemisphere, right posterior temporal and occipital lobes, right parietal parasagittal region and in the left side of the asmita consistent with areas of acute ischemia or infarcts. 3. Probable ischemic changes in the periventricular white matter. 4. Otherwise negative MRI scan  of the brain. **This report has been created using voice recognition software. It may contain minor errors which are inherent in voice recognition technology. **      Final report electronically signed by DR Flash Abrams on 2/9/2021 4:00 PM      MRA NECK WO CONTRAST   Final Result   Impression:      Somewhat limited study by motion artifact. No significant stenosis. This document has been electronically signed by: Jonel Quintana MD on    02/08/2021 09:56 PM      Carotid stenosis and measurements are in accordance with the NASCET    criteria. MRA HEAD WO CONTRAST   Final Result   Impression:      No significant abnormalities. This document has been electronically signed by: Jonel Quintana MD on    02/08/2021 09:54 PM         CT HEAD WO CONTRAST   Final Result   Addendum 1 of 1   ** ADDENDUM #1 **      Findings reported to as Emerson Naas CNP 8:04 PM on 02/08/2021. MRI is advised if clinically warrented. Final report electronically signed by Dr. Spencer Zepeda on 2/8/2021 8:07 PM      ** ORIGINAL REPORT**   PROCEDURE: CT HEAD WO CONTRAST      CLINICAL INFORMATION: r/o acute. COMPARISON: April 29, 2019 TECHNIQUE: Noncontrast 5 mm axial images were    obtained through the brain. All CT scans at this facility use dose modulation, iterative    reconstruction, and/or weight-based dosing when appropriate to reduce    radiation dose to as low as reasonably achievable.       FINDINGS: Mild generalized volume loss. Small vessel disease. Wedge-shaped    low-attenuation the left cerebellum correlate for acute subacute    infarction. No acute hemorrhage or midline shift. Ventricles are within normal limits for age. Paranasal sinuses are clear. Mastoid air cells are patent. Skull: Unremarkable. Soft tissues: Unremarkable. Final      XR ABDOMEN (KUB) (SINGLE AP VIEW)   Final Result    IMPRESSION: Stool throughout the colon. Correlate for constipation. Otherwise, nonspecific bowel gas pattern. **This report has been created using voice recognition software. It may contain minor errors which are inherent in voice recognition technology. **      Final report electronically signed by Dr. Elias Trevino on 2/8/2021 2:03 PM      XR CHEST (2 VW)   Final Result    IMPRESSION:      No lobar consolidation. **This report has been created using voice recognition software. It may contain minor errors which are inherent in voice recognition technology. **      Final report electronically signed by Dr. Elias Trevino on 2/8/2021 12:32 PM        Xr Chest (2 Vw)    Result Date: 2/8/2021  PROCEDURE: XR CHEST (2 VW) CLINICAL INFORMATION: Nausea vomiting elevated troponin status post TAVR. COMPARISON: Multiple overlying wires. February 7, 2021. TECHNIQUE: PA and lateral views the chest. FINDINGS: Aortic stent graft. No lobar consolidation. Costophrenic recesses are preserved. No cardiomegaly. Degenerative changes thoracic spine. IMPRESSION: No lobar consolidation. **This report has been created using voice recognition software. It may contain minor errors which are inherent in voice recognition technology. ** Final report electronically signed by Dr. Elias Trevino on 2/8/2021 12:32 PM    Xr Abdomen (kub) (single Ap View)    Result Date: 2/8/2021 PROCEDURE: XR ABDOMEN (KUB) (SINGLE AP VIEW) CLINICAL INFORMATION: N/V. COMPARISON: April 29, 2019 TECHNIQUE: A supine AP view of the abdomen was obtained. FINDINGS: Postsurgical changes lower lumbar spine with fusion L2-L4. Mild lumbar scoliosis. Surgical clips in the right upper quadrant. Stool throughout the colon correlate for constipation. No acute osseous findings. IMPRESSION: Stool throughout the colon. Correlate for constipation. Otherwise, nonspecific bowel gas pattern. **This report has been created using voice recognition software. It may contain minor errors which are inherent in voice recognition technology. ** Final report electronically signed by Dr. Gilda Vargas on 2/8/2021 2:03 PM    Ct Head Wo Contrast    Addendum Date: 2/8/2021    ** ADDENDUM #1 ** Findings reported to as Bren Falcon CNP 8:04 PM on 02/08/2021. MRI is advised if clinically warrented. Final report electronically signed by Dr. Gilda Vargas on 2/8/2021 8:07 PM ** ORIGINAL REPORT ** PROCEDURE: CT HEAD WO CONTRAST CLINICAL INFORMATION: r/o acute. COMPARISON: April 29, 2019 TECHNIQUE: Noncontrast 5 mm axial images were obtained through the brain. All CT scans at this facility use dose modulation, iterative reconstruction, and/or weight-based dosing when appropriate to reduce radiation dose to as low as reasonably achievable. FINDINGS: Mild generalized volume loss. Small vessel disease. Wedge-shaped low-attenuation the left cerebellum correlate for acute subacute infarction. No acute hemorrhage or midline shift. Ventricles are within normal limits for age. Paranasal sinuses are clear. Mastoid air cells are patent. Skull: Unremarkable. Soft tissues: Unremarkable.      Result Date: 2/8/2021 PROCEDURE: CT HEAD WO CONTRAST CLINICAL INFORMATION: r/o acute. COMPARISON: April 29, 2019 TECHNIQUE: Noncontrast 5 mm axial images were obtained through the brain. All CT scans at this facility use dose modulation, iterative reconstruction, and/or weight-based dosing when appropriate to reduce radiation dose to as low as reasonably achievable. FINDINGS: Mild generalized volume loss. Small vessel disease. Wedge-shaped low-attenuation the left cerebellum correlate for acute subacute infarction. No acute hemorrhage or midline shift. Ventricles are within normal limits for age. Paranasal sinuses are clear. Mastoid air cells are patent. Skull: Unremarkable. Soft tissues: Unremarkable. Wedge-shaped low-attenuation the left cerebellum correlate for acute/subacute infarction. No acute hemorrhage. **This report has been created using voice recognition software. It may contain minor errors which are inherent in voice recognition technology. ** Final report electronically signed by Dr. Jerod Velasco on 2/8/2021 6:20 PM    Mra Head Wo Contrast    Result Date: 2/8/2021  MR angiogram head/[place] without contrast Comparison: None Findings: Normal configuration noted in the [place] vessels. No filling defect, vessel truncation or significant stenosis. No focal aneurysm or central vascular abnormality. No peripheral vascular malformations demonstrated. Impression: No significant abnormalities. This document has been electronically signed by: Jessy Martin MD on 02/08/2021 09:54 PM     Mra Neck Wo Contrast    Result Date: 2/8/2021  MR angiogram of the neck/carotid arteries without contrast Comparison: None Findings: There is motion artifact adversely affecting multiple series. Aortic arch not included on the study. There is no evidence for carotid bifurcation stenosis. Bilateral vertebral arteries patent, left vertebral dominant. Impression: Somewhat limited study by motion artifact. No significant stenosis. This document has been electronically signed by: Jose Manuel Holman MD on 02/08/2021 09:56 PM Carotid stenosis and measurements are in accordance with the NASCET criteria. Mri Brain Wo Contrast    Result Date: 2/9/2021  PROCEDURE: MRI BRAIN WO CONTRAST CLINICAL INFORMATION left cerebellar stroke. COMPARISON: CT scan of the brain dated 8 February 2021. TECHNIQUE: Multiplanar and multiple spin echo MRI images were obtained of the brain without contrast. FINDINGS: The diffusion-weighted images demonstrate restricted diffusion in the left cerebellar hemisphere inferiorly consistent with an acute infarct. There are smaller areas of restricted diffusion in the right cerebellar hemisphere, in the right posterior temporal and occipital and parietal lobes and in the left side of the asmita consistent with areas of acute ischemia or infarcts The brain volume is normal. There is a mild amount of signal hyperintensity on the FLAIR and T2-weighted sequences in the white  matter of the brain. This is consistent with mild severity chronic small vessel ischemic changes. There are no intra-or extra-axial collections. There is no hydrocephalus, midline shift or mass effect. There are no areas of susceptibility artifact noted. The major intracranial vascular flow voids are present.  The midline craniocervical junction structures are normal.  The pituitary gland and brainstem are normal. 1. 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 2. Smaller areas of restricted diffusion in the right cerebellar hemisphere, right posterior temporal and occipital lobes, right parietal parasagittal region and in the left side of the asmita consistent with areas of acute ischemia or infarcts. 3. Probable ischemic changes in the periventricular white matter. 4. Otherwise negative MRI scan  of the brain. **This report has been created using voice recognition software. It may contain minor errors which are inherent in voice recognition technology. ** Final report electronically signed by DR Carmen Meade on 2/9/2021 4:00 PM      Discussed plan with patient and nurse. Patient and nurse verbalized understanding and agree. All questions addressed with concerns. Please excuse my TYPOS!     Electronically signed by John Hernandez MD on 2/10/2021 at 7:13 AM

## 2021-02-10 NOTE — PROGRESS NOTES
The patient is alert and oriented to person, place, time, and situation. Patient's pupils are equal, round, and react to light. The upper extremities are warm, dry, with full sensation present, and patient states no numbness or tingling. The color of the upper extremities are appropriate for ethnicity. Cap refill and skin turgor are both less than 3 seconds. Hand grasp is strong bilaterally. Patient has full range of motion of the upper extremities. Arm drift is negative. Heart sounds have regular rate and rhythm. Lung sounds are clear throughout. Patient's breathing has moderate depth and respirations are symmetrical. Abdomen is rounded. Bowel sounds are heard in all four quadrants. No tenderness or masses upon palpation. The lower extremities are warm, dry, with full sensation present and no numbness or tingling. There is no edema present in lower extremities. Pedal push and pull are strong bilaterally. The patient is able to move self in bed. Skin over coccyx and heels is intact. Patient has an external catheter and ambulates to the bathroom. Patient tolerates ambulation well. Patient states continuous posterior head pain that is relieved with medication. The patients pathway is clear, possessions are within reach, the patient states that she is comfortable, call light is within reach. Mark Monsalve SN/RSC.

## 2021-02-11 ENCOUNTER — TELEPHONE (OUTPATIENT)
Dept: FAMILY MEDICINE CLINIC | Age: 81
End: 2021-02-11

## 2021-02-11 VITALS
WEIGHT: 249.6 LBS | BODY MASS INDEX: 42.61 KG/M2 | TEMPERATURE: 98.5 F | DIASTOLIC BLOOD PRESSURE: 91 MMHG | SYSTOLIC BLOOD PRESSURE: 121 MMHG | HEART RATE: 75 BPM | RESPIRATION RATE: 20 BRPM | OXYGEN SATURATION: 98 % | HEIGHT: 64 IN

## 2021-02-11 LAB
ANION GAP SERPL CALCULATED.3IONS-SCNC: 7 MEQ/L (ref 8–16)
BASOPHILS # BLD: 0.4 %
BASOPHILS ABSOLUTE: 0 THOU/MM3 (ref 0–0.1)
BUN BLDV-MCNC: 18 MG/DL (ref 7–22)
CALCIUM SERPL-MCNC: 8.8 MG/DL (ref 8.5–10.5)
CHLORIDE BLD-SCNC: 104 MEQ/L (ref 98–111)
CO2: 24 MEQ/L (ref 23–33)
CREAT SERPL-MCNC: 0.7 MG/DL (ref 0.4–1.2)
EOSINOPHIL # BLD: 5.4 %
EOSINOPHILS ABSOLUTE: 0.4 THOU/MM3 (ref 0–0.4)
ERYTHROCYTE [DISTWIDTH] IN BLOOD BY AUTOMATED COUNT: 13.9 % (ref 11.5–14.5)
ERYTHROCYTE [DISTWIDTH] IN BLOOD BY AUTOMATED COUNT: 45.4 FL (ref 35–45)
GFR SERPL CREATININE-BSD FRML MDRD: 81 ML/MIN/1.73M2
GLUCOSE BLD-MCNC: 151 MG/DL (ref 70–108)
HCT VFR BLD CALC: 33.8 % (ref 37–47)
HEMOGLOBIN: 11.3 GM/DL (ref 12–16)
IMMATURE GRANS (ABS): 0.04 THOU/MM3 (ref 0–0.07)
IMMATURE GRANULOCYTES: 0.5 %
LYMPHOCYTES # BLD: 25.8 %
LYMPHOCYTES ABSOLUTE: 2.1 THOU/MM3 (ref 1–4.8)
MCH RBC QN AUTO: 30.1 PG (ref 26–33)
MCHC RBC AUTO-ENTMCNC: 33.4 GM/DL (ref 32.2–35.5)
MCV RBC AUTO: 90.1 FL (ref 81–99)
MONOCYTES # BLD: 8.2 %
MONOCYTES ABSOLUTE: 0.7 THOU/MM3 (ref 0.4–1.3)
NUCLEATED RED BLOOD CELLS: 0 /100 WBC
PLATELET # BLD: 165 THOU/MM3 (ref 130–400)
PMV BLD AUTO: 10.6 FL (ref 9.4–12.4)
POTASSIUM SERPL-SCNC: 3.9 MEQ/L (ref 3.5–5.2)
RBC # BLD: 3.75 MILL/MM3 (ref 4.2–5.4)
SEG NEUTROPHILS: 59.7 %
SEGMENTED NEUTROPHILS ABSOLUTE COUNT: 4.9 THOU/MM3 (ref 1.8–7.7)
SODIUM BLD-SCNC: 135 MEQ/L (ref 135–145)
WBC # BLD: 8.2 THOU/MM3 (ref 4.8–10.8)

## 2021-02-11 PROCEDURE — 97162 PT EVAL MOD COMPLEX 30 MIN: CPT

## 2021-02-11 PROCEDURE — 85025 COMPLETE CBC W/AUTO DIFF WBC: CPT

## 2021-02-11 PROCEDURE — 6370000000 HC RX 637 (ALT 250 FOR IP): Performed by: HOSPITALIST

## 2021-02-11 PROCEDURE — 99239 HOSP IP/OBS DSCHRG MGMT >30: CPT | Performed by: FAMILY MEDICINE

## 2021-02-11 PROCEDURE — 36415 COLL VENOUS BLD VENIPUNCTURE: CPT

## 2021-02-11 PROCEDURE — 97116 GAIT TRAINING THERAPY: CPT

## 2021-02-11 PROCEDURE — 80048 BASIC METABOLIC PNL TOTAL CA: CPT

## 2021-02-11 RX ADMIN — PANTOPRAZOLE SODIUM 40 MG: 40 TABLET, DELAYED RELEASE ORAL at 10:54

## 2021-02-11 RX ADMIN — HYDRALAZINE HYDROCHLORIDE 50 MG: 50 TABLET, FILM COATED ORAL at 10:54

## 2021-02-11 RX ADMIN — ASPIRIN 81 MG: 81 TABLET, CHEWABLE ORAL at 10:54

## 2021-02-11 RX ADMIN — SERTRALINE 100 MG: 100 TABLET, FILM COATED ORAL at 10:54

## 2021-02-11 RX ADMIN — Medication 250 MG: at 10:54

## 2021-02-11 RX ADMIN — CLOPIDOGREL BISULFATE 75 MG: 75 TABLET ORAL at 10:54

## 2021-02-11 RX ADMIN — MUPIROCIN: 20 OINTMENT TOPICAL at 10:56

## 2021-02-11 RX ADMIN — ATENOLOL 25 MG: 25 TABLET ORAL at 10:54

## 2021-02-11 ASSESSMENT — PAIN DESCRIPTION - PROGRESSION
CLINICAL_PROGRESSION: NOT CHANGED

## 2021-02-11 NOTE — TELEPHONE ENCOUNTER
.Transition of Care visit scheduled. 3/4/21  Patient is being discharged to Home  Date of discharge 2/11/21  Discharge from facility [facility]  Reason for admission Stroke    Pt actually needs to be seen sooner if possible 2-18 or 2-19, pt didn't want to walk in which is why we went ahead and scheduled this appointment.

## 2021-02-11 NOTE — TELEPHONE ENCOUNTER
Spoke with pt she is still in hospital at this time she wants to keep the 3/4 appt she does not have a ride to be here sooner as her  cannot drive at the moment

## 2021-02-11 NOTE — PROGRESS NOTES
Physician Progress Note      PATIENT:               Susi Odom  CSN #:                  [id number]  :                       1940  ADMIT DATE:       2021 11:14 AM  [address] [place] DATE:  RESPONDING  PROVIDER #:        Summer Luciano MD          QUERY TEXT:    Patient admitted with BMI 42.7. If possible, please document in progress notes   and discharge summary if you are evaluating and /or treating any of the   following: The medical record reflects the following:    Risk Factors: DM, GERD  Clinical Indicators: ht 5'4\" wt 248 lb, BMI 42.7  Treatment: fall precautions, weight based/pharmacy dosed meds when needed    Thank you! Bigg Mclain, KIMR  RN Clinical   P: [phone]  Options provided:  -- Obesity  -- Morbid obesity  -- Other - I will add my own diagnosis  -- Disagree - Not applicable / Not valid  -- Disagree - Clinically unable to determine / Unknown  -- Refer to Clinical Documentation Reviewer    PROVIDER RESPONSE TEXT:    This patient has morbid obesity.     Query created by: Krystin Victor on 2021 11:24 AM      Electronically signed by:  Summer Luciano MD 2021 6:00 AM

## 2021-02-11 NOTE — PROGRESS NOTES
Spoke with Anabelle JOHNSON with ENT and okay for discharge. No follow up needed. He did ask that we gave patient office number in case of any issues in future. Perfect Serve from Dr. Ralph Sacks and he says okay to discharge and make a follow for in 2 weeks.

## 2021-02-11 NOTE — DISCHARGE SUMMARY
Hospitalist Discharge Summary      Addendum 2/13/21: Urine culture positive for Ecoli and psudom > 100.000 colonies, I prescribed ceftin as per culture and sensitivity report for one week and sent to the patient pharmacy. 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) [I63.442]     Nausea and vomiting [R11.2] 02/08/2021    Status post transcatheter aortic valve replacement (TAVR) using bioprosthesis [Z95.3] 02/03/2021    Type 2 diabetes mellitus with diabetic neuropathy, without long-term current use of insulin ([facility] [E11.40] 07/26/2017       Code Status:  DNR-CCA     Chief Complaint on presentation :-  Worsening N/V      Initial admission HPI as below:-  Pt s/p TACR on 2/3/21; descrives ongoing nausea since procedure which continued through hospital stay on 2/4; pt was discharged home w/ no further W/U; describes worseing nausea f/b bilious vomiting; denies hematemesis/melena/BRBPR/CP/SOB/palpitation/presyncope. Pt denies BM since last Thursday; reports flatus daily. Denies dysuria/urgency/frequency/hematuria/lower abdo or flank pain. Describes mild colicky abdo pain. Denies fever/chills. Could only tolerated fluids and oatmeal. Small slip and near fall from chair this a.m. but required assistance to get up d/t weakness.  Denies headache/dysarthria/dysphonia/focal neuro deficit/vertigo/tinnitus/hearing loss    Hospital problem list with assessment and plan updates:- 1. Nausea/voming: NYD etiology; likely 2/2 severe constipation/Garfield's picture vs pSBO/ileus vs others (UTI/PNA, increased ICH, etc). CT A/P yesterday; however, no report of bowels; will liaise w/ radiology, also KUB ordered along w/ CT head w/o to r/o acute intracerebral processes given recent TAVR .  CXR,lipase/ biochemistry non-contributory. Laxatives (including fleet enema) ordered. Placed on IVF. NPO for now. Will start on CL if no obstruction. If N/V worsening will consider NGT  2/9: much improved; + large BM; tolerating CL diet; will advance gradually as tolerated; no further abdo imaging warranted at this time. Of note: CT head w/o showed a Wedge-shaped low-attenuation in left cerebellum c/w acute/subacute infarction; no hemorrhage. MRA brain/neck non-contributory; noted MR brain w/o missed; ordered this a.m. will follow up. Noted neurology and neuroSx consulted overnight?? Will follow up on results. PT/OT to see. Permissive HTN for now     2. Large acute infarct left cerebellum hemisphere: possible explanation for possible increased ICH and ensuing ongoing N/V? managing as dicussed above. Pt already on DAPT/statin  2/10/2021: Already has residual neurological findings with left eye blurry vision since the previous CVA. Otherwise normal currently. Currently on antiplatelets dual in addition to high intensity lipid-lowering therapy, PT/OT to evaluate the patient. Appreciate neurology team recommendations. 3. S/p TAVR   4. HypoNa: likely 2/2 diuretics/dehydration; IVF, held diuretics will reassess response  2/9: resolvd. . As advancing diet; will reduce rate and later d/c IVF and trend serum Na  5. Hx of DM: well-controlled on OHA w/ recent A1C 6.8%. held metformin; SSI. diabetic diet, SSI, accuchecks, and hypoglycemia orders in place, A1C 6.8%  2/9: BS fairly well controlled.  CCM 6. Hx of HTN: home meds held later yesterday after suspicious stroke; BP remains well-controlled. CCM. Prn hydralazine in place for SBP greater than 180 and DBP greater than 110 mmHg  7. Hx of chronic HFpEF: well-compensated; held diuretics for now  8. Hx of non-obstructive-CAD/stroke; on guideline-directed treatment; trop mildly elevate, denies CP, EKG negative for new/dynamic ischemic changes, recent LHC unremarkable; will monitor  9. Hx of traumatic SAH  10. Hx of GERD: resumed home BID PPI  11. Hx of urinary incontinence  12. Code status: DNR-CCA as confirmed by pt and family along w/ advanced directive  15. Disposition: PT/OT to evaluate the patient before discharge. Patient wanted to go home with physical therapy and occupational therapist but I waiting for further recommendation from them before discharge.     PMH, PSH, [facility], [address] [facility],[address] reviewed in chart review snap shot in [facility]    Additional discharge final recommendations as below:-  Patient evaluated by physical therapist/occupational therapist and recommended home health with PT/OT. We ordered home health with PT/OT at the day of discharge. Also we ordered cardiac event monitor for 30 days per neurology recommendations to monitor for cardiac arrhythmias and possible thrombus formation lead to possible cryptogenic stroke. Patient improved without any residual neurological findings from new stroke. Patient to follow-up with her neurologist and primary care physician as scheduled. Management as above in my assessment and plan. She was tolerating orally with good urinary output and good bowel movements at the day of discharge and she was walking with physical therapist and occupational therapist down the hallway with no issues. Here to mention that patient has increased risk of hospital readmission because of current comorbidities.     Discharge Nurse(Corina Velarde RN) note at the time of discharge as below:- AVS discussed with patient and spouse. Patient given stroke folder and educated. Patient instructed she needs a follow up with Dr. Meir Harrington in 2 weeks. Patient discharged in stable condition per wheelchair with transport. All appointments obtained for the patient at the day of discharge with other specialities including PCP in one week. All questions and concerns addressed. Patient verbalized understandings and was agreeable with discharge planning. Physical Exam:-  Vitals:   Patient Vitals for the past 24 hrs:   BP Temp Temp src Pulse Resp SpO2   02/11/21 0434 (!) 155/72 98.1 °F (36.7 °C) Oral 68 20 95 %   02/10/21 2345 131/60   71 20 98 %   02/10/21 2100 (!) 159/71 98.3 °F (36.8 °C) Oral 74 20 96 %   02/10/21 1612 (!) 145/65 97.9 °F (36.6 °C) Oral 64 18 97 %   02/10/21 1215 126/68 98.8 °F (37.1 °C) Oral 75 18 97 %   02/10/21 1123    70     02/10/21 1045 (!) 136/58          Weight:   Weight: 249 lb 9.6 oz (113.2 kg)   24 hour intake/output:     Intake/Output Summary (Last 24 hours) at 2/11/2021 0925  Last data filed at 2/10/2021 1413  Gross per 24 hour   Intake 360 ml   Output 175 ml   Net 185 ml       1. General appearance: No apparent distress, appears stated age and cooperative. 2. HEENT: Normal cephalic, atraumatic without obvious deformity. Pupils equal, round, and reactive to light. Extra ocular muscles intact. Conjunctivae/corneas clear. 3. Neck: Supple, with full range of motion. No jugular venous distention. Trachea midline. 4. Respiratory:  Normal respiratory effort. Clear to auscultation, bilaterally without Rales/Wheezes/Rhonchi. 5. Cardiovascular: Regular rate and rhythm with normal S1/S2 without murmurs, rubs or gallops. 6. Abdomen: Soft, non-tender, non-distended with normal bowel sounds. 7. Musculoskeletal:  No clubbing, cyanosis or edema bilaterally. 8. Skin: Skin color, texture, turgor normal.  No rashes or lesions. potassium chloride 20 MEQ extended release tablet  Commonly known as: KLOR-CON M  Take 0.5 tablets by mouth daily     quinapril 10 MG tablet  Commonly known as: ACCUPRIL  Take 1 tablet by mouth daily     sertraline 100 MG tablet  Commonly known as: ZOLOFT  TAKE 2 TABLETS DAILY     vitamin D 1000 UNIT Tabs tablet  Commonly known as: CHOLECALCIFEROL     zinc sulfate 220 (50 Zn) MG capsule  Commonly known as: ZINCATE  Take 1 capsule by mouth daily        STOP taking these medications    amLODIPine 10 MG tablet  Commonly known as: NORVASC     pantoprazole 40 MG tablet  Commonly known as: PROTONIX     promethazine 25 MG tablet  Commonly known as: PHENERGAN     traMADol 50 MG tablet  Commonly known as: Ultram             Labs :-  Recent Results (from the past 72 hour(s))   EKG Nausea    Collection Time: 02/08/21 11:37 AM   Result Value Ref Range    Ventricular Rate 71 BPM    Atrial Rate 71 BPM    P-R Interval 158 ms    QRS Duration 156 ms    Q-T Interval 458 ms    QTc Calculation (Bazett) 497 ms    P Axis 23 degrees    R Axis -25 degrees    T Axis 94 degrees   CBC auto differential    Collection Time: 02/08/21 11:38 AM   Result Value Ref Range    WBC 12.6 (H) 4.8 - 10.8 thou/mm3    RBC 4.78 4.20 - 5.40 mill/mm3    Hemoglobin 14.5 12.0 - 16.0 gm/dl    Hematocrit 41.9 37.0 - 47.0 %    MCV 87.7 81.0 - 99.0 fL    MCH 30.3 26.0 - 33.0 pg    MCHC 34.6 32.2 - 35.5 gm/dl    RDW-CV 13.4 11.5 - 14.5 %    RDW-SD 42.5 35.0 - 45.0 fL    Platelets 078 986 - 940 thou/mm3    MPV 10.1 9.4 - 12.4 fL    Seg Neutrophils 80.3 %    Lymphocytes 10.1 %    Monocytes 7.8 %    Eosinophils 1.2 %    Basophils 0.3 %    Immature Granulocytes 0.3 %    Segs Absolute 10.1 (H) 1.8 - 7.7 thou/mm3    Lymphocytes Absolute 1.3 1.0 - 4.8 thou/mm3    Monocytes Absolute 1.0 0.4 - 1.3 thou/mm3    Eosinophils Absolute 0.2 0.0 - 0.4 thou/mm3    Basophils Absolute 0.0 0.0 - 0.1 thou/mm3    Immature Grans (Abs) 0.04 0.00 - 0.07 thou/mm3    nRBC 0 /100 wbc Brain Natriuretic Peptide    Collection Time: 02/08/21 11:38 AM   Result Value Ref Range    Pro-.7 0.0 - 1800.0 pg/mL   Basic Metabolic Panel    Collection Time: 02/08/21 11:38 AM   Result Value Ref Range    Sodium 132 (L) 135 - 145 meq/L    Potassium 4.5 3.5 - 5.2 meq/L    Chloride 96 (L) 98 - 111 meq/L    CO2 21 (L) 23 - 33 meq/L    Glucose 158 (H) 70 - 108 mg/dL    BUN 19 7 - 22 mg/dL    CREATININE 0.6 0.4 - 1.2 mg/dL    Calcium 9.6 8.5 - 10.5 mg/dL   Hepatic function panel    Collection Time: 02/08/21 11:38 AM   Result Value Ref Range    Albumin 4.3 3.5 - 5.1 g/dL    Total Bilirubin 0.9 0.3 - 1.2 mg/dL    Bilirubin, Direct <0.2 0.0 - 0.3 mg/dL    Alkaline Phosphatase 69 38 - 126 U/L    AST 38 5 - 40 U/L    ALT 21 11 - 66 U/L    Total Protein 7.6 6.1 - 8.0 g/dL   Troponin    Collection Time: 02/08/21 11:38 AM   Result Value Ref Range    Troponin T 0.036 (A) ng/ml   Magnesium    Collection Time: 02/08/21 11:38 AM   Result Value Ref Range    Magnesium 2.1 1.6 - 2.4 mg/dL   Lipase    Collection Time: 02/08/21 11:38 AM   Result Value Ref Range    Lipase 20.0 5.6 - 51.3 U/L   Amylase    Collection Time: 02/08/21 11:38 AM   Result Value Ref Range    Amylase 17 (L) 20 - 104 U/L   Anion Gap    Collection Time: 02/08/21 11:38 AM   Result Value Ref Range    Anion Gap 15.0 8.0 - 16.0 meq/L   Glomerular Filtration Rate, Estimated    Collection Time: 02/08/21 11:38 AM   Result Value Ref Range    Est, Glom Filt Rate >90 ml/min/1.73m2   Osmolality    Collection Time: 02/08/21 11:38 AM   Result Value Ref Range    Osmolality Calc 270.1 (L) 275.0 - 300.0 mOsmol/kg   POCT Glucose    Collection Time: 02/08/21  7:58 PM   Result Value Ref Range    POC Glucose 137 (H) 70 - 108 mg/dl   Urine with Reflexed Micro    Collection Time: 02/09/21  3:20 AM   Result Value Ref Range    Glucose, Ur NEGATIVE NEGATIVE mg/dl    Bilirubin Urine NEGATIVE NEGATIVE    Ketones, Urine NEGATIVE NEGATIVE Specific Gravity, Urine 1.020 1.002 - 1.030    Blood, Urine NEGATIVE NEGATIVE    pH, UA 5.5 5.0 - 9.0    Protein,  (A) NEGATIVE    Urobilinogen, Urine 0.2 0.0 - 1.0 eu/dl    Nitrite, Urine NEGATIVE NEGATIVE    Leukocyte Esterase, Urine MODERATE (A) NEGATIVE    Color, UA YELLOW STRAW-YELLOW    Character, Urine CLOUDY (A) CLEAR-SL CLOUD    RBC, UA NONE SEEN 0-2/hpf /hpf    WBC, UA 5-9 0-4/hpf /hpf    Epithelial Cells, UA 0-2 3-5/hpf /hpf    Bacteria, UA MANY FEW/NONE SEEN /hpf    Casts UA 8-15 HYALINE NONE SEEN /lpf    Crystals, UA NONE SEEN NONE SEEN    Renal Epithelial, UA NONE SEEN NONE SEEN    Yeast, UA NONE SEEN NONE SEEN    CASTS 2 NONE SEEN NONE SEEN /lpf    MISCELLANEOUS 2 NONE SEEN    Culture, Reflexed, Urine    Collection Time: 02/09/21  3:20 AM    Specimen: Urine, clean catch   Result Value Ref Range    Organism Escherichia coli (A)     Urine Culture Reflex [place] count: >100,000 CFU/mL      Organism nonfermenting gram negative bacilli (A)     Urine Culture Reflex [place] count: 10,000-50,000 CFU/mL         Susceptibility    Escherichia coli - BACTERIAL SUSCEPTIBILITY PANEL BY BHUPENDRA     amoxicillin-clavulanate <=2 Sensitive mcg/mL     cefOXitin <=4 Sensitive mcg/mL     cefTRIAXone <=1 Sensitive mcg/mL     gentamicin <=1 Sensitive mcg/mL     ampicillin <=2 Sensitive mcg/mL     ciprofloxacin >=4 Resistant mcg/mL     trimethoprim-sulfamethoxazole <=20 Sensitive mcg/mL     levofloxacin >=8 Resistant mcg/mL     tetracycline <=1 Sensitive mcg/mL     nitrofurantoin <=16 Sensitive mcg/mL   Basic Metabolic Panel    Collection Time: 02/09/21  4:19 AM   Result Value Ref Range    Sodium 135 135 - 145 meq/L    Potassium 3.7 3.5 - 5.2 meq/L    Chloride 102 98 - 111 meq/L    CO2 23 23 - 33 meq/L    Glucose 132 (H) 70 - 108 mg/dL    BUN 18 7 - 22 mg/dL    CREATININE 0.7 0.4 - 1.2 mg/dL    Calcium 8.4 (L) 8.5 - 10.5 mg/dL   CBC Auto Differential    Collection Time: 02/09/21  4:19 AM   Result Value Ref Range WBC 11.0 (H) 4.8 - 10.8 thou/mm3    RBC 3.91 (L) 4.20 - 5.40 mill/mm3    Hemoglobin 11.8 (L) 12.0 - 16.0 gm/dl    Hematocrit 34.4 (L) 37.0 - 47.0 %    MCV 88.0 81.0 - 99.0 fL    MCH 30.2 26.0 - 33.0 pg    MCHC 34.3 32.2 - 35.5 gm/dl    RDW-CV 13.8 11.5 - 14.5 %    RDW-SD 43.9 35.0 - 45.0 fL    Platelets 965 292 - 295 thou/mm3    MPV 10.1 9.4 - 12.4 fL    Seg Neutrophils 69.6 %    Lymphocytes 18.4 %    Monocytes 8.1 %    Eosinophils 3.1 %    Basophils 0.4 %    Immature Granulocytes 0.4 %    Segs Absolute 7.7 1.8 - 7.7 thou/mm3    Lymphocytes Absolute 2.0 1.0 - 4.8 thou/mm3    Monocytes Absolute 0.9 0.4 - 1.3 thou/mm3    Eosinophils Absolute 0.3 0.0 - 0.4 thou/mm3    Basophils Absolute 0.0 0.0 - 0.1 thou/mm3    Immature Grans (Abs) 0.04 0.00 - 0.07 thou/mm3    nRBC 0 /100 wbc   Anion Gap    Collection Time: 02/09/21  4:19 AM   Result Value Ref Range    Anion Gap 10.0 8.0 - 16.0 meq/L   Glomerular Filtration Rate, Estimated    Collection Time: 02/09/21  4:19 AM   Result Value Ref Range    Est, Glom Filt Rate 81 (A) ml/min/1.73m2   EKG 12 Lead    Collection Time: 02/09/21  4:48 AM   Result Value Ref Range    Ventricular Rate 68 BPM    Atrial Rate 68 BPM    P-R Interval 184 ms    QRS Duration 152 ms    Q-T Interval 470 ms    QTc Calculation (Bazett) 499 ms    P Axis -15 degrees    R Axis -24 degrees    T Axis 96 degrees   Basic Metabolic Panel    Collection Time: 02/10/21  3:37 AM   Result Value Ref Range    Sodium 137 135 - 145 meq/L    Potassium 4.1 3.5 - 5.2 meq/L    Chloride 106 98 - 111 meq/L    CO2 22 (L) 23 - 33 meq/L    Glucose 109 (H) 70 - 108 mg/dL    BUN 20 7 - 22 mg/dL    CREATININE 0.8 0.4 - 1.2 mg/dL    Calcium 8.6 8.5 - 10.5 mg/dL   CBC Auto Differential    Collection Time: 02/10/21  3:37 AM   Result Value Ref Range    WBC 8.7 4.8 - 10.8 thou/mm3    RBC 3.88 (L) 4.20 - 5.40 mill/mm3    Hemoglobin 11.7 (L) 12.0 - 16.0 gm/dl    Hematocrit 35.4 (L) 37.0 - 47.0 %    MCV 91.2 81.0 - 99.0 fL MCH 30.2 26.0 - 33.0 pg    MCHC 33.1 32.2 - 35.5 gm/dl    RDW-CV 13.8 11.5 - 14.5 %    RDW-SD 45.6 (H) 35.0 - 45.0 fL    Platelets 685 724 - 195 thou/mm3    MPV 10.3 9.4 - 12.4 fL    Seg Neutrophils 60.0 %    Lymphocytes 24.4 %    Monocytes 8.0 %    Eosinophils 6.8 %    Basophils 0.3 %    Immature Granulocytes 0.5 %    Segs Absolute 5.2 1.8 - 7.7 thou/mm3    Lymphocytes Absolute 2.1 1.0 - 4.8 thou/mm3    Monocytes Absolute 0.7 0.4 - 1.3 thou/mm3    Eosinophils Absolute 0.6 (H) 0.0 - 0.4 thou/mm3    Basophils Absolute 0.0 0.0 - 0.1 thou/mm3    Immature Grans (Abs) 0.04 0.00 - 0.07 thou/mm3    nRBC 0 /100 wbc   Anion Gap    Collection Time: 02/10/21  3:37 AM   Result Value Ref Range    Anion Gap 9.0 8.0 - 16.0 meq/L   Glomerular Filtration Rate, Estimated    Collection Time: 02/10/21  3:37 AM   Result Value Ref Range    Est, Glom Filt Rate 69 (A) AWA/PBT/0.94R2   Basic Metabolic Panel    Collection Time: 02/11/21  5:16 AM   Result Value Ref Range    Sodium 135 135 - 145 meq/L    Potassium 3.9 3.5 - 5.2 meq/L    Chloride 104 98 - 111 meq/L    CO2 24 23 - 33 meq/L    Glucose 151 (H) 70 - 108 mg/dL    BUN 18 7 - 22 mg/dL    CREATININE 0.7 0.4 - 1.2 mg/dL    Calcium 8.8 8.5 - 10.5 mg/dL   CBC Auto Differential    Collection Time: 02/11/21  5:16 AM   Result Value Ref Range    WBC 8.2 4.8 - 10.8 thou/mm3    RBC 3.75 (L) 4.20 - 5.40 mill/mm3    Hemoglobin 11.3 (L) 12.0 - 16.0 gm/dl    Hematocrit 33.8 (L) 37.0 - 47.0 %    MCV 90.1 81.0 - 99.0 fL    MCH 30.1 26.0 - 33.0 pg    MCHC 33.4 32.2 - 35.5 gm/dl    RDW-CV 13.9 11.5 - 14.5 %    RDW-SD 45.4 (H) 35.0 - 45.0 fL    Platelets 610 120 - 831 thou/mm3    MPV 10.6 9.4 - 12.4 fL    Seg Neutrophils 59.7 %    Lymphocytes 25.8 %    Monocytes 8.2 %    Eosinophils 5.4 %    Basophils 0.4 %    Immature Granulocytes 0.5 %    Segs Absolute 4.9 1.8 - 7.7 thou/mm3    Lymphocytes Absolute 2.1 1.0 - 4.8 thou/mm3    Monocytes Absolute 0.7 0.4 - 1.3 thou/mm3 Eosinophils Absolute 0.4 0.0 - 0.4 thou/mm3    Basophils Absolute 0.0 0.0 - 0.1 thou/mm3    Immature Grans (Abs) 0.04 0.00 - 0.07 thou/mm3    nRBC 0 /100 wbc   Anion Gap    Collection Time: 02/11/21  5:16 AM   Result Value Ref Range    Anion Gap 7.0 (L) 8.0 - 16.0 meq/L   Glomerular Filtration Rate, Estimated    Collection Time: 02/11/21  5:16 AM   Result Value Ref Range    Est, Glom Filt Rate 81 (A) ml/min/1.73m2        Microbiology:    Blood culture #1: No results found for: BC    Blood culture #2:No results found for: BLOODCULT2    Organism:  No results found for: LABGRAM    MRSA culture only:No results found for: [facility]    Urine culture:   Lab Results   Component Value Date    LABURIN [place] count: >100,000 CFU/mL 05/18/2017    LABURIN 94.5 mg 06/13/2016     Lab Results   Component Value Date    ORG Escherichia coli 02/09/2021    ORG nonfermenting gram negative bacilli 02/09/2021        Respiratory culture: No results found for: CULTRESP    Aerobic and Anaerobic :  No results found for: LABAERO  No results found for: LABANAE    Urinalysis:      Lab Results   Component Value Date    NITRU NEGATIVE 02/09/2021    WBCUA 5-9 02/09/2021    BACTERIA MANY 02/09/2021    RBCUA NONE SEEN 02/09/2021    BLOODU NEGATIVE 02/09/2021    SPECGRAV 1.019 02/07/2021    GLUCOSEU NEGATIVE 02/09/2021       Radiology:-  Xr Chest (2 Vw)    Result Date: 2/8/2021  PROCEDURE: XR CHEST (2 VW) CLINICAL INFORMATION: Nausea vomiting elevated troponin status post TAVR. COMPARISON: Multiple overlying wires. February 7, 2021. TECHNIQUE: PA and lateral views the chest. FINDINGS: Aortic stent graft. No lobar consolidation. Costophrenic recesses are preserved. No cardiomegaly. Degenerative changes thoracic spine. IMPRESSION: No lobar consolidation. **This report has been created using voice recognition software. It may contain minor errors which are inherent in voice recognition technology. ** Final report electronically signed by Dr. Yandy Marshall on 2/8/2021 12:32 PM    Xr Abdomen (kub) (single Ap View)    Result Date: 2/8/2021  PROCEDURE: XR ABDOMEN (KUB) (SINGLE AP VIEW) CLINICAL INFORMATION: N/V. COMPARISON: April 29, 2019 TECHNIQUE: A supine AP view of the abdomen was obtained. FINDINGS: Postsurgical changes lower lumbar spine with fusion L2-L4. Mild lumbar scoliosis. Surgical clips in the right upper quadrant. Stool throughout the colon correlate for constipation. No acute osseous findings. IMPRESSION: Stool throughout the colon. Correlate for constipation. Otherwise, nonspecific bowel gas pattern. **This report has been created using voice recognition software. It may contain minor errors which are inherent in voice recognition technology. ** Final report electronically signed by Dr. Yandy Marshall on 2/8/2021 2:03 PM    Ct Head Wo Contrast    Addendum Date: 2/8/2021 ** ADDENDUM #1 ** Findings reported to as Lucho Bell CNP 8:04 PM on 02/08/2021. MRI is advised if clinically warrented. Final report electronically signed by Dr. Alma Clifton on 2/8/2021 8:07 PM ** ORIGINAL REPORT ** PROCEDURE: CT HEAD WO CONTRAST CLINICAL INFORMATION: r/o acute. COMPARISON: April 29, 2019 TECHNIQUE: Noncontrast 5 mm axial images were obtained through the brain. All CT scans at this facility use dose modulation, iterative reconstruction, and/or weight-based dosing when appropriate to reduce radiation dose to as low as reasonably achievable. FINDINGS: Mild generalized volume loss. Small vessel disease. Wedge-shaped low-attenuation the left cerebellum correlate for acute subacute infarction. No acute hemorrhage or midline shift. Ventricles are within normal limits for age. Paranasal sinuses are clear. Mastoid air cells are patent. Skull: Unremarkable. Soft tissues: Unremarkable. Result Date: 2/8/2021  PROCEDURE: CT HEAD WO CONTRAST CLINICAL INFORMATION: r/o acute. COMPARISON: April 29, 2019 TECHNIQUE: Noncontrast 5 mm axial images were obtained through the brain. All CT scans at this facility use dose modulation, iterative reconstruction, and/or weight-based dosing when appropriate to reduce radiation dose to as low as reasonably achievable. FINDINGS: Mild generalized volume loss. Small vessel disease. Wedge-shaped low-attenuation the left cerebellum correlate for acute subacute infarction. No acute hemorrhage or midline shift. Ventricles are within normal limits for age. Paranasal sinuses are clear. Mastoid air cells are patent. Skull: Unremarkable. Soft tissues: Unremarkable. Wedge-shaped low-attenuation the left cerebellum correlate for acute/subacute infarction. No acute hemorrhage. **This report has been created using voice recognition software. It may contain minor errors which are inherent in voice recognition technology. ** Final report electronically signed by Dr. Jose Eduardo Potter on 2/8/2021 6:20 PM    Mra Head Wo Contrast    Result Date: 2/8/2021  MR angiogram head/[place] without contrast Comparison: None Findings: Normal configuration noted in the [place] vessels. No filling defect, vessel truncation or significant stenosis. No focal aneurysm or central vascular abnormality. No peripheral vascular malformations demonstrated. Impression: No significant abnormalities. This document has been electronically signed by: Elli Rocha MD on 02/08/2021 09:54 PM     Mra Neck Wo Contrast    Result Date: 2/8/2021  MR angiogram of the neck/carotid arteries without contrast Comparison: None Findings: There is motion artifact adversely affecting multiple series. Aortic arch not included on the study. There is no evidence for carotid bifurcation stenosis. Bilateral vertebral arteries patent, left vertebral dominant. Impression: Somewhat limited study by motion artifact. No significant stenosis. This document has been electronically signed by: Elli Rocha MD on 02/08/2021 09:56 PM Carotid stenosis and measurements are in accordance with the NASCET criteria.      Mri Brain Wo Contrast    Result Date: 2/9/2021 PROCEDURE: MRI BRAIN WO CONTRAST CLINICAL INFORMATION left cerebellar stroke. COMPARISON: CT scan of the brain dated 8 February 2021. TECHNIQUE: Multiplanar and multiple spin echo MRI images were obtained of the brain without contrast. FINDINGS: The diffusion-weighted images demonstrate restricted diffusion in the left cerebellar hemisphere inferiorly consistent with an acute infarct. There are smaller areas of restricted diffusion in the right cerebellar hemisphere, in the right posterior temporal and occipital and parietal lobes and in the left side of the asmita consistent with areas of acute ischemia or infarcts The brain volume is normal. There is a mild amount of signal hyperintensity on the FLAIR and T2-weighted sequences in the white  matter of the brain. This is consistent with mild severity chronic small vessel ischemic changes. There are no intra-or extra-axial collections. There is no hydrocephalus, midline shift or mass effect. There are no areas of susceptibility artifact noted. The major intracranial vascular flow voids are present. The midline craniocervical junction structures are normal.  The pituitary gland and brainstem are normal.      1. 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 2. Smaller areas of restricted diffusion in the right cerebellar hemisphere, right posterior temporal and occipital lobes, right parietal parasagittal region and in the left side of the asmita consistent with areas of acute ischemia or infarcts. 3. Probable ischemic changes in the periventricular white matter. 4. Otherwise negative MRI scan  of the brain. **This report has been created using voice recognition software. It may contain minor errors which are inherent in voice recognition technology. ** Final report electronically signed by DR Emmanuel Fowler on 2/9/2021 4:00 PM       Follow-up scheduled after discharge :-    today with Raymond Dense, DO  in the next few days Consultations during this hospital stay:-  [] NONE [] Cardiology  [] Nephrology  [] Hemo onco   [] GI   [] ID  [] Endocrine  [] Pulm    [x] Neuro    [] Psych   [] Urology  [] ENT   [] G SURGERY   []Ortho    []CV surg    [x] Palliative  [] Hospice [] Pain management   [x]    []TCU   [x] PT/OT  OTHERS:-     Disposition: home with home health care PT/OT  Condition at Discharge: Stable    Discharge Medications:      Eli Lopez   Home Medication Instructions HII:[id number]    Printed on:02/11/21 8325   Medication Information                      aspirin 81 MG tablet  Take 81 mg by mouth daily             atenolol (TENORMIN) 25 MG tablet  Take 1 tablet by mouth daily             atorvastatin (LIPITOR) 40 MG tablet  TAKE 1 TABLET DAILY             clopidogrel (PLAVIX) 75 MG tablet  Take 1 tablet by mouth daily             furosemide (LASIX) 40 MG tablet  Take 0.5 tablets by mouth daily             hydrALAZINE (APRESOLINE) 50 MG tablet  Take 1 tablet by mouth 2 times daily             Magnesium Oxide 250 MG TABS  Take 1 tablet by mouth 2 times daily             metFORMIN (GLUCOPHAGE) 500 MG tablet  TAKE 1 TABLET TWICE A DAY WITH MEALS             nystatin (MYCOSTATIN) 492541 UNIT/GM cream  Apply topically 2 times daily. nystatin (MYCOSTATIN) 411376 UNIT/GM cream  Apply topically as needed             ondansetron (ZOFRAN ODT) 4 MG disintegrating tablet  Take 1 tablet by mouth every 8 hours as needed for Nausea or Vomiting             potassium chloride (KLOR-CON M) 20 MEQ extended release tablet  Take 0.5 tablets by mouth daily             quinapril (ACCUPRIL) 10 MG tablet  Take 1 tablet by mouth daily             sertraline (ZOLOFT) 100 MG tablet  TAKE 2 TABLETS DAILY             vitamin C (VITAMIN C) 1000 MG tablet  Take 1 tablet by mouth daily             vitamin D (CHOLECALCIFEROL) 1000 UNIT TABS tablet  Take 1,000 Units by mouth nightly. zinc sulfate (ZINCATE) 220 (50 Zn) MG capsule  Take 1 capsule by mouth daily                      Time Spent:- 45 minutes    Electronically signed by Yordy Baird MD on 2/11/2021 at 9:25 AM  Discharging Hospitalist

## 2021-02-11 NOTE — PROGRESS NOTES
[facility]  INPATIENT PHYSICAL THERAPY  EVALUATION  [facility]025-A    Time In: 7067  Time Out: 5314  Timed Code Treatment Minutes: 10 Minutes  Minutes: 23          Date: 2021  Patient Name: Ari Anderson,  Gender:  female        MRN: [id number]  : 1940  (68 y.o.)      Referring Practitioner: Dr. Aleshia Armendariz  Diagnosis: N&V  Additional Pertinent Hx: 68 y.o. female admitted to [facility] on 2021. She presents for evaluation of nausea and dry heaving, vomiting since had TAVR 5 days ago (2/3/2021). Patient has been having dry heaves since then with pain when tenderness on the epigastric and upper quadrant. Patient states that her nausea vomiting started after the TAVR. Patient denies any chest pain, patient's feel weak no shortness of breath denies any diarrhea melena hematochezia. Patient was evaluated last night however the patient declined admission and went home. This morning continues to have dry heaves and went to see Dr. Kelsi Patel today and his office and was sent here for admission. CT head done yesterday showed Wedge-shaped low-attenuation the left cerebellum correlate for acute subacute infarction. MRI damion-large acute infarct in left cerevellar hemisphere     Restrictions/Precautions:  Restrictions/Precautions: Fall Risk  Position Activity Restriction  Other position/activity restrictions: CT head  showed Wedge-shaped low-attenuation the left cerebellum correlate for acute subacute infarction.     Subjective:  Chart Reviewed: Yes  Patient assessed for rehabilitation services?: Yes  Subjective: pleasant and cooperative, per pt going home today and no questions or concerns for home mobility    General:            Hearing: Within functional limits         Pain: no pain per pt    Social/Functional History:    Lives With: Spouse  Type of Home: House  Home Layout: One level  Home Access: (1) Treatment Initiated: Treatment and education initiated within context of evaluation. Evaluation time included review of current medical information, gathering information related to past medical, social and functional history, completion of standardized testing, formal and informal observation of tasks, assessment of data and development of plan of care and goals. Treatment time included skilled education and facilitation of tasks to increase safety and independence with functional mobility for improved independence and quality of life. Assessment:   Body structures, Functions, Activity limitations: Decreased functional mobility , Decreased balance, Decreased endurance, Decreased strength  Assessment: per pt hx of multiple back surgeries affecting her posture, generalized weakness, use of walker and inc assist for safe mobility, recommend cont PT to inc pt I with functional mobility  Prognosis: Good    REQUIRES PT FOLLOW UP: Yes    Discharge Recommendations:  Discharge Recommendations: Continue to assess pending progress, Home with Home health PT, Patient would benefit from continued therapy after discharge    Patient Education:  PT Education: Goals, PT Role, Plan of Care, Functional Mobility Training    Equipment Recommendations:  Equipment Needed: No    Plan:  Times per week: 3-5X N  Times per day: Daily  Specific instructions for Next Treatment: therex and mobility    Goals:  Patient goals : return home with [facility]  Short term goals  Time Frame for Short term goals: by discharge  Short term goal 1: bed mobility with MOD I to get in/out of bed  Short term goal 2: transfer with MOD I to get in/out of chairs  Short term goal 3: amb >150'x1 with RW and MOD I to walk safely in home  Short term goal 4: negotiate 1 step with RW and S to enter home safely  Long term goals  Time Frame for Long term goals : no LTGs set secondary to short ELOS Following session, patient left in safe position with all fall risk precautions in place.

## 2021-02-11 NOTE — CARE COORDINATION
2/11/21, 9:52 AM EST    Patient goals/plan/ treatment preferences discussed by  and . Patient goals/plan/ treatment preferences reviewed with patient/ family. Patient/ family verbalize understanding of discharge plan and are in agreement with goal/plan/treatment preferences. Understanding was demonstrated using the teach back method. AVS provided by RN at time of discharge, which includes all necessary medical information pertaining to the patients current course of illness, treatment, post-discharge goals of care, and treatment preferences. Services After Discharge  Services At/After Discharge: OT, PT   IMM Letter  IMM Letter given to Patient/Family/Significant other/Guardian/POA/by[de-identified]   IMM Letter date given[de-identified] 02/11/21  IMM Letter time given[de-identified] 5623       Planning home today with [facility].  Message left for Carri Hagan at [facility] regarding referral.     Electronically signed by Mary Beren RN on 2/11/2021 at 9:53 AM

## 2021-02-12 ENCOUNTER — TELEPHONE (OUTPATIENT)
Dept: FAMILY MEDICINE CLINIC | Age: 81
End: 2021-02-12

## 2021-02-12 LAB
ORGANISM: ABNORMAL
ORGANISM: ABNORMAL
URINE CULTURE REFLEX: ABNORMAL
URINE CULTURE REFLEX: ABNORMAL

## 2021-02-12 NOTE — TELEPHONE ENCOUNTER
[facility] Transitions Initial Follow Up Call    Outreach made within 2 business days of discharge: Yes    Patient: Maurice Martinez Patient : 1940   MRN: [id number]  Reason for Admission: There are no discharge diagnoses documented for the most recent discharge. Discharge Date: 21       Spoke with: Janina Florez    Discharge department/facility: [facility]    TCM Interactive Patient Contact:  Was patient able to fill all prescriptions: Yes  Was patient instructed to bring all medications to the follow-up visit: Yes  Is patient taking all medications as directed in the discharge summary?  Yes  Does patient understand their discharge instructions: Yes  Does patient have questions or concerns that need addressed prior to 7-14 day follow up office visit: no    Scheduled appointment with PCP within 7-14 days    Follow Up  Future Appointments   Date Time Provider James Cruz   2021  9:15 AM SEAN Rueda CNP N SRPX CHF [address]   3/1/2021  1:00 PM STR ECHO RM3 STRZ ECHO [address] [place] [address]   3/3/2021 11:00 AM SEAN Fried CNP N [facility] CHF [address]   3/4/2021 12:00 PM DO GERALDINE Page PCT [address]   3/8/2021  1:00 PM Ofe Soliman MD N SRPX Heart [address]   2021 11:45 AM Marky Damon MD [facility] ([address] [place] [address])

## 2021-02-13 RX ORDER — CEFUROXIME AXETIL 250 MG/1
250 TABLET ORAL 2 TIMES DAILY
Qty: 14 TABLET | Refills: 0 | Status: SHIPPED | OUTPATIENT
Start: 2021-02-13 | End: 2021-02-20

## 2021-02-17 ENCOUNTER — OFFICE VISIT (OUTPATIENT)
Dept: CARDIOLOGY CLINIC | Age: 81
End: 2021-02-17
Payer: MEDICARE

## 2021-02-17 VITALS
WEIGHT: 253 LBS | DIASTOLIC BLOOD PRESSURE: 70 MMHG | SYSTOLIC BLOOD PRESSURE: 124 MMHG | HEART RATE: 81 BPM | HEIGHT: 64 IN | BODY MASS INDEX: 43.19 KG/M2 | OXYGEN SATURATION: 94 %

## 2021-02-17 DIAGNOSIS — I50.32 CHF (CONGESTIVE HEART FAILURE), NYHA CLASS II, CHRONIC, DIASTOLIC (HCC): Primary | ICD-10-CM

## 2021-02-17 DIAGNOSIS — Z95.2 S/P TAVR (TRANSCATHETER AORTIC VALVE REPLACEMENT): ICD-10-CM

## 2021-02-17 DIAGNOSIS — I10 ESSENTIAL HYPERTENSION: ICD-10-CM

## 2021-02-17 DIAGNOSIS — I35.0 SEVERE AORTIC STENOSIS: ICD-10-CM

## 2021-02-17 DIAGNOSIS — E66.01 MORBID OBESITY WITH BMI OF 40.0-44.9, ADULT (HCC): ICD-10-CM

## 2021-02-17 PROCEDURE — G8399 PT W/DXA RESULTS DOCUMENT: HCPCS | Performed by: NURSE PRACTITIONER

## 2021-02-17 PROCEDURE — 4040F PNEUMOC VAC/ADMIN/RCVD: CPT | Performed by: NURSE PRACTITIONER

## 2021-02-17 PROCEDURE — 1036F TOBACCO NON-USER: CPT | Performed by: NURSE PRACTITIONER

## 2021-02-17 PROCEDURE — G8427 DOCREV CUR MEDS BY ELIG CLIN: HCPCS | Performed by: NURSE PRACTITIONER

## 2021-02-17 PROCEDURE — G8484 FLU IMMUNIZE NO ADMIN: HCPCS | Performed by: NURSE PRACTITIONER

## 2021-02-17 PROCEDURE — 99214 OFFICE O/P EST MOD 30 MIN: CPT | Performed by: NURSE PRACTITIONER

## 2021-02-17 PROCEDURE — 1090F PRES/ABSN URINE INCON ASSESS: CPT | Performed by: NURSE PRACTITIONER

## 2021-02-17 PROCEDURE — 1111F DSCHRG MED/CURRENT MED MERGE: CPT | Performed by: NURSE PRACTITIONER

## 2021-02-17 PROCEDURE — 1123F ACP DISCUSS/DSCN MKR DOCD: CPT | Performed by: NURSE PRACTITIONER

## 2021-02-17 PROCEDURE — G8417 CALC BMI ABV UP PARAM F/U: HCPCS | Performed by: NURSE PRACTITIONER

## 2021-02-17 RX ORDER — ONDANSETRON 4 MG/1
TABLET, FILM COATED ORAL
COMMUNITY
Start: 2021-02-07 | End: 2021-02-17

## 2021-02-17 RX ORDER — HYDRALAZINE HYDROCHLORIDE 50 MG/1
25 TABLET, FILM COATED ORAL 2 TIMES DAILY
Qty: 180 TABLET | Refills: 3
Start: 2021-02-17 | End: 2021-12-21

## 2021-02-17 ASSESSMENT — ENCOUNTER SYMPTOMS
ABDOMINAL DISTENTION: 0
NAUSEA: 0
APNEA: 0
COUGH: 0
ABDOMINAL PAIN: 0
WHEEZING: 0
BACK PAIN: 1
SHORTNESS OF BREATH: 1
COLOR CHANGE: 0
CHEST TIGHTNESS: 0

## 2021-02-17 NOTE — PROGRESS NOTES
 cefUROXime (CEFTIN) 250 MG tablet Take 1 tablet by mouth 2 times daily for 7 days 14 tablet 0    quinapril (ACCUPRIL) 10 MG tablet Take 1 tablet by mouth daily 90 tablet 4    atenolol (TENORMIN) 25 MG tablet Take 1 tablet by mouth daily 90 tablet 3    clopidogrel (PLAVIX) 75 MG tablet Take 1 tablet by mouth daily 30 tablet 3    furosemide (LASIX) 40 MG tablet Take 0.5 tablets by mouth daily 60 tablet 3    potassium chloride (KLOR-CON M) 20 MEQ extended release tablet Take 0.5 tablets by mouth daily 90 tablet 1    Magnesium Oxide 250 MG TABS Take 1 tablet by mouth 2 times daily 60 tablet 5    vitamin C (VITAMIN C) 1000 MG tablet Take 1 tablet by mouth daily 30 tablet 3    zinc sulfate (ZINCATE) 220 (50 Zn) MG capsule Take 1 capsule by mouth daily 30 capsule 3    ondansetron (ZOFRAN ODT) 4 MG disintegrating tablet Take 1 tablet by mouth every 8 hours as needed for Nausea or Vomiting 20 tablet 0    metFORMIN (GLUCOPHAGE) 500 MG tablet TAKE 1 TABLET TWICE A DAY WITH MEALS 180 tablet 4    atorvastatin (LIPITOR) 40 MG tablet TAKE 1 TABLET DAILY 90 tablet 4    sertraline (ZOLOFT) 100 MG tablet TAKE 2 TABLETS DAILY 180 tablet 4    nystatin (MYCOSTATIN) 133830 UNIT/GM cream Apply topically 2 times daily. (Patient taking differently: Apply topically as needed Apply topically 2 times daily. ) 1 Tube 1    aspirin 81 MG tablet Take 81 mg by mouth daily      vitamin D (CHOLECALCIFEROL) 1000 UNIT TABS tablet Take 1,000 Units by mouth nightly. No current facility-administered medications for this visit. Allergies   Allergen Reactions    Norco [Hydrocodone-Acetaminophen] Itching    Tramadol Itching    Codeine Hives and Rash       SUBJECTIVE:   Review of Systems   Constitutional: Positive for fatigue. Negative for activity change, appetite change and fever. HENT: Negative for congestion. Respiratory: Positive for shortness of breath (with exertion ). Negative for apnea, cough, chest tightness and wheezing. Cardiovascular: Negative for chest pain, palpitations and leg swelling. Gastrointestinal: Negative for abdominal distention, abdominal pain and nausea. Genitourinary: Negative for difficulty urinating and dysuria. Musculoskeletal: Positive for back pain (chronic ) and gait problem (stand up walker ). Negative for arthralgias. Skin: Negative for color change. Neurological: Negative for dizziness, numbness and headaches. Psychiatric/Behavioral: Negative for agitation, confusion and sleep disturbance. The patient is not nervous/anxious. OBJECTIVE:   Today's Vitals:  /70   Pulse 81   Ht 5' 4\" (1.626 m)   Wt 253 lb (114.8 kg)   SpO2 94%   BMI 43.43 kg/m²     Physical Exam  Vitals signs reviewed. Constitutional:       Appearance: She is well-developed. HENT:      Head: Normocephalic and atraumatic. Eyes:      Pupils: Pupils are equal, round, and reactive to light. Neck:      Musculoskeletal: Normal range of motion. Vascular: No JVD. Cardiovascular:      Rate and Rhythm: Normal rate and regular rhythm. Heart sounds: Murmur present. Pulmonary:      Effort: Pulmonary effort is normal. No respiratory distress. Breath sounds: No rales. Abdominal:      General: There is no distension. Palpations: Abdomen is soft. Tenderness: There is no abdominal tenderness. Musculoskeletal:         General: No tenderness. Right lower leg: No edema. Left lower leg: No edema. Skin:     General: Skin is warm and dry. Capillary Refill: Capillary refill takes less than 2 seconds. Neurological:      Mental Status: She is alert and oriented to person, place, and time.    Psychiatric:         Mood and Affect: Mood normal.         Behavior: Behavior normal.         Wt Readings from Last 3 Encounters:   02/17/21 253 lb (114.8 kg) 02/10/21 249 lb 9.6 oz (113.2 kg)   02/07/21 242 lb (109.8 kg)     BP Readings from Last 3 Encounters:   02/17/21 124/70   02/11/21 (!) 121/91   02/08/21 (!) 145/84     Pulse Readings from Last 3 Encounters:   02/17/21 81   02/11/21 75   02/08/21 79     Body mass index is 43.43 kg/m². ECHO:   2/4/21   Summary   Normal left ventricular size and systolic function. Paradoxical septal motion. Wall thickness was within normal limits. Ejection fraction was estimated at 55-60%. Doppler parameters were consistent with abnormal left ventricular   relaxation (grade 1 diastolic dysfunction). S/p TAVR. DOPPLER: Transaortic velocity was within the normal range with   no evidence of aortic stenosis (mean gradient 11 mmHg, EOA 2.0 cm2, Vmax   2.4 m/s). There was no evidence of aortic regurgitation. There was trace tricuspid regurgitation. IVC size is within normal limits with normal respiratory phasic changes. Signature      ----------------------------------------------------------------   Electronically signed by Agustin Weber MD (Interpreting   physician) on 02/04/2021 at 04:45 PM   ----------------------------------------------------------------      Findings      Mitral Valve   The mitral valve structure demonstrated mild mitral calcification   especially associated with the posterior leaflet. DOPPLER: The transmitral   velocity was within the normal range with no evidence for mitral stenosis. There was no evidence of mitral regurgitation. Aortic Valve   S/p TAVR. DOPPLER: Transaortic velocity was within the normal range with   no evidence of aortic stenosis (mean gradient 11 mmHg, EOA 2.0 cm2, Vmax   2.4 m/s). There was no evidence of aortic regurgitation. Tricuspid Valve   The tricuspid valve structure was normal with normal leaflet separation. DOPPLER: There was no evidence of tricuspid stenosis. There was trace   tricuspid regurgitation.       Pulmonic Valve The pulmonic valve leaflets were not well seen. DOPPLER: The transpulmonic   velocity was within the normal range with no evidence for regurgitation. Left Atrium   Left atrial size was normal.      Left Ventricle   Normal left ventricular size and systolic function. Paradoxical septal motion. Wall thickness was within normal limits. Ejection fraction was estimated at 55-60%. Doppler parameters were consistent with abnormal left ventricular   relaxation (grade 1 diastolic dysfunction). Right Atrium   Right atrial size was normal.      Right Ventricle   The right ventricular size was normal with normal systolic function and   wall thickness. Pericardial Effusion   The pericardium was normal in appearance with no evidence of a pericardial   effusion. Pleural Effusion   No evidence of pleural effusion. Aorta / Great Vessels   IVC size is within normal limits with normal respiratory phasic changes.     Results reviewed:  BNP:   Lab Results   Component Value Date    PROBNP 509.7 02/08/2021     CBC:   Lab Results   Component Value Date    WBC 8.2 02/11/2021    RBC 3.75 02/11/2021    RBC 0-5 04/07/2015    HGB 11.3 02/11/2021    HCT 33.8 02/11/2021     02/11/2021     CMP:    Lab Results   Component Value Date     02/11/2021    K 3.9 02/11/2021    K 4.1 02/07/2021     02/11/2021    CO2 24 02/11/2021    BUN 18 02/11/2021    CREATININE 0.7 02/11/2021    LABGLOM 81 02/11/2021    GLUCOSE 151 02/11/2021    CALCIUM 8.8 02/11/2021     Hepatic Function Panel:    Lab Results   Component Value Date    ALKPHOS 69 02/08/2021    ALT 21 02/08/2021    AST 38 02/08/2021    PROT 7.6 02/08/2021    BILITOT 0.9 02/08/2021    BILITOT NEGATIVE 04/07/2015    BILIDIR <0.2 02/08/2021    LABALBU 4.3 02/08/2021     Magnesium:    Lab Results   Component Value Date    MG 2.1 02/08/2021     PT/INR:    Lab Results   Component Value Date    INR 1.03 02/04/2021     Lipids:    Lab Results Component Value Date    TRIG 141 05/14/2016    HDL 46 05/14/2016    [address] 70 05/14/2016       ASSESSMENT AND PLAN:   The patient's condition/symptoms are Stable      Diagnosis Orders   1. CHF (congestive heart failure), NYHA class II, chronic, diastolic ([facility] [address]     2. Essential hypertension     3. Severe aortic stenosis     4. S/P TAVR (transcatheter aortic valve replacement)     5. Morbid obesity with BMI of 40.0-44.9, adult ([facility] [address]         Plan:  · GDMT   · ACE/ARB/ARNi: Accupril 10 mg daily  · Beta Blocker: Atenolol 25 mg daily  · Aldosterone antagonist: no  · Diuretic/Potassium: Lasix 20 mg daily, Potassium 10 mEq daily  · Hydralazine/Nitrate: decrease Hydralazine to 25 mg bid (from 50 mg) d/t lower BPs at times  · Other: Mag ox, ASA 81 mg, Plavix - off Amlodipine 10 mg after CVA/TAVR  · No signs of fluid overload. [facility] 2/11/21 reviewed - kidney function stable. Will continue the Lasix for now as she is making good urine. She is working with PT.   · HF Zones reviewed. · Daily weights and record  · Fluid restriction of 2 Liters per day (64 oz)   · Limit sodium in diet to 8593-7877 mg/day  · Healthier food options were discussed   · Monitor BP daily 1 hour after meds are taken  · Increase activity as tolerated     Patient was instructed to call the [address] for changes in the following symptoms:   ? Weight gain of 3 pounds in 1 day or 5 pounds in 1 week  ? Increased shortness of breath  ? Shortness of breath while laying down  ? Cough  ? Chest pain  ? Swelling in feet, ankles or legs  ? Tenderness or bloating in the abdomen  ? Fatigue   ? Decreased appetite or feeling \"full\"  ? Nausea   ? Confusion      Return in about 3 months (around 5/17/2021). or sooner if needed     Patient given educational materials - see patient instructions. We discussed the importance of weighing oneself and recording daily. We also discussed the importance of a low sodium diet, higher sodium foods to avoid and appropriate low sodium food choices. Discussed use, benefit, and side effects of prescribed medications. All patient questions answered. Patient verbalizes understanding of plan of care using teach back method, and is agreeable to the treatment plan. Greater then 40 minutes of time was spent reviewing the chart and educating the patient about HF, medications, diet, exercise, and discussing the plan of care. I personally spent more then 50% of the appt time face to face with the patient counseling/coordinating patient's care.       Electronicallysigned by SEAN Elizabeth CNP on 2/17/2021 at 10:14 AM

## 2021-02-17 NOTE — PATIENT INSTRUCTIONS
You may receive a survey regarding the care you received during your visit. Your input is valuable to us. We encourage you to complete and return your survey. We hope you will choose us in the future for your healthcare needs. NEW ORDERS FROM TODAY:      Continue:  · Take all medications as prescribed   · Daily weights and record - please try to weigh yourself upon awakening before eating or drinking   · Fluid restriction of 2 Liters per day (64 oz)  · Limit sodium in diet to around 5023-2597 mg/day  · Monitor BP - take BP 1 hour after meds  · Increase activity as tolerated     Call the [facility] for any of the following symptoms: [phone]  ? Weight gain of 3 pounds in 1 day or 5 pounds in 1 week  ? Increased shortness of breath  ? Shortness of breath while laying down  ? Cough  ? Chest pain  ? Swelling in feet, ankles or legs  ? Tenderness or bloating in the abdomen  ? Fatigue   ? Decreased appetite or feeling \"full\"   ? Nausea   ? Confusion     **PLEASE bring all medications in original bottles with you to your next appointment**    Educational websites:    http://www.RealtimeBoard.[employer]/. org (American Heart Association)    [employer]. com ([employer])    Home Chef.com ([employer])    Too much sodium causes your body to hold on to extra water. This can raise your blood pressure and force your heart and kidneys to work harder. In very serious cases, this could cause you to be put in the hospital. It might even be life-threatening. By limiting sodium, you will feel better and lower your risk of serious problems. The most common source of sodium is salt. People get most of the salt in their diet from canned, prepared, and packaged foods. Fast food and restaurant meals also are very high in sodium. Your doctor will probably limit your sodium to less than 2,000 milligrams (mg) a day. This limit counts all the sodium in prepared and packaged foods and any salt you add to your food. · Use less salt (or none) when recipes call for it. You can often use half the salt a recipe calls for without losing flavor. Other foods such as rice, pasta, and grains do not need added salt. · Rinse canned vegetables, and cook them in fresh water. This removes somebut not allof the salt. · Avoid water that is naturally high in sodium or that has been treated with water softeners, which add sodium. Call your local water company to find out the sodium content of your water supply. If you buy bottled water, read the label and choose a sodium-free brand. Avoid high-sodium foods  · Avoid eating:  ? Smoked, cured, salted, and canned meat, fish, and poultry. ? Ham, ayala, hot dogs, and luncheon meats. ? Regular, hard, and processed cheese and regular peanut butter. ? Crackers with salted tops, and other salted snack foods such as pretzels, chips, and salted popcorn. ? Frozen prepared meals, unless labeled low-sodium. ? Canned and dried soups, broths, and bouillon, unless labeled sodium-free or low-sodium. ? Canned vegetables, unless labeled sodium-free or low-sodium. ? [place] fries, pizza, tacos, and other fast foods. ? Pickles, olives, ketchup, and other condiments, especially soy sauce, unless labeled sodium-free or low-sodium.

## 2021-03-01 ENCOUNTER — HOSPITAL ENCOUNTER (OUTPATIENT)
Dept: NON INVASIVE DIAGNOSTICS | Age: 81
Discharge: HOME | End: 2021-03-01
Payer: MEDICARE

## 2021-03-01 DIAGNOSIS — Z95.2 S/P TAVR (TRANSCATHETER AORTIC VALVE REPLACEMENT): ICD-10-CM

## 2021-03-01 LAB
LV EF: 53 %
LVEF MODALITY: NORMAL

## 2021-03-01 PROCEDURE — 93306 TTE W/DOPPLER COMPLETE: CPT

## 2021-03-04 ENCOUNTER — IMMUNIZATION (OUTPATIENT)
Dept: PRIMARY CARE CLINIC | Age: 81
End: 2021-03-04
Payer: MEDICARE

## 2021-03-04 PROCEDURE — 0001A COVID-19, PFIZER VACCINE 30MCG/0.3ML DOSE: CPT | Performed by: FAMILY MEDICINE

## 2021-03-04 PROCEDURE — 91300 COVID-19, PFIZER VACCINE 30MCG/0.3ML DOSE: CPT | Performed by: FAMILY MEDICINE

## 2021-03-08 ENCOUNTER — OFFICE VISIT (OUTPATIENT)
Dept: CARDIOLOGY CLINIC | Age: 81
End: 2021-03-08
Payer: MEDICARE

## 2021-03-08 VITALS
HEIGHT: 65 IN | BODY MASS INDEX: 40.32 KG/M2 | SYSTOLIC BLOOD PRESSURE: 136 MMHG | HEART RATE: 89 BPM | DIASTOLIC BLOOD PRESSURE: 84 MMHG | WEIGHT: 242 LBS

## 2021-03-08 DIAGNOSIS — Z95.2 S/P TAVR (TRANSCATHETER AORTIC VALVE REPLACEMENT): Primary | ICD-10-CM

## 2021-03-08 PROCEDURE — G8484 FLU IMMUNIZE NO ADMIN: HCPCS | Performed by: INTERNAL MEDICINE

## 2021-03-08 PROCEDURE — 1090F PRES/ABSN URINE INCON ASSESS: CPT | Performed by: INTERNAL MEDICINE

## 2021-03-08 PROCEDURE — 4040F PNEUMOC VAC/ADMIN/RCVD: CPT | Performed by: INTERNAL MEDICINE

## 2021-03-08 PROCEDURE — 1123F ACP DISCUSS/DSCN MKR DOCD: CPT | Performed by: INTERNAL MEDICINE

## 2021-03-08 PROCEDURE — 99213 OFFICE O/P EST LOW 20 MIN: CPT | Performed by: INTERNAL MEDICINE

## 2021-03-08 PROCEDURE — 93000 ELECTROCARDIOGRAM COMPLETE: CPT | Performed by: INTERNAL MEDICINE

## 2021-03-08 PROCEDURE — 1111F DSCHRG MED/CURRENT MED MERGE: CPT | Performed by: INTERNAL MEDICINE

## 2021-03-08 PROCEDURE — G8427 DOCREV CUR MEDS BY ELIG CLIN: HCPCS | Performed by: INTERNAL MEDICINE

## 2021-03-08 PROCEDURE — G8417 CALC BMI ABV UP PARAM F/U: HCPCS | Performed by: INTERNAL MEDICINE

## 2021-03-08 PROCEDURE — 1036F TOBACCO NON-USER: CPT | Performed by: INTERNAL MEDICINE

## 2021-03-08 PROCEDURE — G8399 PT W/DXA RESULTS DOCUMENT: HCPCS | Performed by: INTERNAL MEDICINE

## 2021-03-08 NOTE — PROGRESS NOTES
tablet, TAKE 1 TABLET TWICE A DAY WITH MEALS, Disp: 180 tablet, Rfl: 4    atorvastatin (LIPITOR) 40 MG tablet, TAKE 1 TABLET DAILY, Disp: 90 tablet, Rfl: 4    sertraline (ZOLOFT) 100 MG tablet, TAKE 2 TABLETS DAILY, Disp: 180 tablet, Rfl: 4    nystatin (MYCOSTATIN) 308680 UNIT/GM cream, Apply topically 2 times daily. (Patient taking differently: Apply topically as needed Apply topically 2 times daily.), Disp: 1 Tube, Rfl: 1    aspirin 81 MG tablet, Take 81 mg by mouth daily, Disp: , Rfl:     vitamin D (CHOLECALCIFEROL) 1000 UNIT TABS tablet, Take 1,000 Units by mouth nightly., Disp: , Rfl:     Past Medical History  Jennifer Tripp  has a past medical history of Arthritis, Chronic low back pain, COVID-19, DM type 2, goal A1c below 7, GERD (gastroesophageal reflux disease), Hyperlipidemia, Hypertension, Loss of vision, Mitral valve disorder, Osteoporosis, Subarachnoid hemorrhage ([facility] [address] TIA (transient ischemic attack), Type 2 diabetes mellitus with diabetic neuropathy, without long-term current use of insulin ([facility] [address] Unspecified cerebral artery occlusion with cerebral infarction, and Urinary incontinence. Social History  Jennifer Tripp  reports that she has never smoked. She has never used smokeless tobacco. She reports that she does not drink alcohol or use drugs. Family History  Jennifer Tripp family history includes Arthritis in her mother; Cancer in her mother; Heart Disease in her father; High Blood Pressure in her father. There is no family history of bicuspid aortic valve, aneurysms, heart transplant, pacemakers, defibrillators, or sudden cardiac death.       Past Surgical History   Past Surgical History:   Procedure Laterality Date    APPENDECTOMY      CARDIAC SURGERY      TAVR    CARPAL TUNNEL RELEASE Left 2011    CATARACT REMOVAL Bilateral     CERVICAL FUSION  1976    CHOLECYSTECTOMY      COLON SURGERY  2012    COLONOSCOPY  2015    ENDOSCOPY, COLON, DIAGNOSTIC      EYE SURGERY      CATARACTS    FINGER SURGERY Right 10/2016    3rd digit    HERNIA REPAIR      HYSTERECTOMY      JOINT REPLACEMENT      both knees    NERVE SURGERY Right 10/25/2019    Lumbar Facet MBB @ A65-P4,Q3-7 L2-3 right side only #1 performed by Foreign Stephens MD at [facility] Right 12/16/2019    Lumbar Facet MBB @ V93-J3-F7-9, L2-3 RIGHT SIDE ONLY performed by Foreign Stephens MD at [facility] 113 Right 1/6/2020    Lumbar RFA T12-L1, L1-2, L2-3 Right performed by Foreign Stephens MD at [address] [place] [address] Erica LAMINECTOMY,>2 [facility]      6 FUSIONS    ROTATOR CUFF REPAIR Right 11/28/2014    SHOULDER SURGERY  2014    SPINE SURGERY      TENDON RELEASE Right 05/18/2017    at [facility] Bilateral     UPPER GASTROINTESTINAL ENDOSCOPY  2013,2015       Review of Systems   Constitutional: Negative for chills and fever  HENT: Negative for congestion, sinus pressure, sneezing and sore throat. Eyes: Negative for pain, discharge, redness and itching. Respiratory: Negative for apnea, cough  Gastrointestinal: Negative for blood in stool, constipation, diarrhea   Endocrine: Negative for cold intolerance, heat intolerance, polydipsia. Genitourinary: Negative for dysuria, enuresis, flank pain and hematuria. Musculoskeletal: Negative for arthralgias, joint swelling and neck pain. Neurological: Negative for numbness and headaches. Psychiatric/Behavioral: Negative for agitation, confusion, decreased concentration and dysphoric mood. Objective:     /84   Pulse 89   Ht 5' 5\" (1.651 m)   Wt 242 lb (109.8 kg)   BMI 40.27 kg/m²     Wt Readings from Last 3 Encounters:   03/08/21 242 lb (109.8 kg)   02/17/21 253 lb (114.8 kg)   02/10/21 249 lb 9.6 oz (113.2 kg)     BP Readings from Last 3 Encounters:   03/08/21 136/84   02/17/21 124/70   02/11/21 (!) 121/91       Nursing note and vitals reviewed.     Physical Exam Constitutional: Oriented to person, place, and time. Appears well-developed and well-nourished. HENT:   Head: Normocephalic and atraumatic. Eyes: EOM are normal. Pupils are equal, round, and reactive to light. Neck: Normal range of motion. Neck supple. No JVD present. Cardiovascular: Normal rate, regular rhythm, normal heart sounds and intact distal pulses. No murmur heard. Pulmonary/Chest: Effort normal and breath sounds normal. No respiratory distress. No wheezes. No rales. Abdominal: Soft. Bowel sounds are normal. No distension. There is no tenderness. Musculoskeletal: Normal range of motion. No edema. Neurological: Alert and oriented to person, place, and time. No cranial nerve deficit. Coordination normal.   Skin: Skin is warm and dry. Psychiatric: Normal mood and affect.        Lab Results   Component Value Date    CKTOTAL 49 09/04/2020       Lab Results   Component Value Date    WBC 8.2 02/11/2021    RBC 3.75 02/11/2021    RBC 0-5 04/07/2015    HGB 11.3 02/11/2021    HCT 33.8 02/11/2021    MCV 90.1 02/11/2021    MCH 30.1 02/11/2021    MCHC 33.4 02/11/2021    RDW 13.4 01/12/2017     02/11/2021    MPV 10.6 02/11/2021       Lab Results   Component Value Date     02/11/2021    K 3.9 02/11/2021    K 4.1 02/07/2021     02/11/2021    CO2 24 02/11/2021    BUN 18 02/11/2021    LABALBU 4.3 02/08/2021    CREATININE 0.7 02/11/2021    CALCIUM 8.8 02/11/2021    LABGLOM 81 02/11/2021    GLUCOSE 151 02/11/2021       Lab Results   Component Value Date    ALKPHOS 69 02/08/2021    ALT 21 02/08/2021    AST 38 02/08/2021    PROT 7.6 02/08/2021    BILITOT 0.9 02/08/2021    BILITOT NEGATIVE 04/07/2015    BILIDIR <0.2 02/08/2021    LABALBU 4.3 02/08/2021       Lab Results   Component Value Date    MG 2.1 02/08/2021       Lab Results   Component Value Date    INR 1.03 02/04/2021    INR 0.95 02/03/2021    INR 1.01 01/13/2021         Lab Results   Component Value Date    LABA1C 6.8 01/14/2020 Lab Results   Component Value Date    TRIG 141 05/14/2016    HDL 46 05/14/2016    LDLCALC 70 05/14/2016       Lab Results   Component Value Date    TSH 2.740 05/18/2016         Testing Reviewed:      I have individually reviewed the cardiac test below:    ECHO:   Results for orders placed during the hospital encounter of 03/01/21   ECHO Complete 2D W Doppler W Color    Narrative Transthoracic Echocardiography Report (TTE)     Demographics      Patient Name    [facility]  Gender               Female                   K      MR #            [id number]        Race                                                        Ethnicity      Account #       [de-identified]        Room Number      Accession       [id number]       Date of Study        03/01/2021   Number      Date of Birth   1940       Referring Physician  Andres Parada MD      Age             80 year(s)       Gina Herndon MD                                    Physician     Procedure    Type of Study      TTE procedure:ECHOCARDIOGRAM COMPLETE 2D W DOPPLER W COLOR. Procedure Date  Date: 03/01/2021 Start: 01:00 PM    Study Location: Bedside    Indications:Post TAVR 30 days. Additional Medical History:Hyperlipidemia, Hypertension, GERD, Diabetic,  Morbi Obesity, COVID, Congestive heart failure, Stroke, TAVR. Patient Status: Routine    Height: 64 inches Weight: 253.01 pounds BSA: 2.16 m^2 BMI: 43.43 kg/m^2    BP: 124/70 mmHg    Allergies    - See Epic. - Other allergy:(Tramadol, Norco, codeine). Conclusions      Summary   Technically difficult examination. Normal left ventricular size and systolic function. There were no regional wall motion abnormalities. Wall thickness was within normal limits. Ejection fraction was estimated at 50-55%. Doppler parameters were consistent with abnormal left ventricular   relaxation (grade 1 diastolic dysfunction). S/p TAVR. DOPPLER: Transaortic velocity was within the normal range with   no evidence of aortic stenosis (mean gradient 7 mmHg, V max 1.8 m/s, EOA   1.8 cm2). There was no evidence of aortic regurgitation. There was trace mitral regurgitation. IVC not well seen. Signature      ----------------------------------------------------------------   Electronically signed by Alexandra Wilson MD (Interpreting   physician) on 03/02/2021 at 02:52 PM   ----------------------------------------------------------------      Findings      Mitral Valve   The mitral valve structure was normal with normal leaflet separation. DOPPLER: The transmitral velocity was within the normal range with no   evidence for mitral stenosis. There was trace mitral regurgitation. Aortic Valve   S/p TAVR. DOPPLER: Transaortic velocity was within the normal range with   no evidence of aortic stenosis (mean gradient 7 mmHg, V max 1.8 m/s, EOA   1.8 cm2). There was no evidence of aortic regurgitation. Tricuspid Valve   The tricuspid valve structure was normal with normal leaflet separation. DOPPLER: There was no evidence of tricuspid stenosis. There was no   evidence of tricuspid regurgitation. Pulmonic Valve   The pulmonic valve leaflets were not well seen. DOPPLER: The transpulmonic   velocity was within the normal range with no evidence for regurgitation. Left Atrium   Left atrial size was normal.      Left Ventricle   Normal left ventricular size and systolic function. There were no regional wall motion abnormalities. Wall thickness was within normal limits. Ejection fraction was estimated at 50-55%. Doppler parameters were consistent with abnormal left ventricular   relaxation (grade 1 diastolic dysfunction).       Right Atrium   Right atrial size was normal.      Right Ventricle   The right ventricular size was normal with normal systolic function and   wall thickness. Pericardial Effusion   The pericardium was normal in appearance with no evidence of a pericardial   effusion. Pleural Effusion   No evidence of pleural effusion. Aorta / Great Vessels   IVC not well seen.      M-Mode/2D Measurements & Calculations      LV Diastolic     LV Systolic Dimension: 3.6 cm    LA Dimension: 3.8 cmLA   Dimension: 5 cm  LV Volume Diastolic: 295 ml      Area: 16.7 cm^2   LV FS:28 %       LV Volume Systolic: 73.2 ml   LV PW Diastolic: LV EDV/LV EDV Index: 118 ml/55   1.2 cm           m^2LV ESV/LV ESV Index: 54.4   Septum           ml/25 m^2                        RV Diastolic Dimension:   Diastolic: 1.2   EF Calculated: 53.9 %            2.9 cm   cm                                                     Ascending Aorta: 2.8 cm                                                     LA volume/Index: 44.5 ml                    LVOT: 2 cm                       /21m^2     Doppler Measurements & Calculations      MV Peak E-Wave: 93  AV Peak Velocity: 183    LVOT Peak Velocity: 99.2 cm/s   cm/s                cm/s                     LVOT Mean Velocity: 69.9 cm/s   MV Peak A-Wave: 120 AV Peak Gradient: 13.4   LVOT Peak Gradient: 4   cm/s                mmHg                     mmHgLVOT Mean Gradient: 2   MV E/A Ratio: 0.78  AV Mean Velocity: 123    mmHg   MV Peak Gradient:   cm/s   3.46 mmHg           AV Mean Gradient: 6 mmHg TV Peak E-Wave: 51 cm/s                       AV VTI: 37.2 cm          TV Peak A-Wave: 49.7 cm/s   MV Deceleration     AV Area   Time: 327 msec      (Continuity):1.87 cm^2   TV Peak Gradient: 1.04 mmHg                       AV Acceleration Time:    TR Velocity:227 cm/s                       116 msec                 TR Gradient:20.61 mmHg   MV E' Septal        LVOT VTI: 22.1 cm        PV Peak Velocity: 70.3 cm/s   Velocity: 4.1 cm/s  IVRT: 67 msec

## 2021-03-16 ENCOUNTER — TELEPHONE (OUTPATIENT)
Dept: CARDIOLOGY CLINIC | Age: 81
End: 2021-03-16

## 2021-03-16 NOTE — TELEPHONE ENCOUNTER
Pre-TAVR Assessment      Straight STS Score = 2.78%      Total Estimated Operative Morbidity and Mortality Risk = 13.16%        5 METER WALK ASSESSMENTS (seconds):    Time #1 = 10.75    Time #2 = 12.86    Time #3 = unable to complete       Mini-Cog Total Score = 5    A cut point of <3 on the Mini-Cog has been validated for dementia screening, but many individuals with clinically meaningful cognitive impairment will score higher,  When greater sensitivity is desired, a cut point of <4 is recommended as it may indicate a need for further evaluation of cognitive status. EFT Score = 1                        EFT Score                    1-Year Mortality                             TAVR   SAVR                               0-1                6%      3%                             2       15%                     7%                             3                             28%                    16%                             4                             30%                    38%                             5                             65%                     50%            Dental Clearance = obtained       Risk Assessment and Prediction Tool     Value Score   What is your age? 46-71 years old     71-68 years old  > 76years old    [] 2   [] 1   [x] 0   What is your gender? Male  Female    [] 2   [x] 1   On average how far can you walk?  2 blocks or more (with /without a rest)  1-2 blocks (with/ without a rest)  Household most of the time     [] 2   [] 1   [x] 0   Which walking aid do you use most often? Nothing  María Fogo or crutches    [] 2   [x] 1   [] 0   Do you use support services such as meals on wheels, home health aide, home nurse, or private pay help?    None or 1 time per week  2 or more times per week  [x] 1   [] 0   Will you have someone (Care Partner) at home who will be able to assist you with care for at least 72 hours after you leave the hospital? (i.e. Meal preparation, assistance of the time Several times per day At least once a day 3 or more times per week 1-2 times per week Less than once a week Never over the past 2 weeks      x                 1        2     3  4         5       6  7     5. Over the past 2 weeks, on average, how many times have you been forced to sleep sitting up in a chair or with at least 3 pillows to prop you up because of shortness of breath? Every night 3 or more times per week, but not every day 1-2 times per week Less than once a week Never over the past 2 weeks         x   `        1          2   3       4                     5        6.  Over the past 2 weeks, how much has your heart failure limited your enjoyment of life? It has extremely limited my enjoyment of life It has limited my enjoyment of life quite a bit It has moderately limited my enjoyment of life It has slightly limited my enjoyment of life It has not limited my enjoyment of life       x           1          2   3       4           5     7.  If you had to spend the rest of your life with your heart failure the way it is right now, how would you feel about this? Not at all satisfied Mostly dissatisfied Somewhat satisfied Mostly satisfied Completely satisfied     x                  1                         2                            3                        4                            5        8. How much does your heart failure affect your lifestyle?  Please indicate how your heart failure may have limited your participation in the following activities over the past 2 weeks    Activity Severely limited Limited quite a bit Moderately limited Slightly limited Did not limit at all Does not apply or did not do this activity   Hobbies, recreational activities  x       Working or doing household chores  x       Visiting family or friends out of your home     x                1      2   3         4       5            6

## 2021-03-16 NOTE — TELEPHONE ENCOUNTER
Cordell Vargas    The following questions refer to your heart failure and how it may affect your life. Please read and complete the following questions. There is no right or wrong answers. Please hannah the answer that best applies to you. 1. Heart failure affects different people in different ways. Some feel shortness of breath while others feel fatigue. Please indicate how much you are limited by heart failure (shortness of breath or fatigue) in your ability to do the following activities over the past 2 weeks. Activity Extremely Limited Quite a bit limited Moderatly Limited Slightly Limited Not at all Limited Limited for other reasons/ did not do the activity   Showering/ Bathing          x    Walking 1 block on Level ground      x   Hurrying or jogging (as if to catch a bus)      x        1         2       3  4       5   6     2. Over the past 2 weeks, how many times did you have swelling in your feet, ankles or legs when you woke up in the morning? Every morning 3 or more times per week, but not every day 1-2 times per week Less than once a week Never over the past 2 weeks         x   `        1          2   3       4                     5           3. Over the past 2 weeks, on average, how many times has fatigue limited your ability to do what you wanted? All of the time Several times per day At least once a day 3 or more times per week 1-2 times per week Less than once a week Never over the past 2 weeks        x               1        2     3  4        5       6  7        4. Over the past 2 weeks, on average, how many times has shortness of breath limited your ability to do what you    wanted? All of the time Several times per day At least once a day 3 or more times per week 1-2 times per week Less than once a week Never over the past 2 weeks          x             1        2     3  4         5       6  7     5.  Over the past 2 weeks, on average, how many times have you been forced to sleep sitting up in a chair or with at least 3 pillows to prop you up because of shortness of breath? Every night 3 or more times per week, but not every day 1-2 times per week Less than once a week Never over the past 2 weeks         x   `        1          2   3       4                     5        6.  Over the past 2 weeks, how much has your heart failure limited your enjoyment of life? It has extremely limited my enjoyment of life It has limited my enjoyment of life quite a bit It has moderately limited my enjoyment of life It has slightly limited my enjoyment of life It has not limited my enjoyment of life         x         1          2   3       4           5     7.  If you had to spend the rest of your life with your heart failure the way it is right now, how would you feel about this? Not at all satisfied Mostly dissatisfied Somewhat satisfied Mostly satisfied Completely satisfied         x              1                         2                            3                        4                            5        8. How much does your heart failure affect your lifestyle?  Please indicate how your heart failure may have limited your participation in the following activities over the past 2 weeks    Activity Severely limited Limited quite a bit Moderately limited Slightly limited Did not limit at all Does not apply or did not do this activity   Hobbies, recreational activities     x    Working or doing household chores     x    Visiting family or friends out of your home     x                1      2   3         4       5            6             Meter Walk Test    Time 1: 10.5 seconds  Time 2: 11 seconds  Time 3: 10.5 seconds

## 2021-03-25 ENCOUNTER — IMMUNIZATION (OUTPATIENT)
Dept: PRIMARY CARE CLINIC | Age: 81
End: 2021-03-25
Payer: MEDICARE

## 2021-03-25 PROCEDURE — 0002A COVID-19, PFIZER VACCINE 30MCG/0.3ML DOSE: CPT

## 2021-03-25 PROCEDURE — 91300 COVID-19, PFIZER VACCINE 30MCG/0.3ML DOSE: CPT

## 2021-04-14 ENCOUNTER — OFFICE VISIT (OUTPATIENT)
Dept: CARDIOLOGY CLINIC | Age: 81
End: 2021-04-14
Payer: MEDICARE

## 2021-04-14 VITALS
BODY MASS INDEX: 43.52 KG/M2 | SYSTOLIC BLOOD PRESSURE: 118 MMHG | HEART RATE: 78 BPM | HEIGHT: 65 IN | WEIGHT: 261.2 LBS | OXYGEN SATURATION: 95 % | DIASTOLIC BLOOD PRESSURE: 80 MMHG

## 2021-04-14 DIAGNOSIS — Z95.2 S/P TAVR (TRANSCATHETER AORTIC VALVE REPLACEMENT): ICD-10-CM

## 2021-04-14 DIAGNOSIS — I50.32 CHF (CONGESTIVE HEART FAILURE), NYHA CLASS II, CHRONIC, DIASTOLIC (HCC): Primary | ICD-10-CM

## 2021-04-14 DIAGNOSIS — E66.01 MORBID OBESITY WITH BMI OF 40.0-44.9, ADULT (HCC): ICD-10-CM

## 2021-04-14 DIAGNOSIS — I10 ESSENTIAL HYPERTENSION: ICD-10-CM

## 2021-04-14 PROCEDURE — G8417 CALC BMI ABV UP PARAM F/U: HCPCS | Performed by: NURSE PRACTITIONER

## 2021-04-14 PROCEDURE — 1090F PRES/ABSN URINE INCON ASSESS: CPT | Performed by: NURSE PRACTITIONER

## 2021-04-14 PROCEDURE — 99214 OFFICE O/P EST MOD 30 MIN: CPT | Performed by: NURSE PRACTITIONER

## 2021-04-14 PROCEDURE — 1036F TOBACCO NON-USER: CPT | Performed by: NURSE PRACTITIONER

## 2021-04-14 PROCEDURE — 4040F PNEUMOC VAC/ADMIN/RCVD: CPT | Performed by: NURSE PRACTITIONER

## 2021-04-14 PROCEDURE — G8427 DOCREV CUR MEDS BY ELIG CLIN: HCPCS | Performed by: NURSE PRACTITIONER

## 2021-04-14 PROCEDURE — 1123F ACP DISCUSS/DSCN MKR DOCD: CPT | Performed by: NURSE PRACTITIONER

## 2021-04-14 PROCEDURE — G8399 PT W/DXA RESULTS DOCUMENT: HCPCS | Performed by: NURSE PRACTITIONER

## 2021-04-14 RX ORDER — FUROSEMIDE 40 MG/1
20 TABLET ORAL PRN
Qty: 60 TABLET | Refills: 3
Start: 2021-04-14 | End: 2022-04-20

## 2021-04-14 RX ORDER — POTASSIUM CHLORIDE 20 MEQ/1
10 TABLET, EXTENDED RELEASE ORAL PRN
Qty: 90 TABLET | Refills: 1
Start: 2021-04-14 | End: 2022-01-28 | Stop reason: SDUPTHER

## 2021-04-14 ASSESSMENT — ENCOUNTER SYMPTOMS
ABDOMINAL PAIN: 0
ABDOMINAL DISTENTION: 0
SHORTNESS OF BREATH: 1
COUGH: 0
APNEA: 0
NAUSEA: 0
WHEEZING: 0
CHEST TIGHTNESS: 0
COLOR CHANGE: 0

## 2021-04-14 NOTE — PROGRESS NOTES
180 tablet 3    quinapril (ACCUPRIL) 10 MG tablet Take 1 tablet by mouth daily 90 tablet 4    atenolol (TENORMIN) 25 MG tablet Take 1 tablet by mouth daily 90 tablet 3    clopidogrel (PLAVIX) 75 MG tablet Take 1 tablet by mouth daily 30 tablet 3    Magnesium Oxide 250 MG TABS Take 1 tablet by mouth 2 times daily 60 tablet 5    vitamin C (VITAMIN C) 1000 MG tablet Take 1 tablet by mouth daily 30 tablet 3    zinc sulfate (ZINCATE) 220 (50 Zn) MG capsule Take 1 capsule by mouth daily 30 capsule 3    ondansetron (ZOFRAN ODT) 4 MG disintegrating tablet Take 1 tablet by mouth every 8 hours as needed for Nausea or Vomiting 20 tablet 0    metFORMIN (GLUCOPHAGE) 500 MG tablet TAKE 1 TABLET TWICE A DAY WITH MEALS 180 tablet 4    atorvastatin (LIPITOR) 40 MG tablet TAKE 1 TABLET DAILY 90 tablet 4    sertraline (ZOLOFT) 100 MG tablet TAKE 2 TABLETS DAILY 180 tablet 4    nystatin (MYCOSTATIN) 954663 UNIT/GM cream Apply topically 2 times daily. (Patient taking differently: Apply topically as needed Apply topically 2 times daily. ) 1 Tube 1    aspirin 81 MG tablet Take 81 mg by mouth daily      vitamin D (CHOLECALCIFEROL) 1000 UNIT TABS tablet Take 1,000 Units by mouth nightly. No current facility-administered medications for this visit. Allergies   Allergen Reactions    Norco [Hydrocodone-Acetaminophen] Itching    Tramadol Itching    Codeine Hives and Rash       SUBJECTIVE:   Review of Systems   Constitutional: Negative for activity change, appetite change, fatigue and fever. HENT: Negative for congestion. Respiratory: Positive for shortness of breath (with exertion ). Negative for apnea, cough, chest tightness and wheezing. Cardiovascular: Negative for chest pain, palpitations and leg swelling. Gastrointestinal: Negative for abdominal distention, abdominal pain and nausea. Genitourinary: Negative for difficulty urinating and dysuria.    Musculoskeletal: Positive for arthralgias (chronic knee) and gait problem (walker ). Skin: Negative for color change. Neurological: Negative for dizziness, numbness and headaches. Psychiatric/Behavioral: Negative for agitation, confusion and sleep disturbance. The patient is not nervous/anxious. OBJECTIVE:   Today's Vitals:  /80   Pulse 78   Ht 5' 5\" (1.651 m)   Wt 261 lb 3.2 oz (118.5 kg)   SpO2 95%   BMI 43.47 kg/m²     Physical Exam  Vitals signs reviewed. Constitutional:       Appearance: She is well-developed. HENT:      Head: Normocephalic and atraumatic. Eyes:      Pupils: Pupils are equal, round, and reactive to light. Neck:      Musculoskeletal: Normal range of motion. Vascular: No JVD. Cardiovascular:      Rate and Rhythm: Normal rate and regular rhythm. Heart sounds: Normal heart sounds. No murmur. Pulmonary:      Effort: Pulmonary effort is normal. No respiratory distress. Breath sounds: No rales. Abdominal:      General: There is no distension. Palpations: Abdomen is soft. Tenderness: There is no abdominal tenderness. Musculoskeletal:         General: Swelling (trace LE non pittling) present. No tenderness. Right lower leg: No edema. Left lower leg: No edema. Skin:     General: Skin is warm and dry. Capillary Refill: Capillary refill takes less than 2 seconds. Neurological:      Mental Status: She is alert and oriented to person, place, and time. Psychiatric:         Mood and Affect: Mood normal.         Behavior: Behavior normal.         Wt Readings from Last 3 Encounters:   04/14/21 261 lb 3.2 oz (118.5 kg)   03/08/21 242 lb (109.8 kg)   02/17/21 253 lb (114.8 kg)     BP Readings from Last 3 Encounters:   04/14/21 118/80   03/08/21 136/84   02/17/21 124/70     Pulse Readings from Last 3 Encounters:   04/14/21 78   03/08/21 89   02/17/21 81     Body mass index is 43.47 kg/m². ECHO:   3/1/21   Summary   Technically difficult examination.    Normal left ventricular size and systolic function. There were no regional wall motion abnormalities. Wall thickness was within normal limits. Ejection fraction was estimated at 50-55%. Doppler parameters were consistent with abnormal left ventricular   relaxation (grade 1 diastolic dysfunction). S/p TAVR. DOPPLER: Transaortic velocity was within the normal range with   no evidence of aortic stenosis (mean gradient 7 mmHg, V max 1.8 m/s, EOA   1.8 cm2). There was no evidence of aortic regurgitation. There was trace mitral regurgitation. IVC not well seen. Signature      ----------------------------------------------------------------   Electronically signed by Isauro Martinez MD (Interpreting   physician) on 03/02/2021 at 02:52 PM   ----------------------------------------------------------------      Findings      Mitral Valve   The mitral valve structure was normal with normal leaflet separation. DOPPLER: The transmitral velocity was within the normal range with no   evidence for mitral stenosis. There was trace mitral regurgitation. Aortic Valve   S/p TAVR. DOPPLER: Transaortic velocity was within the normal range with   no evidence of aortic stenosis (mean gradient 7 mmHg, V max 1.8 m/s, EOA   1.8 cm2). There was no evidence of aortic regurgitation. Tricuspid Valve   The tricuspid valve structure was normal with normal leaflet separation. DOPPLER: There was no evidence of tricuspid stenosis. There was no   evidence of tricuspid regurgitation. Pulmonic Valve   The pulmonic valve leaflets were not well seen. DOPPLER: The transpulmonic   velocity was within the normal range with no evidence for regurgitation. Left Atrium   Left atrial size was normal.      Left Ventricle   Normal left ventricular size and systolic function. There were no regional wall motion abnormalities. Wall thickness was within normal limits. Ejection fraction was estimated at 50-55%.    Doppler parameters were consistent with abnormal left ventricular   relaxation (grade 1 diastolic dysfunction). Right Atrium   Right atrial size was normal.      Right Ventricle   The right ventricular size was normal with normal systolic function and   wall thickness. Pericardial Effusion   The pericardium was normal in appearance with no evidence of a pericardial   effusion. Pleural Effusion   No evidence of pleural effusion. Aorta / Great Vessels   IVC not well seen. Results reviewed:  BNP:   Lab Results   Component Value Date    PROBNP 509.7 02/08/2021     CBC:   Lab Results   Component Value Date    WBC 8.2 02/11/2021    RBC 3.75 02/11/2021    RBC 0-5 04/07/2015    HGB 11.3 02/11/2021    HCT 33.8 02/11/2021     02/11/2021     CMP:    Lab Results   Component Value Date     02/11/2021    K 3.9 02/11/2021    K 4.1 02/07/2021     02/11/2021    CO2 24 02/11/2021    BUN 18 02/11/2021    CREATININE 0.7 02/11/2021    LABGLOM 81 02/11/2021    GLUCOSE 151 02/11/2021    CALCIUM 8.8 02/11/2021     Hepatic Function Panel:    Lab Results   Component Value Date    ALKPHOS 69 02/08/2021    ALT 21 02/08/2021    AST 38 02/08/2021    PROT 7.6 02/08/2021    BILITOT 0.9 02/08/2021    BILITOT NEGATIVE 04/07/2015    BILIDIR <0.2 02/08/2021    LABALBU 4.3 02/08/2021     Magnesium:    Lab Results   Component Value Date    MG 2.1 02/08/2021     PT/INR:    Lab Results   Component Value Date    INR 1.03 02/04/2021     Lipids:    Lab Results   Component Value Date    TRIG 141 05/14/2016    HDL 46 05/14/2016    [address] 70 05/14/2016       ASSESSMENT AND PLAN:   The patient's condition/symptoms are Stable      Diagnosis Orders   1. CHF (congestive heart failure), NYHA class II, chronic, diastolic (HCC)  Basic Metabolic Panel    Magnesium   2. S/P TAVR (transcatheter aortic valve replacement)     3. Essential hypertension     4.  Morbid obesity with BMI of 40.0-44.9, adult ([facility] [address]         Plan:  · GDMT   · ACE/ARB/ARNi: Quinapril 10 mg daily  · Beta Blocker: Atenolol 25 mg daily  · Aldosterone antagonist: no  · Diuretic/Potassium: ONLY taking Lasix PRN - usually once a week. Lasix 20 mg daily, Potassium 10 mEq daily  · Hydralazine/Nitrate: Hydralazine 25 mg bid  · Other: Lipitor, Plavix, Mag ox, ASA 81 mg  · No obvious signs of fluid overload. Not taking the Lasix daily. BP controlled. Weight loss encouraged. BMP and Mg soon. · HF Zones reviewed. · Daily weights and record  · Fluid restriction of 2 Liters per day (64 oz)   · Limit sodium in diet to 7048-1243 mg/day  · Healthier food options were discussed   · Monitor BP daily 1 hour after meds are taken  · Increase activity as tolerated     Patient was instructed to call the [address] for changes in the following symptoms:    Weight gain of 3 pounds in 1 day or 5 pounds in 1 week   Increased shortness of breath   Shortness of breath while laying down   Cough   Chest pain   Swelling in feet, ankles or legs   Tenderness or bloating in the abdomen   Fatigue    Decreased appetite or feeling \"full\"   Nausea    Confusion      Return in about 4 months (around 8/14/2021). or sooner if needed     Patient given educational materials - see patient instructions. We discussed the importance of weighing oneself and recording daily. We also discussed the importance of a low sodium diet, higher sodium foods to avoid and appropriate low sodium food choices. Discussed use, benefit, and side effects of prescribed medications. All patient questions answered. Patient verbalizes understanding of plan of care using teach back method, and is agreeable to the treatment plan. Greater then 30 minutes of time was spent reviewing the chart and educating the patient about HF, medications, diet, exercise, and discussing the plan of care. I personally spent more then 50% of the appt time face to face with the patient counseling/coordinating patient's care.       Electronicallysigned by Suly Lerma SEAN Valiente - CNP on 4/14/2021 at 11:17 AM

## 2021-04-14 NOTE — PATIENT INSTRUCTIONS
Follow-up care is a key part of your treatment and safety. Be sure to make and go to all appointments, and call your doctor if you are having problems. It's also a good idea to know your test results and keep a list of the medicines you take. How can you care for yourself at home? Read food labels  · Read labels on cans and food packages. The labels tell you how much sodium is in each serving. Make sure that you look at the serving size. If you eat more than the serving size, you have eaten more sodium. · Food labels also tell you the Percent Daily Value for sodium. Choose products with low Percent Daily Values for sodium. · Be aware that sodium can come in forms other than salt, including monosodium glutamate (MSG), sodium citrate, and sodium bicarbonate (baking soda). MSG is often added to Asian food. When you eat out, you can sometimes ask for food without MSG or added salt. Buy low-sodium foods  · Buy foods that are labeled \"unsalted\" (no salt added), \"sodium-free\" (less than 5 mg of sodium per serving), or \"low-sodium\" (less than 140 mg of sodium per serving). Foods labeled \"reduced-sodium\" and \"light sodium\" may still have too much sodium. Be sure to read the label to see how much sodium you are getting. · Buy fresh vegetables, or frozen vegetables without added sauces. Buy low-sodium versions of canned vegetables, soups, and other canned goods. Prepare low-sodium meals  · Cut back on the amount of salt you use in cooking. This will help you adjust to the taste. Do not add salt after cooking. One teaspoon of salt has about 2,300 mg of sodium. · Take the salt shaker off the table. · Flavor your food with garlic, lemon juice, onion, vinegar, herbs, and spices. Do not use soy sauce, lite soy sauce, steak sauce, onion salt, garlic salt, celery salt, mustard, or ketchup on your food. · Use low-sodium salad dressings, sauces, and ketchup. Or make your own salad dressings and sauces without adding salt.   · Use less salt (or none) when recipes call for it. You can often use half the salt a recipe calls for without losing flavor. Other foods such as rice, pasta, and grains do not need added salt. · Rinse canned vegetables, and cook them in fresh water. This removes somebut not allof the salt. · Avoid water that is naturally high in sodium or that has been treated with water softeners, which add sodium. Call your local water company to find out the sodium content of your water supply. If you buy bottled water, read the label and choose a sodium-free brand. Avoid high-sodium foods  · Avoid eating:  ? Smoked, cured, salted, and canned meat, fish, and poultry. ? Ham, ayala, hot dogs, and luncheon meats. ? Regular, hard, and processed cheese and regular peanut butter. ? Crackers with salted tops, and other salted snack foods such as pretzels, chips, and salted popcorn. ? Frozen prepared meals, unless labeled low-sodium. ? Canned and dried soups, broths, and bouillon, unless labeled sodium-free or low-sodium. ? Canned vegetables, unless labeled sodium-free or low-sodium. ? [place] fries, pizza, tacos, and other fast foods. ? Pickles, olives, ketchup, and other condiments, especially soy sauce, unless labeled sodium-free or low-sodium.

## 2021-04-29 ENCOUNTER — TELEPHONE (OUTPATIENT)
Dept: CARDIOLOGY CLINIC | Age: 81
End: 2021-04-29

## 2021-04-30 NOTE — TELEPHONE ENCOUNTER
Patient verbalized understanding. Form in Dr. Emmanuel Butts box for signature.    Patient states she has a RX at home for Amoxicillin 2 gram 1 hour before procedure from her dentist.

## 2021-05-01 LAB
ANION GAP SERPL CALCULATED.3IONS-SCNC: 10 MMOL/L (ref 5–15)
BUN BLDV-MCNC: 14 MG/DL (ref 5–27)
CALCIUM SERPL-MCNC: 9.5 MG/DL (ref 8.5–10.5)
CHLORIDE BLD-SCNC: 106 MMOL/L (ref 98–109)
CO2: 24 MMOL/L (ref 22–32)
CREAT SERPL-MCNC: 0.78 MG/DL (ref 0.4–1)
EGFR AFRICAN AMERICAN: >60 ML/MIN/1.73SQ.M
EGFR IF NONAFRICAN AMERICAN: >60 ML/MIN/1.73SQ.M
GLUCOSE: 147 MG/DL (ref 65–99)
MAGNESIUM: 1.6 MG/DL (ref 1.8–2.6)
POTASSIUM SERPL-SCNC: 4.3 MMOL/L (ref 3.5–5)
SODIUM BLD-SCNC: 140 MMOL/L (ref 134–146)

## 2021-05-02 ENCOUNTER — TELEPHONE (OUTPATIENT)
Dept: CARDIOLOGY CLINIC | Age: 81
End: 2021-05-02

## 2021-05-02 DIAGNOSIS — I50.32 CHF (CONGESTIVE HEART FAILURE), NYHA CLASS II, CHRONIC, DIASTOLIC (HCC): Primary | ICD-10-CM

## 2021-05-02 RX ORDER — MULTIVITAMIN/IRON/FOLIC ACID 18MG-0.4MG
500 TABLET ORAL 2 TIMES DAILY
Qty: 120 TABLET | Refills: 5
Start: 2021-05-02 | End: 2021-08-05

## 2021-05-02 NOTE — TELEPHONE ENCOUNTER
Labs reviewed  Kidney function stable  Mg lower 1.6  Increase Mag Ox to 500 mg bid (from 250 mg)  Repeat Mg in 2 months

## 2021-05-10 ENCOUNTER — OFFICE VISIT (OUTPATIENT)
Dept: FAMILY MEDICINE CLINIC | Age: 81
End: 2021-05-10
Payer: MEDICARE

## 2021-05-10 VITALS
RESPIRATION RATE: 18 BRPM | TEMPERATURE: 98.4 F | HEART RATE: 78 BPM | OXYGEN SATURATION: 94 % | SYSTOLIC BLOOD PRESSURE: 144 MMHG | DIASTOLIC BLOOD PRESSURE: 82 MMHG

## 2021-05-10 DIAGNOSIS — G89.29 CHRONIC PAIN OF LEFT KNEE: Primary | ICD-10-CM

## 2021-05-10 DIAGNOSIS — N30.00 ACUTE CYSTITIS WITHOUT HEMATURIA: ICD-10-CM

## 2021-05-10 DIAGNOSIS — K21.9 GASTROESOPHAGEAL REFLUX DISEASE WITHOUT ESOPHAGITIS: ICD-10-CM

## 2021-05-10 DIAGNOSIS — F33.0 MAJOR DEPRESSIVE DISORDER, RECURRENT EPISODE, MILD (HCC): ICD-10-CM

## 2021-05-10 DIAGNOSIS — E11.40 TYPE 2 DIABETES MELLITUS WITH DIABETIC NEUROPATHY, WITHOUT LONG-TERM CURRENT USE OF INSULIN (HCC): ICD-10-CM

## 2021-05-10 DIAGNOSIS — M25.562 CHRONIC PAIN OF LEFT KNEE: Primary | ICD-10-CM

## 2021-05-10 DIAGNOSIS — M46.1 SI (SACROILIAC) JOINT INFLAMMATION (HCC): ICD-10-CM

## 2021-05-10 LAB — HBA1C MFR BLD: 7.1 % (ref 4.3–5.7)

## 2021-05-10 PROCEDURE — 1036F TOBACCO NON-USER: CPT | Performed by: FAMILY MEDICINE

## 2021-05-10 PROCEDURE — 1090F PRES/ABSN URINE INCON ASSESS: CPT | Performed by: FAMILY MEDICINE

## 2021-05-10 PROCEDURE — G8399 PT W/DXA RESULTS DOCUMENT: HCPCS | Performed by: FAMILY MEDICINE

## 2021-05-10 PROCEDURE — 4040F PNEUMOC VAC/ADMIN/RCVD: CPT | Performed by: FAMILY MEDICINE

## 2021-05-10 PROCEDURE — G8417 CALC BMI ABV UP PARAM F/U: HCPCS | Performed by: FAMILY MEDICINE

## 2021-05-10 PROCEDURE — 1123F ACP DISCUSS/DSCN MKR DOCD: CPT | Performed by: FAMILY MEDICINE

## 2021-05-10 PROCEDURE — G8427 DOCREV CUR MEDS BY ELIG CLIN: HCPCS | Performed by: FAMILY MEDICINE

## 2021-05-10 PROCEDURE — 3051F HG A1C>EQUAL 7.0%<8.0%: CPT | Performed by: FAMILY MEDICINE

## 2021-05-10 PROCEDURE — 99214 OFFICE O/P EST MOD 30 MIN: CPT | Performed by: FAMILY MEDICINE

## 2021-05-10 RX ORDER — CLOPIDOGREL BISULFATE 75 MG/1
75 TABLET ORAL DAILY
Qty: 90 TABLET | Refills: 3 | Status: SHIPPED | OUTPATIENT
Start: 2021-05-10 | End: 2022-09-06

## 2021-05-10 RX ORDER — DULOXETIN HYDROCHLORIDE 30 MG/1
30 CAPSULE, DELAYED RELEASE ORAL DAILY
Qty: 90 CAPSULE | Refills: 3 | Status: SHIPPED | OUTPATIENT
Start: 2021-05-10 | End: 2021-07-15

## 2021-05-10 RX ORDER — SULFAMETHOXAZOLE AND TRIMETHOPRIM 800; 160 MG/1; MG/1
1 TABLET ORAL 2 TIMES DAILY
Qty: 10 TABLET | Refills: 0 | Status: SHIPPED | OUTPATIENT
Start: 2021-05-10 | End: 2021-07-19 | Stop reason: SDUPTHER

## 2021-05-10 RX ORDER — OMEPRAZOLE 40 MG/1
40 CAPSULE, DELAYED RELEASE ORAL DAILY
Qty: 90 CAPSULE | Refills: 3 | Status: SHIPPED | OUTPATIENT
Start: 2021-05-10 | End: 2021-11-19 | Stop reason: SDUPTHER

## 2021-05-10 NOTE — PATIENT INSTRUCTIONS
Take 1/2 pill of the zoloft for 1 week, then stop the zoloft. Start the cymbalta the day after you stop the zoloft.

## 2021-05-10 NOTE — PROGRESS NOTES
Adriel Rothman is a 80 y.o. female that presents for     Chief Complaint   Patient presents with    Knee Pain     left    Discuss Medications       BP (!) 144/82   Pulse 78   Temp 98.4 °F (36.9 °C) (Oral)   Resp 18   SpO2 94%       HPI    Has hx of L TKA about 20 years ago. States that she is now having a lot of difficulty bearing weight. Has a lot of pain with most activities. Surgery was in [place]. No swelling. MDD:  States that it feels like her zoloft is not helping. Feeling more irritable overall. Sleep is variable, usually due to pain. Feels like protonix has not helping as much recently. Reports an empty stomach frequent. +nausea, without vomiting. Will occur with or without eating. Has followed with GI in the past for this. A1c 7.1. On metformin, tolerates this well. States that she thinks that she still has a UTI that was present in February during her admission. +dysuria and suprapubic pressure.     Health Maintenance   Topic Date Due    Hepatitis C screen  Never done    DTaP/Tdap/Td vaccine (1 - Tdap) Never done    Shingles Vaccine (1 of 2) Never done    Pneumococcal 65+ years Vaccine (2 of 2 - PPSV23) 12/14/2016    Lipid screen  05/14/2017    Annual Wellness Visit (AWV)  07/15/2021    Potassium monitoring  04/30/2022    Creatinine monitoring  04/30/2022    Flu vaccine  Completed    COVID-19 Vaccine  Completed    DEXA (modify frequency per FRAX score)  Addressed    Hepatitis A vaccine  Aged Out    Hib vaccine  Aged Out    Meningococcal (ACWY) vaccine  Aged Out       Past Medical History:   Diagnosis Date    Arthritis     Chronic low back pain     COVID-19     DM type 2, goal A1c below 7     GERD (gastroesophageal reflux disease)     Hyperlipidemia     Hypertension     Loss of vision     Mitral valve disorder     Osteoporosis     Subarachnoid hemorrhage ([facility] [address] 4/25/2019    TIA (transient ischemic attack)     Type 2 diabetes mellitus with diabetic neuropathy, without long-term current use of insulin ([facility] [address] 7/26/2017   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    Urinary incontinence        Past Surgical History:   Procedure Laterality Date    APPENDECTOMY      CARDIAC SURGERY      TAVR    CARPAL TUNNEL RELEASE Left 2011    CATARACT REMOVAL Bilateral     CERVICAL FUSION  1976    CHOLECYSTECTOMY      COLON SURGERY  2012    COLONOSCOPY  2015    ENDOSCOPY, COLON, DIAGNOSTIC      EYE SURGERY      CATARACTS    FINGER SURGERY Right 10/2016    3rd digit    HERNIA REPAIR      HYSTERECTOMY      JOINT REPLACEMENT      both knees    NERVE SURGERY Right 10/25/2019    Lumbar Facet MBB @ E29-N7,K5-6 L2-3 right side only #1 performed by Valarie Martinez MD at [facility] 98 Right 12/16/2019    Lumbar Facet MBB @ L63-L6-S5-4, L2-3 RIGHT SIDE ONLY performed by Valarie Martinez MD at [facility] 113 Right 1/6/2020    Lumbar RFA T12-L1, L1-2, L2-3 Right performed by Valarie Martinez MD at  [facility] LAMINECTOMY,>2 [facility]      6 FUSIONS    ROTATOR CUFF REPAIR Right 11/28/2014    SHOULDER SURGERY  2014    SPINE SURGERY      TENDON RELEASE Right 05/18/2017    at [address] [place] [address] Bilateral     UPPER GASTROINTESTINAL ENDOSCOPY  2013,2015       Social History     Tobacco Use    Smoking status: Never Smoker    Smokeless tobacco: Never Used   Substance Use Topics    Alcohol use: No    Drug use: No       Family History   Problem Relation Age of Onset    Arthritis Mother     Cancer Mother     Heart Disease Father     High Blood Pressure Father          I have reviewed the patient's past medical history, past surgical history, allergies, medications, social and family history and I have made updates where appropriate.     Review of Systems        PHYSICAL EXAM:  BP (!) 144/82   Pulse 78   Temp 98.4 °F (36.9 °C) (Oral)   Resp 18   SpO2 94%     Physical Exam  Vitals signs and nursing note reviewed. Constitutional:       General: She is not in acute distress. Appearance: She is well-developed. HENT:      Head: Normocephalic and atraumatic. Right Ear: Hearing and external ear normal.      Left Ear: Hearing and external ear normal.      Nose: Nose normal.   Eyes:      General:         Right eye: No discharge. Left eye: No discharge. Conjunctiva/sclera: Conjunctivae normal.   Neck:      Musculoskeletal: Normal range of motion and neck supple. Thyroid: No thyromegaly. Trachea: No tracheal deviation. Cardiovascular:      Rate and Rhythm: Normal rate and regular rhythm. Heart sounds: Normal heart sounds. No murmur. No friction rub. No gallop. Pulmonary:      Effort: Pulmonary effort is normal. No respiratory distress. Breath sounds: No wheezing or rales. Chest:      Chest wall: No tenderness. Musculoskeletal:         General: No deformity. Lymphadenopathy:      Cervical: No cervical adenopathy. Skin:     General: Skin is warm and dry. Findings: No erythema or rash. Neurological:      Mental Status: She is alert. Motor: No abnormal muscle tone. Coordination: Coordination normal.   Psychiatric:         Behavior: Behavior normal.         Thought Content: Thought content normal.         Judgment: Judgment normal.             ASSESSMENT & PLAN  Toya Seen was seen today for knee pain and discuss medications. Diagnoses and all orders for this visit:    Chronic pain of left knee  -     AFL - Arely Fall MD, Orthopedic Surgery, [facility]    SI (sacroiliac) joint inflammation [facility])    Major depressive disorder, recurrent episode, mild (HCC)  -     DULoxetine (CYMBALTA) 30 MG extended release capsule; Take 1 capsule by mouth daily    Acute cystitis without hematuria  -     sulfamethoxazole-trimethoprim (BACTRIM DS) 800-160 MG per tablet;  Take 1 tablet by mouth 2 times daily for 5 days    Gastroesophageal reflux disease without esophagitis  -     omeprazole (PRILOSEC) 40 MG delayed release capsule; Take 1 capsule by mouth daily    Type 2 diabetes mellitus with diabetic neuropathy, without long-term current use of insulin ([facility])  -     POCT glycosylated hemoglobin (Hb A1C)    -Multiple issues to address today. She voiced reluctance to follow up or proceed with the treatments we discussed today  -Stop zoloft, will start cymbalta for uncontrolled MDD  -Stop protonix and will trial omeprazole instead  -refer to Ortho for knee pain ( she stated later on that she already has an appt with them scheduled)    Return in about 2 months (around 7/10/2021). All copied or forwarded information in the progress note was verified by me to be accurate at the time of visit  Patient's past medical, surgical, social and family history were reviewed and updated     All patient questions answered. Patient voiced understanding. Addendum 5/21/21:  Patient requiring preoperative clearance for left knee revision. EKG performed on 3/8/21 without any acute changes. No ST segment changes or or q waves. Patient had cardiac cath on 1/13/21 with non obstructive CAD. CXR reviewed today. Per Dr Bre Villalta note on 5/17/21, low to moderate risk. Was given instructions on holding plavix. Patient may proceed with planned procedure. Will require standard VTE prophylaxis.

## 2021-05-13 ENCOUNTER — TELEPHONE (OUTPATIENT)
Dept: CARDIOLOGY CLINIC | Age: 81
End: 2021-05-13

## 2021-05-14 ENCOUNTER — TELEPHONE (OUTPATIENT)
Dept: CARDIOLOGY CLINIC | Age: 81
End: 2021-05-14

## 2021-05-14 NOTE — TELEPHONE ENCOUNTER
This patient has been 3 months post TAVR (2/3/2021). From your standpoint would you like to continue Plavix due to recent CVA or is it safe to stop this therapy?

## 2021-05-20 ENCOUNTER — HOSPITAL ENCOUNTER (OUTPATIENT)
Age: 81
Discharge: HOME | End: 2021-05-20
Payer: MEDICARE

## 2021-05-20 ENCOUNTER — HOSPITAL ENCOUNTER (OUTPATIENT)
Dept: GENERAL RADIOLOGY | Age: 81
Discharge: HOME | End: 2021-05-20
Payer: MEDICARE

## 2021-05-20 DIAGNOSIS — Z01.812 ENCOUNTER FOR PRE-OPERATIVE LABORATORY TESTING: ICD-10-CM

## 2021-05-20 DIAGNOSIS — Z01.818 PRE-OP TESTING: ICD-10-CM

## 2021-05-20 DIAGNOSIS — I10 HYPERTENSION, UNSPECIFIED TYPE: ICD-10-CM

## 2021-05-20 PROCEDURE — 99211 OFF/OP EST MAY X REQ PHY/QHP: CPT

## 2021-05-20 PROCEDURE — 87641 MR-STAPH DNA AMP PROBE: CPT

## 2021-05-20 PROCEDURE — 71046 X-RAY EXAM CHEST 2 VIEWS: CPT

## 2021-05-21 LAB — MRSA SCREEN RT-PCR: NEGATIVE

## 2021-05-24 ENCOUNTER — HOSPITAL ENCOUNTER (OUTPATIENT)
Age: 81
Discharge: HOME | End: 2021-05-24
Payer: MEDICARE

## 2021-05-24 LAB
INFLUENZA A: NOT DETECTED
INFLUENZA B: NOT DETECTED
SARS-COV-2 RNA, RT PCR: NOT DETECTED

## 2021-05-24 PROCEDURE — 87636 SARSCOV2 & INF A&B AMP PRB: CPT

## 2021-05-27 NOTE — PROGRESS NOTES
In preparation for their surgical procedure above patient was screened for Obstructive Sleep Apnea (ALLISON) using the STOP-Bang Questionnaire by the Pre-Admission Testing department. This is a pre-surgical screening tool for patient safety and serves as a recommendation, this WILL NOT cause cancellation of surgery. STOP-Bang Questionnaire  * Do you currently see a pulmonologist?  No     If yes STOP, do not complete. Patient follows with Dr.     1.  Do you snore loudly (able to be heard in the next room)? No    2. Do you often feel tired or sleepy during the daytime? No       3. Has anyone ever told you that you stop breathing during your sleep? No    4. Do you have or are you being treated for high blood pressure? Yes      5. BMI more than 35? BMI (Calculated): 41.6        Yes    6. Age over 48 years? 80 y.o. Yes    7. Neck Circumference greater than 17 inches for male or 16 inches for female? Measured           (visits only)            Not Applicable    8. Gender Male? No      TOTAL SCORE: 3    ALLISON - Low Risk : Yes to 0 - 2 questions  ALLISON - Intermediate Risk : Yes to 3 - 4 questions  ALLISON - High Risk : Yes to 5 - 8 questions    Adapted from:   STOP Questionnaire: A Tool to Screen Patients for Obstructive Sleep Apnea   ROMANA Mart.C.P.C., Cuong Lujan M.B.B.S., Violeta Lopez M.D., Lakia Hendricks. Maryan Layton, Ph.D., TELMA Lester.B.B.S., Dion San M.Sc., Madi Quick M.D., Jaclyn Ervin. MERLENE Bird.P.C.    Anesthesiology 2008; 657:077-68 Copyright 2008, the [address] [employer], j[place] 37.   ----------------------------------------------------------------------------------------------------------------

## 2021-05-27 NOTE — PROGRESS NOTES
Following instructions given to patient, who states understanding:     NPO after midnight  [employer] and 's license  Wear comfortable clean clothing  Do not bring jewelry   Shower night before and morning of surgery with a liquid antibacterial soap  Bring medications in original bottles  Follow all instructions given by your physician   needed at discharge  Call [phone] for any questions  Report to [facility] on 2nd floor  If you would become ill prior to surgery, please call the surgeon  Please bring and wear mask    Covid screening questionnaire complete and negative for symptoms or exposure see chart for documentation.    Please limit your exposure to the public after you have your covid test  Please call your doctor immediately if you develop any symptoms of covid prior to your surgery

## 2021-05-31 ENCOUNTER — ANESTHESIA EVENT (OUTPATIENT)
Dept: OPERATING ROOM | Age: 81
DRG: 467 | End: 2021-05-31
Payer: MEDICARE

## 2021-06-01 ENCOUNTER — HOSPITAL ENCOUNTER (INPATIENT)
Age: 81
LOS: 3 days | Discharge: HOME | DRG: 467 | End: 2021-06-04
Attending: ORTHOPAEDIC SURGERY | Admitting: ORTHOPAEDIC SURGERY
Payer: MEDICARE

## 2021-06-01 ENCOUNTER — APPOINTMENT (OUTPATIENT)
Dept: GENERAL RADIOLOGY | Age: 81
DRG: 467 | End: 2021-06-01
Attending: ORTHOPAEDIC SURGERY
Payer: MEDICARE

## 2021-06-01 ENCOUNTER — ANESTHESIA (OUTPATIENT)
Dept: OPERATING ROOM | Age: 81
DRG: 467 | End: 2021-06-01
Payer: MEDICARE

## 2021-06-01 VITALS
OXYGEN SATURATION: 93 % | SYSTOLIC BLOOD PRESSURE: 125 MMHG | RESPIRATION RATE: 19 BRPM | DIASTOLIC BLOOD PRESSURE: 66 MMHG | TEMPERATURE: 98.2 F

## 2021-06-01 DIAGNOSIS — Z96.652 STATUS POST REVISION OF TOTAL REPLACEMENT OF LEFT KNEE: Primary | ICD-10-CM

## 2021-06-01 DIAGNOSIS — T84.093A FAILED TOTAL LEFT KNEE REPLACEMENT, INITIAL ENCOUNTER (HCC): ICD-10-CM

## 2021-06-01 LAB
ABO: NORMAL
ANTIBODY SCREEN: NORMAL
CHARACTER,SYN.FL: ABNORMAL
COLOR FLUID: YELLOW
CRYSTALS, FLUID: ABNORMAL
GLUCOSE BLD-MCNC: 165 MG/DL (ref 70–108)
GLUCOSE BLD-MCNC: 187 MG/DL (ref 70–108)
MONONUCLEAR CELLS SYNOVIAL FLUID: 88 % (ref 0–74)
PATHOLOGIST REVIEW: ABNORMAL
POLYMORPHONUCLEAR CELLS SYNOVIAL FLUID: 12 % (ref 0–24)
POTASSIUM SERPL-SCNC: 4.3 MEQ/L (ref 3.5–5.2)
RH FACTOR: NORMAL
TOTAL NUCLEATED CELLS SYNOVIAL: 170 /CUMM (ref 0–150)
TOTAL VOLUME RECEIVED SYNOVIAL: 3 ML

## 2021-06-01 PROCEDURE — 6370000000 HC RX 637 (ALT 250 FOR IP): Performed by: PHYSICIAN ASSISTANT

## 2021-06-01 PROCEDURE — 87205 SMEAR GRAM STAIN: CPT

## 2021-06-01 PROCEDURE — 1200000000 HC SEMI PRIVATE

## 2021-06-01 PROCEDURE — 88305 TISSUE EXAM BY PATHOLOGIST: CPT

## 2021-06-01 PROCEDURE — 3700000001 HC ADD 15 MINUTES (ANESTHESIA): Performed by: ORTHOPAEDIC SURGERY

## 2021-06-01 PROCEDURE — 88331 PATH CONSLTJ SURG 1 BLK 1SPC: CPT

## 2021-06-01 PROCEDURE — 2720000010 HC SURG SUPPLY STERILE: Performed by: ORTHOPAEDIC SURGERY

## 2021-06-01 PROCEDURE — 73560 X-RAY EXAM OF KNEE 1 OR 2: CPT

## 2021-06-01 PROCEDURE — 7100000001 HC PACU RECOVERY - ADDTL 15 MIN: Performed by: ORTHOPAEDIC SURGERY

## 2021-06-01 PROCEDURE — 87075 CULTR BACTERIA EXCEPT BLOOD: CPT

## 2021-06-01 PROCEDURE — 36415 COLL VENOUS BLD VENIPUNCTURE: CPT

## 2021-06-01 PROCEDURE — 3700000000 HC ANESTHESIA ATTENDED CARE: Performed by: ORTHOPAEDIC SURGERY

## 2021-06-01 PROCEDURE — 2709999900 HC NON-CHARGEABLE SUPPLY: Performed by: ORTHOPAEDIC SURGERY

## 2021-06-01 PROCEDURE — 84132 ASSAY OF SERUM POTASSIUM: CPT

## 2021-06-01 PROCEDURE — 89050 BODY FLUID CELL COUNT: CPT

## 2021-06-01 PROCEDURE — 0SRU0J9 REPLACEMENT OF LEFT KNEE JOINT, FEMORAL SURFACE WITH SYNTHETIC SUBSTITUTE, CEMENTED, OPEN APPROACH: ICD-10-PCS | Performed by: ORTHOPAEDIC SURGERY

## 2021-06-01 PROCEDURE — 6360000002 HC RX W HCPCS

## 2021-06-01 PROCEDURE — C1713 ANCHOR/SCREW BN/BN,TIS/BN: HCPCS | Performed by: ORTHOPAEDIC SURGERY

## 2021-06-01 PROCEDURE — 3600000015 HC SURGERY LEVEL 5 ADDTL 15MIN: Performed by: ORTHOPAEDIC SURGERY

## 2021-06-01 PROCEDURE — 97110 THERAPEUTIC EXERCISES: CPT

## 2021-06-01 PROCEDURE — 87070 CULTURE OTHR SPECIMN AEROBIC: CPT

## 2021-06-01 PROCEDURE — 7100000000 HC PACU RECOVERY - FIRST 15 MIN: Performed by: ORTHOPAEDIC SURGERY

## 2021-06-01 PROCEDURE — 0SPU0JZ REMOVAL OF SYNTHETIC SUBSTITUTE FROM LEFT KNEE JOINT, FEMORAL SURFACE, OPEN APPROACH: ICD-10-PCS | Performed by: ORTHOPAEDIC SURGERY

## 2021-06-01 PROCEDURE — 87116 MYCOBACTERIA CULTURE: CPT

## 2021-06-01 PROCEDURE — 76942 ECHO GUIDE FOR BIOPSY: CPT | Performed by: ANESTHESIOLOGY

## 2021-06-01 PROCEDURE — 6360000002 HC RX W HCPCS: Performed by: ORTHOPAEDIC SURGERY

## 2021-06-01 PROCEDURE — 86900 BLOOD TYPING SEROLOGIC ABO: CPT

## 2021-06-01 PROCEDURE — 2580000003 HC RX 258: Performed by: ORTHOPAEDIC SURGERY

## 2021-06-01 PROCEDURE — 2580000003 HC RX 258: Performed by: PHYSICIAN ASSISTANT

## 2021-06-01 PROCEDURE — 87102 FUNGUS ISOLATION CULTURE: CPT

## 2021-06-01 PROCEDURE — 2500000003 HC RX 250 WO HCPCS: Performed by: ORTHOPAEDIC SURGERY

## 2021-06-01 PROCEDURE — 6360000002 HC RX W HCPCS: Performed by: PHYSICIAN ASSISTANT

## 2021-06-01 PROCEDURE — 86850 RBC ANTIBODY SCREEN: CPT

## 2021-06-01 PROCEDURE — 6360000002 HC RX W HCPCS: Performed by: ANESTHESIOLOGY

## 2021-06-01 PROCEDURE — C1776 JOINT DEVICE (IMPLANTABLE): HCPCS | Performed by: ORTHOPAEDIC SURGERY

## 2021-06-01 PROCEDURE — 89060 EXAM SYNOVIAL FLUID CRYSTALS: CPT

## 2021-06-01 PROCEDURE — 2500000003 HC RX 250 WO HCPCS

## 2021-06-01 PROCEDURE — 82948 REAGENT STRIP/BLOOD GLUCOSE: CPT

## 2021-06-01 PROCEDURE — 97162 PT EVAL MOD COMPLEX 30 MIN: CPT

## 2021-06-01 PROCEDURE — 3600000005 HC SURGERY LEVEL 5 BASE: Performed by: ORTHOPAEDIC SURGERY

## 2021-06-01 PROCEDURE — 86901 BLOOD TYPING SEROLOGIC RH(D): CPT

## 2021-06-01 PROCEDURE — 6370000000 HC RX 637 (ALT 250 FOR IP): Performed by: ORTHOPAEDIC SURGERY

## 2021-06-01 PROCEDURE — 97530 THERAPEUTIC ACTIVITIES: CPT

## 2021-06-01 DEVICE — LEGION OFFSET COUPLER 2MM
Type: IMPLANTABLE DEVICE | Site: KNEE | Status: FUNCTIONAL
Brand: LEGION

## 2021-06-01 DEVICE — RALLY MV AB BONE CEMENT 40 GRAMS
Type: IMPLANTABLE DEVICE | Site: KNEE | Status: FUNCTIONAL
Brand: RALLY

## 2021-06-01 DEVICE — GENESIS II POSTERIOR STABILIZED                                    HIGH FLEXION INSERT SIZE 3-4 15MM
Type: IMPLANTABLE DEVICE | Site: KNEE | Status: FUNCTIONAL
Brand: GENESIS II

## 2021-06-01 DEVICE — LEGION SCREW-ON LWEDGE 5D X 5 POS                                    SZ 5
Type: IMPLANTABLE DEVICE | Site: KNEE | Status: FUNCTIONAL
Brand: LEGION

## 2021-06-01 DEVICE — LEGION PRESSFIT STEM 16MM X 160MM
Type: IMPLANTABLE DEVICE | Site: KNEE | Status: FUNCTIONAL
Brand: LEGION

## 2021-06-01 DEVICE — LEGION OXINIUM CONSTRAINED FEMORAL                                    SIZE 5 LEFT
Type: IMPLANTABLE DEVICE | Site: KNEE | Status: FUNCTIONAL
Brand: LEGION

## 2021-06-01 DEVICE — LEGION REVISION TIBIAL BASEPLATE                                    SIZE 4 LEFT
Type: IMPLANTABLE DEVICE | Site: KNEE | Status: FUNCTIONAL
Brand: LEGION

## 2021-06-01 DEVICE — GENESIS II BICONVEX PATELLA 26MM
Type: IMPLANTABLE DEVICE | Site: KNEE | Status: FUNCTIONAL
Brand: GENESIS II

## 2021-06-01 DEVICE — LEGION PRESSFIT STEM 14MM X 160MM
Type: IMPLANTABLE DEVICE | Site: KNEE | Status: FUNCTIONAL
Brand: LEGION

## 2021-06-01 RX ORDER — ROCURONIUM BROMIDE 10 MG/ML
INJECTION, SOLUTION INTRAVENOUS PRN
Status: DISCONTINUED | OUTPATIENT
Start: 2021-06-01 | End: 2021-06-01 | Stop reason: SDUPTHER

## 2021-06-01 RX ORDER — HYDRALAZINE HYDROCHLORIDE 25 MG/1
25 TABLET, FILM COATED ORAL 2 TIMES DAILY
Status: DISCONTINUED | OUTPATIENT
Start: 2021-06-01 | End: 2021-06-04 | Stop reason: HOSPADM

## 2021-06-01 RX ORDER — ATORVASTATIN CALCIUM 40 MG/1
40 TABLET, FILM COATED ORAL DAILY
Status: DISCONTINUED | OUTPATIENT
Start: 2021-06-01 | End: 2021-06-04 | Stop reason: HOSPADM

## 2021-06-01 RX ORDER — MEPERIDINE HYDROCHLORIDE 25 MG/ML
12.5 INJECTION INTRAMUSCULAR; INTRAVENOUS; SUBCUTANEOUS EVERY 5 MIN PRN
Status: DISCONTINUED | OUTPATIENT
Start: 2021-06-01 | End: 2021-06-01 | Stop reason: HOSPADM

## 2021-06-01 RX ORDER — HYDROCODONE BITARTRATE AND ACETAMINOPHEN 5; 325 MG/1; MG/1
1 TABLET ORAL EVERY 4 HOURS PRN
Status: DISCONTINUED | OUTPATIENT
Start: 2021-06-01 | End: 2021-06-02

## 2021-06-01 RX ORDER — MULTIVITAMIN/IRON/FOLIC ACID 18MG-0.4MG
500 TABLET ORAL 2 TIMES DAILY
Status: DISCONTINUED | OUTPATIENT
Start: 2021-06-01 | End: 2021-06-04 | Stop reason: HOSPADM

## 2021-06-01 RX ORDER — PROMETHAZINE HYDROCHLORIDE 25 MG/ML
12.5 INJECTION, SOLUTION INTRAMUSCULAR; INTRAVENOUS
Status: DISCONTINUED | OUTPATIENT
Start: 2021-06-01 | End: 2021-06-01 | Stop reason: HOSPADM

## 2021-06-01 RX ORDER — BUPIVACAINE HYDROCHLORIDE 5 MG/ML
INJECTION, SOLUTION EPIDURAL; INTRACAUDAL PRN
Status: DISCONTINUED | OUTPATIENT
Start: 2021-06-01 | End: 2021-06-01 | Stop reason: ALTCHOICE

## 2021-06-01 RX ORDER — LABETALOL 20 MG/4 ML (5 MG/ML) INTRAVENOUS SYRINGE
5 EVERY 10 MIN PRN
Status: DISCONTINUED | OUTPATIENT
Start: 2021-06-01 | End: 2021-06-01 | Stop reason: HOSPADM

## 2021-06-01 RX ORDER — POTASSIUM CHLORIDE 750 MG/1
10 TABLET, FILM COATED, EXTENDED RELEASE ORAL DAILY PRN
Status: DISCONTINUED | OUTPATIENT
Start: 2021-06-01 | End: 2021-06-04 | Stop reason: HOSPADM

## 2021-06-01 RX ORDER — FENTANYL CITRATE 50 UG/ML
25 INJECTION, SOLUTION INTRAMUSCULAR; INTRAVENOUS EVERY 5 MIN PRN
Status: DISCONTINUED | OUTPATIENT
Start: 2021-06-01 | End: 2021-06-01 | Stop reason: HOSPADM

## 2021-06-01 RX ORDER — GABAPENTIN 300 MG/1
300 CAPSULE ORAL ONCE
Status: COMPLETED | OUTPATIENT
Start: 2021-06-01 | End: 2021-06-01

## 2021-06-01 RX ORDER — MORPHINE SULFATE 2 MG/ML
2 INJECTION, SOLUTION INTRAMUSCULAR; INTRAVENOUS
Status: DISPENSED | OUTPATIENT
Start: 2021-06-01 | End: 2021-06-02

## 2021-06-01 RX ORDER — LISINOPRIL 10 MG/1
10 TABLET ORAL DAILY
Status: DISCONTINUED | OUTPATIENT
Start: 2021-06-02 | End: 2021-06-04 | Stop reason: HOSPADM

## 2021-06-01 RX ORDER — EPHEDRINE SULFATE/0.9% NACL/PF 50 MG/5 ML
SYRINGE (ML) INTRAVENOUS PRN
Status: DISCONTINUED | OUTPATIENT
Start: 2021-06-01 | End: 2021-06-01 | Stop reason: SDUPTHER

## 2021-06-01 RX ORDER — TRAMADOL HYDROCHLORIDE 50 MG/1
50 TABLET ORAL EVERY 6 HOURS PRN
Status: DISCONTINUED | OUTPATIENT
Start: 2021-06-01 | End: 2021-06-04 | Stop reason: HOSPADM

## 2021-06-01 RX ORDER — VANCOMYCIN HYDROCHLORIDE 500 MG/10ML
INJECTION, POWDER, LYOPHILIZED, FOR SOLUTION INTRAVENOUS PRN
Status: DISCONTINUED | OUTPATIENT
Start: 2021-06-01 | End: 2021-06-01 | Stop reason: ALTCHOICE

## 2021-06-01 RX ORDER — ONDANSETRON 2 MG/ML
INJECTION INTRAMUSCULAR; INTRAVENOUS PRN
Status: DISCONTINUED | OUTPATIENT
Start: 2021-06-01 | End: 2021-06-01 | Stop reason: SDUPTHER

## 2021-06-01 RX ORDER — ACETAMINOPHEN 325 MG/1
650 TABLET ORAL EVERY 6 HOURS
Status: DISCONTINUED | OUTPATIENT
Start: 2021-06-01 | End: 2021-06-04 | Stop reason: HOSPADM

## 2021-06-01 RX ORDER — NEOSTIGMINE METHYLSULFATE 5 MG/5 ML
SYRINGE (ML) INTRAVENOUS PRN
Status: DISCONTINUED | OUTPATIENT
Start: 2021-06-01 | End: 2021-06-01 | Stop reason: SDUPTHER

## 2021-06-01 RX ORDER — SODIUM CHLORIDE 0.9 % (FLUSH) 0.9 %
10 SYRINGE (ML) INJECTION EVERY 12 HOURS SCHEDULED
Status: DISCONTINUED | OUTPATIENT
Start: 2021-06-01 | End: 2021-06-04 | Stop reason: HOSPADM

## 2021-06-01 RX ORDER — DEXAMETHASONE SODIUM PHOSPHATE 10 MG/ML
INJECTION, EMULSION INTRAMUSCULAR; INTRAVENOUS PRN
Status: DISCONTINUED | OUTPATIENT
Start: 2021-06-01 | End: 2021-06-01 | Stop reason: SDUPTHER

## 2021-06-01 RX ORDER — PROPOFOL 10 MG/ML
INJECTION, EMULSION INTRAVENOUS PRN
Status: DISCONTINUED | OUTPATIENT
Start: 2021-06-01 | End: 2021-06-01 | Stop reason: SDUPTHER

## 2021-06-01 RX ORDER — SODIUM CHLORIDE 9 MG/ML
INJECTION, SOLUTION INTRAVENOUS CONTINUOUS
Status: DISCONTINUED | OUTPATIENT
Start: 2021-06-01 | End: 2021-06-04 | Stop reason: HOSPADM

## 2021-06-01 RX ORDER — LIDOCAINE HCL/PF 100 MG/5ML
SYRINGE (ML) INJECTION PRN
Status: DISCONTINUED | OUTPATIENT
Start: 2021-06-01 | End: 2021-06-01 | Stop reason: SDUPTHER

## 2021-06-01 RX ORDER — ONDANSETRON 2 MG/ML
4 INJECTION INTRAMUSCULAR; INTRAVENOUS EVERY 6 HOURS PRN
Status: DISCONTINUED | OUTPATIENT
Start: 2021-06-01 | End: 2021-06-04 | Stop reason: HOSPADM

## 2021-06-01 RX ORDER — FENTANYL CITRATE 50 UG/ML
INJECTION, SOLUTION INTRAMUSCULAR; INTRAVENOUS PRN
Status: DISCONTINUED | OUTPATIENT
Start: 2021-06-01 | End: 2021-06-01 | Stop reason: SDUPTHER

## 2021-06-01 RX ORDER — TRANEXAMIC ACID 100 MG/ML
INJECTION, SOLUTION INTRAVENOUS PRN
Status: DISCONTINUED | OUTPATIENT
Start: 2021-06-01 | End: 2021-06-01 | Stop reason: ALTCHOICE

## 2021-06-01 RX ORDER — SODIUM CHLORIDE 9 MG/ML
INJECTION, SOLUTION INTRAVENOUS CONTINUOUS
Status: DISCONTINUED | OUTPATIENT
Start: 2021-06-01 | End: 2021-06-01

## 2021-06-01 RX ORDER — SENNA AND DOCUSATE SODIUM 50; 8.6 MG/1; MG/1
1 TABLET, FILM COATED ORAL 2 TIMES DAILY
Status: DISCONTINUED | OUTPATIENT
Start: 2021-06-01 | End: 2021-06-04 | Stop reason: HOSPADM

## 2021-06-01 RX ORDER — ROPIVACAINE HYDROCHLORIDE 5 MG/ML
INJECTION, SOLUTION EPIDURAL; INFILTRATION; PERINEURAL
Status: DISCONTINUED | OUTPATIENT
Start: 2021-06-01 | End: 2021-06-01 | Stop reason: SDUPTHER

## 2021-06-01 RX ORDER — GLYCOPYRROLATE 1 MG/5 ML
SYRINGE (ML) INTRAVENOUS PRN
Status: DISCONTINUED | OUTPATIENT
Start: 2021-06-01 | End: 2021-06-01 | Stop reason: SDUPTHER

## 2021-06-01 RX ORDER — CLOPIDOGREL BISULFATE 75 MG/1
75 TABLET ORAL DAILY
Status: DISCONTINUED | OUTPATIENT
Start: 2021-06-01 | End: 2021-06-04 | Stop reason: HOSPADM

## 2021-06-01 RX ORDER — FUROSEMIDE 20 MG/1
20 TABLET ORAL DAILY PRN
Status: DISCONTINUED | OUTPATIENT
Start: 2021-06-01 | End: 2021-06-04 | Stop reason: HOSPADM

## 2021-06-01 RX ORDER — SODIUM CHLORIDE 0.9 % (FLUSH) 0.9 %
10 SYRINGE (ML) INJECTION EVERY 12 HOURS SCHEDULED
Status: DISCONTINUED | OUTPATIENT
Start: 2021-06-01 | End: 2021-06-01 | Stop reason: HOSPADM

## 2021-06-01 RX ORDER — CELECOXIB 100 MG/1
100 CAPSULE ORAL ONCE
Status: COMPLETED | OUTPATIENT
Start: 2021-06-01 | End: 2021-06-01

## 2021-06-01 RX ORDER — SODIUM CHLORIDE 9 MG/ML
25 INJECTION, SOLUTION INTRAVENOUS PRN
Status: DISCONTINUED | OUTPATIENT
Start: 2021-06-01 | End: 2021-06-01 | Stop reason: HOSPADM

## 2021-06-01 RX ORDER — FENTANYL CITRATE 50 UG/ML
50 INJECTION, SOLUTION INTRAMUSCULAR; INTRAVENOUS EVERY 5 MIN PRN
Status: DISCONTINUED | OUTPATIENT
Start: 2021-06-01 | End: 2021-06-01 | Stop reason: HOSPADM

## 2021-06-01 RX ORDER — MIDAZOLAM HYDROCHLORIDE 1 MG/ML
INJECTION INTRAMUSCULAR; INTRAVENOUS PRN
Status: DISCONTINUED | OUTPATIENT
Start: 2021-06-01 | End: 2021-06-01 | Stop reason: SDUPTHER

## 2021-06-01 RX ORDER — SODIUM CHLORIDE 9 MG/ML
25 INJECTION, SOLUTION INTRAVENOUS PRN
Status: DISCONTINUED | OUTPATIENT
Start: 2021-06-01 | End: 2021-06-04 | Stop reason: HOSPADM

## 2021-06-01 RX ORDER — DULOXETIN HYDROCHLORIDE 30 MG/1
30 CAPSULE, DELAYED RELEASE ORAL DAILY
Status: DISCONTINUED | OUTPATIENT
Start: 2021-06-01 | End: 2021-06-04 | Stop reason: HOSPADM

## 2021-06-01 RX ORDER — ATENOLOL 25 MG/1
25 TABLET ORAL DAILY
Status: DISCONTINUED | OUTPATIENT
Start: 2021-06-02 | End: 2021-06-04 | Stop reason: HOSPADM

## 2021-06-01 RX ORDER — SODIUM CHLORIDE 0.9 % (FLUSH) 0.9 %
10 SYRINGE (ML) INJECTION PRN
Status: DISCONTINUED | OUTPATIENT
Start: 2021-06-01 | End: 2021-06-01 | Stop reason: HOSPADM

## 2021-06-01 RX ORDER — SODIUM CHLORIDE 0.9 % (FLUSH) 0.9 %
10 SYRINGE (ML) INJECTION PRN
Status: DISCONTINUED | OUTPATIENT
Start: 2021-06-01 | End: 2021-06-04 | Stop reason: HOSPADM

## 2021-06-01 RX ADMIN — PROPOFOL 150 MG: 10 INJECTION, EMULSION INTRAVENOUS at 10:41

## 2021-06-01 RX ADMIN — MORPHINE SULFATE 2 MG: 2 INJECTION, SOLUTION INTRAMUSCULAR; INTRAVENOUS at 14:00

## 2021-06-01 RX ADMIN — SODIUM CHLORIDE: 9 INJECTION, SOLUTION INTRAVENOUS at 09:03

## 2021-06-01 RX ADMIN — ATORVASTATIN CALCIUM 40 MG: 40 TABLET, FILM COATED ORAL at 17:00

## 2021-06-01 RX ADMIN — Medication 0.8 MG: at 12:21

## 2021-06-01 RX ADMIN — MORPHINE SULFATE 2 MG: 2 INJECTION, SOLUTION INTRAMUSCULAR; INTRAVENOUS at 22:48

## 2021-06-01 RX ADMIN — Medication 10 MG: at 11:41

## 2021-06-01 RX ADMIN — DOCUSATE SODIUM 50 MG AND SENNOSIDES 8.6 MG 1 TABLET: 8.6; 5 TABLET, FILM COATED ORAL at 20:46

## 2021-06-01 RX ADMIN — DOCUSATE SODIUM 50 MG AND SENNOSIDES 8.6 MG 1 TABLET: 8.6; 5 TABLET, FILM COATED ORAL at 17:00

## 2021-06-01 RX ADMIN — ACETAMINOPHEN 650 MG: 325 TABLET ORAL at 20:47

## 2021-06-01 RX ADMIN — HYDRALAZINE HYDROCHLORIDE 25 MG: 25 TABLET, FILM COATED ORAL at 20:46

## 2021-06-01 RX ADMIN — Medication 10 MG: at 11:46

## 2021-06-01 RX ADMIN — DEXAMETHASONE SODIUM PHOSPHATE 8 MG: 10 INJECTION, EMULSION INTRAMUSCULAR; INTRAVENOUS at 10:50

## 2021-06-01 RX ADMIN — MIDAZOLAM 2 MG: 1 INJECTION INTRAMUSCULAR; INTRAVENOUS at 10:34

## 2021-06-01 RX ADMIN — TRAMADOL HYDROCHLORIDE 50 MG: 50 TABLET, COATED ORAL at 20:46

## 2021-06-01 RX ADMIN — GABAPENTIN 300 MG: 300 CAPSULE ORAL at 09:22

## 2021-06-01 RX ADMIN — HYDROCODONE BITARTRATE AND ACETAMINOPHEN 1 TABLET: 5; 325 TABLET ORAL at 17:00

## 2021-06-01 RX ADMIN — SODIUM CHLORIDE: 9 INJECTION, SOLUTION INTRAVENOUS at 20:46

## 2021-06-01 RX ADMIN — ROCURONIUM BROMIDE 30 MG: 10 INJECTION INTRAVENOUS at 10:41

## 2021-06-01 RX ADMIN — METFORMIN HYDROCHLORIDE 500 MG: 500 TABLET ORAL at 17:00

## 2021-06-01 RX ADMIN — Medication 100 MG: at 10:41

## 2021-06-01 RX ADMIN — SODIUM CHLORIDE, PRESERVATIVE FREE 10 ML: 5 INJECTION INTRAVENOUS at 20:46

## 2021-06-01 RX ADMIN — SODIUM CHLORIDE: 9 INJECTION, SOLUTION INTRAVENOUS at 11:44

## 2021-06-01 RX ADMIN — ROCURONIUM BROMIDE 20 MG: 10 INJECTION INTRAVENOUS at 10:57

## 2021-06-01 RX ADMIN — DULOXETINE HYDROCHLORIDE 30 MG: 30 CAPSULE, DELAYED RELEASE ORAL at 17:00

## 2021-06-01 RX ADMIN — CELECOXIB 100 MG: 100 CAPSULE ORAL at 09:22

## 2021-06-01 RX ADMIN — FENTANYL CITRATE 25 MCG: 50 INJECTION, SOLUTION INTRAMUSCULAR; INTRAVENOUS at 12:31

## 2021-06-01 RX ADMIN — FENTANYL CITRATE 50 MCG: 50 INJECTION, SOLUTION INTRAMUSCULAR; INTRAVENOUS at 10:36

## 2021-06-01 RX ADMIN — ROPIVACAINE HYDROCHLORIDE 30 ML: 5 INJECTION, SOLUTION EPIDURAL; INFILTRATION; PERINEURAL at 10:30

## 2021-06-01 RX ADMIN — CEFAZOLIN 2000 MG: 10 INJECTION, POWDER, FOR SOLUTION INTRAVENOUS at 20:46

## 2021-06-01 RX ADMIN — Medication 5 MG: at 12:21

## 2021-06-01 RX ADMIN — FENTANYL CITRATE 50 MCG: 50 INJECTION, SOLUTION INTRAMUSCULAR; INTRAVENOUS at 11:02

## 2021-06-01 RX ADMIN — ONDANSETRON HYDROCHLORIDE 4 MG: 4 INJECTION, SOLUTION INTRAMUSCULAR; INTRAVENOUS at 12:15

## 2021-06-01 RX ADMIN — ACETAMINOPHEN 650 MG: 325 TABLET ORAL at 17:00

## 2021-06-01 RX ADMIN — ROCURONIUM BROMIDE 20 MG: 10 INJECTION INTRAVENOUS at 11:43

## 2021-06-01 RX ADMIN — HYDROCODONE BITARTRATE AND ACETAMINOPHEN 1 TABLET: 5; 325 TABLET ORAL at 23:50

## 2021-06-01 RX ADMIN — CLOPIDOGREL BISULFATE 75 MG: 75 TABLET ORAL at 20:47

## 2021-06-01 RX ADMIN — ONDANSETRON 4 MG: 2 INJECTION INTRAMUSCULAR; INTRAVENOUS at 14:16

## 2021-06-01 RX ADMIN — CEFAZOLIN SODIUM 2000 MG: 10 INJECTION, POWDER, FOR SOLUTION INTRAVENOUS at 10:50

## 2021-06-01 RX ADMIN — ASPIRIN 325 MG: 325 TABLET, COATED ORAL at 20:47

## 2021-06-01 ASSESSMENT — PULMONARY FUNCTION TESTS
PIF_VALUE: 21
PIF_VALUE: 19
PIF_VALUE: 20
PIF_VALUE: 21
PIF_VALUE: 21
PIF_VALUE: 22
PIF_VALUE: 17
PIF_VALUE: 21
PIF_VALUE: 22
PIF_VALUE: 21
PIF_VALUE: 22
PIF_VALUE: 21
PIF_VALUE: 21
PIF_VALUE: 22
PIF_VALUE: 3
PIF_VALUE: 22
PIF_VALUE: 21
PIF_VALUE: 21
PIF_VALUE: 0
PIF_VALUE: 0
PIF_VALUE: 6
PIF_VALUE: 21
PIF_VALUE: 0
PIF_VALUE: 21
PIF_VALUE: 22
PIF_VALUE: 21
PIF_VALUE: 5
PIF_VALUE: 22
PIF_VALUE: 20
PIF_VALUE: 21
PIF_VALUE: 22
PIF_VALUE: 20
PIF_VALUE: 22
PIF_VALUE: 21
PIF_VALUE: 22
PIF_VALUE: 20
PIF_VALUE: 21
PIF_VALUE: 25
PIF_VALUE: 21
PIF_VALUE: 3
PIF_VALUE: 21
PIF_VALUE: 22
PIF_VALUE: 21
PIF_VALUE: 22
PIF_VALUE: 21
PIF_VALUE: 3
PIF_VALUE: 0
PIF_VALUE: 19
PIF_VALUE: 21
PIF_VALUE: 0
PIF_VALUE: 21
PIF_VALUE: 2
PIF_VALUE: 21
PIF_VALUE: 21
PIF_VALUE: 22
PIF_VALUE: 6
PIF_VALUE: 21
PIF_VALUE: 22
PIF_VALUE: 21
PIF_VALUE: 18
PIF_VALUE: 21
PIF_VALUE: 22
PIF_VALUE: 7
PIF_VALUE: 19
PIF_VALUE: 21
PIF_VALUE: 19
PIF_VALUE: 20
PIF_VALUE: 21
PIF_VALUE: 22
PIF_VALUE: 21
PIF_VALUE: 21
PIF_VALUE: 22
PIF_VALUE: 5
PIF_VALUE: 21
PIF_VALUE: 2
PIF_VALUE: 15
PIF_VALUE: 21
PIF_VALUE: 20
PIF_VALUE: 2
PIF_VALUE: 21
PIF_VALUE: 0
PIF_VALUE: 21
PIF_VALUE: 21
PIF_VALUE: 18
PIF_VALUE: 21
PIF_VALUE: 0
PIF_VALUE: 21
PIF_VALUE: 21
PIF_VALUE: 1
PIF_VALUE: 21
PIF_VALUE: 21
PIF_VALUE: 3
PIF_VALUE: 2
PIF_VALUE: 21
PIF_VALUE: 21
PIF_VALUE: 6
PIF_VALUE: 21
PIF_VALUE: 18
PIF_VALUE: 0

## 2021-06-01 ASSESSMENT — PAIN SCALES - GENERAL
PAINLEVEL_OUTOF10: 5
PAINLEVEL_OUTOF10: 4
PAINLEVEL_OUTOF10: 0
PAINLEVEL_OUTOF10: 0
PAINLEVEL_OUTOF10: 5
PAINLEVEL_OUTOF10: 7
PAINLEVEL_OUTOF10: 5
PAINLEVEL_OUTOF10: 5
PAINLEVEL_OUTOF10: 3
PAINLEVEL_OUTOF10: 7
PAINLEVEL_OUTOF10: 5
PAINLEVEL_OUTOF10: 0
PAINLEVEL_OUTOF10: 0
PAINLEVEL_OUTOF10: 7

## 2021-06-01 ASSESSMENT — PAIN DESCRIPTION - LOCATION
LOCATION: KNEE

## 2021-06-01 ASSESSMENT — PAIN DESCRIPTION - PROGRESSION
CLINICAL_PROGRESSION: GRADUALLY WORSENING
CLINICAL_PROGRESSION: NOT CHANGED
CLINICAL_PROGRESSION: GRADUALLY IMPROVING

## 2021-06-01 ASSESSMENT — PAIN DESCRIPTION - DESCRIPTORS
DESCRIPTORS: CONSTANT;SORE;ACHING
DESCRIPTORS: ACHING;SORE;CONSTANT
DESCRIPTORS: ACHING

## 2021-06-01 ASSESSMENT — PAIN DESCRIPTION - FREQUENCY
FREQUENCY: CONTINUOUS

## 2021-06-01 ASSESSMENT — PAIN DESCRIPTION - ONSET
ONSET: ON-GOING

## 2021-06-01 ASSESSMENT — PAIN - FUNCTIONAL ASSESSMENT
PAIN_FUNCTIONAL_ASSESSMENT: PREVENTS OR INTERFERES SOME ACTIVE ACTIVITIES AND ADLS
PAIN_FUNCTIONAL_ASSESSMENT: 0-10

## 2021-06-01 ASSESSMENT — PAIN DESCRIPTION - ORIENTATION
ORIENTATION: LEFT

## 2021-06-01 ASSESSMENT — PAIN DESCRIPTION - PAIN TYPE
TYPE: SURGICAL PAIN

## 2021-06-01 NOTE — ANESTHESIA POSTPROCEDURE EVALUATION
Department of Anesthesiology  Postprocedure Note    Patient: Rashard More  MRN: [id number]  YOB: 1940  Date of evaluation: 6/1/2021  Time:  2:36 PM     Procedure Summary     Date: 06/01/21 Room / Location: [address]    Anesthesia Start: 1040 Anesthesia Stop: 8903    Procedure: REVISION LEFT TOTAL KNEE ARTHROPLASTY (Left Knee) Diagnosis: (UNSTABLE, FAILED LEFT TOTAL KNEE)    Surgeons: Pieter Franco MD Responsible Provider: Meli Smith DO    Anesthesia Type: general, regional ASA Status: 3          Anesthesia Type: general, regional    Jono Phase I: Jono Score: 9    Jono Phase II:      Last vitals: Reviewed and per EMR flowsheets.        Anesthesia Post Evaluation    Patient location during evaluation: PACU  Patient participation: complete - patient participated  Level of consciousness: awake  Airway patency: patent  Nausea & Vomiting: no nausea  Complications: no  Cardiovascular status: hemodynamically stable  Respiratory status: acceptable  Hydration status: stable

## 2021-06-01 NOTE — BRIEF OP NOTE
Brief Postoperative Note      Patient: Miky Mazariegos  YOB: 1940  MRN: [id number]    Date of Procedure: 6/1/2021    Pre-Op Diagnosis: UNSTABLE, FAILED LEFT TOTAL KNEE    Post-Op Diagnosis: Same       Procedure(s):  REVISION LEFT TOTAL KNEE ARTHROPLASTY    Surgeon(s):  Rebecca Beltran MD    Assistant:  Physician Assistant: Sumi Kapoor. MAURICIO Pacheco    Anesthesia: Spinal    Estimated Blood Loss (mL): Minimal    Complications: None    Specimens:   ID Type Source Tests Collected by Time Destination   1 : Left Knee Joint Fluid Body Fluid Joint, Knee BODY FLUID CELL COUNT WITH DIFFERENTIAL (Canceled), CULTURE, FUNGUS, AFB STAIN (Canceled), BODY FLUID CRYSTAL (Canceled), CULTURE WITH SMEAR, ACID FAST Avie Claudy Richter MD 6/1/2021 1103    2 : Left Knee Tibial Membrane Anabelle Loach for Actute Inflammation Tissue Joint, Knee CULTURE, ANAEROBIC AND AEROBIC Rebecca Beltran MD 6/1/2021 1123    3 : LEFT KNEE JOINT CAPSULE Tissue Joint, Knee CULTURE, ANAEROBIC AND AEROBIC, CULTURE, AERORBIC AND ANAROBIC, JOINT Rebecca Beltran MD 6/1/2021 1114        Implants:  Implant Name Type Inv.  Item Serial No.  Lot No. LRB No. Used Action   CEMENT BONE 40GM M VISC AB RALLY  CEMENT BONE 40GM M VISC AB RALLY  SMITH AND [employer]- [id number] Left 2 Implanted   BASEPLATE TIB SZ 4 L KNEE REV ERIC FEM TAPR LEGION  BASEPLATE TIB SZ 4 L KNEE REV ERIC FEM TAPR [facility] [id number] Left 1 Implanted   STEM FEM L160MM [id number] STD KNEE STR PRESSFIT LEGION  STEM FEM L160MM [id number] STD KNEE STR PRESSFIT [facility] AND [facility] [id number] Left 1 Implanted   STEM FEM L160MM [id number] KNEE PRESSFIT [facility]  STEM FEM L160MM [id number] KNEE PRESSFIT [facility] AND [facility] [id number] Left 1 Implanted   COMPONENT PAT [id number] KNEE HI WT POLY BCNVX GEN II  COMPONENT PAT [id number] KNEE HI WT POLY BCNVX GEN II  SMITH AND [facility] [id number] Left 1 Implanted    FIX OFFSET +2MM FEM KNEE [facility]   FIX OFFSET +2MM FEM KNEE [facility] AND [employer]-WD [id number] Left 1 Implanted   WEDGE FEM L SZ 5 5MM DST POST KNEE SCR ON LEGION  WEDGE FEM L SZ 5 5MM DST POST KNEE SCR ON [facility] AND [employer] [id number] Left 1 Implanted   WEDGE FEM L SZ 5 5MM DST POST KNEE SCR ON LEGION  WEDGE FEM L SZ 5 5MM DST POST KNEE SCR ON [facility] AND [employer]- [id number] Left 1 Implanted   COMPONENT FEM SZ 5 L KNEE OXINIUM REV ERIC LEGION  COMPONENT FEM SZ 5 L KNEE OXINIUM REV ERIC [facility] AND [employer] [id number] Left 1 Implanted   INSERT TIB SZ 3-4 [id number] POLYETH POST STBL HI FLX LEGION  INSERT TIB SZ 3-4 [id number] POLYETH POST STBL HI FLX [facility] AND [employer]- [id number] Left 1 Explanted   INSERT TIB SZ 3-4 [id number] POLYETH POST STBL HI FLX LEGION  INSERT TIB SZ 3-4 [id number] POLYETH POST STBL HI FLX [facility] AND NEPH Denniskeegantammy Lujan [id number] Left 1 Implanted         Drains:   [REMOVED] External Urinary Catheter (Removed)       Findings: See operative dictation    Electronically signed by Nery Purcell MD on 6/1/2021 at 12:31 PM

## 2021-06-01 NOTE — PROGRESS NOTES
Oriented to sds  16       Refuses flu and pneumonia vaccine. Family updated to stay in room. Informed family to take belonging with them if they leave. Keep nothing of value in room unattended. pt was asked and agreed to first name and last initial being put on white boards. Allergy and fall risks applied. SCD Applied to patient. Warming blanket applied to patient. Pt denies any abuse or thoughts of suicide. Pt has a blue wheeling walker with seat.

## 2021-06-01 NOTE — ANESTHESIA PRE PROCEDURE
Department of Anesthesiology  Preprocedure Note       Name:  Kailey Payment   Age:  80 y.o.  :  1940                                          MRN:  [id number]         Date:  2021      Surgeon: Corinthia Goodpasture):  Katlin Villalta MD    Procedure: Procedure(s):  REVISION LEFT TOTAL KNEE ARTHROPLASTY    Medications prior to admission:   Prior to Admission medications    Medication Sig Start Date End Date Taking?  Authorizing Provider   clopidogrel (PLAVIX) 75 MG tablet Take 1 tablet by mouth daily 5/10/21  Yes Myrtle Dukes MD   DULoxetine (CYMBALTA) 30 MG extended release capsule Take 1 capsule by mouth daily 5/10/21  Yes Sulaiman Kam DO   omeprazole (PRILOSEC) 40 MG delayed release capsule Take 1 capsule by mouth daily 5/10/21  Yes Sulaiman Kam DO   Magnesium Oxide (MAGNESIUM-OXIDE) 250 MG TABS tablet Take 2 tablets by mouth 2 times daily 21  Yes SEAN Fried CNP   furosemide (LASIX) 40 MG tablet Take 0.5 tablets by mouth as needed (for weight gain of 3 lbs) 21  Yes SEAN Fried CNP   potassium chloride (KLOR-CON M) 20 MEQ extended release tablet Take 0.5 tablets by mouth as needed (take with Lasix) 21  Yes SEAN Fried CNP   hydrALAZINE (APRESOLINE) 50 MG tablet Take 0.5 tablets by mouth 2 times daily 21  Yes SEAN Fried CNP   quinapril (ACCUPRIL) 10 MG tablet Take 1 tablet by mouth daily 21  Yes Harlow Dubin, PA-C   atenolol (TENORMIN) 25 MG tablet Take 1 tablet by mouth daily 21  Yes Harlow Dubin, PA-C   vitamin C (VITAMIN C) 1000 MG tablet Take 1 tablet by mouth daily 20  Yes ELIZABETH Dillon   ondansetron (ZOFRAN ODT) 4 MG disintegrating tablet Take 1 tablet by mouth every 8 hours as needed for Nausea or Vomiting 20  Yes SEAN Medina CNP   metFORMIN (GLUCOPHAGE) 500 MG tablet TAKE 1 TABLET TWICE A DAY WITH MEALS 3/9/20  Yes Naveed Valentin DO   atorvastatin (LIPITOR) 40 MG tablet TAKE 1 TABLET DAILY 3/5/20  Yes Emily Mancini DO   aspirin 81 MG tablet Take 81 mg by mouth daily   Yes Historical Provider, MD   vitamin D (CHOLECALCIFEROL) 1000 UNIT TABS tablet Take 1,000 Units by mouth nightly. Yes Historical Provider, MD       Current medications:    Current Facility-Administered Medications   Medication Dose Route Frequency Provider Last Rate Last Admin    tranexamic acid (CYKLOKAPRON) 1,000 mg in dextrose 5 % 100 mL IVPB  1,000 mg Intravenous On Call to [facility] CASTILLO Pacheco PA-C        0.9 % sodium chloride infusion   Intravenous Continuous Johanne Pacheco PA-C 125 mL/hr at 06/01/21 0903 New Bag at 06/01/21 0903    sodium chloride flush 0.9 % injection 10 mL  10 mL Intravenous 2 times per day Johanne Pacheco PA-C        sodium chloride flush 0.9 % injection 10 mL  10 mL Intravenous PRN Johanne Pacheco PA-C        0.9 % sodium chloride infusion  25 mL Intravenous PRN Johanne Pacheco PA-C        ceFAZolin (ANCEF) 2000 mg in dextrose 5 % 50 mL IVPB  2,000 mg Intravenous On Call to [facility] CASTILLO Pacheco PA-C           Allergies: Allergies   Allergen Reactions    Norco [Hydrocodone-Acetaminophen] Itching    Tramadol Itching    Codeine Hives and Rash       Problem List:    Patient Active Problem List   Diagnosis Code    Dyslipidemia E78.5    HTN, goal below 140/90 I10    Leg cramps R25.2    GERD (gastroesophageal reflux disease) K21.9    Major depressive disorder, recurrent episode, mild ([facility]) F33.0    Type 2 diabetes mellitus with diabetic neuropathy, without long-term current use of insulin ([facility]) E11.40    Subarachnoid hemorrhage ([facility]) I60.9    Traumatic hematoma of forehead S00.83XA    Closed head injury S09.90XA    Fall down stairs W10. 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 ([facility]) I63.442    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    Status post revision of total replacement of left knee C96.717       Past Medical History:        Diagnosis Date    Arthritis     Chronic low back pain     COVID-19     DM type 2, goal A1c below 7     GERD (gastroesophageal reflux disease)     Hyperlipidemia     Hypertension     Loss of vision     Mitral valve disorder     Osteoporosis     Subarachnoid hemorrhage ([facility]) 4/25/2019    TIA (transient ischemic attack)     Type 2 diabetes mellitus with diabetic neuropathy, without long-term current use of insulin ([facility] [address] 7/26/2017   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    aslo after valve replacement 2020    Urinary incontinence        Past Surgical History:        Procedure Laterality Date    APPENDECTOMY      CARDIAC SURGERY      TAVR    CARPAL TUNNEL RELEASE Left 2011    CATARACT REMOVAL Bilateral     CERVICAL FUSION  1976    CHOLECYSTECTOMY      COLON SURGERY  2012    COLONOSCOPY  2015    ENDOSCOPY, COLON, DIAGNOSTIC      EYE SURGERY      CATARACTS    FINGER SURGERY Right 10/2016    3rd digit    HERNIA REPAIR      HYSTERECTOMY      JOINT REPLACEMENT      both knees    NERVE SURGERY Right 10/25/2019    Lumbar Facet MBB @ B11-F6,G0-3 L2-3 right side only #1 performed by Nola Rey MD at [facility] 98 Right 12/16/2019    Lumbar Facet MBB @ R07-S3-Y8-1, L2-3 RIGHT SIDE ONLY performed by Nola Rey MD at [facility] 113 Right 1/6/2020    Lumbar RFA T12-L1, L1-2, L2-3 Right performed by Nola Rey MD at [address] [place] [address]  [place] LAMINECTOMY,>2 SGMT,LUMBAR      6 FUSIONS    ROTATOR CUFF REPAIR Right 11/28/2014    SHOULDER SURGERY  2014    SPINE SURGERY      TENDON RELEASE Right 05/18/2017    at [address] Bilateral     UPPER GASTROINTESTINAL ENDOSCOPY  2013,2015       Social History:    Social History     Tobacco Use    Smoking status: Never Smoker    Smokeless tobacco: Never Used   Substance Use Topics    Alcohol use: No                                Counseling given: Not Answered      Vital Signs (Current):   Vitals:    05/27/21 1629 06/01/21 0827   BP:  (!) 158/74   Pulse:  75   Resp:  18   Temp:  97.2 °F (36.2 °C)   TempSrc:  Temporal   SpO2:  93%   Weight: 242 lb (109.8 kg) 258 lb 6.4 oz (117.2 kg)   Height: 5' 4\" (1.626 m) 5' 5\" (1.651 m)                                              BP Readings from Last 3 Encounters:   06/01/21 (!) 158/74   05/10/21 (!) 144/82   04/14/21 118/80       NPO Status: Time of last liquid consumption: 0050                        Time of last solid consumption: 1830                        Date of last liquid consumption: 06/01/21                        Date of last solid food consumption: 05/31/21    BMI:   Wt Readings from Last 3 Encounters:   06/01/21 258 lb 6.4 oz (117.2 kg)   04/14/21 261 lb 3.2 oz (118.5 kg)   03/08/21 242 lb (109.8 kg)     Body mass index is 43 kg/m².     CBC:   Lab Results   Component Value Date    WBC 8.2 02/11/2021    RBC 3.75 02/11/2021    RBC 0-5 04/07/2015    HGB 11.3 02/11/2021    HCT 33.8 02/11/2021    MCV 90.1 02/11/2021    RDW 13.4 01/12/2017     02/11/2021       CMP:   Lab Results   Component Value Date     04/30/2021    K 4.3 06/01/2021    K 4.1 02/07/2021     04/30/2021    CO2 24 04/30/2021    BUN 14 04/30/2021    CREATININE 0.78 04/30/2021    LABGLOM 81 02/11/2021    GLUCOSE 147 04/30/2021    PROT 7.6 02/08/2021    CALCIUM 9.5 04/30/2021    BILITOT 0.9 02/08/2021    BILITOT NEGATIVE 04/07/2015    ALKPHOS 69 02/08/2021 AST 38 02/08/2021    ALT 21 02/08/2021       POC Tests: No results for input(s): POCGLU, POCNA, POCK, POCCL, POCBUN, POCHEMO, POCHCT in the last 72 hours. Coags:   Lab Results   Component Value Date    INR 1.03 02/04/2021    APTT 25.5 02/04/2021       HCG (If Applicable): No results found for: PREGTESTUR, PREGSERUM, HCG, HCGQUANT     ABGs: No results found for: PHART, PO2ART, RDX8ZQO, GBD6KTI, BEART, U4RMVRNL     Type & Screen (If Applicable):  Lab Results   Component Value Date    LABRH POS 06/01/2021       Drug/Infectious Status (If Applicable):  No results found for: HIV, HEPCAB    COVID-19 Screening (If Applicable):   Lab Results   Component Value Date    COVID19 NOT DETECTED 05/24/2021           Anesthesia Evaluation  Patient summary reviewed and Nursing notes reviewed no history of anesthetic complications:   Airway: Mallampati: II        Dental:          Pulmonary:   (+) decreased breath sounds,                             Cardiovascular:  Exercise tolerance: poor (<4 METS),   (+) hypertension:, valvular problems/murmurs: AS, CHF:,       ECG reviewed  Rhythm: regular  Rate: normal  Echocardiogram reviewed               ROS comment: S/pa TAVR  Doing well, no evidence of stenosis     Neuro/Psych:   (+) CVA: no interval change, TIA, psychiatric history:            GI/Hepatic/Renal:   (+) GERD:,           Endo/Other:    (+) Diabetes, : arthritis:., .                 Abdominal:   (+) obese,     Abdomen: soft. Vascular:                                        Anesthesia Plan      general and regional     ASA 3       Induction: intravenous. MIPS: Postoperative opioids intended and Prophylactic antiemetics administered. Anesthetic plan and risks discussed with patient and spouse. Plan discussed with CRNA.                   [address] [place] [address],    6/1/2021

## 2021-06-01 NOTE — PROGRESS NOTES
Patient brought from PACU. Patient alert and oriented x 4. 02 via nasal cannula 2L. IV of normal saline infusing at 30ml/hr with 600ml left in bag. Surgical dressing to left knee small amount of bloody drainage noted. Knee immobilizer on left leg. SCD right lower leg. Patient oriented to room and call light. See vitals and physical assessment for further details.

## 2021-06-02 LAB
ANION GAP SERPL CALCULATED.3IONS-SCNC: 13 MEQ/L (ref 8–16)
BUN BLDV-MCNC: 15 MG/DL (ref 7–22)
CALCIUM SERPL-MCNC: 8.3 MG/DL (ref 8.5–10.5)
CHLORIDE BLD-SCNC: 106 MEQ/L (ref 98–111)
CO2: 19 MEQ/L (ref 23–33)
CREAT SERPL-MCNC: 0.7 MG/DL (ref 0.4–1.2)
ERYTHROCYTE [DISTWIDTH] IN BLOOD BY AUTOMATED COUNT: 13.7 % (ref 11.5–14.5)
ERYTHROCYTE [DISTWIDTH] IN BLOOD BY AUTOMATED COUNT: 46.1 FL (ref 35–45)
GFR SERPL CREATININE-BSD FRML MDRD: 81 ML/MIN/1.73M2
GLUCOSE BLD-MCNC: 163 MG/DL (ref 70–108)
HCT VFR BLD CALC: 37.1 % (ref 37–47)
HEMOGLOBIN: 11.7 GM/DL (ref 12–16)
MCH RBC QN AUTO: 28.8 PG (ref 26–33)
MCHC RBC AUTO-ENTMCNC: 31.5 GM/DL (ref 32.2–35.5)
MCV RBC AUTO: 91.4 FL (ref 81–99)
PLATELET # BLD: 161 THOU/MM3 (ref 130–400)
PMV BLD AUTO: 11.7 FL (ref 9.4–12.4)
POTASSIUM SERPL-SCNC: 4.1 MEQ/L (ref 3.5–5.2)
RBC # BLD: 4.06 MILL/MM3 (ref 4.2–5.4)
SODIUM BLD-SCNC: 138 MEQ/L (ref 135–145)
WBC # BLD: 11.3 THOU/MM3 (ref 4.8–10.8)

## 2021-06-02 PROCEDURE — 85027 COMPLETE CBC AUTOMATED: CPT

## 2021-06-02 PROCEDURE — 2580000003 HC RX 258: Performed by: ORTHOPAEDIC SURGERY

## 2021-06-02 PROCEDURE — 6370000000 HC RX 637 (ALT 250 FOR IP): Performed by: ORTHOPAEDIC SURGERY

## 2021-06-02 PROCEDURE — 6360000002 HC RX W HCPCS: Performed by: ORTHOPAEDIC SURGERY

## 2021-06-02 PROCEDURE — 97116 GAIT TRAINING THERAPY: CPT

## 2021-06-02 PROCEDURE — 97530 THERAPEUTIC ACTIVITIES: CPT

## 2021-06-02 PROCEDURE — 80048 BASIC METABOLIC PNL TOTAL CA: CPT

## 2021-06-02 PROCEDURE — 1200000000 HC SEMI PRIVATE

## 2021-06-02 PROCEDURE — 36415 COLL VENOUS BLD VENIPUNCTURE: CPT

## 2021-06-02 PROCEDURE — 6370000000 HC RX 637 (ALT 250 FOR IP): Performed by: PHYSICIAN ASSISTANT

## 2021-06-02 RX ORDER — HYDROCODONE BITARTRATE AND ACETAMINOPHEN 5; 325 MG/1; MG/1
2 TABLET ORAL EVERY 4 HOURS PRN
Status: DISCONTINUED | OUTPATIENT
Start: 2021-06-02 | End: 2021-06-04 | Stop reason: HOSPADM

## 2021-06-02 RX ORDER — HYDROXYZINE PAMOATE 25 MG/1
25 CAPSULE ORAL EVERY 6 HOURS PRN
Status: DISCONTINUED | OUTPATIENT
Start: 2021-06-02 | End: 2021-06-04 | Stop reason: HOSPADM

## 2021-06-02 RX ORDER — HYDROCODONE BITARTRATE AND ACETAMINOPHEN 5; 325 MG/1; MG/1
1 TABLET ORAL EVERY 4 HOURS PRN
Status: DISCONTINUED | OUTPATIENT
Start: 2021-06-02 | End: 2021-06-04 | Stop reason: HOSPADM

## 2021-06-02 RX ADMIN — DOCUSATE SODIUM 50 MG AND SENNOSIDES 8.6 MG 1 TABLET: 8.6; 5 TABLET, FILM COATED ORAL at 09:18

## 2021-06-02 RX ADMIN — CEFAZOLIN 2000 MG: 10 INJECTION, POWDER, FOR SOLUTION INTRAVENOUS at 03:21

## 2021-06-02 RX ADMIN — CEFAZOLIN 2000 MG: 10 INJECTION, POWDER, FOR SOLUTION INTRAVENOUS at 21:41

## 2021-06-02 RX ADMIN — SODIUM CHLORIDE, PRESERVATIVE FREE 10 ML: 5 INJECTION INTRAVENOUS at 21:41

## 2021-06-02 RX ADMIN — ASPIRIN 325 MG: 325 TABLET, COATED ORAL at 09:18

## 2021-06-02 RX ADMIN — HYDROXYZINE PAMOATE 25 MG: 25 CAPSULE ORAL at 21:42

## 2021-06-02 RX ADMIN — CEFAZOLIN 2000 MG: 10 INJECTION, POWDER, FOR SOLUTION INTRAVENOUS at 11:43

## 2021-06-02 RX ADMIN — TRAMADOL HYDROCHLORIDE 50 MG: 50 TABLET, COATED ORAL at 03:21

## 2021-06-02 RX ADMIN — METFORMIN HYDROCHLORIDE 500 MG: 500 TABLET ORAL at 09:18

## 2021-06-02 RX ADMIN — METFORMIN HYDROCHLORIDE 500 MG: 500 TABLET ORAL at 16:35

## 2021-06-02 RX ADMIN — HYDROCODONE BITARTRATE AND ACETAMINOPHEN 2 TABLET: 5; 325 TABLET ORAL at 17:13

## 2021-06-02 RX ADMIN — HYDRALAZINE HYDROCHLORIDE 25 MG: 25 TABLET, FILM COATED ORAL at 09:19

## 2021-06-02 RX ADMIN — CLOPIDOGREL BISULFATE 75 MG: 75 TABLET ORAL at 09:18

## 2021-06-02 RX ADMIN — HYDRALAZINE HYDROCHLORIDE 25 MG: 25 TABLET, FILM COATED ORAL at 21:41

## 2021-06-02 RX ADMIN — DULOXETINE HYDROCHLORIDE 30 MG: 30 CAPSULE, DELAYED RELEASE ORAL at 09:19

## 2021-06-02 RX ADMIN — LISINOPRIL 10 MG: 10 TABLET ORAL at 11:43

## 2021-06-02 RX ADMIN — ATENOLOL 25 MG: 25 TABLET ORAL at 09:19

## 2021-06-02 RX ADMIN — DOCUSATE SODIUM 50 MG AND SENNOSIDES 8.6 MG 1 TABLET: 8.6; 5 TABLET, FILM COATED ORAL at 21:42

## 2021-06-02 RX ADMIN — ACETAMINOPHEN 650 MG: 325 TABLET ORAL at 09:18

## 2021-06-02 RX ADMIN — HYDROXYZINE PAMOATE 25 MG: 25 CAPSULE ORAL at 14:48

## 2021-06-02 RX ADMIN — ATORVASTATIN CALCIUM 40 MG: 40 TABLET, FILM COATED ORAL at 21:42

## 2021-06-02 RX ADMIN — HYDROCODONE BITARTRATE AND ACETAMINOPHEN 1 TABLET: 5; 325 TABLET ORAL at 06:38

## 2021-06-02 RX ADMIN — HYDROCODONE BITARTRATE AND ACETAMINOPHEN 1 TABLET: 5; 325 TABLET ORAL at 11:43

## 2021-06-02 RX ADMIN — SODIUM CHLORIDE: 9 INJECTION, SOLUTION INTRAVENOUS at 06:38

## 2021-06-02 RX ADMIN — ACETAMINOPHEN 650 MG: 325 TABLET ORAL at 03:21

## 2021-06-02 RX ADMIN — SODIUM CHLORIDE, PRESERVATIVE FREE 10 ML: 5 INJECTION INTRAVENOUS at 09:22

## 2021-06-02 RX ADMIN — HYDROCODONE BITARTRATE AND ACETAMINOPHEN 2 TABLET: 5; 325 TABLET ORAL at 21:42

## 2021-06-02 ASSESSMENT — PAIN SCALES - GENERAL
PAINLEVEL_OUTOF10: 0
PAINLEVEL_OUTOF10: 4
PAINLEVEL_OUTOF10: 7
PAINLEVEL_OUTOF10: 7
PAINLEVEL_OUTOF10: 0
PAINLEVEL_OUTOF10: 4
PAINLEVEL_OUTOF10: 0
PAINLEVEL_OUTOF10: 5
PAINLEVEL_OUTOF10: 6
PAINLEVEL_OUTOF10: 6
PAINLEVEL_OUTOF10: 4
PAINLEVEL_OUTOF10: 5

## 2021-06-02 ASSESSMENT — PAIN DESCRIPTION - PAIN TYPE
TYPE: SURGICAL PAIN

## 2021-06-02 ASSESSMENT — PAIN DESCRIPTION - LOCATION
LOCATION: KNEE

## 2021-06-02 ASSESSMENT — PAIN DESCRIPTION - PROGRESSION
CLINICAL_PROGRESSION: GRADUALLY IMPROVING
CLINICAL_PROGRESSION: NOT CHANGED

## 2021-06-02 ASSESSMENT — PAIN DESCRIPTION - ONSET
ONSET: ON-GOING

## 2021-06-02 ASSESSMENT — PAIN - FUNCTIONAL ASSESSMENT
PAIN_FUNCTIONAL_ASSESSMENT: PREVENTS OR INTERFERES SOME ACTIVE ACTIVITIES AND ADLS

## 2021-06-02 ASSESSMENT — PAIN DESCRIPTION - DESCRIPTORS
DESCRIPTORS: ACHING
DESCRIPTORS: ACHING;SORE;CONSTANT
DESCRIPTORS: ACHING;CONSTANT;SORE

## 2021-06-02 ASSESSMENT — PAIN DESCRIPTION - ORIENTATION
ORIENTATION: LEFT

## 2021-06-02 ASSESSMENT — PAIN DESCRIPTION - FREQUENCY
FREQUENCY: CONTINUOUS

## 2021-06-02 NOTE — PROGRESS NOTES
[address] [address]  INPATIENT PHYSICAL THERAPY  DAILY NOTE  [facility] 7K - 7K-01/001-A    Time In: 2683  Time Out: 0911  Timed Code Treatment Minutes: 30 Minutes  Minutes: 30          Date: 2021  Patient Name: Mauro Bermudez,  Gender:  female        MRN: [id number]  : 1940  (80 y.o.)     Referring Practitioner: Mateo Wang MD  Diagnosis: Failed total left knee replacement [facility])  Additional Pertinent Hx: The patient is a 30-year-old female withincreasing pain in her left knee. She has had left total kne[place] in [place] in [place]. She has been struggling withinstability for quite some time. We have tried bracing as well asactivity modification at home physician-directed exercise program.  Atthis point, she is still having difficulty with ambulation secondary toher left knee pain and instability. She does have quite a bit ofmedical problems including a heart valve replacement by Dr. Gem Freeman, coronary artery disease as well as history of CVA. She darrel diabetic, but her last A1C being 7. At this point, she would like tomove forth with more definitive treatment, after trying and failingexhaustive conservative measures and continued pain.      Prior Level of Function:  Lives With: Spouse  Type of Home: House  Home Layout: One level  Home Access: Stairs to enter with rails  Entrance Stairs - Number of Steps: 2  Home Equipment: 4 wheeled walker, Cane, Grab bars        ADL Assistance: Independent  Ambulation Assistance: Independent  Transfer Assistance: Independent  Active : Yes    Restrictions/Precautions:  Restrictions/Precautions: Weight Bearing, Fall Risk, Femoral Block, General Precautions  Date and Time: 21 at 1355  Left Lower Extremity Weight Bearing: Weight Bearing As Tolerated  Position Activity Restriction  Other position/activity restrictions: Knee immobilizer in room for femoral block when ambulating for 48hrs or until patient demonstrates quad function with PT     SUBJECTIVE: RN approved session. Pt in bed upon arrival. Pt pleasant and agreeable to session. Pt educated on importance of exercise to promote quad function for improved transfers and gait. Time taken due to pt bleeding from accidentally pulling out IV with mobility    PAIN: 5/10: L knee    Vitals: Vitals not assessed per clinical judgement, see nursing flowsheet    OBJECTIVE:  Bed Mobility:  Rolling to Left: Stand By Assistance. Pt utilized bed rail  Rolling to Right: Stand By Assistance Pt utilized bed rail  Supine to Sit: Stand By Assistance, with head of bed raised, with verbal cues , with increased time for completion. Sit to Supine: Stand By Assistance, with verbal cues , with increased time for completion Increased time for LE management  Scooting: Stand By Assistance    Transfers:  Sit to Stand: [employer], Minimal Assistance, with increased time for completion, cues for hand placement, with verbal cues. Cues to push off from bed  Stand to [address] [address], cues for hand placement, with verbal cues  2 STS completed. 1st one required min assist and pt demos difficulty completing full stand for 30 seconds. 2nd stand required CGA with improved knee extension and posture. Cues for maintaining and erect trunk, increased knee extension, and hand placement on walker     Ambulation:  Contact Guard Assistance, with verbal cues , with increased time for completion  Distance: 3', 2'  Surface: Level Tile  Device:Rolling Walker  Gait Deviations: Forward Flexed Posture, Slow [place], Decreased Step Length Bilaterally, Decreased Weight Shift Left, Decreased Gait Speed, Decreased Heel Strike Bilaterally, Unsteady Gait and Decreased Terminal Knee Extension  Pt ambulated 2 steps with severe fwd flexed posture with cues to stand upright. Pt then began bleeding and sat EOB with nursing notified IV had been pulled out and STNA in to remove.   Second trial was better with improved step length and posture however pt heavy reliance on RW and unable to increase step length or improve heel strike    Balance:  Static Sitting Balance:  Stand By Assistance, with verbal cues. Pt sat EOB with no UE support while STNA removed IV. Cues for posture and no sway or LOB noted  Static Standing Balance: Contact Guard Assistance, Minimal Assistance, with verbal cues. Pt stood with B UE support on walker ~1 minute. Pt required cues for upright posture and decreased knee flexion. Pt presented with heavy fwd lean with encouragement to place weight through hands vs forearms    Exercise:  Patient was guided in 1 set(s) 10 reps of exercise to both lower extremities. Ankle pumps, Glut sets, Quad sets and Heelslides. Exercises were completed for increased independence with functional mobility. Functional Outcome Measures: Completed  AM-PAC Inpatient Mobility Raw Score : 15  AM-PAC Inpatient T-Scale Score : 39.45    ASSESSMENT:  Assessment: Patient progressing toward established goals. Activity Tolerance:  Patient tolerance of  treatment: good. Pt tolerated session well. Decreased endurance, strength, and balance. Pt demos unsteadiness on LEs and required cues throughout for sequencing. Pt would benefit from continued PT for improved functional mobility.      Equipment Recommendations:Equipment Needed: No  Discharge Recommendations:  Continue to assess pending progress, Home with Home health PT 24 hour supervision or assist    Plan: Times per week: 6x BID; 1x daily  Current Treatment Recommendations: Functional Mobility Training, ROM, Strengthening, Transfer Training, Balance Training, Gait Training, Stair training, Home Exercise Program, Endurance Training    Patient Education  Patient Education: Plan of Care, [employer] Mobility, Transfers, Gait, Use of Gait [place], Verbal Exercise Instruction    Goals:  Patient goals : go home  Short term goals  Short term goal 1: Pt will supine <>sit with MOD I in order to get out of bed  Short term goal 2: Pt will sit<>stand with MOD I and a RW to be able to transfer surfaces  Short term goal 3: Pt will ambulate 50 ft with a RW and MOD I inorder ambulate house hold distances  Short term goal 4: Pt will ascend and descend 2 steps with a handrail and MOD I inorder to enter her home       Following session, patient left in safe position with all fall risk precautions in place.

## 2021-06-02 NOTE — PROGRESS NOTES
[address] [address]  INPATIENT PHYSICAL THERAPY  DAILY NOTE  [facility] ORTHOPEDICS 7K - 7K-01/001-A     Time In: 5249  Time Out: 1340  Timed Code Treatment Minutes: 23 Minutes  Minutes: 23          Date: 2021  Patient Name: Adriel Rothman,  Gender:  female        MRN: [id number]  : 1940  (80 y.o.)     Referring Practitioner: Viola De La Fuente MD  Diagnosis: Failed total left knee replacement [facility])  Additional Pertinent Hx: The patient is a 72-year-old female withincreasing pain in her left knee. She has had left total kneereplacement done in [place] in [place]. She has been struggling withinstability for quite some time. We have tried bracing as well asactivity modification at home physician-directed exercise program.  Atthis point, she is still having difficulty with ambulation secondary toher left knee pain and instability. She does have quite a bit ofmedical problems including a heart valve replacement by Dr. Cari Conner, coronary artery disease as well as history of CVA. She darrel diabetic, but her last A1C being 7. At this point, she would like tomove forth with more definitive treatment, after trying and failingexhaustive conservative measures and continued pain.      Prior Level of Function:  Lives With: Spouse  Type of Home: House  Home Layout: One level  Home Access: Stairs to enter with rails  Entrance Stairs - Number of Steps: 2  Home Equipment: 4 wheeled walker, Cane, Grab bars        ADL Assistance: Independent  Ambulation Assistance: Independent  Transfer Assistance: Independent  Active : Yes    Restrictions/Precautions:  Restrictions/Precautions: Weight Bearing, Fall Risk, Femoral Block, General Precautions  Date and Time: 21 at 1355  Left Lower Extremity Weight Bearing: Weight Bearing As Tolerated  Position Activity Restriction  Other position/activity restrictions: Knee immobilizer in room for femoral block when ambulating for 48hrs or until patient demonstrates quad function with PT Pt demos good quad function and no longer needs immobilizer     SUBJECTIVE: RN approved session. Pt in bed upon arrival and stated increased pain this afternoon. Pt pleasant and agrees to session. Discussed with pt possible SNF placement prior to home due to pt unable to ambulate well, decreased safety awareness, and unable to complete stairs and pt refused and stated \"going home tmw\"    PAIN: Not Rated, L knee    Vitals: Vitals not assessed per clinical judgement, see nursing flowsheet    OBJECTIVE:  Bed Mobility:  Supine to Sit: Stand By Assistance, with head of bed raised, with verbal cues   Sit to Supine: Stand By Assistance, with verbal cues , with increased time for completion   Pt utilized bed rails with mobility    Transfers:  Sit to Stand: Minimal Assistance, X 1, with increased time for completion, cues for hand placement, with verbal cues  Stand to Sit:Contact Guard Assistance, with increased time for completion, cues for hand placement  Pt required multiple attempts to stand with cues for sequencing. Pt demos difficulty due to pain. Cues for fwd bend and pushing off from bed vs walker. Pt continued to keep 1 UE on walker despite cues for safety awareness    Ambulation:  Contact Guard Assistance, Minimal Assistance, with cues for safety, with verbal cues , with increased time for completion  Distance: 15'x1  Surface: Level Tile  Device:Rolling Walker  Gait Deviations: Forward Flexed Posture, Slow [place], Decreased Step Length Bilaterally, Decreased Weight Shift Bilaterally, Decreased Gait Speed, Decreased Heel Strike Bilaterally, Narrow Base of Support, Unsteady Gait and Decreased Terminal Knee Extension  Pt demos heavy fwd lean with cues for upright posture and increased step length.  Pt unable to follow cues and demos decreased safety awareness    Balance:  Dynamic Standing Balance: Minimal Assistance, X 1, with verbal cues  Pt stood with walker and completed pericare and reaching for objects outside NEREYDA however pt ignoring PTA cues and demos decreased safety awareness with balance. Pt placing forearms onto walker and almost causing walker to tip with education on why not to lean on RW. Pt then leaning fwd to wipe LEs with poor NEREYDA    Exercise:  Patient was guided in 1 set(s) 10 reps of exercise to both lower extremities. Ankle pumps, Glut sets, Quad sets, Heelslides and Straight leg raises. Exercises were completed for increased independence with functional mobility. Functional Outcome Measures: Completed  AM-PAC Inpatient Mobility Raw Score : 15  AM-PAC Inpatient T-Scale Score : 39.45    ASSESSMENT:  Assessment: Patient progressing toward established goals. Activity Tolerance:  Patient tolerance of  treatment: fair. Pt tolerated session well. Decreased balance, posture, endurance, and strength. Pt demos poor gait and decreased saefty awareness with balance and ambulation. Pt unable to complete steps this date. Pt unsafe to return home due to decreased safety awareness and increased fall risk.       Equipment Recommendations:Equipment Needed: No  Discharge Recommendations:  Continue to assess pending progress, Subacute/Skilled Nursing Facility    Plan: Times per week: 6x BID; 1x daily  Current Treatment Recommendations: Functional Mobility Training, ROM, Strengthening, Transfer Training, Balance Training, Gait Training, Stair training, Home Exercise Program, Endurance Training    Patient Education  Patient Education: Plan of Care, Precautions/Restrictions, [employer] Mobility, Transfers, Gait, Use of Gait [place], Verbal Exercise Instruction    Goals:  Patient goals : go home  Short term goals  Short term goal 1: Pt will supine <>sit with MOD I in order to get out of bed  Short term goal 2: Pt will sit<>stand with MOD I and a RW to be able to transfer surfaces  Short term goal 3: Pt will ambulate 50 ft with a RW and MOD I inorder ambulate house hold distances  Short term goal 4: Pt will ascend and descend 2 steps with a handrail and MOD I inorder to enter her home       Following session, patient left in safe position with all fall risk precautions in place.

## 2021-06-02 NOTE — PROGRESS NOTES
Ortho  Pt has a BMI of 37, age of 80 and limited mobility. Extra fall precautions, weight based antibiotic dosing were implemented secondary to BMI.

## 2021-06-02 NOTE — CARE COORDINATION
6/2/21, 10:26 AM EDT  DISCHARGE PLANNING EVALUATION:    Frederick Laguna       Admitted: 6/1/2021/ [facility] day: 1   Location: -01/001-A Reason for admit: Status post revision of total replacement of left knee [Z96.652]   PMH:  has a past medical history of Arthritis, Chronic low back pain, COVID-19, DM type 2, goal A1c below 7, GERD (gastroesophageal reflux disease), Hyperlipidemia, Hypertension, Loss of vision, Mitral valve disorder, Osteoporosis, Subarachnoid hemorrhage (HCC), TIA (transient ischemic attack), Type 2 diabetes mellitus with diabetic neuropathy, without long-term current use of insulin ([facility] [address] Unspecified cerebral artery occlusion with cerebral infarction, and Urinary incontinence. Procedure: 6/1/21 surgery by Dr Yulisa Berger: REVISION LEFT TOTAL KNEE ARTHROPLASTY  Barriers to Discharge:  PT/OT. FWBAT. Knee immobilizer. Pain management. N/V checks. I&O. Discontinue wang catheter today. PCP: Sallie More DO  Readmission Risk Score: 18%    Patient Goals/Plan/Treatment Preferences: I spoke with Richradha Mcclain. She is planning to go home tomorrow with her . She has needed equipment for home use. Planning OPPT at [address] [facility] [address] [facility]. Transportation/Food Security/Housekeeping Addressed:  No issues identified.

## 2021-06-02 NOTE — CARE COORDINATION
DISCHARGE/PLANNING EVALUATION  6/2/21, 12:38 PM EDT    Reason for Referral: discharge needs   Mental Status:  Alert, oriented   Decision Making:  Makes own decisions  Family/Social/Home Environment:  Flavio Gleason lives at home with family, who can assist with her care. She has been independent with her care prior to admission. She has a one level home that is easily accessible. Current Services including food security, transportation and housekeeping: no current services, no food security, housekeeping or transportation concerns noted by patient   Current Equipment: walker, cane  Payment Source: [employer] medicare   Concerns or Barriers to Discharge:  Planning outpt PT  Post acute provider list with quality measures, geographic area and applicable managed care information provided. Questions regarding selection process answered: declined     Teach Back Method used with patient regarding care plan and discharge plan  Patient  verbalizes understanding of the plan of care and contributes to goal setting. Patient goals, treatment preferences and discharge plan:  Spoke with patient about discharge plan. She is planning home at discharge with family, who can assist with her care. She is agreeable to oupt PT.      Electronically signed by RAMANDO Dixon on 6/2/2021 at 12:38 PM

## 2021-06-02 NOTE — PLAN OF CARE
Problem: Pain:  Goal: Pain level will decrease  Description: Pain level will decrease  Outcome: Ongoing  Note: Pt rates her pain as a 7 out of 10. Pain goal 3. Patient taking Norco and vistaril as needed. Problem: Pain:  Goal: Control of acute pain  Description: Control of acute pain  Outcome: Ongoing  Note: Patient pain medication increased due to patient having a lot of pain. Patient using ice for comfort on knee. Problem: Pain:  Goal: Control of chronic pain  Description: Control of chronic pain  Outcome: Ongoing  Note: Patient voices she has chronic back pain. Problem: Falls - Risk of:  Goal: Will remain free from falls  Description: Will remain free from falls  Outcome: Ongoing  Note: Patient up with one assist with walker. Patient has not walked very far today. Patient able to use call light to call for assistance. Problem: Falls - Risk of:  Goal: Absence of physical injury  Description: Absence of physical injury  Outcome: Ongoing  Note: Patient free of physical injury. Problem: DISCHARGE BARRIERS  Goal: Patient's continuum of care needs are met  Outcome: Ongoing  Note: Discharge planning in progress. Patient plans to go home at discharge. Problem: Mobility - Impaired:  Goal: Mobility will improve  Description: Mobility will improve  Outcome: Ongoing  Note: Patient working with PT and OT. Patient has not walked very far today. Problem: Infection - Surgical Site:  Goal: Will show no infection signs and symptoms  Description: Will show no infection signs and symptoms  Outcome: Ongoing  Note: Patient afebrile. Surgical dressing to left knee has small amount of bloody drainage. Care plan reviewed with patient. Patient verbalize understanding of the plan of care and contribute to goal setting.

## 2021-06-02 NOTE — PROGRESS NOTES
[facility]  OCCUPATIONAL THERAPY MISSED TREATMENT NOTE  [facility] ORTHOPEDICS 7K  7K-01/001-A      Date: 2021  Patient Name: Edmund Isaac        CSN: [id number]   : 1940  (80 y.o.)  Gender: female                REASON FOR MISSED TREATMENT: Pt was resting in bed upon attempt, pt adamantly declining OT d/t increased pain. Max encouragement and education provided. Will check back first thing this morning.

## 2021-06-02 NOTE — OP NOTE
posterior capsular stripping was performed and then we went ahead  and cleaned off the distal femur. We reamed the canal up to a depth of  160 to accommodate a 16 stem, placed our distal block with a +0 cut,  placed our size 5 block with a 2 mm offset , cut our anterior,  posterior, chamfer cuts as well as our anterior and posterior cuts and  then trialed with a +5 on the posterior femur and then assembled the  trial, impacted it, reamed it and chiseled it out of the box housing. Assembled the box housing itself and then went to a 15 trial.  With a 15  trial, we still had quite a bit of hyperextension, _____, went ahead and  placed a 5 x 5 wedge, and now reimpacted on distal femur now. With the  5 x 5 wedge, we had excellent stability. All trial components were removed except for tibial baseplate with  rotation set. Tibial plateau was then pin punched. The knee was  copiously irrigated and dried. Meanwhile on the back table, two batches  of Rally AV cement was mixed and the tibia followed by the femur  followed by patella were all cemented into place. All excess cement was  removed and the trial 15 was placed back in for cement setup. Once the  cement had setup, felt the 15 was the appropriate size. This was  removed. Any posterior cement was removed. Then, the real 15 was then  impacted onto the baseplate. We put the first polyethylene and  unfortunately the dovetail bent because it was not _____ on just right,  so we discarded that 15 and took a second 15 and now this was able to be  impacted without difficulty. Knee was copiously irrigated one last  time, sprinkled with 500 mg of vancomycin powder. Extensor mechanism  was closed with #2 Quill. The knee was injected with 1 gm of tranexamic  acid. Skin was then closed with 2-0 Vicryl, ZipLine, Dermabond, and a  dressing was applied. The patient was extubated in the operating room, taken to the recovery  room in satisfactory condition. All needle and sponge counts were  correct. Johanne Pacheco PA-C, assisted throughout the procedure with positioning,  draping, retraction, wound closure, dressing, and splint application.         Kika Myers M.D.    D: 06/01/2021 12:31:24       T: 06/01/2021 13:47:51     [email or web address]  Job#: [id number]     Doc#: [id number]    CC:

## 2021-06-03 PROCEDURE — 6370000000 HC RX 637 (ALT 250 FOR IP): Performed by: ORTHOPAEDIC SURGERY

## 2021-06-03 PROCEDURE — 97530 THERAPEUTIC ACTIVITIES: CPT

## 2021-06-03 PROCEDURE — 97116 GAIT TRAINING THERAPY: CPT

## 2021-06-03 PROCEDURE — 6360000002 HC RX W HCPCS: Performed by: ORTHOPAEDIC SURGERY

## 2021-06-03 PROCEDURE — 97110 THERAPEUTIC EXERCISES: CPT

## 2021-06-03 PROCEDURE — 6370000000 HC RX 637 (ALT 250 FOR IP): Performed by: PHYSICIAN ASSISTANT

## 2021-06-03 PROCEDURE — 97166 OT EVAL MOD COMPLEX 45 MIN: CPT

## 2021-06-03 PROCEDURE — 2580000003 HC RX 258: Performed by: ORTHOPAEDIC SURGERY

## 2021-06-03 PROCEDURE — 1200000000 HC SEMI PRIVATE

## 2021-06-03 RX ORDER — HYDROCODONE BITARTRATE AND ACETAMINOPHEN 5; 325 MG/1; MG/1
1 TABLET ORAL EVERY 4 HOURS PRN
Qty: 42 TABLET | Refills: 0 | Status: SHIPPED | OUTPATIENT
Start: 2021-06-03 | End: 2021-06-10

## 2021-06-03 RX ADMIN — SODIUM CHLORIDE, PRESERVATIVE FREE 10 ML: 5 INJECTION INTRAVENOUS at 09:46

## 2021-06-03 RX ADMIN — HYDROCODONE BITARTRATE AND ACETAMINOPHEN 2 TABLET: 5; 325 TABLET ORAL at 22:21

## 2021-06-03 RX ADMIN — HYDROCODONE BITARTRATE AND ACETAMINOPHEN 2 TABLET: 5; 325 TABLET ORAL at 09:43

## 2021-06-03 RX ADMIN — ACETAMINOPHEN 650 MG: 325 TABLET ORAL at 09:48

## 2021-06-03 RX ADMIN — HYDROCODONE BITARTRATE AND ACETAMINOPHEN 2 TABLET: 5; 325 TABLET ORAL at 14:53

## 2021-06-03 RX ADMIN — METFORMIN HYDROCHLORIDE 500 MG: 500 TABLET ORAL at 09:43

## 2021-06-03 RX ADMIN — ASPIRIN 325 MG: 325 TABLET, COATED ORAL at 09:42

## 2021-06-03 RX ADMIN — CLOPIDOGREL BISULFATE 75 MG: 75 TABLET ORAL at 09:42

## 2021-06-03 RX ADMIN — CEFAZOLIN 2000 MG: 10 INJECTION, POWDER, FOR SOLUTION INTRAVENOUS at 18:55

## 2021-06-03 RX ADMIN — DOCUSATE SODIUM 50 MG AND SENNOSIDES 8.6 MG 1 TABLET: 8.6; 5 TABLET, FILM COATED ORAL at 22:21

## 2021-06-03 RX ADMIN — HYDRALAZINE HYDROCHLORIDE 25 MG: 25 TABLET, FILM COATED ORAL at 09:42

## 2021-06-03 RX ADMIN — CEFAZOLIN 2000 MG: 10 INJECTION, POWDER, FOR SOLUTION INTRAVENOUS at 11:29

## 2021-06-03 RX ADMIN — LISINOPRIL 10 MG: 10 TABLET ORAL at 09:42

## 2021-06-03 RX ADMIN — ATORVASTATIN CALCIUM 40 MG: 40 TABLET, FILM COATED ORAL at 22:21

## 2021-06-03 RX ADMIN — HYDRALAZINE HYDROCHLORIDE 25 MG: 25 TABLET, FILM COATED ORAL at 22:21

## 2021-06-03 RX ADMIN — HYDROXYZINE PAMOATE 25 MG: 25 CAPSULE ORAL at 03:27

## 2021-06-03 RX ADMIN — HYDROCODONE BITARTRATE AND ACETAMINOPHEN 2 TABLET: 5; 325 TABLET ORAL at 18:55

## 2021-06-03 RX ADMIN — HYDROCODONE BITARTRATE AND ACETAMINOPHEN 2 TABLET: 5; 325 TABLET ORAL at 03:28

## 2021-06-03 RX ADMIN — DULOXETINE HYDROCHLORIDE 30 MG: 30 CAPSULE, DELAYED RELEASE ORAL at 09:42

## 2021-06-03 RX ADMIN — DOCUSATE SODIUM 50 MG AND SENNOSIDES 8.6 MG 1 TABLET: 8.6; 5 TABLET, FILM COATED ORAL at 09:44

## 2021-06-03 RX ADMIN — METFORMIN HYDROCHLORIDE 500 MG: 500 TABLET ORAL at 17:57

## 2021-06-03 RX ADMIN — Medication 500 MG: at 09:43

## 2021-06-03 RX ADMIN — CEFAZOLIN 2000 MG: 10 INJECTION, POWDER, FOR SOLUTION INTRAVENOUS at 03:27

## 2021-06-03 RX ADMIN — HYDROXYZINE PAMOATE 25 MG: 25 CAPSULE ORAL at 09:43

## 2021-06-03 RX ADMIN — Medication 500 MG: at 22:22

## 2021-06-03 RX ADMIN — ATENOLOL 25 MG: 25 TABLET ORAL at 09:42

## 2021-06-03 ASSESSMENT — PAIN SCALES - GENERAL
PAINLEVEL_OUTOF10: 6
PAINLEVEL_OUTOF10: 7
PAINLEVEL_OUTOF10: 5
PAINLEVEL_OUTOF10: 5
PAINLEVEL_OUTOF10: 7
PAINLEVEL_OUTOF10: 7
PAINLEVEL_OUTOF10: 9
PAINLEVEL_OUTOF10: 0
PAINLEVEL_OUTOF10: 7
PAINLEVEL_OUTOF10: 6

## 2021-06-03 ASSESSMENT — PAIN DESCRIPTION - PAIN TYPE
TYPE: SURGICAL PAIN
TYPE: SURGICAL PAIN

## 2021-06-03 ASSESSMENT — PAIN DESCRIPTION - LOCATION
LOCATION: KNEE
LOCATION: KNEE
LOCATION: LEG
LOCATION: KNEE

## 2021-06-03 ASSESSMENT — PAIN DESCRIPTION - ORIENTATION
ORIENTATION: LEFT

## 2021-06-03 ASSESSMENT — PAIN - FUNCTIONAL ASSESSMENT: PAIN_FUNCTIONAL_ASSESSMENT: PREVENTS OR INTERFERES SOME ACTIVE ACTIVITIES AND ADLS

## 2021-06-03 ASSESSMENT — PAIN DESCRIPTION - FREQUENCY: FREQUENCY: CONTINUOUS

## 2021-06-03 ASSESSMENT — PAIN DESCRIPTION - DESCRIPTORS: DESCRIPTORS: ACHING;SORE;CONSTANT

## 2021-06-03 ASSESSMENT — PAIN DESCRIPTION - PROGRESSION: CLINICAL_PROGRESSION: GRADUALLY WORSENING

## 2021-06-03 ASSESSMENT — PAIN DESCRIPTION - ONSET: ONSET: ON-GOING

## 2021-06-03 NOTE — PROGRESS NOTES
[address]  INPATIENT OCCUPATIONAL THERAPY  [facility] ORTHOPEDICS 7K  EVALUATION    Time:   Time In: 4564  Time Out: 9826  Timed Code Treatment Minutes: 15 Minutes  Minutes: 23    Date: 6/3/2021  Patient Name: Kailey Briscoe,   Gender: female      MRN: [id number]  : 1940  (80 y.o.)  Referring Practitioner: Katlin Villalta MD  Diagnosis: Status Post Revision of Total Replacement of Left Knee  Additional Pertinent Hx: Per H&P, \" The patient is a 33-year-old female withincreasing pain in her left knee. She has had left total knee replacement done in  in [place]. She has been struggling withinstability for quite some time. We have tried bracing as well asactivity modification at home physician-directed exercise program.  Atthis point, she is still having difficulty with ambulation secondary toher left knee pain and instability. She does have quite a bit ofmedical problems including a heart valve replacement by Dr. Patricia Mayfield, coronary artery disease as well as history of CVA. She darrel diabetic, but her last A1C being 7. At this point, she would like tomove forth with more definitive treatment, after trying and failingexhaustive conservative measures and continued pain. \" Pt is s/p Left TKA by Dr. Herman Phelps on 21. Restrictions/Precautions:  Restrictions/Precautions: Weight Bearing, Fall Risk, Femoral Block, General Precautions  Date and Time: 21 at 1355  Left Lower Extremity Weight Bearing: Weight Bearing As Tolerated  Position Activity Restriction  Other position/activity restrictions: Knee immobilizer in room for femoral block when ambulating for 48hrs or until patient demonstrates quad function with PT    Subjective  Chart Reviewed: Yes, Orders, Progress Notes, History and Physical, Operative Notes  Patient assessed for rehabilitation services?: Yes    Subjective: RN okayed OT session. Upon arrival patient was sitting up in bed. Pt  was agreeable to OT session. Pain:  Pain Assessment  Patient Currently in Pain: Yes  Pain Assessment: 0-10  Pain Level: 5  Pain Type: Surgical pain  Pain Location: Leg  Pain Orientation: Left    Vitals: Vitals not assessed per clinical judgement, see nursing flowsheet    Social/Functional History:  Lives With: Spouse  Type of Home: House  Home Layout: One level  Home Access: Stairs to enter with rails  Entrance Stairs - Number of Steps: 2  Home Equipment: 4 wheeled walker, Cane, Grab bars   Bathroom Shower/Tub: Walk-in shower, Shower chair with back  [employer]: Handicap height  Bathroom Accessibility: Accessible     ADL Assistance: Independent  Homemaking Assistance: Independent  Homemaking Responsibilities: Yes  Ambulation Assistance: Independent  Transfer Assistance: Independent    Active : Yes  Occupation: Retired     VISION:WFL    HEARING:  WFL    COGNITION: WFL    RANGE OF MOTION:  Bilateral Upper Extremity:  WFL    STRENGTH:  Bilateral Upper Extremity:  WFL    SENSATION:   WFL    ADL:   Lower Extremity Dressing: Maximum Assistance. Socks. Chriss Aguilera BALANCE:  Sitting Balance:  Stand By Assistance. sitting EOB with BUE release   Standing Balance: Contact Guard Assistance, X 1, with cues for safety, with verbal cues . with BUE on RW. forward flexed posture. BED MOBILITY:  Supine to Sit: Contact Guard Assistance, with head of bed raised, with increased time for completion    Scooting: Contact Guard Assistance, with increased time for completion      TRANSFERS:  Sit to Stand:  [employer], with increased time for completion, cues for hand placement. Stand to Sit: [employer], with increased time for completion, cues for hand placement. FUNCTIONAL MOBILITY:  Assistive Device: Rolling Walker  Assist Level:  Contact Guard Assistance and with increased time for completion. Distance: EOB to Recliner   Slow pace, decreased step length, forward flexed posture.       Activity Tolerance:  Patient tolerance of  treatment: good. Assessment:  Assessment: This 80year old female is s/p L TKA. Pt requires skilled OT intervention to increase indep and safety with all self cares, transfers, mobility, and IADLs to decrease fall risk and return to PLOF. Without skilled OT intervention pt is at increased risk for falls and caregiver burden. Performance deficits / Impairments: Decreased functional mobility , Decreased ADL status, Decreased ROM, Decreased endurance, Decreased balance, Decreased high-level IADLs, Decreased strength  Prognosis: Good  REQUIRES OT FOLLOW UP: Yes    Treatment Initiated: Treatment and education initiated within context of evaluation. Evaluation time included review of current medical information, gathering information related to past medical, social and functional history, completion of standardized testing, formal and informal observation of tasks, assessment of data and development of plan of care and goals. Treatment time included skilled education and facilitation of tasks to increase safety and independence with ADL's for improved functional independence and quality of life. Discharge Recommendations:  Continue to assess pending progress, SNF, 24/7 S and Assist    Patient Education:  OT Education: OT Role, Plan of Care, Transfer Training, Precautions    Equipment Recommendations:  Equipment Needed: Yes  Other: Monitor need for LHAE    Plan:  Times per week: 6x  Current Treatment Recommendations: Strengthening, Functional Mobility Training, Balance Training, Endurance Training, Patient/Caregiver Education & Training, Safety Education & Training, Self-Care / ADL, Home Management Training. See long-term goal time frame for expected duration of plan of care. If no long-term goals established, a short length of stay is anticipated.     Goals:  Patient goals : Go home with spouse  Short term goals  Time Frame for Short term goals: Until discharge  Short term goal 1: Pt will complete BUE

## 2021-06-03 NOTE — PROGRESS NOTES
[address] [address]  INPATIENT PHYSICAL THERAPY  DAILY NOTE  [facility] ORTHOPEDICS 7K - 7K-01/001-A    Time In: 3573  Time Out: 1440  Timed Code Treatment Minutes: 25 Minutes  Minutes: 25          Date: 6/3/2021  Patient Name: Makayla Sherman,  Gender:  female        MRN: [id number]  : 1940  (80 y.o.)     Referring Practitioner: Gideon Burger MD  Diagnosis: Failed total left knee replacement [facility])  Additional Pertinent Hx: The patient is a 68-year-old female withincreasing pain in her left knee. She has had left total kneereplacement done in [place] in [place]. She has been struggling withinstability for quite some time. We have tried bracing as well asactivity modification at home physician-directed exercise program.  Atthis point, she is still having difficulty with ambulation secondary toher left knee pain and instability. She does have quite a bit ofmedical problems including a heart valve replacement by Dr. Candace Koenig, coronary artery disease as well as history of CVA. She darrel diabetic, but her last A1C being 7. At this point, she would like tomove forth with more definitive treatment, after trying and failingexhaustive conservative measures and continued pain.      Prior Level of Function:  Lives With: Spouse  Type of Home: House  Home Layout: One level  Home Access: Stairs to enter with rails  Entrance Stairs - Number of Steps: 2  Home Equipment: 4 wheeled walker, Cane, Grab bars   Bathroom Shower/Tub: Walk-in shower, Shower chair with back  [employer]: Handicap height  Bathroom Accessibility: Accessible    ADL Assistance: Independent  Homemaking Assistance: Independent  Homemaking Responsibilities: Yes  Ambulation Assistance: Independent  Transfer Assistance: Independent  Active : Yes    Restrictions/Precautions:  Restrictions/Precautions: [place] Bearing, Fall Risk, Femoral Block, General Precautions  Date and Time: 21 at 26  Left Lower Extremity Weight Bearing: Weight Bearing As Tolerated  Position Activity Restriction  Other position/activity restrictions: Knee immobilizer in room for femoral block when ambulating for 48hrs or until patient demonstrates quad function with PT     SUBJECTIVE: RN approved session. Pt resting in bed upon arrival, requesting to use restroom. PAIN: 4/10: L knee    Vitals: Vitals not assessed per clinical judgement, see nursing flowsheet    OBJECTIVE:  Bed Mobility:  Rolling to Left: Stand By Assistance   Supine to Sit: Stand By Assistance  Sit to Supine: Minimal Assistance, Of L LE     Transfers:  Sit to Stand: [employer], From EOB and from toilet. Cuing for improved technique. Stand to Sit:Contact Guard Assistance    Ambulation:  Contact Guard Assistance, Minimal Assistance  Distance: 15 feet x2   Surface: Level Tile  Device:Rolling Walker  Gait Deviations:  Frequent cuing to stay within [place] of AD. Somewhat impulsive. Decreased safety awareness. Decreased B step lengths. Min instability. Not safe to be up unassisted. Balance:  Dynamic Standing Balance: Contact Guard Assistance, Pt up in restroom to complete roro clean up and wash hands. Cuing for safety awareness. Exercise:  Patient was guided in 1 set(s) 10 reps of exercise to both lower extremities. Glut sets, Quad sets, Heelslides, Hip abduction/adduction, Seated marches and Long arc quads. Exercises were completed for increased independence with functional mobility. Functional Outcome Measures: Completed  -PAC Inpatient Mobility Raw Score : 17  -PAC Inpatient T-Scale Score : 42.13    ASSESSMENT:  Assessment: Patient progressing toward established goals. and Pt tolerated session fairly well. Limited by decreased strength, decreased mobility, and decreased safety awareness. Pt would best benefit from SNF for continued PT but is not receptive to this  Activity Tolerance:  Patient tolerance of  treatment: good.       Equipment Recommendations:Equipment Needed: No  Discharge Recommendations:  Continue to assess pending progress, Subacute/Skilled Nursing Facility    Plan: Times per week: 6x BID; 1x daily  Current Treatment Recommendations: Functional Mobility Training, ROM, Strengthening, Transfer Training, Balance Training, Gait Training, Stair training, Home Exercise Program, Endurance Training    Patient Education  Patient Education: Plan of Care, [employer] Mobility, Transfers, Gait    Goals:  Patient goals : go home  Short term goals  Short term goal 1: Pt will supine <>sit with MOD I in order to get out of bed  Short term goal 2: Pt will sit<>stand with MOD I and a RW to be able to transfer surfaces  Short term goal 3: Pt will ambulate 50 ft with a RW and MOD I inorder ambulate house hold distances  Short term goal 4: Pt will ascend and descend 2 steps with a handrail and MOD I inorder to enter her home       Following session, patient left in safe position with all fall risk precautions in place.

## 2021-06-03 NOTE — PLAN OF CARE
Problem: Falls - Risk of:  Goal: Will remain free from falls  Description: Will remain free from falls  Outcome: Met This Shift  Note: Patient has not fallen this shift      Problem: Mobility - Impaired:  Goal: Mobility will improve  Description: Mobility will improve  Outcome: Met This Shift  Note: Patient has ambulated to the bathroom and back to bed. Problem: Infection - Surgical Site:  Goal: Will show no infection signs and symptoms  Description: Will show no infection signs and symptoms  Outcome: Met This Shift  Note: No s/s of infection.  VS within normal limits

## 2021-06-03 NOTE — PROGRESS NOTES
[facility]  INPATIENT PHYSICAL THERAPY  DAILY NOTE  [facility] ORTHOPEDICS 7K - 7K-01/001-A    Time In: 1025  Time Out: 1048  Timed Code Treatment Minutes: 23 Minutes  Minutes: 23          Date: 6/3/2021  Patient Name: Paul Linares,  Gender:  female        MRN: [id number]  : 1940  (80 y.o.)     Referring Practitioner: Joseph Bourne MD  Diagnosis: Failed total left knee replacement [facility])  Additional Pertinent Hx: The patient is a 72-year-old female withincreasing pain in her left knee. She has had left total kneereplacement done in [place] in [place]. She has been struggling withinstability for quite some time. We have tried bracing as well asactivity modification at home physician-directed exercise program.  Atthis point, she is still having difficulty with ambulation secondary toher left knee pain and instability. She does have quite a bit ofmedical problems including a heart valve replacement by Dr. Inna Avila, coronary artery disease as well as history of CVA. She darrel diabetic, but her last A1C being 7. At this point, she would like tomove forth with more definitive treatment, after trying and failingexhaustive conservative measures and continued pain.      Prior Level of Function:  Lives With: Spouse  Type of Home: House  Home Layout: One level  Home Access: Stairs to enter with rails  Entrance Stairs - Number of Steps: 2  Home Equipment: 4 wheeled walker, Cane, Grab bars   Bathroom Shower/Tub: Walk-in shower, Shower chair with back  [employer]: Handicap height  Bathroom Accessibility: Accessible    ADL Assistance: Independent  Homemaking Assistance: Independent  Homemaking Responsibilities: Yes  Ambulation Assistance: Independent  Transfer Assistance: Independent  Active : Yes    Restrictions/Precautions:  Restrictions/Precautions: [place] Bearing, Fall Risk, Femoral Block, General Precautions  Date and Time: 21 at 26  Left Lower Extremity Weight Bearing: Weight Bearing As Tolerated  Position Activity Restriction  Other position/activity restrictions: Knee immobilizer in room for femoral block when ambulating for 48hrs or until patient demonstrates quad function with PT     SUBJECTIVE: RN approved session. Pt resting in bed upon arrival, agreeable to therapy. PAIN: 4/10: L LE    Vitals: Vitals not assessed per clinical judgement, see nursing flowsheet    OBJECTIVE:  Bed Mobility:  Rolling to Left: Stand By Assistance   Supine to Sit: Stand By Assistance, with increased time for completion    Transfers:  Sit to Stand: [employer], with increased time for completion  Stand to Sit:Contact Guard Assistance    Ambulation:  Minimal Assistance  Distance: 20 feet x1   Surface: Level Tile  Device:Rolling Walker  Gait Deviations: Forward Flexed Posture, Slow [place], Decreased Step Length Bilaterally, Decreased Weight Shift Left, Decreased Gait Speed and Decreased Heel Strike Bilaterally    Balance:  Dynamic Sitting Balance: Stand By Assistance  Static Standing Balance: Contact Guard Assistance    Exercise:  Patient was guided in 1 set(s) 10 reps of exercise to both lower extremities. Glut sets, Quad sets, Heelslides, Hip abduction/adduction, Seated marches, Seated heel/toe raises and Long arc quads. Exercises were completed for increased independence with functional mobility. Functional Outcome Measures: Completed  AM-PAC Inpatient Mobility Raw Score : 17  AM-PAC Inpatient T-Scale Score : 42.13    ASSESSMENT:  Assessment: Patient progressing toward established goals. and Pt tolerated session well. Limited by decreased strength and decreased mobility of L LE. Pt would benefit from continued therapy before returning home. Activity Tolerance:  Patient tolerance of  treatment: good.       Equipment Recommendations:Equipment Needed: No  Discharge Recommendations:  Continue to assess pending progress, Subacute/Skilled Nursing Facility    Plan: Times per week: 6x BID; 1x daily  Current Treatment Recommendations: Functional Mobility Training, ROM, Strengthening, Transfer Training, Balance Training, Gait Training, Stair training, Home Exercise Program, Endurance Training    Patient Education  Patient Education: Plan of Care, [employer] Mobility, Transfers, Gait    Goals:  Patient goals : go home  Short term goals  Short term goal 1: Pt will supine <>sit with MOD I in order to get out of bed  Short term goal 2: Pt will sit<>stand with MOD I and a RW to be able to transfer surfaces  Short term goal 3: Pt will ambulate 50 ft with a RW and MOD I inorder ambulate house hold distances  Short term goal 4: Pt will ascend and descend 2 steps with a handrail and MOD I inorder to enter her home       Following session, patient left in safe position with all fall risk precautions in place.

## 2021-06-03 NOTE — PROGRESS NOTES
Progress Note    Subjective:     Post-Operative Day: 2 Status Post left Rev Total Knee Arthroplasty  Systemic or Specific Complaints:No Complaints    Objective:     VITALS:  BP (!) 140/76   Pulse 84   Temp 98.9 °F (37.2 °C) (Oral)   Resp 18   Ht 5' 5\" (1.651 m)   Wt 258 lb 6.4 oz (117.2 kg)   SpO2 93%   BMI 43.00 kg/m²   24HR INTAKE/OUTPUT:    Intake/Output Summary (Last 24 hours) at 6/3/2021 0759  Last data filed at 6/3/2021 0334  Gross per 24 hour   Intake 1011.93 ml   Output 1550 ml   Net -538.07 ml          General: alert, appears stated age and cooperative   Wound: Wound clean and dry no evidence of infection. and No Erythema   DVT Exam: No evidence of DVT seen on physical exam.  Negative Tan's sign. Knee swollen but thigh soft to palpation. Moving foot and ankle. Good distal pulses. Data Review  CBC:   Lab Results   Component Value Date    WBC 11.3 06/02/2021    RBC 4.06 06/02/2021    RBC 0-5 04/07/2015    HGB 11.7 06/02/2021    HCT 37.1 06/02/2021     06/02/2021    INR 1.03 02/04/2021     BMP:   Lab Results   Component Value Date     06/02/2021    K 4.1 06/02/2021    K 4.1 02/07/2021     06/02/2021    CO2 19 06/02/2021    BUN 15 06/02/2021    CREATININE 0.7 06/02/2021    GLUCOSE 163 06/02/2021    GLUCOSE 147 04/30/2021       Assessment:     Status Post left Rev Total Knee Arthroplasty. Doing well postoperatively. Plan:      1:  Continue Total Knee post-op protocol.    2:  Continue Deep venous thrombosis prophylaxis  3:  Continue physical therapy  4:  Continue Pain Control  5:  Home today or tomorrow

## 2021-06-04 VITALS
WEIGHT: 258.4 LBS | HEART RATE: 97 BPM | HEIGHT: 65 IN | SYSTOLIC BLOOD PRESSURE: 116 MMHG | RESPIRATION RATE: 18 BRPM | TEMPERATURE: 98.1 F | OXYGEN SATURATION: 93 % | BODY MASS INDEX: 43.05 KG/M2 | DIASTOLIC BLOOD PRESSURE: 59 MMHG

## 2021-06-04 PROCEDURE — 6370000000 HC RX 637 (ALT 250 FOR IP): Performed by: ORTHOPAEDIC SURGERY

## 2021-06-04 PROCEDURE — 97110 THERAPEUTIC EXERCISES: CPT

## 2021-06-04 PROCEDURE — 6370000000 HC RX 637 (ALT 250 FOR IP): Performed by: PHYSICIAN ASSISTANT

## 2021-06-04 PROCEDURE — 2580000003 HC RX 258: Performed by: ORTHOPAEDIC SURGERY

## 2021-06-04 PROCEDURE — 6360000002 HC RX W HCPCS: Performed by: ORTHOPAEDIC SURGERY

## 2021-06-04 PROCEDURE — 97116 GAIT TRAINING THERAPY: CPT

## 2021-06-04 PROCEDURE — 97530 THERAPEUTIC ACTIVITIES: CPT

## 2021-06-04 RX ADMIN — CLOPIDOGREL BISULFATE 75 MG: 75 TABLET ORAL at 10:41

## 2021-06-04 RX ADMIN — METFORMIN HYDROCHLORIDE 500 MG: 500 TABLET ORAL at 10:40

## 2021-06-04 RX ADMIN — Medication 500 MG: at 10:40

## 2021-06-04 RX ADMIN — ATENOLOL 25 MG: 25 TABLET ORAL at 10:40

## 2021-06-04 RX ADMIN — DULOXETINE HYDROCHLORIDE 30 MG: 30 CAPSULE, DELAYED RELEASE ORAL at 10:40

## 2021-06-04 RX ADMIN — ASPIRIN 325 MG: 325 TABLET, COATED ORAL at 10:40

## 2021-06-04 RX ADMIN — HYDROXYZINE PAMOATE 25 MG: 25 CAPSULE ORAL at 09:08

## 2021-06-04 RX ADMIN — CEFAZOLIN 2000 MG: 10 INJECTION, POWDER, FOR SOLUTION INTRAVENOUS at 10:44

## 2021-06-04 RX ADMIN — HYDRALAZINE HYDROCHLORIDE 25 MG: 25 TABLET, FILM COATED ORAL at 10:40

## 2021-06-04 RX ADMIN — DOCUSATE SODIUM 50 MG AND SENNOSIDES 8.6 MG 1 TABLET: 8.6; 5 TABLET, FILM COATED ORAL at 10:40

## 2021-06-04 RX ADMIN — LISINOPRIL 10 MG: 10 TABLET ORAL at 10:40

## 2021-06-04 RX ADMIN — SODIUM CHLORIDE, PRESERVATIVE FREE 10 ML: 5 INJECTION INTRAVENOUS at 10:42

## 2021-06-04 RX ADMIN — HYDROCODONE BITARTRATE AND ACETAMINOPHEN 2 TABLET: 5; 325 TABLET ORAL at 03:36

## 2021-06-04 RX ADMIN — HYDROCODONE BITARTRATE AND ACETAMINOPHEN 2 TABLET: 5; 325 TABLET ORAL at 09:05

## 2021-06-04 RX ADMIN — CEFAZOLIN 2000 MG: 10 INJECTION, POWDER, FOR SOLUTION INTRAVENOUS at 03:30

## 2021-06-04 RX ADMIN — ACETAMINOPHEN 650 MG: 325 TABLET ORAL at 10:41

## 2021-06-04 ASSESSMENT — PAIN DESCRIPTION - PAIN TYPE: TYPE: SURGICAL PAIN

## 2021-06-04 ASSESSMENT — PAIN SCALES - GENERAL
PAINLEVEL_OUTOF10: 5
PAINLEVEL_OUTOF10: 5
PAINLEVEL_OUTOF10: 8
PAINLEVEL_OUTOF10: 7

## 2021-06-04 ASSESSMENT — PAIN DESCRIPTION - LOCATION: LOCATION: KNEE

## 2021-06-04 ASSESSMENT — PAIN DESCRIPTION - ORIENTATION: ORIENTATION: LEFT

## 2021-06-04 NOTE — PROGRESS NOTES
Discharge instructions given to pt and . Pt being discharged to home per private vehicle with personal belongings.

## 2021-06-04 NOTE — PROGRESS NOTES
[address] [address]  INPATIENT PHYSICAL THERAPY  DAILY NOTE  [facility] ORTHOPEDICS 7K - 7K-01/001-A    Time In: 0900  Time Out: 0939  Timed Code Treatment Minutes: 44 Minutes  Minutes: 39          Date: 2021  Patient Name: Florin Moralez,  Gender:  female        MRN: [id number]  : 1940  (80 y.o.)     Referring Practitioner: Ginger Vidal MD  Diagnosis: Failed total left knee replacement [facility])  Additional Pertinent Hx: The patient is a 79-year-old female withincreasing pain in her left knee. She has had left total kneereplacement done in [place] in [place]. She has been struggling withinstability for quite some time. We have tried bracing as well asactivity modification at home physician-directed exercise program.  Atthis point, she is still having difficulty with ambulation secondary toher left knee pain and instability. She does have quite a bit ofmedical problems including a heart valve replacement by Dr. Antoine Tavarez, coronary artery disease as well as history of CVA. She darrel diabetic, but her last A1C being 7. At this point, she would like tomove forth with more definitive treatment, after trying and failingexhaustive conservative measures and continued pain.      Prior Level of Function:  Lives With: Spouse  Type of Home: House  Home Layout: One level  Home Access: Stairs to enter with rails  Entrance Stairs - Number of Steps: 2  Home Equipment: 4 wheeled walker, Cane, Grab bars   Bathroom Shower/Tub: Walk-in shower, Shower chair with back  [employer]: Handicap height  Bathroom Accessibility: Accessible    ADL Assistance: Independent  Homemaking Assistance: Independent  Homemaking Responsibilities: Yes  Ambulation Assistance: Independent  Transfer Assistance: Independent  Active : Yes    Restrictions/Precautions:  Restrictions/Precautions: [place] Bearing, Fall Risk, Femoral Block, General Precautions  Date and Time: 21 at 26  Left Lower Extremity Weight Bearing: Weight Bearing As Tolerated  Position Activity Restriction  Other position/activity restrictions: Knee immobilizer in room for femoral block when ambulating for 48hrs or until patient demonstrates quad function with PT     SUBJECTIVE: RN approved session. Pt resting in bed upon arrival, pleasant and agreeable to therapy. Throughout session pt was very unsafe, fatigued quickly, and demod decreased safety awareness. PAIN: 4/10: L knee    Vitals: Vitals not assessed per clinical judgement, see nursing flowsheet    OBJECTIVE:  Bed Mobility:  Rolling to Right: Supervision   Sit to Supine: Stand By Assistance     Transfers:  Sit to Stand: [address], Minimal Assistance, with increased time for completion, cues for hand placement, CGA from EOB and Min A from toilet. Pt with LOB upon standing from toilet requiring Min A to crroect  Stand to Sit:Contact Guard Assistance    Ambulation:  Contact Guard Assistance  Distance: 20 feet x1  Surface: Level Tile  Device:Rolling Walker  Gait Deviations:  MAX cuing for pt to keep hands on AD as she kept leaning down onto her elbows or reaching out for rail in hallway. Fatigued very quickly. Unsteady. Not safe to be up unassisted and , Luis Arteaga, not safe to assist pt    Stairs:  Minimal Assistance  Number of Steps: 4  Height: 6\" step with Bilateral Handrails    Balance:  Dynamic Standing Balance: Contact Guard Assistance, Minimal Assistance, Pt up in restroom to complete roro clean up. Min instability at times. Required One UE support on AD for stability. Exercise:  Patient was guided in 1 set(s) 10 reps of exercise to both lower extremities. Glut sets, Seated marches, Seated heel/toe raises and Long arc quads. Exercises were completed for increased independence with functional mobility.     Functional Outcome Measures: Completed  AM-PAC Inpatient Mobility Raw Score : 17  AM-PAC Inpatient T-Scale Score : 42.13    ASSESSMENT:  Assessment: Patient progressing toward established goals. and Pt tolerated session well. Limited by poor safety awareness, decreased balance, decreased strength, decreased mobility. SNF for continued PT  Activity Tolerance:  Patient tolerance of  treatment: fair. Equipment Recommendations:Equipment Needed: No  Discharge Recommendations:  Continue to assess pending progress, Subacute/Skilled Nursing Facility    Plan: Times per week: 6x BID; 1x daily  Current Treatment Recommendations: Functional Mobility Training, ROM, Strengthening, Transfer Training, Balance Training, Gait Training, Stair training, Home Exercise Program, Endurance Training    Patient Education  Patient Education: Plan of Care, [employer] [facility] Mobility, Transfers, Gait, Stairs    Goals:  Patient goals : go home  Short term goals  Short term goal 1: Pt will supine <>sit with MOD I in order to get out of bed  Short term goal 2: Pt will sit<>stand with MOD I and a RW to be able to transfer surfaces  Short term goal 3: Pt will ambulate 50 ft with a RW and MOD I inorder ambulate house hold distances  Short term goal 4: Pt will ascend and descend 2 steps with a handrail and MOD I inorder to enter her home       Following session, patient left in safe position with all fall risk precautions in place.

## 2021-06-06 LAB
AEROBIC CULTURE: NORMAL
ANAEROBIC CULTURE: NORMAL
GRAM STAIN RESULT: NORMAL

## 2021-06-07 ENCOUNTER — TELEPHONE (OUTPATIENT)
Dept: FAMILY MEDICINE CLINIC | Age: 81
End: 2021-06-07

## 2021-06-07 NOTE — TELEPHONE ENCOUNTER
[facility] Transitions Initial Follow Up Call    Outreach made within 2 business days of discharge: Yes    Patient: Adriel Rothman Patient : 1940   MRN: [id number]  Reason for Admission: There are no discharge diagnoses documented for the most recent discharge. Discharge Date: 21       Spoke with: pt    Discharge department/facility: [facility]    TCM Interactive Patient Contact:  Was patient able to fill all prescriptions: Yes  Was patient instructed to bring all medications to the follow-up visit: Yes  Is patient taking all medications as directed in the discharge summary?  Yes  Does patient understand their discharge instructions: Yes  Does patient have questions or concerns that need addressed prior to 7-14 day follow up office visit: no    Scheduled appointment with PCP within 7-14 days    Follow Up  Future Appointments   Date Time Provider James Cruz   2021  1:00 PM DO GERALDINE Diaz [facility] [address] [place] [address]   7/15/2021 11:20 AM DO GERALDINE Diaz [facility] [address] [place] [address]   9/15/2021 11:00 AM SEAN Flores - CNP [facility] [address]   2/3/2022  1:00 PM Marky Gonzales MD [facility] - Duran Youssef LPN

## 2021-06-08 NOTE — DISCHARGE SUMMARY
tolerated. She was discharged on DVT  prophylaxis, pain medication, and outpatient physical therapy, and  resume her home medications as prescribed. If she has any questions or  any concerns, she will give us a call. Otherwise, we will see her back  in two weeks' time to evaluate her incision. DOMENICA Hines     D: 06/07/2021 10:41:00       T: 06/07/2021 12:50:04     [email or web address]  Job#: [id number]     Doc#: [id number]    CC:

## 2021-06-09 ENCOUNTER — OFFICE VISIT (OUTPATIENT)
Dept: FAMILY MEDICINE CLINIC | Age: 81
End: 2021-06-09
Payer: MEDICARE

## 2021-06-09 VITALS
SYSTOLIC BLOOD PRESSURE: 120 MMHG | HEART RATE: 87 BPM | DIASTOLIC BLOOD PRESSURE: 68 MMHG | RESPIRATION RATE: 18 BRPM | TEMPERATURE: 97.6 F | OXYGEN SATURATION: 95 %

## 2021-06-09 DIAGNOSIS — M25.562 CHRONIC PAIN OF LEFT KNEE: Primary | ICD-10-CM

## 2021-06-09 DIAGNOSIS — Z96.652 STATUS POST REVISION OF TOTAL REPLACEMENT OF LEFT KNEE: ICD-10-CM

## 2021-06-09 DIAGNOSIS — G89.29 CHRONIC PAIN OF LEFT KNEE: Primary | ICD-10-CM

## 2021-06-09 DIAGNOSIS — T84.093D FAILED TOTAL LEFT KNEE REPLACEMENT, SUBSEQUENT ENCOUNTER: ICD-10-CM

## 2021-06-09 PROCEDURE — 1111F DSCHRG MED/CURRENT MED MERGE: CPT | Performed by: FAMILY MEDICINE

## 2021-06-09 PROCEDURE — 99495 TRANSJ CARE MGMT MOD F2F 14D: CPT | Performed by: FAMILY MEDICINE

## 2021-06-09 RX ORDER — OXYCODONE HYDROCHLORIDE AND ACETAMINOPHEN 5; 325 MG/1; MG/1
1 TABLET ORAL EVERY 4 HOURS PRN
Qty: 42 TABLET | Refills: 0 | Status: SHIPPED | OUTPATIENT
Start: 2021-06-09 | End: 2021-06-22 | Stop reason: SDUPTHER

## 2021-06-09 SDOH — ECONOMIC STABILITY: FOOD INSECURITY: WITHIN THE PAST 12 MONTHS, YOU WORRIED THAT YOUR FOOD WOULD RUN OUT BEFORE YOU GOT MONEY TO BUY MORE.: NEVER TRUE

## 2021-06-09 SDOH — ECONOMIC STABILITY: FOOD INSECURITY: WITHIN THE PAST 12 MONTHS, THE FOOD YOU BOUGHT JUST DIDN'T LAST AND YOU DIDN'T HAVE MONEY TO GET MORE.: NEVER TRUE

## 2021-06-09 ASSESSMENT — SOCIAL DETERMINANTS OF HEALTH (SDOH): HOW HARD IS IT FOR YOU TO PAY FOR THE VERY BASICS LIKE FOOD, HOUSING, MEDICAL CARE, AND HEATING?: NOT HARD AT ALL

## 2021-06-09 NOTE — PROGRESS NOTES
Transition of Care Visit/Hospital Follow Up:      Rashard More is a 80 y.o. female that presents for Follow-Up from Hospital (SP knee replacement)        Date of Discharge:   6/4/21  Was patient contacted within 2 business days of discharge (see chart for documentation):  yes - 6/7/21      Patient presents for hospital follow up. Patient recently hospitalized at [facility] for treatment of left total knee revision. Symptoms prior to admission:  Patient had been having worsening left knee pain that was causing increased difficulty with ambulation. Hospital Course per DC Summary:    HOSPITAL COURSE:  The patient was brought to the operating room, where  she had a revision of left total knee replacement done. She had no  complications. She was then admitted to general orthopedic floor for  postop care where she had an uneventful stay. She was seen by Physical  Therapy where she went on to mobilize well with the walker. She was  able to be discharged in stable condition. Medication changes at discharge:  Med list reconciled today    Clinical course since discharge:  States that she is doing ok. She states that she is still having a high level of pain. She is on Norco q 4 hrs, but this is not controlling the pain. No drainage or bleeding from the wound. States that her appetite is still low. Having regular BMs. Current Outpatient Medications   Medication Sig Dispense Refill    oxyCODONE-acetaminophen (PERCOCET) 5-325 MG per tablet Take 1 tablet by mouth every 4 hours as needed for Pain for up to 7 days. 42 tablet 0    aspirin 325 MG EC tablet Take 1 tablet by mouth daily 30 tablet 0    HYDROcodone-acetaminophen (NORCO) 5-325 MG per tablet Take 1 tablet by mouth every 4 hours as needed for Pain for up to 7 days.  42 tablet 0    clopidogrel (PLAVIX) 75 MG tablet Take 1 tablet by mouth daily 90 tablet 3    DULoxetine (CYMBALTA) 30 MG extended release capsule Take 1 capsule by mouth daily 90 capsule 3    omeprazole (PRILOSEC) 40 MG delayed release capsule Take 1 capsule by mouth daily 90 capsule 3    Magnesium Oxide (MAGNESIUM-OXIDE) 250 MG TABS tablet Take 2 tablets by mouth 2 times daily 120 tablet 5    furosemide (LASIX) 40 MG tablet Take 0.5 tablets by mouth as needed (for weight gain of 3 lbs) 60 tablet 3    potassium chloride (KLOR-CON M) 20 MEQ extended release tablet Take 0.5 tablets by mouth as needed (take with Lasix) 90 tablet 1    hydrALAZINE (APRESOLINE) 50 MG tablet Take 0.5 tablets by mouth 2 times daily 180 tablet 3    quinapril (ACCUPRIL) 10 MG tablet Take 1 tablet by mouth daily 90 tablet 4    atenolol (TENORMIN) 25 MG tablet Take 1 tablet by mouth daily 90 tablet 3    vitamin C (VITAMIN C) 1000 MG tablet Take 1 tablet by mouth daily 30 tablet 3    ondansetron (ZOFRAN ODT) 4 MG disintegrating tablet Take 1 tablet by mouth every 8 hours as needed for Nausea or Vomiting 20 tablet 0    metFORMIN (GLUCOPHAGE) 500 MG tablet TAKE 1 TABLET TWICE A DAY WITH MEALS 180 tablet 4    atorvastatin (LIPITOR) 40 MG tablet TAKE 1 TABLET DAILY 90 tablet 4    vitamin D (CHOLECALCIFEROL) 1000 UNIT TABS tablet Take 1,000 Units by mouth nightly. No current facility-administered medications for this visit.        Past Medical History:   Diagnosis Date    Arthritis     Chronic low back pain     COVID-19     DM type 2, goal A1c below 7     GERD (gastroesophageal reflux disease)     Hyperlipidemia     Hypertension     Loss of vision     Mitral valve disorder     Osteoporosis     Subarachnoid hemorrhage (HCC) 4/25/2019    TIA (transient ischemic attack)     Type 2 diabetes mellitus with diabetic neuropathy, without long-term current use of insulin ([facility] [address] 7/26/2017   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    aslo after valve replacement 2020    Urinary incontinence        Past Surgical History:   Procedure Laterality Date    APPENDECTOMY      distress  Head: normocephalic and atraumatic  ENT: external ear and ear canal normal bilaterally, nose without deformity, nasal mucosa and turbinates normal without polyps, oropharynx normal, dentition is normal for age, no lip or gum lesions noted  Neck: supple and non-tender without mass, no thyromegaly or thyroid nodules, no cervical lymphadenopathy  Pulmonary/Chest: clear to auscultation bilaterally- no wheezes, rales or rhonchi, normal air movement, no respiratory distress or retractions  Cardiovascular: normal rate, regular rhythm, normal S1 and S2, no murmurs, rubs, clicks, or gallops, distal pulses intact  Extremities: no cyanosis, clubbing or edema of the lower extremities  Psych:  Normal affect without evidence of depression or anxiety, insight and judgement are appropriate, memory appears intact  Skin: warm and dry, no rash or erythema      Diagnostic test results reviewed: inpatient labs    Patient risk of morbidity and mortality: moderate      ASSESSMENT & PLAN  Toya Seen was seen today for follow-up from hospital.    Diagnoses and all orders for this visit:    Chronic pain of left knee  -     oxyCODONE-acetaminophen (PERCOCET) 5-325 MG per tablet; Take 1 tablet by mouth every 4 hours as needed for Pain for up to 7 days.  -     RI DISCHARGE MEDS RECONCILED W/ CURRENT OUTPATIENT MED LIST    Failed total left knee replacement, subsequent encounter  -     oxyCODONE-acetaminophen (PERCOCET) 5-325 MG per tablet; Take 1 tablet by mouth every 4 hours as needed for Pain for up to 7 days.  -     RI DISCHARGE MEDS RECONCILED W/ CURRENT OUTPATIENT MED LIST    Status post revision of total replacement of left knee  -     oxyCODONE-acetaminophen (PERCOCET) 5-325 MG per tablet; Take 1 tablet by mouth every 4 hours as needed for Pain for up to 7 days.  -     RI DISCHARGE MEDS RECONCILED W/ CURRENT OUTPATIENT MED LIST        Return if symptoms worsen or fail to improve.     Level of medical complexity:  moderate

## 2021-06-22 ENCOUNTER — PATIENT MESSAGE (OUTPATIENT)
Dept: FAMILY MEDICINE CLINIC | Age: 81
End: 2021-06-22

## 2021-06-22 DIAGNOSIS — Z96.652 STATUS POST REVISION OF TOTAL REPLACEMENT OF LEFT KNEE: ICD-10-CM

## 2021-06-22 DIAGNOSIS — G89.29 CHRONIC PAIN OF LEFT KNEE: ICD-10-CM

## 2021-06-22 DIAGNOSIS — T84.093D FAILED TOTAL LEFT KNEE REPLACEMENT, SUBSEQUENT ENCOUNTER: ICD-10-CM

## 2021-06-22 DIAGNOSIS — M25.562 CHRONIC PAIN OF LEFT KNEE: ICD-10-CM

## 2021-06-22 RX ORDER — OXYCODONE HYDROCHLORIDE AND ACETAMINOPHEN 5; 325 MG/1; MG/1
1 TABLET ORAL EVERY 4 HOURS PRN
Qty: 42 TABLET | Refills: 0 | Status: SHIPPED | OUTPATIENT
Start: 2021-06-22 | End: 2021-06-29

## 2021-06-22 NOTE — TELEPHONE ENCOUNTER
Called and left VM with number to call back to discuss eliquis to warfarin   From: Bianca Berman  To: Ronnie Abreu DO  Sent: 6/22/2021 1:56 PM EDT  Subject: Prescription Question    Am coming along and the Percocet has helped. Is it possible to get another script since now it's bending the knee with a lot of pain. Wow I've never asked for pain meds but these have helped. Three weeks today and I sure believe dr Dolores Oglesby telling me it's going to take big time to get through this. Have a nice day.

## 2021-07-04 LAB
FUNGUS IDENTIFIED: NORMAL
FUNGUS SMEAR: NORMAL

## 2021-07-15 ENCOUNTER — OFFICE VISIT (OUTPATIENT)
Dept: FAMILY MEDICINE CLINIC | Age: 81
End: 2021-07-15
Payer: MEDICARE

## 2021-07-15 VITALS
BODY MASS INDEX: 40.82 KG/M2 | DIASTOLIC BLOOD PRESSURE: 82 MMHG | HEART RATE: 86 BPM | SYSTOLIC BLOOD PRESSURE: 130 MMHG | RESPIRATION RATE: 14 BRPM | OXYGEN SATURATION: 98 % | TEMPERATURE: 98 F | WEIGHT: 245 LBS | HEIGHT: 65 IN

## 2021-07-15 DIAGNOSIS — R11.0 NAUSEA: ICD-10-CM

## 2021-07-15 DIAGNOSIS — F41.9 ANXIETY: Primary | ICD-10-CM

## 2021-07-15 DIAGNOSIS — M25.562 CHRONIC PAIN OF LEFT KNEE: ICD-10-CM

## 2021-07-15 DIAGNOSIS — G89.29 CHRONIC PAIN OF LEFT KNEE: ICD-10-CM

## 2021-07-15 DIAGNOSIS — T84.093D FAILED TOTAL LEFT KNEE REPLACEMENT, SUBSEQUENT ENCOUNTER: ICD-10-CM

## 2021-07-15 DIAGNOSIS — Z96.652 STATUS POST REVISION OF TOTAL REPLACEMENT OF LEFT KNEE: ICD-10-CM

## 2021-07-15 PROCEDURE — 99214 OFFICE O/P EST MOD 30 MIN: CPT | Performed by: FAMILY MEDICINE

## 2021-07-15 PROCEDURE — 1123F ACP DISCUSS/DSCN MKR DOCD: CPT | Performed by: FAMILY MEDICINE

## 2021-07-15 PROCEDURE — 4040F PNEUMOC VAC/ADMIN/RCVD: CPT | Performed by: FAMILY MEDICINE

## 2021-07-15 PROCEDURE — G8399 PT W/DXA RESULTS DOCUMENT: HCPCS | Performed by: FAMILY MEDICINE

## 2021-07-15 PROCEDURE — 1090F PRES/ABSN URINE INCON ASSESS: CPT | Performed by: FAMILY MEDICINE

## 2021-07-15 PROCEDURE — G8427 DOCREV CUR MEDS BY ELIG CLIN: HCPCS | Performed by: FAMILY MEDICINE

## 2021-07-15 PROCEDURE — G8417 CALC BMI ABV UP PARAM F/U: HCPCS | Performed by: FAMILY MEDICINE

## 2021-07-15 PROCEDURE — 1036F TOBACCO NON-USER: CPT | Performed by: FAMILY MEDICINE

## 2021-07-15 RX ORDER — ESCITALOPRAM OXALATE 10 MG/1
10 TABLET ORAL DAILY
Qty: 30 TABLET | Refills: 0 | Status: SHIPPED | OUTPATIENT
Start: 2021-07-15 | End: 2021-07-15 | Stop reason: SDUPTHER

## 2021-07-15 RX ORDER — BUSPIRONE HYDROCHLORIDE 5 MG/1
5 TABLET ORAL 2 TIMES DAILY
Qty: 60 TABLET | Refills: 5 | Status: SHIPPED | OUTPATIENT
Start: 2021-07-15 | End: 2021-09-20 | Stop reason: SDUPTHER

## 2021-07-15 RX ORDER — ESCITALOPRAM OXALATE 10 MG/1
10 TABLET ORAL DAILY
Qty: 90 TABLET | Refills: 3 | Status: SHIPPED | OUTPATIENT
Start: 2021-07-15 | End: 2022-01-31 | Stop reason: ALTCHOICE

## 2021-07-15 RX ORDER — ONDANSETRON 4 MG/1
4 TABLET, ORALLY DISINTEGRATING ORAL EVERY 8 HOURS PRN
Qty: 90 TABLET | Refills: 1 | Status: SHIPPED | OUTPATIENT
Start: 2021-07-15 | End: 2022-07-11

## 2021-07-15 RX ORDER — OXYCODONE HYDROCHLORIDE AND ACETAMINOPHEN 5; 325 MG/1; MG/1
1 TABLET ORAL EVERY 6 HOURS PRN
Qty: 28 TABLET | Refills: 0 | Status: SHIPPED | OUTPATIENT
Start: 2021-07-15 | End: 2021-07-22

## 2021-07-15 NOTE — PROGRESS NOTES
Kash Shen is a 80 y.o. female that presents for     Chief Complaint   Patient presents with    Post-Op Check    Discuss Medications       /82   Pulse 86   Temp 98 °F (36.7 °C) (Infrared)   Resp 14   Ht 5' 5\" (1.651 m)   Wt 245 lb (111.1 kg)   SpO2 98%   BMI 40.77 kg/m²       HPI    Still having a lot of left knee pain after her surgery. Percocet has helped when she has had it. Is still doing therapy. Notes that she is still having a lot of anxiety. Describes it as an 'empty, hollow' feeling. Sleep is variable. States that she can feel very sad at times. Cymbalta has not helped with sx.       Health Maintenance   Topic Date Due    Hepatitis C screen  Never done    DTaP/Tdap/Td vaccine (1 - Tdap) Never done    Shingles Vaccine (1 of 2) Never done    Pneumococcal 65+ years Vaccine (2 of 2 - PPSV23) 12/14/2016    Lipid screen  05/14/2017    Annual Wellness Visit (AWV)  07/15/2021    Flu vaccine (1) 09/01/2021    Potassium monitoring  06/02/2022    Creatinine monitoring  06/02/2022    COVID-19 Vaccine  Completed    DEXA (modify frequency per FRAX score)  Addressed    Hepatitis A vaccine  Aged Out    Hib vaccine  Aged Out    Meningococcal (ACWY) vaccine  Aged Out       Past Medical History:   Diagnosis Date    Arthritis     Chronic low back pain     COVID-19     DM type 2, goal A1c below 7     GERD (gastroesophageal reflux disease)     Hyperlipidemia     Hypertension     Loss of vision     Mitral valve disorder     Osteoporosis     Subarachnoid hemorrhage ([address] 4/25/2019    TIA (transient ischemic attack)     Type 2 diabetes mellitus with diabetic neuropathy, without long-term current use of insulin ([address] 7/26/2017   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    aslo after valve replacement 2020    Urinary incontinence        Past Surgical History:   Procedure Laterality Date    APPENDECTOMY      CARDIAC SURGERY      TAVR    CARPAL Exam  Vitals and nursing note reviewed. Constitutional:       General: She is not in acute distress. Appearance: She is well-developed. HENT:      Head: Normocephalic and atraumatic. Right Ear: Hearing and external ear normal.      Left Ear: Hearing and external ear normal.      Nose: Nose normal.   Eyes:      General:         Right eye: No discharge. Left eye: No discharge. Conjunctiva/sclera: Conjunctivae normal.   Neck:      Thyroid: No thyromegaly. Trachea: No tracheal deviation. Cardiovascular:      Rate and Rhythm: Normal rate and regular rhythm. Heart sounds: Normal heart sounds. No murmur heard. No friction rub. No gallop. Pulmonary:      Effort: Pulmonary effort is normal. No respiratory distress. Breath sounds: No wheezing or rales. Chest:      Chest wall: No tenderness. Musculoskeletal:         General: No deformity. Cervical back: Normal range of motion and neck supple. Lymphadenopathy:      Cervical: No cervical adenopathy. Skin:     General: Skin is warm and dry. Findings: No erythema or rash. Neurological:      Mental Status: She is alert. Motor: No abnormal muscle tone. Coordination: Coordination normal.   Psychiatric:         Mood and Affect: Mood is anxious. Behavior: Behavior normal.         Thought Content: Thought content normal.         Judgment: Judgment normal.             ASSESSMENT & PLAN  Alma Larsen was seen today for post-op check and discuss medications. Diagnoses and all orders for this visit:    Anxiety  -     Discontinue: escitalopram (LEXAPRO) 10 MG tablet; Take 1 tablet by mouth daily  -     busPIRone (BUSPAR) 5 MG tablet; Take 1 tablet by mouth 2 times daily  -     escitalopram (LEXAPRO) 10 MG tablet; Take 1 tablet by mouth daily    Chronic pain of left knee  -     oxyCODONE-acetaminophen (PERCOCET) 5-325 MG per tablet;  Take 1 tablet by mouth every 6 hours as needed for Pain for up to 7 days.    Failed total left knee replacement, subsequent encounter  -     oxyCODONE-acetaminophen (PERCOCET) 5-325 MG per tablet; Take 1 tablet by mouth every 6 hours as needed for Pain for up to 7 days. Status post revision of total replacement of left knee  -     oxyCODONE-acetaminophen (PERCOCET) 5-325 MG per tablet; Take 1 tablet by mouth every 6 hours as needed for Pain for up to 7 days. Nausea  -     ondansetron (ZOFRAN ODT) 4 MG disintegrating tablet; Take 1 tablet by mouth every 8 hours as needed for Nausea or Vomiting    -Stop cymbalta and will start lexapro/buspar for uncontrolled anxiety  -Will continue percocet for knee pain, will reduce frequency to q 6 hrs prn    Return in about 6 months (around 1/15/2022). The most recent results of the following tests were reviewed with the patient today: none     All copied or forwarded information in the progress note was verified by me to be accurate at the time of visit  Patient's past medical, surgical, social and family history were reviewed and updated     All patient questions answered. Patient voiced understanding.

## 2021-07-19 ENCOUNTER — NURSE ONLY (OUTPATIENT)
Dept: LAB | Age: 81
End: 2021-07-19

## 2021-07-19 ENCOUNTER — PATIENT MESSAGE (OUTPATIENT)
Dept: FAMILY MEDICINE CLINIC | Age: 81
End: 2021-07-19

## 2021-07-19 DIAGNOSIS — N30.00 ACUTE CYSTITIS WITHOUT HEMATURIA: ICD-10-CM

## 2021-07-19 DIAGNOSIS — I50.32 CHF (CONGESTIVE HEART FAILURE), NYHA CLASS II, CHRONIC, DIASTOLIC (HCC): ICD-10-CM

## 2021-07-19 LAB
AFB CULTURE & SMEAR: NORMAL
MAGNESIUM: 1.3 MG/DL (ref 1.6–2.4)

## 2021-07-19 RX ORDER — SULFAMETHOXAZOLE AND TRIMETHOPRIM 800; 160 MG/1; MG/1
1 TABLET ORAL 2 TIMES DAILY
Qty: 10 TABLET | Refills: 0 | Status: SHIPPED | OUTPATIENT
Start: 2021-07-19 | End: 2021-07-24

## 2021-07-23 ENCOUNTER — TELEPHONE (OUTPATIENT)
Dept: CARDIOLOGY CLINIC | Age: 81
End: 2021-07-23

## 2021-07-23 DIAGNOSIS — E83.42 HYPOMAGNESEMIA: ICD-10-CM

## 2021-07-23 DIAGNOSIS — I50.32 CHF (CONGESTIVE HEART FAILURE), NYHA CLASS II, CHRONIC, DIASTOLIC (HCC): Primary | ICD-10-CM

## 2021-07-23 NOTE — TELEPHONE ENCOUNTER
----- Message from SEAN Larson CNP sent at 7/23/2021 12:06 AM EDT -----  Mg low - 1.3 = Call and check if she's taking her Mag supplement, twice daily? ? need to restart if not. If she is taking call me. Recheck Mg+ in 2 weeks.

## 2021-07-23 NOTE — TELEPHONE ENCOUNTER
Patient was only taking Magnesium once a day when she took it. Would not take every day. Advised patient to resume at 2 tabs BID and recheck Mag in 2 weeks. Lab slip mailed.

## 2021-08-04 ENCOUNTER — NURSE ONLY (OUTPATIENT)
Dept: LAB | Age: 81
End: 2021-08-04

## 2021-08-04 DIAGNOSIS — Z95.2 S/P TAVR (TRANSCATHETER AORTIC VALVE REPLACEMENT): ICD-10-CM

## 2021-08-04 DIAGNOSIS — E83.42 HYPOMAGNESEMIA: ICD-10-CM

## 2021-08-04 LAB — MAGNESIUM: 1.6 MG/DL (ref 1.6–2.4)

## 2021-08-05 DIAGNOSIS — I50.32 CHF (CONGESTIVE HEART FAILURE), NYHA CLASS II, CHRONIC, DIASTOLIC (HCC): Primary | ICD-10-CM

## 2021-08-05 RX ORDER — MULTIVITAMIN/IRON/FOLIC ACID 18MG-0.4MG
250 TABLET ORAL 2 TIMES DAILY
Qty: 60 TABLET | Refills: 0
Start: 2021-08-05 | End: 2022-02-08

## 2021-08-09 ENCOUNTER — PATIENT MESSAGE (OUTPATIENT)
Dept: FAMILY MEDICINE CLINIC | Age: 81
End: 2021-08-09

## 2021-08-09 DIAGNOSIS — M25.562 CHRONIC PAIN OF LEFT KNEE: Primary | ICD-10-CM

## 2021-08-09 DIAGNOSIS — G89.29 CHRONIC PAIN OF LEFT KNEE: Primary | ICD-10-CM

## 2021-08-09 NOTE — TELEPHONE ENCOUNTER
From: Esha Berman  To: Ashlee Kapoor DO  Sent: 8/9/2021 10:25 AM EDT  Subject: Prescription Question    Would you please send me a new prescription for my disability placard.  Thanks

## 2021-08-10 NOTE — TELEPHONE ENCOUNTER
From: Sonal Berman  To: Subhash Rodríguez DO  Sent: 8/9/2021 4:59 PM EDT  Subject: Non-Urgent Medical Question    I will pick it up this week.  Thank you

## 2021-09-10 ENCOUNTER — NURSE ONLY (OUTPATIENT)
Dept: LAB | Age: 81
End: 2021-09-10

## 2021-09-10 DIAGNOSIS — I50.32 CHF (CONGESTIVE HEART FAILURE), NYHA CLASS II, CHRONIC, DIASTOLIC (HCC): ICD-10-CM

## 2021-09-10 LAB
ANION GAP SERPL CALCULATED.3IONS-SCNC: 11 MEQ/L (ref 8–16)
BUN BLDV-MCNC: 15 MG/DL (ref 7–22)
CALCIUM SERPL-MCNC: 10.1 MG/DL (ref 8.5–10.5)
CHLORIDE BLD-SCNC: 105 MEQ/L (ref 98–111)
CO2: 24 MEQ/L (ref 23–33)
CREAT SERPL-MCNC: 0.7 MG/DL (ref 0.4–1.2)
GFR SERPL CREATININE-BSD FRML MDRD: 81 ML/MIN/1.73M2
GLUCOSE BLD-MCNC: 149 MG/DL (ref 70–108)
MAGNESIUM: 1.5 MG/DL (ref 1.6–2.4)
POTASSIUM SERPL-SCNC: 4 MEQ/L (ref 3.5–5.2)
SODIUM BLD-SCNC: 140 MEQ/L (ref 135–145)

## 2021-09-15 ENCOUNTER — OFFICE VISIT (OUTPATIENT)
Dept: CARDIOLOGY CLINIC | Age: 81
End: 2021-09-15
Payer: MEDICARE

## 2021-09-15 VITALS
WEIGHT: 241 LBS | BODY MASS INDEX: 40.1 KG/M2 | HEART RATE: 73 BPM | OXYGEN SATURATION: 94 % | SYSTOLIC BLOOD PRESSURE: 128 MMHG | DIASTOLIC BLOOD PRESSURE: 80 MMHG

## 2021-09-15 DIAGNOSIS — I50.32 CHRONIC DIASTOLIC CONGESTIVE HEART FAILURE, NYHA CLASS 1 (HCC): Primary | ICD-10-CM

## 2021-09-15 DIAGNOSIS — I25.10 CORONARY ARTERY DISEASE INVOLVING NATIVE CORONARY ARTERY OF NATIVE HEART WITHOUT ANGINA PECTORIS: ICD-10-CM

## 2021-09-15 DIAGNOSIS — I10 HTN, GOAL BELOW 140/90: ICD-10-CM

## 2021-09-15 PROCEDURE — 99214 OFFICE O/P EST MOD 30 MIN: CPT | Performed by: NURSE PRACTITIONER

## 2021-09-15 ASSESSMENT — ENCOUNTER SYMPTOMS
WHEEZING: 0
ABDOMINAL DISTENTION: 0
COUGH: 0
SHORTNESS OF BREATH: 0
ABDOMINAL PAIN: 0

## 2021-09-15 NOTE — PROGRESS NOTES
Heart Failure Clinic       Visit Date: 9/15/2021  Cardiologist:  Dr. Maci Pedersen  Primary Care Physician: Dr. Hiwot Mendoza, Bianca Fonseca is a 80 y.o. female who presents today for:  Chief Complaint   Patient presents with    Congestive Heart Failure       HPI:   Ricardo Gill is a 80 y.o. female who presents to the office for a follow up patient visit in the heart failure clinic. Last seen by CHF clinic on ,   Accompanied by  Roberta Baeza    TYPE HF: HFpEF (55%)   Cause: nonischemic  Device: none  HX: TAVR (), HTN, DM, CAD s/p stent, TIA    Dry Wt:  235    Hospitalization:  >6 months    Concerns today: no complaints todays, no new symptoms.  Has not needed to take Lasix PRN    Activity: walker used for recent knee replacement, no BRITO, walks long distances w/o needing to stop, ADLs perfromed  Diet: low sodium low fluid  followed    Patient has:  Chest Pain: no  SOB: no  Orthopnea/PND: no  ALLISON: refused to do sleep study  Edema: no  Fatigue: no  Abdominal bloating: no  Cough: no  Appetite: good  Home weight: daily (235)  Home blood pressure: does not check    Past Medical History:   Diagnosis Date    Arthritis     Chronic low back pain     COVID-19     DM type 2, goal A1c below 7     GERD (gastroesophageal reflux disease)     Hyperlipidemia     Hypertension     Loss of vision     Mitral valve disorder     Osteoporosis     Subarachnoid hemorrhage (HCC) 2019    TIA (transient ischemic attack)     Type 2 diabetes mellitus with diabetic neuropathy, without long-term current use of insulin ([facility] [address] 2017    Unspecified cerebral artery occlusion with cerebral infarction 3-    aslo after valve replacement 2020    Urinary incontinence      Past Surgical History:   Procedure Laterality Date    APPENDECTOMY      CARDIAC SURGERY      TAVR    CARPAL TUNNEL RELEASE Left 2011    CATARACT REMOVAL Bilateral     CERVICAL FUSION  1976    CHOLECYSTECTOMY      COLON SURGERY    COLONOSCOPY  2015    ENDOSCOPY, COLON, DIAGNOSTIC      EYE SURGERY      CATARACTS    FINGER SURGERY Right 10/2016    3rd digit    HERNIA REPAIR      HYSTERECTOMY      JOINT REPLACEMENT      both knees    NERVE SURGERY Right 10/25/2019    Lumbar Facet MBB @ N05-I4,E6-3 L2-3 right side only #1 performed by Nidia Palmer MD at [facility] 98 Right 12/16/2019    Lumbar Facet MBB @ R37-X6-D8-1, L2-3 RIGHT SIDE ONLY performed by Nidia Palmer MD at [facility] Right 1/6/2020    Lumbar RFA T12-L1, L1-2, L2-3 Right performed by Nidia Palmer MD at [address] LAMINECTOMY,>2 [facility]      6 FUSIONS    REVISION TOTAL KNEE ARTHROPLASTY Left 6/1/2021    REVISION LEFT TOTAL KNEE ARTHROPLASTY performed by Pushpa Fowler MD at [address] Right 11/28/2014    SHOULDER SURGERY  2014    SPINE SURGERY      TENDON RELEASE Right 05/18/2017    at [facility] Bilateral     UPPER GASTROINTESTINAL ENDOSCOPY  2013,2015     Family History   Problem Relation Age of Onset    Arthritis Mother     Cancer Mother     Heart Disease Father     High Blood Pressure Father      Social History     Tobacco Use    Smoking status: Never Smoker    Smokeless tobacco: Never Used   Substance Use Topics    Alcohol use: No     Current Outpatient Medications   Medication Sig Dispense Refill    Magnesium Oxide (MAGNESIUM-OXIDE) 250 MG TABS tablet Take 1 tablet by mouth 2 times daily 60 tablet 0    escitalopram (LEXAPRO) 10 MG tablet Take 1 tablet by mouth daily 90 tablet 3    ondansetron (ZOFRAN ODT) 4 MG disintegrating tablet Take 1 tablet by mouth every 8 hours as needed for Nausea or Vomiting 90 tablet 1    aspirin 325 MG EC tablet Take 1 tablet by mouth daily 30 tablet 0    clopidogrel (PLAVIX) 75 MG tablet Take 1 tablet by mouth daily 90 tablet 3    omeprazole (PRILOSEC) 40 MG is not diaphoretic. HENT:      Head: Normocephalic and atraumatic. Eyes:      Conjunctiva/sclera: Conjunctivae normal.   Cardiovascular:      Rate and Rhythm: Normal rate and regular rhythm. Heart sounds: Normal heart sounds. No murmur heard. Pulmonary:      Effort: Pulmonary effort is normal. No respiratory distress. Breath sounds: Normal breath sounds. No wheezing, rhonchi or rales. Abdominal:      General: Bowel sounds are normal. There is no distension. Palpations: Abdomen is soft. Tenderness: There is no abdominal tenderness. Musculoskeletal:         General: Normal range of motion. Cervical back: Normal range of motion and neck supple. Right lower leg: No edema. Left lower leg: No edema. Skin:     General: Skin is warm and dry. Capillary Refill: Capillary refill takes less than 2 seconds. Neurological:      Mental Status: She is alert and oriented to person, place, and time. Coordination: Coordination normal.   Psychiatric:         Behavior: Behavior normal.         Wt Readings from Last 3 Encounters:   09/15/21 241 lb (109.3 kg)   07/15/21 245 lb (111.1 kg)   06/01/21 258 lb 6.4 oz (117.2 kg)     BP Readings from Last 3 Encounters:   09/15/21 128/80   07/15/21 130/82   06/09/21 120/68     Pulse Readings from Last 3 Encounters:   09/15/21 73   07/15/21 86   06/09/21 87     Body mass index is 40.1 kg/m². ECHO:   Conclusions      Summary   Technically difficult examination. Normal left ventricular size and systolic function. There were no regional wall motion abnormalities. Wall thickness was within normal limits. Ejection fraction was estimated at 50-55%. Doppler parameters were consistent with abnormal left ventricular   relaxation (grade 1 diastolic dysfunction). S/p TAVR. DOPPLER: Transaortic velocity was within the normal range with   no evidence of aortic stenosis (mean gradient 7 mmHg, V max 1.8 m/s, EOA   1.8 cm2).  There was no evidence of aortic regurgitation. There was trace mitral regurgitation. IVC not well seen. Signature      ----------------------------------------------------------------   Electronically signed by Gabino Atwood MD (Interpreting   physician) on 03/02/2021 at 02:52 PM   ----------------------------------------------------------------      CATH/STRESS:   IMPRESSION:    1. Mild pulmonary hypertension. 2.    No step up in oxygen saturation from different chambers of right side          of the heart. 3.    Diastolic dysfunction of the left ventricle. 4.    Left ventricular hypertrophy, preserved systolic function of left          ventricle, ejection fraction about 55-60 percent. 5.    No mitral regurg, no gradient across aortic valve. 6.    Extremely tortuous lower abdomen and calcification. Renal sheath has          to be utilized. 7.    Patent nondominant right coronary artery. 8.    Short patent left main. 9.    Patent dominant circumflex artery. 10.   Nonobstructive disease proximal left anterior descending with          calcification. Distally the LAD was patent. The patient tolerated the procedure well. At this point we recommend for the  patient to continue aggressive medical treatment, risk factor modification. FOLLOWUP:  The patient should be considered for sleep apnea study and  possible C-PAP. Thank you very much for allowing us to share in the management of this  patient. Mohan Dang M.D.        D: 03/21/2014 09:34                                    T: 03/21/2014 09:48  cp           Results reviewed:  BNP: No results found for: BNP  CBC:   Lab Results   Component Value Date    WBC 11.3 06/02/2021    RBC 4.06 06/02/2021    RBC 0-5 04/07/2015    HGB 11.7 06/02/2021    HCT 37.1 06/02/2021     06/02/2021     CMP:    Lab Results   Component Value Date     09/10/2021    K 4.0 09/10/2021    K 4.1 02/07/2021     09/10/2021 around 8651-3708 mg/day  · Monitor BP  · Activity as tolerated     Stable, appears Euvolemic  Lab reviewed - stable  Repeat blood work in 4 months  BP/HR stable   No med changes today   Continue diet/fluid adherence  Continue daily wts. F/U w/ Cardiology  F/U in clinic in 6 month      Patient was instructed to call the [address] for any changes in symptoms as noted in AVS.      Return in about 6 months (around 3/15/2022). or sooner if needed     Patient given educational materials - see patient instructions. We discussed the importance of weighing oneself and recording daily. We also discussed the importance of a low sodium diet, higher sodium foods to avoid and better low sodium food options. Patient verbalizes understanding of plan of care using teach back method, and is agreeable to the treatment plan.        Electronically signed by SEAN Nichole CNP on 9/15/2021 at 11:20 AM

## 2021-09-20 DIAGNOSIS — F41.9 ANXIETY: ICD-10-CM

## 2021-09-20 RX ORDER — BUSPIRONE HYDROCHLORIDE 5 MG/1
5 TABLET ORAL 2 TIMES DAILY
Qty: 180 TABLET | Refills: 3 | Status: SHIPPED | OUTPATIENT
Start: 2021-09-20 | End: 2022-02-07

## 2021-10-21 ENCOUNTER — TELEPHONE (OUTPATIENT)
Dept: CARDIOLOGY CLINIC | Age: 81
End: 2021-10-21

## 2021-10-21 DIAGNOSIS — Z95.2 S/P TAVR (TRANSCATHETER AORTIC VALVE REPLACEMENT): Primary | ICD-10-CM

## 2021-10-21 NOTE — TELEPHONE ENCOUNTER
Orders received from Dr. Tori Sow to schedule this patient for a CBC, BMP and an ECHO prior to the 1 year post TAVR follow up appointment scheduled on 2/3/2022.

## 2021-11-08 ENCOUNTER — PATIENT MESSAGE (OUTPATIENT)
Dept: FAMILY MEDICINE CLINIC | Age: 81
End: 2021-11-08

## 2021-11-08 DIAGNOSIS — F33.0 MAJOR DEPRESSIVE DISORDER, RECURRENT EPISODE, MILD (HCC): ICD-10-CM

## 2021-11-08 NOTE — TELEPHONE ENCOUNTER
From: Martine Berman  To: Dr. Rosado Scale: 11/8/2021 7:04 AM EST  Subject: Prescription Question    I would like to start my Happy pill again with 30 day to [place] and 90 day [facility]. Met with Dr Yannick Carmona last week about what was going on with this revision and I have 6 more months of pailful healing and this has turned me into a real Grump and I want to be my happy self. Jorge Breen would like it too) needless to say my right knee will not ever have a revision.  Thanks

## 2021-11-19 ENCOUNTER — PATIENT MESSAGE (OUTPATIENT)
Dept: FAMILY MEDICINE CLINIC | Age: 81
End: 2021-11-19

## 2021-11-19 DIAGNOSIS — E11.8 CONTROLLED TYPE 2 DIABETES MELLITUS WITH COMPLICATION, WITHOUT LONG-TERM CURRENT USE OF INSULIN (HCC): ICD-10-CM

## 2021-11-19 DIAGNOSIS — K21.9 GASTROESOPHAGEAL REFLUX DISEASE WITHOUT ESOPHAGITIS: ICD-10-CM

## 2021-11-19 RX ORDER — OMEPRAZOLE 40 MG/1
40 CAPSULE, DELAYED RELEASE ORAL DAILY
Qty: 90 CAPSULE | Refills: 3 | Status: SHIPPED | OUTPATIENT
Start: 2021-11-19 | End: 2022-06-20 | Stop reason: SDUPTHER

## 2021-11-19 NOTE — TELEPHONE ENCOUNTER
From: Jessika Berman  To: Dr. Immanuel Andrews: 11/19/2021 10:13 AM EST  Subject: Non-Urgent Medical Question    I need 90 day 3 refills [facility] for [facility] and Prilosec. Thanks. Have a great weekend.

## 2021-12-06 ENCOUNTER — PATIENT MESSAGE (OUTPATIENT)
Dept: FAMILY MEDICINE CLINIC | Age: 81
End: 2021-12-06

## 2021-12-06 DIAGNOSIS — E11.8 CONTROLLED TYPE 2 DIABETES MELLITUS WITH COMPLICATION, WITHOUT LONG-TERM CURRENT USE OF INSULIN (HCC): Primary | ICD-10-CM

## 2021-12-06 RX ORDER — ATORVASTATIN CALCIUM 40 MG/1
40 TABLET, FILM COATED ORAL DAILY
Qty: 90 TABLET | Refills: 3 | Status: SHIPPED | OUTPATIENT
Start: 2021-12-06 | End: 2022-03-28 | Stop reason: SDUPTHER

## 2021-12-21 RX ORDER — ATENOLOL 25 MG/1
TABLET ORAL
Qty: 90 TABLET | Refills: 1 | Status: SHIPPED | OUTPATIENT
Start: 2021-12-21 | End: 2022-05-16 | Stop reason: SDUPTHER

## 2021-12-21 RX ORDER — HYDRALAZINE HYDROCHLORIDE 25 MG/1
25 TABLET, FILM COATED ORAL 2 TIMES DAILY
Qty: 180 TABLET | Refills: 1 | Status: SHIPPED | OUTPATIENT
Start: 2021-12-21 | End: 2022-05-16

## 2022-01-29 RX ORDER — POTASSIUM CHLORIDE 20 MEQ/1
10 TABLET, EXTENDED RELEASE ORAL PRN
Qty: 45 TABLET | Refills: 0 | Status: SHIPPED | OUTPATIENT
Start: 2022-01-29 | End: 2022-04-20 | Stop reason: SDUPTHER

## 2022-01-31 ENCOUNTER — TELEPHONE (OUTPATIENT)
Dept: CARDIOLOGY CLINIC | Age: 82
End: 2022-01-31

## 2022-01-31 ENCOUNTER — OFFICE VISIT (OUTPATIENT)
Dept: CARDIOLOGY CLINIC | Age: 82
End: 2022-01-31
Payer: MEDICARE

## 2022-01-31 ENCOUNTER — NURSE ONLY (OUTPATIENT)
Dept: LAB | Age: 82
End: 2022-01-31

## 2022-01-31 VITALS
WEIGHT: 242 LBS | DIASTOLIC BLOOD PRESSURE: 80 MMHG | BODY MASS INDEX: 40.27 KG/M2 | SYSTOLIC BLOOD PRESSURE: 148 MMHG | HEART RATE: 81 BPM

## 2022-01-31 DIAGNOSIS — Z95.2 S/P TAVR (TRANSCATHETER AORTIC VALVE REPLACEMENT): Primary | ICD-10-CM

## 2022-01-31 DIAGNOSIS — Z95.2 S/P TAVR (TRANSCATHETER AORTIC VALVE REPLACEMENT): ICD-10-CM

## 2022-01-31 LAB
BASOPHILS # BLD: 0.4 %
BASOPHILS ABSOLUTE: 0 THOU/MM3 (ref 0–0.1)
EOSINOPHIL # BLD: 5.1 %
EOSINOPHILS ABSOLUTE: 0.4 THOU/MM3 (ref 0–0.4)
ERYTHROCYTE [DISTWIDTH] IN BLOOD BY AUTOMATED COUNT: 12.9 % (ref 11.5–14.5)
ERYTHROCYTE [DISTWIDTH] IN BLOOD BY AUTOMATED COUNT: 44.3 FL (ref 35–45)
HCT VFR BLD CALC: 47.8 % (ref 37–47)
HEMOGLOBIN: 15.2 GM/DL (ref 12–16)
IMMATURE GRANS (ABS): 0.01 THOU/MM3 (ref 0–0.07)
IMMATURE GRANULOCYTES: 0.1 %
LYMPHOCYTES # BLD: 27.9 %
LYMPHOCYTES ABSOLUTE: 2 THOU/MM3 (ref 1–4.8)
MCH RBC QN AUTO: 30.1 PG (ref 26–33)
MCHC RBC AUTO-ENTMCNC: 31.8 GM/DL (ref 32.2–35.5)
MCV RBC AUTO: 94.7 FL (ref 81–99)
MONOCYTES # BLD: 7.2 %
MONOCYTES ABSOLUTE: 0.5 THOU/MM3 (ref 0.4–1.3)
NUCLEATED RED BLOOD CELLS: 0 /100 WBC
PLATELET # BLD: 199 THOU/MM3 (ref 130–400)
PMV BLD AUTO: 11.2 FL (ref 9.4–12.4)
RBC # BLD: 5.05 MILL/MM3 (ref 4.2–5.4)
SEG NEUTROPHILS: 59.3 %
SEGMENTED NEUTROPHILS ABSOLUTE COUNT: 4.2 THOU/MM3 (ref 1.8–7.7)
WBC # BLD: 7.1 THOU/MM3 (ref 4.8–10.8)

## 2022-01-31 PROCEDURE — G8417 CALC BMI ABV UP PARAM F/U: HCPCS | Performed by: INTERNAL MEDICINE

## 2022-01-31 PROCEDURE — 99212 OFFICE O/P EST SF 10 MIN: CPT | Performed by: INTERNAL MEDICINE

## 2022-01-31 PROCEDURE — 1036F TOBACCO NON-USER: CPT | Performed by: INTERNAL MEDICINE

## 2022-01-31 PROCEDURE — 4040F PNEUMOC VAC/ADMIN/RCVD: CPT | Performed by: INTERNAL MEDICINE

## 2022-01-31 PROCEDURE — 93000 ELECTROCARDIOGRAM COMPLETE: CPT | Performed by: INTERNAL MEDICINE

## 2022-01-31 PROCEDURE — 1090F PRES/ABSN URINE INCON ASSESS: CPT | Performed by: INTERNAL MEDICINE

## 2022-01-31 PROCEDURE — G8399 PT W/DXA RESULTS DOCUMENT: HCPCS | Performed by: INTERNAL MEDICINE

## 2022-01-31 PROCEDURE — G8484 FLU IMMUNIZE NO ADMIN: HCPCS | Performed by: INTERNAL MEDICINE

## 2022-01-31 PROCEDURE — G8427 DOCREV CUR MEDS BY ELIG CLIN: HCPCS | Performed by: INTERNAL MEDICINE

## 2022-01-31 PROCEDURE — 1123F ACP DISCUSS/DSCN MKR DOCD: CPT | Performed by: INTERNAL MEDICINE

## 2022-01-31 NOTE — TELEPHONE ENCOUNTER
[address] ([id number])  Colleen Crawford  1/31/2022    The following questions refer to your heart failure and how it may affect your life. Please read and complete the following questions. There is no right or wrong answers. Please hannah the answer that best applies to you. 1. Heart failure affects different people in different ways. Some feel shortness of breath while others feel fatigue. Please indicate how much you are limited by heart failure (shortness of breath or fatigue) in your ability to do the following activities over the past 2 weeks. Activity Extremely Limited Quite a bit limited Moderatly Limited Slightly Limited Not at all Limited Limited for other reasons/ did not do the activity   Showering/ Bathing          x    Walking 1 block on Level ground     x    Hurrying or jogging (as if to catch a bus)      x        1         2       3  4       5   6     2. Over the past 2 weeks, how many times did you have swelling in your feet, ankles or legs when you woke up in the morning? Every morning 3 or more times per week, but not every day 1-2 times per week Less than once a week Never over the past 2 weeks         x   `        1          2   3       4                     5           3. Over the past 2 weeks, on average, how many times has fatigue limited your ability to do what you wanted? All of the time Several times per day At least once a day 3 or more times per week 1-2 times per week Less than once a week Never over the past 2 weeks           x            1        2     3  4        5       6  7        4. Over the past 2 weeks, on average, how many times has shortness of breath limited your ability to do what you    wanted? All of the time Several times per day At least once a day 3 or more times per week 1-2 times per week Less than once a week Never over the past 2 weeks           x            1        2     3  4         5       6  7     5.  Over the past 2 weeks, on average, how many times have you been forced to sleep sitting up in a chair or with at least 3 pillows to prop you up because of shortness of breath? Every night 3 or more times per week, but not every day 1-2 times per week Less than once a week Never over the past 2 weeks         x   `        1          2   3       4                     5        6.  Over the past 2 weeks, how much has your heart failure limited your enjoyment of life? It has extremely limited my enjoyment of life It has limited my enjoyment of life quite a bit It has moderately limited my enjoyment of life It has slightly limited my enjoyment of life It has not limited my enjoyment of life         x         1          2   3       4           5     7.  If you had to spend the rest of your life with your heart failure the way it is right now, how would you feel about this? Not at all satisfied Mostly dissatisfied Somewhat satisfied Mostly satisfied Completely satisfied                       1                         2                            3                        4                            5        8. How much does your heart failure affect your lifestyle?  Please indicate how your heart failure may have limited your participation in the following activities over the past 2 weeks    Activity Severely limited Limited quite a bit Moderately limited Slightly limited Did not limit at all Does not apply or did not do this activity   Hobbies, recreational activities     x    Working or doing household chores     x    Visiting family or friends out of your home     x                1      2   3         4       5            6

## 2022-01-31 NOTE — PROGRESS NOTES
[facility] [address]  [place] [address]  Dept: [phone]  Dept Fax: [phone]  Loc: [phone]    Visit Date: 1/31/2022    Ms. Amy Brooks is a 80 y.o. female  who presented for:  Chief Complaint   Patient presents with    Follow-up    Results     s/p TAVR    Congestive Heart Failure       HPI:   HPI   81 yo F s/p TAVR who returned with left cerebellar CVA with symptoms of nausea/vomiting, recovered completely who comes for follow-up. Had left knee replacement. She just had surgical revision. No major volume. Taking all meds. TTE pending.  consider palliative care. S/P 1 year TAVR. EKG completed. Patient will have her echo on 2/1/2022. [facility] [address]  [place] [address]  Dept: [phone]  Dept Fax: [phone]  Loc: [phone]    Visit Date: 1/31/2022    Ms. Amy Brooks is a 80 y.o. female  who presented for:  Chief Complaint   Patient presents with    Follow-up    Results     s/p TAVR    Congestive Heart Failure       HPI:   HPI   81 yo F s/p TAVR who returned with left cerebellar CVA with symptoms of nausea/vomiting, recovered completely who comes for follow-up. Had left knee replacement. She just had surgical revision. No major volume. Taking all meds. TTE pending.  consider palliative care.         Current Outpatient Medications:     potassium chloride (KLOR-CON M) 20 MEQ extended release tablet, Take 0.5 tablets by mouth as needed (take with Lasix), Disp: 45 tablet, Rfl: 0    atenolol (TENORMIN) 25 MG tablet, TAKE 1 TABLET DAILY, Disp: 90 tablet, Rfl: 1    hydrALAZINE (APRESOLINE) 25 MG tablet, Take 1 tablet by mouth 2 times daily, Disp: 180 tablet, Rfl: 1    atorvastatin (LIPITOR) 40 MG tablet, Take 1 tablet by mouth daily, Disp: 90 tablet, Rfl: 3    metFORMIN (GLUCOPHAGE) 500 MG tablet, TAKE 1 TABLET TWICE A DAY WITH MEALS, Disp: 180 tablet, Rfl: 3    omeprazole (PRILOSEC) 40 MG delayed release capsule, Take 1 capsule by mouth daily, Disp: 90 capsule, Rfl: 3    Handicap Placard MISC, by Does not apply route Expires 8/9/26, Disp: 1 each, Rfl: 0    Magnesium Oxide (MAGNESIUM-OXIDE) 250 MG TABS tablet, Take 1 tablet by mouth 2 times daily, Disp: 60 tablet, Rfl: 0    ondansetron (ZOFRAN ODT) 4 MG disintegrating tablet, Take 1 tablet by mouth every 8 hours as needed for Nausea or Vomiting, Disp: 90 tablet, Rfl: 1    aspirin 325 MG EC tablet, Take 1 tablet by mouth daily, Disp: 30 tablet, Rfl: 0    clopidogrel (PLAVIX) 75 MG tablet, Take 1 tablet by mouth daily, Disp: 90 tablet, Rfl: 3    furosemide (LASIX) 40 MG tablet, Take 0.5 tablets by mouth as needed (for weight gain of 3 lbs), Disp: 60 tablet, Rfl: 3    quinapril (ACCUPRIL) 10 MG tablet, Take 1 tablet by mouth daily, Disp: 90 tablet, Rfl: 4    vitamin C (VITAMIN C) 1000 MG tablet, Take 1 tablet by mouth daily, Disp: 30 tablet, Rfl: 3    vitamin D (CHOLECALCIFEROL) 1000 UNIT TABS tablet, Take 1,000 Units by mouth nightly., Disp: , Rfl:     sertraline (ZOLOFT) 50 MG tablet, Take 1 tablet by mouth daily (Patient not taking: Reported on 1/31/2022), Disp: 90 tablet, Rfl: 3    busPIRone (BUSPAR) 5 MG tablet, Take 1 tablet by mouth 2 times daily, Disp: 180 tablet, Rfl: 3    Past Medical History  Shirin Marrero  has a past medical history of Arthritis, Chronic low back pain, COVID-19, DM type 2, goal A1c below 7, GERD (gastroesophageal reflux disease), Hyperlipidemia, Hypertension, Loss of vision, Mitral valve disorder, Osteoporosis, Subarachnoid hemorrhage (HCC), TIA (transient ischemic attack), Type 2 diabetes mellitus with diabetic neuropathy, without long-term current use of insulin (HCC), Unspecified cerebral artery occlusion with cerebral infarction, and Urinary incontinence.     Social History  Shirin Marrero  reports that she has never smoked. She has never used smokeless tobacco. She reports that she does not drink alcohol and does not use drugs. Family History  Unknown Christpoher family history includes Arthritis in her mother; Cancer in her mother; Heart Disease in her father; High Blood Pressure in her father. There is no family history of bicuspid aortic valve, aneurysms, heart transplant, pacemakers, defibrillators, or sudden cardiac death. Past Surgical History   Past Surgical History:   Procedure Laterality Date    APPENDECTOMY      CARDIAC SURGERY      TAVR    CARPAL TUNNEL RELEASE Left 2011    CATARACT REMOVAL Bilateral     CERVICAL FUSION  1976    CHOLECYSTECTOMY      COLON SURGERY  2012    COLONOSCOPY  2015    ENDOSCOPY, COLON, DIAGNOSTIC      EYE SURGERY      CATARACTS    FINGER SURGERY Right 10/2016    3rd digit    HERNIA REPAIR      HYSTERECTOMY      JOINT REPLACEMENT      both knees    NERVE SURGERY Right 10/25/2019    Lumbar Facet MBB @ V00-W1,J0-6 L2-3 right side only #1 performed by Bishop Davion MD at [address] [facility] Right 12/16/2019    Lumbar Facet MBB @ V42-Y5-Z1-0, L2-3 RIGHT SIDE ONLY performed by Bishop Davion MD at [place] [address] Right 1/6/2020    Lumbar RFA T12-L1, L1-2, L2-3 Right performed by Bishop Davion MD at [address] [place] [address] Al Erica LAMINECTOMY,>2 [facility]      6 FUSIONS    REVISION TOTAL KNEE ARTHROPLASTY Left 6/1/2021    REVISION LEFT TOTAL KNEE ARTHROPLASTY performed by Pieter Franco MD at [facility] Right 11/28/2014    SHOULDER SURGERY  2014    SPINE SURGERY      TENDON RELEASE Right 05/18/2017    at [facility] Bilateral     UPPER GASTROINTESTINAL ENDOSCOPY  2013,2015       Review of Systems   Constitutional: Negative for chills and fever  HENT: Negative for congestion, sinus pressure, sneezing and sore throat.     Eyes: Negative for pain, discharge, redness and itching. Respiratory: Negative for apnea, cough  Gastrointestinal: Negative for blood in stool, constipation, diarrhea   Endocrine: Negative for cold intolerance, heat intolerance, polydipsia. Genitourinary: Negative for dysuria, enuresis, flank pain and hematuria. Musculoskeletal: Negative for arthralgias, joint swelling and neck pain. Neurological: Negative for numbness and headaches. Psychiatric/Behavioral: Negative for agitation, confusion, decreased concentration and dysphoric mood. Objective:     BP (!) 148/80   Pulse 81   Wt 242 lb (109.8 kg)   BMI 40.27 kg/m²     Wt Readings from Last 3 Encounters:   01/31/22 242 lb (109.8 kg)   09/15/21 241 lb (109.3 kg)   07/15/21 245 lb (111.1 kg)     BP Readings from Last 3 Encounters:   01/31/22 (!) 148/80   09/15/21 128/80   07/15/21 130/82       Nursing note and vitals reviewed. Physical Exam   Constitutional: Oriented to person, place, and time. Appears well-developed and well-nourished. HENT:   Head: Normocephalic and atraumatic. Eyes: EOM are normal. Pupils are equal, round, and reactive to light. Neck: Normal range of motion. Neck supple. No JVD present. Cardiovascular: Normal rate, regular rhythm, normal heart sounds and intact distal pulses. No murmur heard. Pulmonary/Chest: Effort normal and breath sounds normal. No respiratory distress. No wheezes. No rales. Abdominal: Soft. Bowel sounds are normal. No distension. There is no tenderness. Musculoskeletal: Normal range of motion. No edema. Neurological: Alert and oriented to person, place, and time. No cranial nerve deficit. Coordination normal.   Skin: Skin is warm and dry. Psychiatric: Normal mood and affect.        Lab Results   Component Value Date    CKTOTAL 49 09/04/2020       Lab Results   Component Value Date    WBC 11.3 06/02/2021    RBC 4.06 06/02/2021    RBC 0-5 04/07/2015    HGB 11.7 06/02/2021    HCT 37.1 06/02/2021    MCV 91.4 06/02/2021    MCH 28.8 06/02/2021    MCHC 31.5 06/02/2021    RDW 13.4 01/12/2017     06/02/2021    MPV 11.7 06/02/2021       Lab Results   Component Value Date     09/10/2021    K 4.0 09/10/2021    K 4.1 02/07/2021     09/10/2021    CO2 24 09/10/2021    BUN 15 09/10/2021    LABALBU 4.3 02/08/2021    CREATININE 0.7 09/10/2021    CALCIUM 10.1 09/10/2021    LABGLOM 81 09/10/2021    GLUCOSE 149 09/10/2021    GLUCOSE 147 04/30/2021       Lab Results   Component Value Date    ALKPHOS 69 02/08/2021    ALT 21 02/08/2021    AST 38 02/08/2021    PROT 7.6 02/08/2021    BILITOT 0.9 02/08/2021    BILITOT NEGATIVE 04/07/2015    BILIDIR <0.2 02/08/2021    LABALBU 4.3 02/08/2021       Lab Results   Component Value Date    MG 1.5 09/10/2021       Lab Results   Component Value Date    INR 1.03 02/04/2021    INR 0.95 02/03/2021    INR 1.01 01/13/2021         Lab Results   Component Value Date    LABA1C 7.1 05/10/2021    LABA1C 6.8 01/14/2020       Lab Results   Component Value Date    TRIG 141 05/14/2016    HDL 46 05/14/2016    LDLCALC 70 05/14/2016       Lab Results   Component Value Date    TSH 2.740 05/18/2016         Testing Reviewed:      I have individually reviewed the cardiac test below:    ECHO: Results for orders placed during the hospital encounter of 03/01/21    ECHO Complete 2D W Doppler W Color    Narrative  Transthoracic Echocardiography Report (TTE)    Demographics    Patient Name    [facility]  Gender               Female  K    MR #            [id number]        Race                     Ethnicity    Account #       [de-identified]        Room Number    Accession       [id number]       Date of Study        03/01/2021  Number    Date of Birth   1940       Referring Physician  Jossy Corrigan MD    Age             80 year(s)       Nataliya Ruelas MD  Physician    Procedure    Type of Study    TTE procedure:ECHOCARDIOGRAM COMPLETE 2D W DOPPLER W COLOR. Procedure Date  Date: 03/01/2021 Start: 01:00 PM    Study Location: Bedside    Indications:Post TAVR 30 days. Additional Medical History:Hyperlipidemia, Hypertension, GERD, Diabetic,  Morbi Obesity, COVID, Congestive heart failure, Stroke, TAVR. Patient Status: Routine    Height: 64 inches Weight: 253.01 pounds BSA: 2.16 m^2 BMI: 43.43 kg/m^2    BP: 124/70 mmHg    Allergies  - See Epic. - Other allergy:(Tramadol, Norco, codeine). Conclusions    Summary  Technically difficult examination. Normal left ventricular size and systolic function. There were no regional wall motion abnormalities. Wall thickness was within normal limits. Ejection fraction was estimated at 50-55%. Doppler parameters were consistent with abnormal left ventricular  relaxation (grade 1 diastolic dysfunction). S/p TAVR. DOPPLER: Transaortic velocity was within the normal range with  no evidence of aortic stenosis (mean gradient 7 mmHg, V max 1.8 m/s, EOA  1.8 cm2). There was no evidence of aortic regurgitation. There was trace mitral regurgitation. IVC not well seen. Signature    ----------------------------------------------------------------  Electronically signed by Kye Abreu MD (Interpreting  physician) on 03/02/2021 at 02:52 PM  ----------------------------------------------------------------    Findings    Mitral Valve  The mitral valve structure was normal with normal leaflet separation. DOPPLER: The transmitral velocity was within the normal range with no  evidence for mitral stenosis. There was trace mitral regurgitation. Aortic Valve  S/p TAVR. DOPPLER: Transaortic velocity was within the normal range with  no evidence of aortic stenosis (mean gradient 7 mmHg, V max 1.8 m/s, EOA  1.8 cm2). There was no evidence of aortic regurgitation. Tricuspid Valve  The tricuspid valve structure was normal with normal leaflet separation. DOPPLER: There was no evidence of tricuspid stenosis. There was no  evidence of tricuspid regurgitation. Pulmonic Valve  The pulmonic valve leaflets were not well seen. DOPPLER: The transpulmonic  velocity was within the normal range with no evidence for regurgitation. Left Atrium  Left atrial size was normal.    Left Ventricle  Normal left ventricular size and systolic function. There were no regional wall motion abnormalities. Wall thickness was within normal limits. Ejection fraction was estimated at 50-55%. Doppler parameters were consistent with abnormal left ventricular  relaxation (grade 1 diastolic dysfunction). Right Atrium  Right atrial size was normal.    Right Ventricle  The right ventricular size was normal with normal systolic function and  wall thickness. Pericardial Effusion  The pericardium was normal in appearance with no evidence of a pericardial  effusion. Pleural Effusion  No evidence of pleural effusion. Aorta / Great Vessels  IVC not well seen.     M-Mode/2D Measurements & Calculations    LV Diastolic     LV Systolic Dimension: 3.6 cm    LA Dimension: 3.8 cmLA  Dimension: 5 cm  LV Volume Diastolic: 046 ml      Area: 16.7 cm^2  LV FS:28 %       LV Volume Systolic: 35.2 ml  LV PW Diastolic: LV EDV/LV EDV Index: 118 ml/55  1.2 cm           m^2LV ESV/LV ESV Index: 54.4  Septum           ml/25 m^2                        RV Diastolic Dimension:  Diastolic: 1.2   EF Calculated: 53.9 %            2.9 cm  cm  Ascending Aorta: 2.8 cm  LA volume/Index: 44.5 ml  LVOT: 2 cm                       /21m^2    Doppler Measurements & Calculations    MV Peak E-Wave: 93  AV Peak Velocity: 183    LVOT Peak Velocity: 99.2 cm/s  cm/s                cm/s                     LVOT Mean Velocity: 69.9 cm/s  MV Peak A-Wave: 120 AV Peak Gradient: 13.4   LVOT Peak Gradient: 4  cm/s                mmHg                     mmHgLVOT Mean Gradient: 2  MV E/A Ratio: 0.78  AV Mean Velocity: 123    mmHg  MV Peak Gradient:   cm/s  3.46 mmHg           AV Mean Gradient: 6 mmHg TV Peak E-Wave: 51 cm/s  AV VTI: 37.2 cm          TV Peak A-Wave: 49.7 cm/s  MV Deceleration     AV Area  Time: 327 msec      (Continuity):1.87 cm^2   TV Peak Gradient: 1.04 mmHg  AV Acceleration Time:    TR Velocity:227 cm/s  116 msec                 TR Gradient:20.61 mmHg  MV E' Septal        LVOT VTI: 22.1 cm        PV Peak Velocity: 70.3 cm/s  Velocity: 4.1 cm/s  IVRT: 67 msec            PV Peak Gradient: 1.98 mmHg  MV A' Septal  Velocity: 8.6 cm/s  MV E' Lateral       AV DVI (VTI): 0.59AV DVI  Velocity: 4.5 cm/s  (Vmax):0.54  MV A' Lateral  Velocity: 7.6 cm/s  E/E' septal: 22.68  E/E' lateral: 20.67  MR Velocity: 481  cm/s    [id number]       Assessment/Plan   s/p TAVR who returned with left cerebellar CVA with symptoms of nausea/vomiting, recovered completely   Chronic diastolic CHF, NYHA II  HTN  L TKR  LBBB  She is doing well from cardiac standpoint. He is on DAPT, no bleeding. Taking all meds. Monitor BP. She has significant pain related to the knee. Await TTE. No major volume. Discussed diet/exercise/BP/weight loss/health lifestyle choices/lipids; the patient understands the goals and will try to comply.     Disposition:  1 year         Electronically signed by Romain Waldron MD   1/31/2022 at 10:46 AM EST                                   Current Outpatient Medications:     potassium chloride (KLOR-CON M) 20 MEQ extended release tablet, Take 0.5 tablets by mouth as needed (take with Lasix), Disp: 45 tablet, Rfl: 0    atenolol (TENORMIN) 25 MG tablet, TAKE 1 TABLET DAILY, Disp: 90 tablet, Rfl: 1    hydrALAZINE (APRESOLINE) 25 MG tablet, Take 1 tablet by mouth 2 times daily, Disp: 180 tablet, Rfl: 1    atorvastatin (LIPITOR) 40 MG tablet, Take 1 tablet by mouth daily, Disp: 90 tablet, Rfl: 3    metFORMIN (GLUCOPHAGE) 500 MG tablet, TAKE 1 TABLET TWICE A DAY WITH MEALS, Disp: 180 tablet, Rfl: 3    omeprazole (PRILOSEC) 40 MG delayed release capsule, Take 1 capsule by mouth daily, Disp: 90 capsule, Rfl: 3    Handicap Placcarmela MISC, by Does not apply route Expires 8/9/26, Disp: 1 each, Rfl: 0    Magnesium Oxide (MAGNESIUM-OXIDE) 250 MG TABS tablet, Take 1 tablet by mouth 2 times daily, Disp: 60 tablet, Rfl: 0    ondansetron (ZOFRAN ODT) 4 MG disintegrating tablet, Take 1 tablet by mouth every 8 hours as needed for Nausea or Vomiting, Disp: 90 tablet, Rfl: 1    aspirin 325 MG EC tablet, Take 1 tablet by mouth daily, Disp: 30 tablet, Rfl: 0    clopidogrel (PLAVIX) 75 MG tablet, Take 1 tablet by mouth daily, Disp: 90 tablet, Rfl: 3    furosemide (LASIX) 40 MG tablet, Take 0.5 tablets by mouth as needed (for weight gain of 3 lbs), Disp: 60 tablet, Rfl: 3    quinapril (ACCUPRIL) 10 MG tablet, Take 1 tablet by mouth daily, Disp: 90 tablet, Rfl: 4    vitamin C (VITAMIN C) 1000 MG tablet, Take 1 tablet by mouth daily, Disp: 30 tablet, Rfl: 3    vitamin D (CHOLECALCIFEROL) 1000 UNIT TABS tablet, Take 1,000 Units by mouth nightly., Disp: , Rfl:     sertraline (ZOLOFT) 50 MG tablet, Take 1 tablet by mouth daily (Patient not taking: Reported on 1/31/2022), Disp: 90 tablet, Rfl: 3    busPIRone (BUSPAR) 5 MG tablet, Take 1 tablet by mouth 2 times daily, Disp: 180 tablet, Rfl: 3    Past Medical History  Elle Oneal  has a past medical history of Arthritis, Chronic low back pain, COVID-19, DM type 2, goal A1c below 7, GERD (gastroesophageal reflux disease), Hyperlipidemia, Hypertension, Loss of vision, Mitral valve disorder, Osteoporosis, Subarachnoid hemorrhage (HCC), TIA (transient ischemic attack), Type 2 diabetes mellitus with diabetic neuropathy, without long-term current use of insulin (HCC), Unspecified cerebral artery occlusion with cerebral infarction, and Urinary incontinence. Social History  Elle Oneal  reports that she has never smoked.  She has never used smokeless tobacco. She reports that she does not drink alcohol and does not use drugs. Family History  Amy Huizar family history includes Arthritis in her mother; Cancer in her mother; Heart Disease in her father; High Blood Pressure in her father. There is no family history of bicuspid aortic valve, aneurysms, heart transplant, pacemakers, defibrillators, or sudden cardiac death. Past Surgical History   Past Surgical History:   Procedure Laterality Date    APPENDECTOMY      CARDIAC SURGERY      TAVR    CARPAL TUNNEL RELEASE Left 2011    CATARACT REMOVAL Bilateral     CERVICAL FUSION  1976    CHOLECYSTECTOMY      COLON SURGERY  2012    COLONOSCOPY  2015    ENDOSCOPY, COLON, DIAGNOSTIC      EYE SURGERY      CATARACTS    FINGER SURGERY Right 10/2016    3rd digit    HERNIA REPAIR      HYSTERECTOMY      JOINT REPLACEMENT      both knees    NERVE SURGERY Right 10/25/2019    Lumbar Facet MBB @ N20-J7,B8-5 L2-3 right side only #1 performed by Rosaura Pastor MD at [facility] Right 12/16/2019    Lumbar Facet MBB @ Z27-X9-O8-6, L2-3 RIGHT SIDE ONLY performed by Rosaura Pastor MD at [facility] Right 1/6/2020    Lumbar RFA T12-L1, L1-2, L2-3 Right performed by Rosaura Pastor MD at [address] [place] [address] Al Erica LAMINECTOMY,>2 [facility]      6 FUSIONS    REVISION TOTAL KNEE ARTHROPLASTY Left 6/1/2021    REVISION LEFT TOTAL KNEE ARTHROPLASTY performed by Laurence Moraes MD at [address] [place] [address] Right 11/28/2014    SHOULDER SURGERY  2014    SPINE SURGERY      TENDON RELEASE Right 05/18/2017    at [address] Bilateral     UPPER GASTROINTESTINAL ENDOSCOPY  2013,2015       Review of Systems   Constitutional: Negative for chills and fever  HENT: Negative for congestion, sinus pressure, sneezing and sore throat. Eyes: Negative for pain, discharge, redness and itching. Respiratory: Negative for apnea, cough  Gastrointestinal: Negative for blood in stool, constipation, diarrhea   Endocrine: Negative for cold intolerance, heat intolerance, polydipsia. Genitourinary: Negative for dysuria, enuresis, flank pain and hematuria. Musculoskeletal: Negative for arthralgias, joint swelling and neck pain. Neurological: Negative for numbness and headaches. Psychiatric/Behavioral: Negative for agitation, confusion, decreased concentration and dysphoric mood. Objective:     BP (!) 148/80   Pulse 81   Wt 242 lb (109.8 kg)   BMI 40.27 kg/m²     Wt Readings from Last 3 Encounters:   01/31/22 242 lb (109.8 kg)   09/15/21 241 lb (109.3 kg)   07/15/21 245 lb (111.1 kg)     BP Readings from Last 3 Encounters:   01/31/22 (!) 148/80   09/15/21 128/80   07/15/21 130/82       Nursing note and vitals reviewed. Physical Exam   Constitutional: Oriented to person, place, and time. Appears well-developed and well-nourished. HENT:   Head: Normocephalic and atraumatic. Eyes: EOM are normal. Pupils are equal, round, and reactive to light. Neck: Normal range of motion. Neck supple. No JVD present. Cardiovascular: Normal rate, regular rhythm, normal heart sounds and intact distal pulses. No murmur heard. Pulmonary/Chest: Effort normal and breath sounds normal. No respiratory distress. No wheezes. No rales. Abdominal: Soft. Bowel sounds are normal. No distension. There is no tenderness. Musculoskeletal: Normal range of motion. No edema. Neurological: Alert and oriented to person, place, and time. No cranial nerve deficit. Coordination normal.   Skin: Skin is warm and dry. Psychiatric: Normal mood and affect.        Lab Results   Component Value Date    CKTOTAL 49 09/04/2020       Lab Results   Component Value Date    WBC 11.3 06/02/2021    RBC 4.06 06/02/2021    RBC 0-5 04/07/2015    HGB 11.7 06/02/2021    HCT 37.1 06/02/2021    MCV 91.4 06/02/2021    MCH 28.8 06/02/2021    [facility] 31.5 06/02/2021    RDW 13.4 01/12/2017     06/02/2021    MPV 11.7 06/02/2021       Lab Results   Component Value Date     09/10/2021    K 4.0 09/10/2021    K 4.1 02/07/2021     09/10/2021    CO2 24 09/10/2021    BUN 15 09/10/2021    LABALBU 4.3 02/08/2021    CREATININE 0.7 09/10/2021    CALCIUM 10.1 09/10/2021    LABGLOM 81 09/10/2021    GLUCOSE 149 09/10/2021    GLUCOSE 147 04/30/2021       Lab Results   Component Value Date    ALKPHOS 69 02/08/2021    ALT 21 02/08/2021    AST 38 02/08/2021    PROT 7.6 02/08/2021    BILITOT 0.9 02/08/2021    BILITOT NEGATIVE 04/07/2015    BILIDIR <0.2 02/08/2021    LABALBU 4.3 02/08/2021       Lab Results   Component Value Date    MG 1.5 09/10/2021       Lab Results   Component Value Date    INR 1.03 02/04/2021    INR 0.95 02/03/2021    INR 1.01 01/13/2021         Lab Results   Component Value Date    LABA1C 7.1 05/10/2021    LABA1C 6.8 01/14/2020       Lab Results   Component Value Date    TRIG 141 05/14/2016    HDL 46 05/14/2016    LDLCALC 70 05/14/2016       Lab Results   Component Value Date    TSH 2.740 05/18/2016         Testing Reviewed:      I have individually reviewed the cardiac test below:    ECHO: Results for orders placed during the hospital encounter of 03/01/21    ECHO Complete 2D W Doppler W Color    Narrative  Transthoracic Echocardiography Report (TTE)    Demographics    Patient Name    [facility]  Gender               Female  K    MR #            [id number]        Race                     Ethnicity    Account #       [de-identified]        Room Number    Accession       [id number]       Date of Study        03/01/2021  Number    Date of Birth   1940       Referring Physician  Sohan Mackey MD    Age             80 year(s)       Liseth Lewis MD  Physician    Procedure    Type of Study    TTE procedure:ECHOCARDIOGRAM COMPLETE 2D W DOPPLER W COLOR. Procedure Date  Date: 03/01/2021 Start: 01:00 PM    Study Location: Bedside    Indications:Post TAVR 30 days. Additional Medical History:Hyperlipidemia, Hypertension, GERD, Diabetic,  Morbi Obesity, COVID, Congestive heart failure, Stroke, TAVR. Patient Status: Routine    Height: 64 inches Weight: 253.01 pounds BSA: 2.16 m^2 BMI: 43.43 kg/m^2    BP: 124/70 mmHg    Allergies  - See Epic. - Other allergy:(Tramadol, Norco, codeine). Conclusions    Summary  Technically difficult examination. Normal left ventricular size and systolic function. There were no regional wall motion abnormalities. Wall thickness was within normal limits. Ejection fraction was estimated at 50-55%. Doppler parameters were consistent with abnormal left ventricular  relaxation (grade 1 diastolic dysfunction). S/p TAVR. DOPPLER: Transaortic velocity was within the normal range with  no evidence of aortic stenosis (mean gradient 7 mmHg, V max 1.8 m/s, EOA  1.8 cm2). There was no evidence of aortic regurgitation. There was trace mitral regurgitation. IVC not well seen. Signature    ----------------------------------------------------------------  Electronically signed by Blayne Lilly MD (Interpreting  physician) on 03/02/2021 at 02:52 PM  ----------------------------------------------------------------    Findings    Mitral Valve  The mitral valve structure was normal with normal leaflet separation. DOPPLER: The transmitral velocity was within the normal range with no  evidence for mitral stenosis. There was trace mitral regurgitation. Aortic Valve  S/p TAVR. DOPPLER: Transaortic velocity was within the normal range with  no evidence of aortic stenosis (mean gradient 7 mmHg, V max 1.8 m/s, EOA  1.8 cm2). There was no evidence of aortic regurgitation. Tricuspid Valve  The tricuspid valve structure was normal with normal leaflet separation. DOPPLER: There was no evidence of tricuspid stenosis.  There was no  evidence of tricuspid regurgitation. Pulmonic Valve  The pulmonic valve leaflets were not well seen. DOPPLER: The transpulmonic  velocity was within the normal range with no evidence for regurgitation. Left Atrium  Left atrial size was normal.    Left Ventricle  Normal left ventricular size and systolic function. There were no regional wall motion abnormalities. Wall thickness was within normal limits. Ejection fraction was estimated at 50-55%. Doppler parameters were consistent with abnormal left ventricular  relaxation (grade 1 diastolic dysfunction). Right Atrium  Right atrial size was normal.    Right Ventricle  The right ventricular size was normal with normal systolic function and  wall thickness. Pericardial Effusion  The pericardium was normal in appearance with no evidence of a pericardial  effusion. Pleural Effusion  No evidence of pleural effusion. Aorta / Great Vessels  IVC not well seen.     M-Mode/2D Measurements & Calculations    LV Diastolic     LV Systolic Dimension: 3.6 cm    LA Dimension: 3.8 cmLA  Dimension: 5 cm  LV Volume Diastolic: 261 ml      Area: 16.7 cm^2  LV FS:28 %       LV Volume Systolic: 12.0 ml  LV PW Diastolic: LV EDV/LV EDV Index: 118 ml/55  1.2 cm           m^2LV ESV/LV ESV Index: 54.4  Septum           ml/25 m^2                        RV Diastolic Dimension:  Diastolic: 1.2   EF Calculated: 53.9 %            2.9 cm  cm  Ascending Aorta: 2.8 cm  LA volume/Index: 44.5 ml  LVOT: 2 cm                       /21m^2    Doppler Measurements & Calculations    MV Peak E-Wave: 93  AV Peak Velocity: 183    LVOT Peak Velocity: 99.2 cm/s  cm/s                cm/s                     LVOT Mean Velocity: 69.9 cm/s  MV Peak A-Wave: 120 AV Peak Gradient: 13.4   LVOT Peak Gradient: 4  cm/s                mmHg                     mmHgLVOT Mean Gradient: 2  MV E/A Ratio: 0.78  AV Mean Velocity: 123    mmHg  MV Peak Gradient:   cm/s  3.46 mmHg           AV Mean Gradient: 6 mmHg TV Peak E-Wave: 51 cm/s  AV VTI: 37.2 cm          TV Peak A-Wave: 49.7 cm/s  MV Deceleration     AV Area  Time: 327 msec      (Continuity):1.87 cm^2   TV Peak Gradient: 1.04 mmHg  AV Acceleration Time:    TR Velocity:227 cm/s  116 msec                 TR Gradient:20.61 mmHg  MV E' Septal        LVOT VTI: 22.1 cm        PV Peak Velocity: 70.3 cm/s  Velocity: 4.1 cm/s  IVRT: 67 msec            PV Peak Gradient: 1.98 mmHg  MV A' Septal  Velocity: 8.6 cm/s  MV E' Lateral       AV DVI (VTI): 0.59AV DVI  Velocity: 4.5 cm/s  (Vmax):0.54  MV A' Lateral  Velocity: 7.6 cm/s  E/E' septal: 22.68  E/E' lateral: 20.67  MR Velocity: 481  cm/s    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 He is on DAPT, no bleeding. Taking all meds. Monitor BP. She has significant pain related to the knee. Await TTE. No major volume. Discussed diet/exercise/BP/weight loss/health lifestyle choices/lipids; the patient understands the goals and will try to comply.     Disposition:  1 year         Electronically signed by Estephanie Little MD   1/31/2022 at 10:46 AM EST

## 2022-02-01 ENCOUNTER — HOSPITAL ENCOUNTER (OUTPATIENT)
Dept: NON INVASIVE DIAGNOSTICS | Age: 82
Discharge: HOME | End: 2022-02-01
Payer: MEDICARE

## 2022-02-01 DIAGNOSIS — Z95.2 S/P TAVR (TRANSCATHETER AORTIC VALVE REPLACEMENT): ICD-10-CM

## 2022-02-01 LAB
LV EF: 58 %
LVEF MODALITY: NORMAL

## 2022-02-01 PROCEDURE — 93306 TTE W/DOPPLER COMPLETE: CPT

## 2022-02-02 DIAGNOSIS — F33.0 MAJOR DEPRESSIVE DISORDER, RECURRENT EPISODE, MILD (HCC): ICD-10-CM

## 2022-02-07 ENCOUNTER — OFFICE VISIT (OUTPATIENT)
Dept: FAMILY MEDICINE CLINIC | Age: 82
End: 2022-02-07
Payer: MEDICARE

## 2022-02-07 ENCOUNTER — NURSE ONLY (OUTPATIENT)
Dept: LAB | Age: 82
End: 2022-02-07

## 2022-02-07 VITALS
SYSTOLIC BLOOD PRESSURE: 128 MMHG | DIASTOLIC BLOOD PRESSURE: 80 MMHG | HEART RATE: 97 BPM | RESPIRATION RATE: 18 BRPM | TEMPERATURE: 97.5 F | OXYGEN SATURATION: 99 %

## 2022-02-07 DIAGNOSIS — I50.32 CHRONIC DIASTOLIC CONGESTIVE HEART FAILURE, NYHA CLASS 1 (HCC): ICD-10-CM

## 2022-02-07 DIAGNOSIS — R45.4 ANGER: ICD-10-CM

## 2022-02-07 DIAGNOSIS — F33.0 MAJOR DEPRESSIVE DISORDER, RECURRENT EPISODE, MILD (HCC): Primary | ICD-10-CM

## 2022-02-07 DIAGNOSIS — M46.1 SI (SACROILIAC) JOINT INFLAMMATION (HCC): ICD-10-CM

## 2022-02-07 DIAGNOSIS — E11.40 TYPE 2 DIABETES MELLITUS WITH DIABETIC NEUROPATHY, WITHOUT LONG-TERM CURRENT USE OF INSULIN (HCC): ICD-10-CM

## 2022-02-07 LAB
ALBUMIN SERPL-MCNC: 4.6 G/DL (ref 3.5–5.1)
ALP BLD-CCNC: 78 U/L (ref 38–126)
ALT SERPL-CCNC: 24 U/L (ref 11–66)
ANION GAP SERPL CALCULATED.3IONS-SCNC: 13 MEQ/L (ref 8–16)
AST SERPL-CCNC: 23 U/L (ref 5–40)
AVERAGE GLUCOSE: 171 MG/DL (ref 70–126)
BILIRUB SERPL-MCNC: 0.5 MG/DL (ref 0.3–1.2)
BUN BLDV-MCNC: 14 MG/DL (ref 7–22)
CALCIUM SERPL-MCNC: 10 MG/DL (ref 8.5–10.5)
CHLORIDE BLD-SCNC: 103 MEQ/L (ref 98–111)
CO2: 26 MEQ/L (ref 23–33)
CREAT SERPL-MCNC: 0.8 MG/DL (ref 0.4–1.2)
GFR SERPL CREATININE-BSD FRML MDRD: 69 ML/MIN/1.73M2
GLUCOSE BLD-MCNC: 184 MG/DL (ref 70–108)
HBA1C MFR BLD: 7.7 % (ref 4.4–6.4)
MAGNESIUM: 1.5 MG/DL (ref 1.6–2.4)
POTASSIUM SERPL-SCNC: 4.4 MEQ/L (ref 3.5–5.2)
PRO-BNP: 176.1 PG/ML (ref 0–1800)
SODIUM BLD-SCNC: 142 MEQ/L (ref 135–145)
TOTAL PROTEIN: 7.1 G/DL (ref 6.1–8)

## 2022-02-07 PROCEDURE — G8484 FLU IMMUNIZE NO ADMIN: HCPCS | Performed by: FAMILY MEDICINE

## 2022-02-07 PROCEDURE — 1090F PRES/ABSN URINE INCON ASSESS: CPT | Performed by: FAMILY MEDICINE

## 2022-02-07 PROCEDURE — 1036F TOBACCO NON-USER: CPT | Performed by: FAMILY MEDICINE

## 2022-02-07 PROCEDURE — G8427 DOCREV CUR MEDS BY ELIG CLIN: HCPCS | Performed by: FAMILY MEDICINE

## 2022-02-07 PROCEDURE — G8399 PT W/DXA RESULTS DOCUMENT: HCPCS | Performed by: FAMILY MEDICINE

## 2022-02-07 PROCEDURE — 4040F PNEUMOC VAC/ADMIN/RCVD: CPT | Performed by: FAMILY MEDICINE

## 2022-02-07 PROCEDURE — 99214 OFFICE O/P EST MOD 30 MIN: CPT | Performed by: FAMILY MEDICINE

## 2022-02-07 PROCEDURE — 1123F ACP DISCUSS/DSCN MKR DOCD: CPT | Performed by: FAMILY MEDICINE

## 2022-02-07 PROCEDURE — G8417 CALC BMI ABV UP PARAM F/U: HCPCS | Performed by: FAMILY MEDICINE

## 2022-02-07 RX ORDER — DIAZEPAM 2 MG/1
2 TABLET ORAL EVERY 8 HOURS PRN
Qty: 30 TABLET | Refills: 0 | Status: SHIPPED | OUTPATIENT
Start: 2022-02-07 | End: 2022-02-17

## 2022-02-07 RX ORDER — PAROXETINE 10 MG/1
10 TABLET, FILM COATED ORAL DAILY
Qty: 30 TABLET | Refills: 3 | Status: SHIPPED | OUTPATIENT
Start: 2022-02-07 | End: 2022-05-16

## 2022-02-07 NOTE — PROGRESS NOTES
Kailey Briscoe is a 80 y.o. female that presents for     Chief Complaint   Patient presents with    Other     Sent message and wants to go over it       /80   Pulse 97   Temp 97.5 °F (36.4 °C) (Infrared)   Resp 18   SpO2 99%       HPI    Caring for her  at home whose health is declining. States that her mood has been poor recently related to multiple factors, including caring for him. She reports that she is feeling very depressed. States that she has been feeling very angry and irritable. Sleep is ok, but waking up frequently to check on Dima. Eating ok. No SI. She is on zoloft. States 'I just need something to calm me down sometimes'.       Health Maintenance   Topic Date Due    Hepatitis C screen  Never done    Depression Monitoring  Never done    DTaP/Tdap/Td vaccine (1 - Tdap) Never done    Shingles Vaccine (1 of 2) Never done    Pneumococcal 65+ years Vaccine (2 of 2 - PPSV23) 12/14/2016    Lipid screen  05/14/2017    Annual Wellness Visit (AWV)  07/15/2021    Potassium monitoring  09/10/2022    Creatinine monitoring  09/10/2022    Flu vaccine  Completed    COVID-19 Vaccine  Completed    DEXA (modify frequency per FRAX score)  Addressed    Hepatitis A vaccine  Aged Out    Hib vaccine  Aged Out    Meningococcal (ACWY) vaccine  Aged Out       Past Medical History:   Diagnosis Date    Arthritis     Chronic low back pain     COVID-19     DM type 2, goal A1c below 7     GERD (gastroesophageal reflux disease)     Hyperlipidemia     Hypertension     Loss of vision     Mitral valve disorder     Osteoporosis     Subarachnoid hemorrhage ([address] 4/25/2019    TIA (transient ischemic attack)     Type 2 diabetes mellitus with diabetic neuropathy, without long-term current use of insulin ([address] 7/26/2017   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    aslo after valve replacement 2020    Urinary incontinence        Past Surgical History: Procedure Laterality Date    APPENDECTOMY      CARDIAC SURGERY      TAVR    CARPAL TUNNEL RELEASE Left 2011    CATARACT REMOVAL Bilateral     CERVICAL FUSION  1976    CHOLECYSTECTOMY      COLON SURGERY  2012    COLONOSCOPY  2015    ENDOSCOPY, COLON, DIAGNOSTIC      EYE SURGERY      CATARACTS    FINGER SURGERY Right 10/2016    3rd digit    HERNIA REPAIR      HYSTERECTOMY      JOINT REPLACEMENT      both knees    NERVE SURGERY Right 10/25/2019    Lumbar Facet MBB @ U88-F4,Q4-5 L2-3 right side only #1 performed by Zach Ellis MD at [facility] 98 Right 12/16/2019    Lumbar Facet MBB @ D78-D2-Y0-0, L2-3 RIGHT SIDE ONLY performed by Zach [facility] MD at [facility] 113 Right 1/6/2020    Lumbar RFA T12-L1, L1-2, L2-3 Right performed by Zach Ellis MD at [address] LAMINECTOMY,>2 [facility]      6 FUSIONS    REVISION TOTAL KNEE ARTHROPLASTY Left 6/1/2021    REVISION LEFT TOTAL KNEE ARTHROPLASTY performed by Chano Edwards MD at [address] [place] [address] Right 11/28/2014    SHOULDER SURGERY  2014    SPINE SURGERY      TENDON RELEASE Right 05/18/2017    at [facility] Bilateral     UPPER GASTROINTESTINAL ENDOSCOPY  2013,2015       Social History     Tobacco Use    Smoking status: Never Smoker    Smokeless tobacco: Never Used   Vaping Use    Vaping Use: Never used   Substance Use Topics    Alcohol use: No    Drug use: No       Family History   Problem Relation Age of Onset    Arthritis Mother     Cancer Mother     Heart Disease Father     High Blood Pressure Father          I have reviewed the patient's past medical history, past surgical history, allergies, medications, social and family history and I have made updates where appropriate.     Review of Systems        PHYSICAL EXAM:  /80   Pulse 97   Temp 97.5 °F (36.4 °C) (Infrared)   Resp 18   SpO2 99%     Physical Exam  Nursing note reviewed. Constitutional:       Appearance: She is well-developed. Pulmonary:      Effort: Pulmonary effort is normal. No respiratory distress. Neurological:      Mental Status: She is alert. Psychiatric:         Behavior: Behavior normal.         Thought Content: Thought content normal.         Judgment: Judgment normal.             ASSESSMENT & PLAN  Elle Oneal was seen today for other. Diagnoses and all orders for this visit:    Major depressive disorder, recurrent episode, mild (HCC)  -     PARoxetine (PAXIL) 10 MG tablet; Take 1 tablet by mouth daily    SI (sacroiliac) joint inflammation (HCC)    Chronic diastolic congestive heart failure, NYHA class 1 (HCC)    Type 2 diabetes mellitus with diabetic neuropathy, without long-term current use of insulin (HCC)  -     Hemoglobin A1C; Future  -     Comprehensive Metabolic Panel; Future    Anger  -     diazePAM (VALIUM) 2 MG tablet; Take 1 tablet by mouth every 8 hours as needed for Anxiety for up to 10 days. -MDD and anger uncontrolled and worsening. Will start Paxil today and will do short term PRN valium. Advised on possible SEs, recheck in 3 weeks.    -Discussed side effects and benefits of valium and paxil. I advised her that if she develops any SI/HI or concerning symptoms after starting the medication she needs to stop the medication and seek treatment immediately      Return in about 3 weeks (around 2/28/2022). The most recent results of the following tests were reviewed with the patient today: none     All copied or forwarded information in the progress note was verified by me to be accurate at the time of visit  Patient's past medical, surgical, social and family history were reviewed and updated     All patient questions answered. Patient voiced understanding.

## 2022-02-08 ENCOUNTER — TELEPHONE (OUTPATIENT)
Dept: CARDIOLOGY CLINIC | Age: 82
End: 2022-02-08

## 2022-02-08 DIAGNOSIS — E83.42 HYPOMAGNESEMIA: Primary | ICD-10-CM

## 2022-02-08 RX ORDER — MULTIVITAMIN/IRON/FOLIC ACID 18MG-0.4MG
250 TABLET ORAL 3 TIMES DAILY
Qty: 180 TABLET | Refills: 5 | Status: SHIPPED | OUTPATIENT
Start: 2022-02-08 | End: 2022-04-20

## 2022-02-15 ENCOUNTER — TELEPHONE (OUTPATIENT)
Dept: FAMILY MEDICINE CLINIC | Age: 82
End: 2022-02-15

## 2022-02-15 NOTE — TELEPHONE ENCOUNTER
Jennifer Lloyd calling to inform Dr Casper Graham that she will be coming in on 02.17.2022 to discuss Alissa Marcus going on hospice  Future Appointments   Date Time Provider James Cruz   2/17/2022  4:00 PM DO GERALDINE Henderson [facility] - [address] [place] [address]   4/20/2022 10:30 AM SEAN Moran CNP [facility]   2/9/2023 10:30 AM Estephanie Little MD [facility] [address]

## 2022-02-21 ENCOUNTER — OFFICE VISIT (OUTPATIENT)
Dept: FAMILY MEDICINE CLINIC | Age: 82
End: 2022-02-21
Payer: MEDICARE

## 2022-02-21 VITALS
DIASTOLIC BLOOD PRESSURE: 72 MMHG | SYSTOLIC BLOOD PRESSURE: 130 MMHG | RESPIRATION RATE: 18 BRPM | HEART RATE: 89 BPM | WEIGHT: 242 LBS | TEMPERATURE: 97.1 F | HEIGHT: 65 IN | OXYGEN SATURATION: 98 % | BODY MASS INDEX: 40.32 KG/M2

## 2022-02-21 DIAGNOSIS — F41.9 ANXIETY: Primary | ICD-10-CM

## 2022-02-21 PROCEDURE — G8399 PT W/DXA RESULTS DOCUMENT: HCPCS | Performed by: FAMILY MEDICINE

## 2022-02-21 PROCEDURE — 1123F ACP DISCUSS/DSCN MKR DOCD: CPT | Performed by: FAMILY MEDICINE

## 2022-02-21 PROCEDURE — G8417 CALC BMI ABV UP PARAM F/U: HCPCS | Performed by: FAMILY MEDICINE

## 2022-02-21 PROCEDURE — 4040F PNEUMOC VAC/ADMIN/RCVD: CPT | Performed by: FAMILY MEDICINE

## 2022-02-21 PROCEDURE — 1036F TOBACCO NON-USER: CPT | Performed by: FAMILY MEDICINE

## 2022-02-21 PROCEDURE — 1090F PRES/ABSN URINE INCON ASSESS: CPT | Performed by: FAMILY MEDICINE

## 2022-02-21 PROCEDURE — G8427 DOCREV CUR MEDS BY ELIG CLIN: HCPCS | Performed by: FAMILY MEDICINE

## 2022-02-21 PROCEDURE — 99213 OFFICE O/P EST LOW 20 MIN: CPT | Performed by: FAMILY MEDICINE

## 2022-02-21 PROCEDURE — G8484 FLU IMMUNIZE NO ADMIN: HCPCS | Performed by: FAMILY MEDICINE

## 2022-02-21 NOTE — PROGRESS NOTES
Rolly Hatchet is a [de-identified] y.o. female that presents for     Chief Complaint   Patient presents with    Discuss Medications    Other       /72   Pulse 89   Temp 97.1 °F (36.2 °C) (Infrared)   Resp 18   Ht 5' 5\" (1.651 m)   Wt 242 lb (109.8 kg)   SpO2 98%   BMI 40.27 kg/m²       HPI    Patient presents for follow up of anxiety. Valium has helped some.  unfortunately is doing worse and is now requesting hospice care. She has made peace with this, feels this is best for Erlinda Enid.     Health Maintenance   Topic Date Due    Depression Monitoring  Never done    DTaP/Tdap/Td vaccine (1 - Tdap) Never done    Shingles Vaccine (1 of 2) Never done    Pneumococcal 65+ years Vaccine (2 of 2 - PPSV23) 12/14/2016    Lipid screen  05/14/2017    Annual Wellness Visit (AWV)  07/15/2021    Potassium monitoring  02/07/2023    Creatinine monitoring  02/07/2023    Flu vaccine  Completed    COVID-19 Vaccine  Completed    DEXA (modify frequency per FRAX score)  Addressed    Hepatitis A vaccine  Aged Out    Hib vaccine  Aged Out    Meningococcal (ACWY) vaccine  Aged Out       Past Medical History:   Diagnosis Date    Arthritis     Chronic low back pain     COVID-19     DM type 2, goal A1c below 7     GERD (gastroesophageal reflux disease)     Hyperlipidemia     Hypertension     Loss of vision     Mitral valve disorder     Osteoporosis     Subarachnoid hemorrhage ([address] 4/25/2019    TIA (transient ischemic attack)     Type 2 diabetes mellitus with diabetic neuropathy, without long-term current use of insulin ([address] 7/26/2017   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    aslo after valve replacement 2020    Urinary incontinence        Past Surgical History:   Procedure Laterality Date    APPENDECTOMY      CARDIAC SURGERY      TAVR    CARPAL TUNNEL RELEASE Left 2011    CATARACT REMOVAL Bilateral     CERVICAL FUSION  1976    CHOLECYSTECTOMY      COLON SURGERY 2012    COLONOSCOPY  2015    ENDOSCOPY, COLON, DIAGNOSTIC      EYE SURGERY      CATARACTS    FINGER SURGERY Right 10/2016    3rd digit    HERNIA REPAIR      HYSTERECTOMY      JOINT REPLACEMENT      both knees    NERVE SURGERY Right 10/25/2019    Lumbar Facet MBB @ X19-M9,I4-9 L2-3 right side only #1 performed by Dionicio Xie MD at [facility] 98 Right 12/16/2019    Lumbar Facet MBB @ W56-E5-M0-1, L2-3 RIGHT SIDE ONLY performed by Dionicio Xie MD at [facility] 113 Right 1/6/2020    Lumbar RFA T12-L1, L1-2, L2-3 Right performed by Dionicio Xie MD at  [address] LAMINECTOMY,>2 [facility]      6 FUSIONS    REVISION TOTAL KNEE ARTHROPLASTY Left 6/1/2021    REVISION LEFT TOTAL KNEE ARTHROPLASTY performed by Jesus Monzon MD at [address] [place] [address] Right 11/28/2014    SHOULDER SURGERY  2014    SPINE SURGERY      TENDON RELEASE Right 05/18/2017    at [address] [place] [address] Bilateral     UPPER GASTROINTESTINAL ENDOSCOPY  2013,2015       Social History     Tobacco Use    Smoking status: Never Smoker    Smokeless tobacco: Never Used   Vaping Use    Vaping Use: Never used   Substance Use Topics    Alcohol use: No    Drug use: No       Family History   Problem Relation Age of Onset    Arthritis Mother     Cancer Mother     Heart Disease Father     High Blood Pressure Father          I have reviewed the patient's past medical history, past surgical history, allergies, medications, social and family history and I have made updates where appropriate. Review of Systems        PHYSICAL EXAM:  /72   Pulse 89   Temp 97.1 °F (36.2 °C) (Infrared)   Resp 18   Ht 5' 5\" (1.651 m)   Wt 242 lb (109.8 kg)   SpO2 98%   BMI 40.27 kg/m²     Physical Exam  Nursing note reviewed. Constitutional:       Appearance: She is well-developed.    Pulmonary:      Effort: Pulmonary effort is normal. No respiratory distress. Neurological:      Mental Status: She is alert. Psychiatric:         Mood and Affect: Affect is tearful. Behavior: Behavior normal.         Thought Content: Thought content normal.         Cognition and Memory: Cognition and memory normal.         Judgment: Judgment normal.             ASSESSMENT & PLAN  Martinez Gale was seen today for discuss medications and other. Diagnoses and all orders for this visit:    Anxiety        Return if symptoms worsen or fail to improve. Continue valium and paxil, has been tolerating well  I placed a hospice referral for her  in a separate encounter. The most recent results of the following tests were reviewed with the patient today: none     All copied or forwarded information in the progress note was verified by me to be accurate at the time of visit  Patient's past medical, surgical, social and family history were reviewed and updated     All patient questions answered. Patient voiced understanding.

## 2022-03-28 DIAGNOSIS — E11.8 CONTROLLED TYPE 2 DIABETES MELLITUS WITH COMPLICATION, WITHOUT LONG-TERM CURRENT USE OF INSULIN (HCC): ICD-10-CM

## 2022-03-28 PROCEDURE — 3051F HG A1C>EQUAL 7.0%<8.0%: CPT | Performed by: FAMILY MEDICINE

## 2022-03-28 RX ORDER — ATORVASTATIN CALCIUM 40 MG/1
40 TABLET, FILM COATED ORAL DAILY
Qty: 90 TABLET | Refills: 3 | Status: SHIPPED | OUTPATIENT
Start: 2022-03-28 | End: 2022-06-20 | Stop reason: SDUPTHER

## 2022-03-28 RX ORDER — QUINAPRIL 10 MG/1
10 TABLET ORAL DAILY
Qty: 90 TABLET | Refills: 4 | Status: SHIPPED | OUTPATIENT
Start: 2022-03-28 | End: 2022-06-22

## 2022-04-19 ENCOUNTER — NURSE ONLY (OUTPATIENT)
Dept: LAB | Age: 82
End: 2022-04-19

## 2022-04-19 DIAGNOSIS — E83.42 HYPOMAGNESEMIA: ICD-10-CM

## 2022-04-19 LAB — MAGNESIUM: 1.5 MG/DL (ref 1.6–2.4)

## 2022-04-20 ENCOUNTER — TELEPHONE (OUTPATIENT)
Dept: CARDIOLOGY CLINIC | Age: 82
End: 2022-04-20

## 2022-04-20 ENCOUNTER — OFFICE VISIT (OUTPATIENT)
Dept: CARDIOLOGY CLINIC | Age: 82
End: 2022-04-20
Payer: MEDICARE

## 2022-04-20 VITALS
BODY MASS INDEX: 38.92 KG/M2 | WEIGHT: 233.6 LBS | HEART RATE: 81 BPM | SYSTOLIC BLOOD PRESSURE: 130 MMHG | DIASTOLIC BLOOD PRESSURE: 82 MMHG | HEIGHT: 65 IN | OXYGEN SATURATION: 97 %

## 2022-04-20 DIAGNOSIS — I50.32 CHRONIC DIASTOLIC CONGESTIVE HEART FAILURE, NYHA CLASS 1 (HCC): Primary | ICD-10-CM

## 2022-04-20 DIAGNOSIS — E83.42 HYPOMAGNESEMIA: Primary | ICD-10-CM

## 2022-04-20 PROCEDURE — 99213 OFFICE O/P EST LOW 20 MIN: CPT | Performed by: NURSE PRACTITIONER

## 2022-04-20 PROCEDURE — G8399 PT W/DXA RESULTS DOCUMENT: HCPCS | Performed by: NURSE PRACTITIONER

## 2022-04-20 PROCEDURE — G8427 DOCREV CUR MEDS BY ELIG CLIN: HCPCS | Performed by: NURSE PRACTITIONER

## 2022-04-20 PROCEDURE — 1036F TOBACCO NON-USER: CPT | Performed by: NURSE PRACTITIONER

## 2022-04-20 PROCEDURE — G8417 CALC BMI ABV UP PARAM F/U: HCPCS | Performed by: NURSE PRACTITIONER

## 2022-04-20 PROCEDURE — 1090F PRES/ABSN URINE INCON ASSESS: CPT | Performed by: NURSE PRACTITIONER

## 2022-04-20 PROCEDURE — 1123F ACP DISCUSS/DSCN MKR DOCD: CPT | Performed by: NURSE PRACTITIONER

## 2022-04-20 PROCEDURE — 4040F PNEUMOC VAC/ADMIN/RCVD: CPT | Performed by: NURSE PRACTITIONER

## 2022-04-20 RX ORDER — POTASSIUM CHLORIDE 20 MEQ/1
10 TABLET, EXTENDED RELEASE ORAL PRN
Qty: 45 TABLET | Refills: 0 | Status: SHIPPED | OUTPATIENT
Start: 2022-04-20 | End: 2022-09-27

## 2022-04-20 RX ORDER — POTASSIUM CHLORIDE 20 MEQ/1
10 TABLET, EXTENDED RELEASE ORAL PRN
Qty: 45 TABLET | Refills: 0 | Status: SHIPPED | OUTPATIENT
Start: 2022-04-20 | End: 2022-04-20

## 2022-04-20 RX ORDER — MULTIVITAMIN/IRON/FOLIC ACID 18MG-0.4MG
250 TABLET ORAL 3 TIMES DAILY
Qty: 270 TABLET | Refills: 3 | Status: SHIPPED | OUTPATIENT
Start: 2022-04-20

## 2022-04-20 RX ORDER — MULTIVITAMIN/IRON/FOLIC ACID 18MG-0.4MG
TABLET ORAL
Qty: 300 TABLET | Refills: 5 | Status: SHIPPED | OUTPATIENT
Start: 2022-04-20 | End: 2022-04-20

## 2022-04-20 RX ORDER — FUROSEMIDE 20 MG/1
20 TABLET ORAL PRN
Qty: 30 TABLET | Refills: 3 | Status: SHIPPED | OUTPATIENT
Start: 2022-04-20 | End: 2022-04-21 | Stop reason: SDUPTHER

## 2022-04-20 ASSESSMENT — ENCOUNTER SYMPTOMS
WHEEZING: 0
SHORTNESS OF BREATH: 0
COUGH: 0
ABDOMINAL DISTENTION: 0
ABDOMINAL PAIN: 0

## 2022-04-20 NOTE — PROGRESS NOTES
Heart Failure Clinic       Visit Date: 2022  Cardiologist:  Dr. Saranya Celaya  Primary Care Physician: Dr. Charlene Edouard, Jake Dick is a [de-identified] y.o. female who presents today for:  Chief Complaint   Patient presents with    Congestive Heart Failure       HPI:   Judge Arana is a [de-identified] y.o. female who presents to the office for a follow up patient visit in the heart failure clinic. Accompanied by self    TYPE HF: HFpEF (55%)   Cause: nonischemic  Device: none  HX: TAVR (), HTN, DM, CAD s/p stent, TIA    Dry Wt:  235 (241 on 9/15/21) (233 on 22)    Hospitalization:  >6 months    Concerns today: very tearful of Ag death. Really wants off of medications. Has not had any CHF symptoms of recent, not needing to take her Lasix PRN. Not eating since Dima's death. Having diarrhea d/t stress. Plans to see GI doc. Has not been taking Mag TID, only BID. Still low. Visit on 9/15 no complaints todays, no new symptoms.  Has not needed to take Lasix PRN    Activity: walker used for recent knee replacement, no BRITO, walks long distances w/o needing to stop, ADLs perfromed  Diet: low sodium low fluid  followed    Patient has:  Chest Pain: no  SOB: no  Orthopnea/PND: no  ALLISON: refused to do sleep study  Edema: no  Fatigue: no  Abdominal bloating: no  Cough: no  Appetite: good  Home weight: daily   Home blood pressure: does not check    Past Medical History:   Diagnosis Date    Arthritis     Chronic low back pain     COVID-19     DM type 2, goal A1c below 7     GERD (gastroesophageal reflux disease)     Hyperlipidemia     Hypertension     Loss of vision     Mitral valve disorder     Osteoporosis     Subarachnoid hemorrhage (HCC) 2019    TIA (transient ischemic attack)     Type 2 diabetes mellitus with diabetic neuropathy, without long-term current use of insulin ([facility] [address] 2017    Unspecified cerebral artery occlusion with cerebral infarction 3-    aslo after valve replacement 2020    Urinary incontinence      Past Surgical History:   Procedure Laterality Date    APPENDECTOMY      CARDIAC SURGERY      TAVR    CARPAL TUNNEL RELEASE Left 2011    CATARACT REMOVAL Bilateral     CERVICAL FUSION  1976    CHOLECYSTECTOMY      COLON SURGERY  2012    COLONOSCOPY  2015    ENDOSCOPY, COLON, DIAGNOSTIC      EYE SURGERY      CATARACTS    FINGER SURGERY Right 10/2016    3rd digit    HERNIA REPAIR      HYSTERECTOMY      JOINT REPLACEMENT      both knees    NERVE SURGERY Right 10/25/2019    Lumbar Facet MBB @ R44-D0,H6-3 L2-3 right side only #1 performed by Mu Arevalo MD at [facility] 98 Right 12/16/2019    Lumbar Facet MBB @ S51-Y9-L0-5, L2-3 RIGHT SIDE ONLY performed by Mu Arevalo MD at [facility] 113 Right 1/6/2020    Lumbar RFA T12-L1, L1-2, L2-3 Right performed by Mu Arevalo MD at [address] [place], [place] [address]atib LAMINECTOMY,>2 [facility]      6 FUSIONS    REVISION TOTAL KNEE ARTHROPLASTY Left 6/1/2021    REVISION LEFT TOTAL KNEE ARTHROPLASTY performed by Krysten Cavanaugh MD at [facility] Right 11/28/2014    SHOULDER SURGERY  2014    SPINE SURGERY      TENDON RELEASE Right 05/18/2017    at [address] [place] [address] Bilateral     UPPER GASTROINTESTINAL ENDOSCOPY  2013,2015     Family History   Problem Relation Age of Onset    Arthritis Mother     Cancer Mother     Heart Disease Father     High Blood Pressure Father      Social History     Tobacco Use    Smoking status: Never Smoker    Smokeless tobacco: Never Used   Substance Use Topics    Alcohol use: No     Current Outpatient Medications   Medication Sig Dispense Refill    Magnesium Oxide (MAGNESIUM-OXIDE) 250 MG TABS tablet Take 1 tablet by mouth in the morning, at noon, and at bedtime 270 tablet 3    furosemide (LASIX) 20 MG tablet Take 1 tablet by mouth as needed (for weight gain of 3 lbs) 30 tablet 3    potassium chloride (KLOR-CON M) 20 MEQ extended release tablet Take 0.5 tablets by mouth as needed (take with Lasix) 45 tablet 0    quinapril (ACCUPRIL) 10 MG tablet Take 1 tablet by mouth daily 90 tablet 4    atorvastatin (LIPITOR) 40 MG tablet Take 1 tablet by mouth daily 90 tablet 3    atenolol (TENORMIN) 25 MG tablet TAKE 1 TABLET DAILY 90 tablet 1    hydrALAZINE (APRESOLINE) 25 MG tablet Take 1 tablet by mouth 2 times daily 180 tablet 1    metFORMIN (GLUCOPHAGE) 500 MG tablet TAKE 1 TABLET TWICE A DAY WITH MEALS 180 tablet 3    omeprazole (PRILOSEC) 40 MG delayed release capsule Take 1 capsule by mouth daily 90 capsule 3    Handicap Placard MISC by Does not apply route Expires 8/9/26 1 each 0    ondansetron (ZOFRAN ODT) 4 MG disintegrating tablet Take 1 tablet by mouth every 8 hours as needed for Nausea or Vomiting 90 tablet 1    aspirin 325 MG EC tablet Take 1 tablet by mouth daily 30 tablet 0    clopidogrel (PLAVIX) 75 MG tablet Take 1 tablet by mouth daily 90 tablet 3    vitamin C (VITAMIN C) 1000 MG tablet Take 1 tablet by mouth daily 30 tablet 3    vitamin D (CHOLECALCIFEROL) 1000 UNIT TABS tablet Take 1,000 Units by mouth nightly.  PARoxetine (PAXIL) 10 MG tablet Take 1 tablet by mouth daily (Patient not taking: Reported on 4/20/2022) 30 tablet 3    sertraline (ZOLOFT) 50 MG tablet Take 1 tablet by mouth daily (Patient not taking: Reported on 4/20/2022) 90 tablet 3     No current facility-administered medications for this visit. Allergies   Allergen Reactions    Norco [Hydrocodone-Acetaminophen] Itching    Tramadol Itching    Codeine Hives and Rash       SUBJECTIVE:   Review of Systems   Constitutional: Negative for activity change, appetite change and fatigue. Respiratory: Negative for cough, shortness of breath and wheezing. Cardiovascular: Negative for chest pain, palpitations and leg swelling.    Gastrointestinal: Negative for abdominal distention and abdominal pain. Musculoskeletal: Negative for gait problem. Neurological: Negative for weakness, light-headedness and headaches. Psychiatric/Behavioral: Negative for sleep disturbance. OBJECTIVE:   Today's Vitals:  /82   Pulse 81   Ht 5' 5\" (1.651 m)   Wt 233 lb 9.6 oz (106 kg)   SpO2 97%   BMI 38.87 kg/m²     Physical Exam  Vitals reviewed. Constitutional:       General: She is not in acute distress. Appearance: Normal appearance. She is well-developed. She is not diaphoretic. HENT:      Head: Normocephalic and atraumatic. Eyes:      Conjunctiva/sclera: Conjunctivae normal.   Cardiovascular:      Rate and Rhythm: Normal rate and regular rhythm. Heart sounds: Normal heart sounds. No murmur heard. Pulmonary:      Effort: Pulmonary effort is normal. No respiratory distress. Breath sounds: Normal breath sounds. No wheezing, rhonchi or rales. Abdominal:      General: Bowel sounds are normal. There is no distension. Palpations: Abdomen is soft. Tenderness: There is no abdominal tenderness. Musculoskeletal:         General: Normal range of motion. Cervical back: Normal range of motion and neck supple. Right lower leg: No edema. Left lower leg: No edema. Skin:     General: Skin is warm and dry. Capillary Refill: Capillary refill takes less than 2 seconds. Neurological:      Mental Status: She is alert and oriented to person, place, and time. Coordination: Coordination normal.   Psychiatric:         Behavior: Behavior normal.         Wt Readings from Last 3 Encounters:   04/20/22 233 lb 9.6 oz (106 kg)   02/21/22 242 lb (109.8 kg)   01/31/22 242 lb (109.8 kg)     BP Readings from Last 3 Encounters:   04/20/22 130/82   02/21/22 130/72   02/07/22 128/80     Pulse Readings from Last 3 Encounters:   04/20/22 81   02/21/22 89   02/07/22 97     Body mass index is 38.87 kg/m².     ECHO:   Conclusions      Summary   Technically difficult examination. Normal left ventricular size and systolic function. There were no regional wall motion abnormalities. Wall thickness was within normal limits. Ejection fraction was estimated at 50-55%. Doppler parameters were consistent with abnormal left ventricular   relaxation (grade 1 diastolic dysfunction). S/p TAVR. DOPPLER: Transaortic velocity was within the normal range with   no evidence of aortic stenosis (mean gradient 7 mmHg, V max 1.8 m/s, EOA   1.8 cm2). There was no evidence of aortic regurgitation. There was trace mitral regurgitation. IVC not well seen. Signature      ----------------------------------------------------------------   Electronically signed by Brendon Mariano MD (Interpreting   physician) on 03/02/2021 at 02:52 PM   ----------------------------------------------------------------      CATH/STRESS:   IMPRESSION:    1. Mild pulmonary hypertension. 2.    No step up in oxygen saturation from different chambers of right side          of the heart. 3.    Diastolic dysfunction of the left ventricle. 4.    Left ventricular hypertrophy, preserved systolic function of left          ventricle, ejection fraction about 55-60 percent. 5.    No mitral regurg, no gradient across aortic valve. 6.    Extremely tortuous lower abdomen and calcification. Renal sheath has          to be utilized. 7.    Patent nondominant right coronary artery. 8.    Short patent left main. 9.    Patent dominant circumflex artery. 10.   Nonobstructive disease proximal left anterior descending with          calcification. Distally the LAD was patent. The patient tolerated the procedure well. At this point we recommend for the  patient to continue aggressive medical treatment, risk factor modification. FOLLOWUP:  The patient should be considered for sleep apnea study and  possible C-PAP.      Thank you very much for allowing us to share in the management of this  patient. Mohan Lau M.D.        D: 03/21/2014 09:34                                    T: 03/21/2014 09:48  cp           Results reviewed:  BNP: No results found for: BNP  CBC:   Lab Results   Component Value Date    WBC 7.1 01/31/2022    RBC 5.05 01/31/2022    RBC 0-5 04/07/2015    HGB 15.2 01/31/2022    HCT 47.8 01/31/2022     01/31/2022     CMP:    Lab Results   Component Value Date     02/07/2022    K 4.4 02/07/2022    K 4.1 02/07/2021     02/07/2022    CO2 26 02/07/2022    BUN 14 02/07/2022    CREATININE 0.8 02/07/2022    LABGLOM 69 02/07/2022    GLUCOSE 184 02/07/2022    GLUCOSE 147 04/30/2021    CALCIUM 10.0 02/07/2022     Hepatic Function Panel:    Lab Results   Component Value Date    ALKPHOS 78 02/07/2022    ALT 24 02/07/2022    AST 23 02/07/2022    PROT 7.1 02/07/2022    BILITOT 0.5 02/07/2022    BILITOT NEGATIVE 04/07/2015    BILIDIR <0.2 02/08/2021    LABALBU 4.6 02/07/2022     Magnesium:    Lab Results   Component Value Date    MG 1.5 04/19/2022     PT/INR:    Lab Results   Component Value Date    INR 1.03 02/04/2021     Lipids:    Lab Results   Component Value Date    TRIG 141 05/14/2016    HDL 46 05/14/2016    [address] 05/14/2016       ASSESSMENT AND PLAN:   The patient's condition/symptoms are Stable: No clinical evidence of fluid overload today. Continue current medical regimen without changes at present time. Diagnosis Orders   1. Chronic diastolic congestive heart failure, NYHA class 1 (HCC)       Continue:  GDMT:   ACE/ARB/ARNI - Quinapril 10 mg daily   BB - Atenolol 25 mg Daily   Diuretic - Lasix 20 PRN  AA - none  SGLT2 -  none  Vasodilator - Hydralazine 25mg BID   Other - Plavix, Mg, ASA, KCl 10 PRN    Tolerating above noted HF meds, no ill side effects noted. Will continue to monitor kidney function and electrolytes. Will optimize as tolerated. Pt is compliant w/ medications.     Total visit time of 25 minutes has been spent with patient on education of symptoms, management, medication, and plan of care; as well as review of chart: labs, ECHO, radiology reports, etc.   I personally spent more then 50% of the appt time face to face with the patient. · Daily weights  · Fluid restriction of 2 Liters per day  · Limit sodium in diet to around 2571-8392 mg/day  · Monitor BP  · Activity as tolerated       HFpEF 55%  Stable, appears Euvolemic   Lab reviewed - K 4.4, Mag 1.5 Cr 0.8      BP/HR stable   Magnesium 3 times a day. Labs in two weeks   Continue diet/fluid adherence  Continue daily wts. F/U w/ Cardiology  F/U in clinic in 8 weeks    Patient was instructed to call the [address] for any changes in symptoms as noted in AVS.      Return in about 8 weeks (around 6/15/2022). or sooner if needed     Patient given educational materials - see patient instructions. We discussed the importance of weighing oneself and recording daily. We also discussed the importance of a low sodium diet, higher sodium foods to avoid and better low sodium food options. Patient verbalizes understanding of plan of care using teach back method, and is agreeable to the treatment plan.        Electronically signed by SEAN Pacheco CNP on 4/20/2022 at 4:13 PM

## 2022-04-20 NOTE — PATIENT INSTRUCTIONS
You may receive a survey regarding the care you received during your visit. Your input is valuable to us. We encourage you to complete and return your survey. We hope you will choose us in the future for your healthcare needs. Continue:  · Continue current medications  · Daily weights and record  · Fluid restriction of 2 Liters per day  · Limit sodium in diet to around 3836-9581 mg/day  · Monitor BP  · Activity as tolerated     Call the Heart Failure Clinic for any of the following symptoms: [phone]   Weight gain of 2-3 pounds in 1 day or 5 pounds in 1 week   Increased shortness of breath   Shortness of breath while laying down   Cough   Chest pain   Swelling in feet, ankles or legs   Tenderness or bloating in the abdomen   Fatigue    Decreased appetite or nausea    Confusion       Magnesium 3 times a day. Labs in two weeks   Continue diet/fluid adherence  Continue daily wts.   F/U w/ Cardiology  F/U in clinic in 8 weeks

## 2022-04-20 NOTE — TELEPHONE ENCOUNTER
Calling in new prescription for her magnesium still low. Is she taking it?      New order: 500 am, 250 lunch, 500 pm    Labs again in two weeks

## 2022-04-21 RX ORDER — FUROSEMIDE 20 MG/1
20 TABLET ORAL PRN
Qty: 90 TABLET | Refills: 0 | Status: SHIPPED | OUTPATIENT
Start: 2022-04-21 | End: 2022-09-27

## 2022-05-04 ENCOUNTER — TELEPHONE (OUTPATIENT)
Dept: CARDIOLOGY CLINIC | Age: 82
End: 2022-05-04

## 2022-05-04 NOTE — TELEPHONE ENCOUNTER
Pre op Risk Assessment    Procedure Oral Surgery  Physician Dr. Daisy Bocanegra  Date of surgery/procedure TBD    Last OV 1-31-22  Last Stress 11-27-20  Last Echo 2-1-22  Last Cath TAVR 2-3-21  Is patient on blood thinners ASA and Plavix  Hold Meds/how many days ASA 3 days and Plavix 7 days    Fax- [phone]

## 2022-05-10 ENCOUNTER — TELEPHONE (OUTPATIENT)
Dept: CARDIOLOGY CLINIC | Age: 82
End: 2022-05-10

## 2022-05-10 ENCOUNTER — NURSE ONLY (OUTPATIENT)
Dept: LAB | Age: 82
End: 2022-05-10

## 2022-05-10 DIAGNOSIS — E83.42 HYPOMAGNESEMIA: ICD-10-CM

## 2022-05-10 LAB — MAGNESIUM: 1.6 MG/DL (ref 1.6–2.4)

## 2022-05-10 NOTE — TELEPHONE ENCOUNTER
----- Message from SEAN Sloan CNP sent at 5/10/2022  3:47 PM EDT -----  Mag is 1.6.  WNL but low end, please ask what she is taking (has not been compliant w/ her magnesium)

## 2022-05-16 ENCOUNTER — NURSE ONLY (OUTPATIENT)
Dept: LAB | Age: 82
End: 2022-05-16

## 2022-05-16 ENCOUNTER — OFFICE VISIT (OUTPATIENT)
Dept: FAMILY MEDICINE CLINIC | Age: 82
End: 2022-05-16
Payer: MEDICARE

## 2022-05-16 VITALS
HEART RATE: 65 BPM | WEIGHT: 225 LBS | TEMPERATURE: 97.4 F | HEIGHT: 65 IN | SYSTOLIC BLOOD PRESSURE: 130 MMHG | DIASTOLIC BLOOD PRESSURE: 78 MMHG | OXYGEN SATURATION: 98 % | RESPIRATION RATE: 16 BRPM | BODY MASS INDEX: 37.49 KG/M2

## 2022-05-16 DIAGNOSIS — R68.89 HEAT INTOLERANCE: ICD-10-CM

## 2022-05-16 DIAGNOSIS — I10 HTN, GOAL BELOW 140/90: Primary | ICD-10-CM

## 2022-05-16 DIAGNOSIS — F43.21 GRIEF: ICD-10-CM

## 2022-05-16 DIAGNOSIS — E11.40 TYPE 2 DIABETES MELLITUS WITH DIABETIC NEUROPATHY, WITHOUT LONG-TERM CURRENT USE OF INSULIN (HCC): ICD-10-CM

## 2022-05-16 LAB
AVERAGE GLUCOSE: 132 MG/DL (ref 70–126)
HBA1C MFR BLD: 6.4 % (ref 4.4–6.4)
T4 FREE: 1.07 NG/DL (ref 0.93–1.76)
TSH SERPL DL<=0.05 MIU/L-ACNC: 1.9 UIU/ML (ref 0.4–4.2)

## 2022-05-16 PROCEDURE — 1090F PRES/ABSN URINE INCON ASSESS: CPT | Performed by: FAMILY MEDICINE

## 2022-05-16 PROCEDURE — G8417 CALC BMI ABV UP PARAM F/U: HCPCS | Performed by: FAMILY MEDICINE

## 2022-05-16 PROCEDURE — 1123F ACP DISCUSS/DSCN MKR DOCD: CPT | Performed by: FAMILY MEDICINE

## 2022-05-16 PROCEDURE — 3051F HG A1C>EQUAL 7.0%<8.0%: CPT | Performed by: FAMILY MEDICINE

## 2022-05-16 PROCEDURE — G8427 DOCREV CUR MEDS BY ELIG CLIN: HCPCS | Performed by: FAMILY MEDICINE

## 2022-05-16 PROCEDURE — G8399 PT W/DXA RESULTS DOCUMENT: HCPCS | Performed by: FAMILY MEDICINE

## 2022-05-16 PROCEDURE — 99214 OFFICE O/P EST MOD 30 MIN: CPT | Performed by: FAMILY MEDICINE

## 2022-05-16 PROCEDURE — 1036F TOBACCO NON-USER: CPT | Performed by: FAMILY MEDICINE

## 2022-05-16 PROCEDURE — 4040F PNEUMOC VAC/ADMIN/RCVD: CPT | Performed by: FAMILY MEDICINE

## 2022-05-16 RX ORDER — ATENOLOL 50 MG/1
TABLET ORAL
Qty: 90 TABLET | Refills: 1 | Status: SHIPPED | OUTPATIENT
Start: 2022-05-16 | End: 2022-05-16

## 2022-05-16 RX ORDER — ATENOLOL 50 MG/1
TABLET ORAL
Qty: 30 TABLET | Refills: 0 | Status: SHIPPED | OUTPATIENT
Start: 2022-05-16 | End: 2022-06-27 | Stop reason: SDUPTHER

## 2022-05-16 ASSESSMENT — PATIENT HEALTH QUESTIONNAIRE - PHQ9
SUM OF ALL RESPONSES TO PHQ QUESTIONS 1-9: 9
3. TROUBLE FALLING OR STAYING ASLEEP: 1
10. IF YOU CHECKED OFF ANY PROBLEMS, HOW DIFFICULT HAVE THESE PROBLEMS MADE IT FOR YOU TO DO YOUR WORK, TAKE CARE OF THINGS AT HOME, OR GET ALONG WITH OTHER PEOPLE: 0
2. FEELING DOWN, DEPRESSED OR HOPELESS: 3
7. TROUBLE CONCENTRATING ON THINGS, SUCH AS READING THE NEWSPAPER OR WATCHING TELEVISION: 0
6. FEELING BAD ABOUT YOURSELF - OR THAT YOU ARE A FAILURE OR HAVE LET YOURSELF OR YOUR FAMILY DOWN: 0
SUM OF ALL RESPONSES TO PHQ QUESTIONS 1-9: 9
SUM OF ALL RESPONSES TO PHQ9 QUESTIONS 1 & 2: 6
SUM OF ALL RESPONSES TO PHQ QUESTIONS 1-9: 9
1. LITTLE INTEREST OR PLEASURE IN DOING THINGS: 3
8. MOVING OR SPEAKING SO SLOWLY THAT OTHER PEOPLE COULD HAVE NOTICED. OR THE OPPOSITE, BEING SO FIGETY OR RESTLESS THAT YOU HAVE BEEN MOVING AROUND A LOT MORE THAN USUAL: 0
9. THOUGHTS THAT YOU WOULD BE BETTER OFF DEAD, OR OF HURTING YOURSELF: 0
4. FEELING TIRED OR HAVING LITTLE ENERGY: 0
5. POOR APPETITE OR OVEREATING: 2
SUM OF ALL RESPONSES TO PHQ QUESTIONS 1-9: 9

## 2022-05-16 NOTE — PROGRESS NOTES
Sharyle Mullet is a [de-identified] y.o. female that presents for     Chief Complaint   Patient presents with    Depression       /78   Pulse 65   Temp 97.4 °F (36.3 °C) (Infrared)   Resp 16   Ht 5' 5\" (1.651 m)   Wt 225 lb (102.1 kg)   SpO2 98%   BMI 37.44 kg/m²       HPI     recently passed away. Feels like she is very lonely. Stopped her paxil on her own. Sleeping ok. Appetite is decreased, has noted some weight loss. States that she does not want to restart medication. Notes that she is sweating more recently. Seems related to Hydralazine.       Health Maintenance   Topic Date Due    DTaP/Tdap/Td vaccine (1 - Tdap) Never done    Shingles vaccine (1 of 2) Never done    Pneumococcal 65+ years Vaccine (2 - PPSV23 or PCV20) 12/14/2016    Lipids  05/14/2017    Depression Monitoring  07/14/2021    Annual Wellness Visit (AWV)  07/15/2021    Flu vaccine  Completed    COVID-19 Vaccine  Completed    DEXA (modify frequency per FRAX score)  Addressed    Hepatitis A vaccine  Aged Out    Hib vaccine  Aged Out    Meningococcal (ACWY) vaccine  Aged Out       Past Medical History:   Diagnosis Date    Arthritis     Chronic low back pain     COVID-19     DM type 2, goal A1c below 7     GERD (gastroesophageal reflux disease)     Hyperlipidemia     Hypertension     Loss of vision     Mitral valve disorder     Osteoporosis     Subarachnoid hemorrhage ([address] 4/25/2019    TIA (transient ischemic attack)     Type 2 diabetes mellitus with diabetic neuropathy, without long-term current use of insulin ([address] 7/26/2017   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    aslo after valve replacement 2020    Urinary incontinence        Past Surgical History:   Procedure Laterality Date    APPENDECTOMY      CARDIAC SURGERY      TAVR    CARPAL TUNNEL RELEASE Left 2011    CATARACT REMOVAL Bilateral     CERVICAL FUSION  1976    CHOLECYSTECTOMY      COLON SURGERY  2012  COLONOSCOPY  2015    ENDOSCOPY, COLON, DIAGNOSTIC      EYE SURGERY      CATARACTS    FINGER SURGERY Right 10/2016    3rd digit    HERNIA REPAIR      HYSTERECTOMY      JOINT REPLACEMENT      both knees    NERVE SURGERY Right 10/25/2019    Lumbar Facet MBB @ H68-T8,M7-2 L2-3 right side only #1 performed by Beryl Jurado MD at [facility] 98 Right 12/16/2019    Lumbar Facet MBB @ Y87-Y0-I2-7, L2-3 RIGHT SIDE ONLY performed by Beryl Jurado MD at [facility] 113 Right 1/6/2020    Lumbar RFA T12-L1, L1-2, L2-3 Right performed by Beryl Jurado MD at  [address] LAMINECTOMY,>2 [facility]      6 FUSIONS    REVISION TOTAL KNEE ARTHROPLASTY Left 6/1/2021    REVISION LEFT TOTAL KNEE ARTHROPLASTY performed by Carmelita Franz MD at [address] Right 11/28/2014    SHOULDER SURGERY  2014    SPINE SURGERY      [facility]ENDON [facility] 05/18/2017    [facility] Bilateral     UPPER GASTROINTESTINAL ENDOSCOPY  2013,2015       Social History     Tobacco Use    Smoking status: Never Smoker    Smokeless tobacco: Never Used   Vaping Use    Vaping Use: Never used   Substance Use Topics    Alcohol use: No    Drug use: No       Family History   Problem Relation Age of Onset    Arthritis Mother     Cancer Mother     Heart Disease Father     High Blood Pressure Father          I have reviewed the patient's past medical history, past surgical history, allergies, medications, social and family history and I have made updates where appropriate. Review of Systems        PHYSICAL EXAM:  /78   Pulse 65   Temp 97.4 °F (36.3 °C) (Infrared)   Resp 16   Ht 5' 5\" (1.651 m)   Wt 225 lb (102.1 kg)   SpO2 98%   BMI 37.44 kg/m²     Physical Exam  Vitals and nursing note reviewed. Constitutional:       General: She is not in acute distress.      Appearance: She is well-developed. HENT:      Head: Normocephalic and atraumatic. Right Ear: Hearing and external ear normal.      Left Ear: Hearing and external ear normal.      Nose: Nose normal.   Eyes:      General:         Right eye: No discharge. Left eye: No discharge. Conjunctiva/sclera: Conjunctivae normal.   Neck:      Thyroid: No thyromegaly. Trachea: No tracheal deviation. Cardiovascular:      Rate and Rhythm: Normal rate and regular rhythm. Heart sounds: Normal heart sounds. No murmur heard. No friction rub. No gallop. Pulmonary:      Effort: Pulmonary effort is normal. No respiratory distress. Breath sounds: No wheezing or rales. Chest:      Chest wall: No tenderness. Musculoskeletal:         General: No deformity. Cervical back: Normal range of motion and neck supple. Lymphadenopathy:      Cervical: No cervical adenopathy. Skin:     General: Skin is warm and dry. Findings: No erythema or rash. Neurological:      Mental Status: She is alert. Motor: No abnormal muscle tone. Coordination: Coordination normal.   Psychiatric:         Behavior: Behavior normal.         Thought Content: Thought content normal.         Judgment: Judgment normal.             ASSESSMENT & PLAN  Alma Larsen was seen today for depression. Diagnoses and all orders for this visit:    HTN, goal below 140/90  -     Discontinue: atenolol (TENORMIN) 50 MG tablet; TAKE 1 TABLET DAILY  -     atenolol (TENORMIN) 50 MG tablet; TAKE 1 TABLET DAILY    Heat intolerance  -     T4, Free; Future  -     TSH; Future    Type 2 diabetes mellitus with diabetic neuropathy, without long-term current use of insulin (HCC)  -     Hemoglobin A1C; Future    Grief    -Obtain labs for heat intolerance.   Will also d/c hydralazine and start atenolol.  -Monitor grief sx, will let me know if any worsening  -Other chronic issues are stable, continue current medications  -Advised to call if any issues      Return in about 3 months (around 8/16/2022). No red flag sx    The most recent results of the following tests were reviewed with the patient today: A1c     All copied or forwarded information in the progress note was verified by me to be accurate at the time of visit  Patient's past medical, surgical, social and family history were reviewed and updated     All patient questions answered. Patient voiced understanding.

## 2022-05-17 ENCOUNTER — TELEPHONE (OUTPATIENT)
Dept: FAMILY MEDICINE CLINIC | Age: 82
End: 2022-05-17

## 2022-05-17 NOTE — TELEPHONE ENCOUNTER
----- Message from Subhash Rodríguez DO sent at 5/17/2022 10:30 AM EDT -----  Blood sugar and thyroid labs look good. Recommend that she try the medication change that we discussed on Monday.

## 2022-06-17 ENCOUNTER — OFFICE VISIT (OUTPATIENT)
Dept: CARDIOLOGY CLINIC | Age: 82
End: 2022-06-17
Payer: MEDICARE

## 2022-06-17 ENCOUNTER — TELEPHONE (OUTPATIENT)
Dept: CARDIOLOGY CLINIC | Age: 82
End: 2022-06-17

## 2022-06-17 VITALS
HEIGHT: 64 IN | DIASTOLIC BLOOD PRESSURE: 99 MMHG | WEIGHT: 225 LBS | BODY MASS INDEX: 38.41 KG/M2 | SYSTOLIC BLOOD PRESSURE: 189 MMHG | HEART RATE: 84 BPM

## 2022-06-17 DIAGNOSIS — R06.02 SOB (SHORTNESS OF BREATH) ON EXERTION: Primary | ICD-10-CM

## 2022-06-17 PROCEDURE — G8417 CALC BMI ABV UP PARAM F/U: HCPCS | Performed by: NURSE PRACTITIONER

## 2022-06-17 PROCEDURE — G8428 CUR MEDS NOT DOCUMENT: HCPCS | Performed by: NURSE PRACTITIONER

## 2022-06-17 PROCEDURE — 93000 ELECTROCARDIOGRAM COMPLETE: CPT | Performed by: NURSE PRACTITIONER

## 2022-06-17 PROCEDURE — 99211 OFF/OP EST MAY X REQ PHY/QHP: CPT | Performed by: NURSE PRACTITIONER

## 2022-06-17 NOTE — TELEPHONE ENCOUNTER
Called stating that she has been having interment chest pain, feeling lightheaded and dizzy. Notes dizziness and feeling lightheaded when lay flat. Has had had nausea with one episode of vomiting. Recommending that she go the ER. She is refusing to go, willing to come in for a nurse visit to get vitals checked.

## 2022-06-17 NOTE — PROGRESS NOTES
Pt was not feeling well CP yesterday that has not returned, having nausea and  was recommended to go to ER, however pt refused. We did a EKG, and vitals, and pt did not take medications this am. Pt will come back next week for a follow up.

## 2022-06-20 ENCOUNTER — OFFICE VISIT (OUTPATIENT)
Dept: FAMILY MEDICINE CLINIC | Age: 82
End: 2022-06-20
Payer: MEDICARE

## 2022-06-20 VITALS
SYSTOLIC BLOOD PRESSURE: 154 MMHG | WEIGHT: 223 LBS | HEART RATE: 75 BPM | RESPIRATION RATE: 16 BRPM | BODY MASS INDEX: 38.07 KG/M2 | TEMPERATURE: 97.1 F | DIASTOLIC BLOOD PRESSURE: 91 MMHG | OXYGEN SATURATION: 98 % | HEIGHT: 64 IN

## 2022-06-20 DIAGNOSIS — K21.9 GASTROESOPHAGEAL REFLUX DISEASE WITHOUT ESOPHAGITIS: ICD-10-CM

## 2022-06-20 DIAGNOSIS — E11.8 CONTROLLED TYPE 2 DIABETES MELLITUS WITH COMPLICATION, WITHOUT LONG-TERM CURRENT USE OF INSULIN (HCC): ICD-10-CM

## 2022-06-20 DIAGNOSIS — I10 HTN, GOAL BELOW 140/90: Primary | ICD-10-CM

## 2022-06-20 DIAGNOSIS — E66.01 SEVERE OBESITY (BMI 35.0-39.9) WITH COMORBIDITY (HCC): ICD-10-CM

## 2022-06-20 PROCEDURE — G8399 PT W/DXA RESULTS DOCUMENT: HCPCS | Performed by: FAMILY MEDICINE

## 2022-06-20 PROCEDURE — 99214 OFFICE O/P EST MOD 30 MIN: CPT | Performed by: FAMILY MEDICINE

## 2022-06-20 PROCEDURE — 1123F ACP DISCUSS/DSCN MKR DOCD: CPT | Performed by: FAMILY MEDICINE

## 2022-06-20 PROCEDURE — 1036F TOBACCO NON-USER: CPT | Performed by: FAMILY MEDICINE

## 2022-06-20 PROCEDURE — G8427 DOCREV CUR MEDS BY ELIG CLIN: HCPCS | Performed by: FAMILY MEDICINE

## 2022-06-20 PROCEDURE — 3044F HG A1C LEVEL LT 7.0%: CPT | Performed by: FAMILY MEDICINE

## 2022-06-20 PROCEDURE — 1090F PRES/ABSN URINE INCON ASSESS: CPT | Performed by: FAMILY MEDICINE

## 2022-06-20 PROCEDURE — G8417 CALC BMI ABV UP PARAM F/U: HCPCS | Performed by: FAMILY MEDICINE

## 2022-06-20 RX ORDER — SUCRALFATE 1 G/1
1 TABLET ORAL 4 TIMES DAILY
Qty: 56 TABLET | Refills: 0 | Status: SHIPPED | OUTPATIENT
Start: 2022-06-20 | End: 2022-09-27

## 2022-06-20 RX ORDER — ATORVASTATIN CALCIUM 40 MG/1
40 TABLET, FILM COATED ORAL DAILY
Qty: 90 TABLET | Refills: 3 | Status: SHIPPED | OUTPATIENT
Start: 2022-06-20

## 2022-06-20 RX ORDER — HYDRALAZINE HYDROCHLORIDE 25 MG/1
25 TABLET, FILM COATED ORAL 2 TIMES DAILY
Qty: 180 TABLET | Refills: 1 | Status: SHIPPED | OUTPATIENT
Start: 2022-06-20

## 2022-06-20 RX ORDER — OMEPRAZOLE 40 MG/1
40 CAPSULE, DELAYED RELEASE ORAL DAILY
Qty: 90 CAPSULE | Refills: 3 | Status: SHIPPED | OUTPATIENT
Start: 2022-06-20

## 2022-06-20 SDOH — ECONOMIC STABILITY: FOOD INSECURITY: WITHIN THE PAST 12 MONTHS, THE FOOD YOU BOUGHT JUST DIDN'T LAST AND YOU DIDN'T HAVE MONEY TO GET MORE.: NEVER TRUE

## 2022-06-20 SDOH — ECONOMIC STABILITY: FOOD INSECURITY: WITHIN THE PAST 12 MONTHS, YOU WORRIED THAT YOUR FOOD WOULD RUN OUT BEFORE YOU GOT MONEY TO BUY MORE.: NEVER TRUE

## 2022-06-20 ASSESSMENT — SOCIAL DETERMINANTS OF HEALTH (SDOH): HOW HARD IS IT FOR YOU TO PAY FOR THE VERY BASICS LIKE FOOD, HOUSING, MEDICAL CARE, AND HEATING?: NOT HARD AT ALL

## 2022-06-20 NOTE — PROGRESS NOTES
Ward Webster is a [de-identified] y.o. female that presents for     Chief Complaint   Patient presents with    Hypertension    Nausea & Vomiting       BP (!) 162/98   Pulse 75   Temp 97.1 °F (36.2 °C) (Infrared)   Resp 16   Ht 5' 4\" (1.626 m)   Wt 223 lb (101.2 kg)   SpO2 98%   BMI 38.28 kg/m²       HPI    HTN    Does patient check BP regularly at home? - Yes - has been elevated recently, has checked several times when she has not been feeling well and has been elevated. Has been having N/V in the morning x 2 weeks and that is when she is noting the elevated BPs. Denies having chest pains. States that she is feeling nausea when eating. Denies heartburn. Current Medication regimen - atenolol, quinapril, also recently restarted hydralazine yesterday  Tolerating medications well? - yes    Shortness of breath or chest pain? No  Headache or visual complaints? No  Neurologic changes like confusion? No  Extremity edema?  No    BP Readings from Last 3 Encounters:   06/20/22 (!) 162/98   06/17/22 (!) 189/99   05/16/22 130/78         Health Maintenance   Topic Date Due    DTaP/Tdap/Td vaccine (1 - Tdap) Never done    Shingles vaccine (1 of 2) Never done    Pneumococcal 65+ years Vaccine (2 - PPSV23 or PCV20) 12/14/2016    Lipids  05/14/2017    Annual Wellness Visit (AWV)  07/15/2021    Depression Monitoring  05/16/2023    Flu vaccine  Completed    COVID-19 Vaccine  Completed    DEXA (modify frequency per FRAX score)  Addressed    Hepatitis A vaccine  Aged Out    Hib vaccine  Aged Out    Meningococcal (ACWY) vaccine  Aged Out       Past Medical History:   Diagnosis Date    Arthritis     Chronic low back pain     COVID-19     DM type 2, goal A1c below 7     GERD (gastroesophageal reflux disease)     Hyperlipidemia     Hypertension     Loss of vision     Mitral valve disorder     Osteoporosis     Subarachnoid hemorrhage ([address] 4/25/2019    TIA (transient ischemic attack)     Type 2 diabetes reviewed the patient's past medical history, past surgical history, allergies, medications, social and family history and I have made updates where appropriate. Review of Systems        PHYSICAL EXAM:  BP (!) 162/98   Pulse 75   Temp 97.1 °F (36.2 °C) (Infrared)   Resp 16   Ht 5' 4\" (1.626 m)   Wt 223 lb (101.2 kg)   SpO2 98%   BMI 38.28 kg/m²     Physical Exam  Vitals and nursing note reviewed. Constitutional:       General: She is not in acute distress. Appearance: She is well-developed. HENT:      Head: Normocephalic and atraumatic. Right Ear: Hearing and external ear normal.      Left Ear: Hearing and external ear normal.      Nose: Nose normal.   Eyes:      General:         Right eye: No discharge. Left eye: No discharge. Conjunctiva/sclera: Conjunctivae normal.   Neck:      Thyroid: No thyromegaly. Trachea: No tracheal deviation. Cardiovascular:      Rate and Rhythm: Normal rate and regular rhythm. Heart sounds: Normal heart sounds. No murmur heard. No friction rub. No gallop. Pulmonary:      Effort: Pulmonary effort is normal. No respiratory distress. Breath sounds: No wheezing or rales. Chest:      Chest wall: No tenderness. Musculoskeletal:         General: No deformity. Cervical back: Normal range of motion and neck supple. Lymphadenopathy:      Cervical: No cervical adenopathy. Skin:     General: Skin is warm and dry. Findings: No erythema or rash. Neurological:      Mental Status: She is alert. Motor: No abnormal muscle tone. Coordination: Coordination normal.   Psychiatric:         Behavior: Behavior normal.         Thought Content: Thought content normal.         Judgment: Judgment normal.             ASSESSMENT & PLAN  Xander Castorena was seen today for hypertension and nausea & vomiting. Diagnoses and all orders for this visit:    HTN, goal below 140/90  -     hydrALAZINE (APRESOLINE) 25 MG tablet;  Take 1 tablet by mouth 2 times daily    Gastroesophageal reflux disease without esophagitis  -     omeprazole (PRILOSEC) 40 MG delayed release capsule; Take 1 capsule by mouth daily  -     sucralfate (CARAFATE) 1 GM tablet; Take 1 tablet by mouth 4 times daily for 14 days    Controlled type 2 diabetes mellitus with complication, without long-term current use of insulin (HCC)  -     atorvastatin (LIPITOR) 40 MG tablet; Take 1 tablet by mouth daily    Severe obesity (BMI 35.0-39. 9) with comorbidity (HCC)    -N/V appears consistent with gastritis, she does have a hx of PUD. Will start with carafate and recheck next week. -Restart hydralazine for uncontrolled HTN    Return in about 2 weeks (around 7/4/2022). The most recent results of the following tests were reviewed with the patient today: none     All copied or forwarded information in the progress note was verified by me to be accurate at the time of visit  Patient's past medical, surgical, social and family history were reviewed and updated     All patient questions answered. Patient voiced understanding.

## 2022-06-22 ENCOUNTER — OFFICE VISIT (OUTPATIENT)
Dept: CARDIOLOGY CLINIC | Age: 82
End: 2022-06-22
Payer: MEDICARE

## 2022-06-22 VITALS
SYSTOLIC BLOOD PRESSURE: 156 MMHG | OXYGEN SATURATION: 97 % | BODY MASS INDEX: 38.21 KG/M2 | DIASTOLIC BLOOD PRESSURE: 82 MMHG | HEIGHT: 64 IN | HEART RATE: 74 BPM | WEIGHT: 223.8 LBS

## 2022-06-22 DIAGNOSIS — R10.9 ABDOMINAL DISCOMFORT: ICD-10-CM

## 2022-06-22 DIAGNOSIS — I50.32 CHF (CONGESTIVE HEART FAILURE), NYHA CLASS II, CHRONIC, DIASTOLIC (HCC): Primary | ICD-10-CM

## 2022-06-22 PROCEDURE — G8427 DOCREV CUR MEDS BY ELIG CLIN: HCPCS | Performed by: NURSE PRACTITIONER

## 2022-06-22 PROCEDURE — 99213 OFFICE O/P EST LOW 20 MIN: CPT | Performed by: NURSE PRACTITIONER

## 2022-06-22 PROCEDURE — G8417 CALC BMI ABV UP PARAM F/U: HCPCS | Performed by: NURSE PRACTITIONER

## 2022-06-22 PROCEDURE — 1090F PRES/ABSN URINE INCON ASSESS: CPT | Performed by: NURSE PRACTITIONER

## 2022-06-22 PROCEDURE — 1123F ACP DISCUSS/DSCN MKR DOCD: CPT | Performed by: NURSE PRACTITIONER

## 2022-06-22 PROCEDURE — G8399 PT W/DXA RESULTS DOCUMENT: HCPCS | Performed by: NURSE PRACTITIONER

## 2022-06-22 PROCEDURE — 1036F TOBACCO NON-USER: CPT | Performed by: NURSE PRACTITIONER

## 2022-06-22 RX ORDER — QUINAPRIL 10 MG/1
20 TABLET ORAL DAILY
Qty: 180 TABLET | Refills: 3 | Status: SHIPPED | OUTPATIENT
Start: 2022-06-22

## 2022-06-22 ASSESSMENT — ENCOUNTER SYMPTOMS
ABDOMINAL PAIN: 1
COUGH: 0
WHEEZING: 0
ABDOMINAL DISTENTION: 0
SHORTNESS OF BREATH: 0

## 2022-06-22 NOTE — PATIENT INSTRUCTIONS
You may receive a survey regarding the care you received during your visit. Your input is valuable to us. We encourage you to complete and return your survey. We hope you will choose us in the future for your healthcare needs. Continue:  · Continue current medications  · Daily weights and record  · Fluid restriction of 2 Liters per day  · Limit sodium in diet to around 9585-4634 mg/day  · Monitor BP  · Activity as tolerated     Call the [facility] Failure [facility] for any of the following symptoms: [phone]   Weight gain of 2-3 pounds in 1 day or 5 pounds in 1 week   Increased shortness of breath   Shortness of breath while laying down   Cough   Chest pain   Swelling in feet, ankles or legs   Tenderness or bloating in the abdomen   Fatigue    Decreased appetite or nausea    Confusion       BP still elevated, increasing quinapril to 20 daily. Call with continue elevated BP. Labs with in 1 month  No med changes today. Continue diet/fluid adherence  Continue daily wts.   F/U w/ Cardiology  F/U in clinic PRN

## 2022-06-22 NOTE — PROGRESS NOTES
[facility] Failure [facility]       Visit Date: 2022  Cardiologist:  Dr. Michell Fuentes  Primary Care Physician: Dr. Ana Cesar, Romeo Dos Santos is a [de-identified] y.o. female who presents today for:  Chief Complaint   Patient presents with    Follow-up    Congestive Heart Failure       HPI:   Asmita Madsen is a [de-identified] y.o. female who presents to the office for a follow up patient visit in the heart failure clinic. Accompanied by self    TYPE HF: HFpEF (55%)   Cause:   Device: none  HX: TAVR (), HTN, DM, CAD s/p stent, TIA    Dry Wt:  235 (241 on 9/15/21) (233 on 22) (223 on 22)    Hospitalization:  >6 months    Concerns today: elevated blood pressure back on hydralazine 25mg BID. Having GI discomfort, started on carafate by PCP, going to f/u w/ a GI doctor, asking for referral today. Visit on : very tearful of Ag death. Really wants off of medications. Has not had any CHF symptoms of recent, not needing to take her Lasix PRN. Not eating since Dima's death. Having diarrhea d/t stress. Plans to see GI doc. Has not been taking Mag TID, only BID. Still low. Visit on 9/15 no complaints todays, no new symptoms.  Has not needed to take Lasix PRN    Activity: walker used for recent knee replacement, no BRITO, walks long distances w/o needing to stop, ADLs perfromed  Diet: low sodium low fluid  followed    Patient has:  Chest Pain: no  SOB: no  Orthopnea/PND: no  ALLISON: refused to do sleep study  Edema: no  Fatigue: no  Abdominal bloating: no  Cough: no  Appetite: good      Past Medical History:   Diagnosis Date    Arthritis     Chronic low back pain     COVID-19     DM type 2, goal A1c below 7     GERD (gastroesophageal reflux disease)     Hyperlipidemia     Hypertension     Loss of vision     Mitral valve disorder     Osteoporosis     [facility] ([facility] 2019    TIA (transient ischemic attack)     Type 2 diabetes mellitus with diabetic neuropathy, without long-term current use of insulin ([facility] 7/26/2017   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    aslo after valve replacement 2020    Urinary incontinence      Past Surgical History:   Procedure Laterality Date    APPENDECTOMY      CARDIAC SURGERY      TAVR    CARPAL TUNNEL RELEASE Left 2011    CATARACT REMOVAL Bilateral     CERVICAL FUSION  1976    CHOLECYSTECTOMY      COLON SURGERY  2012    COLONOSCOPY  2015    ENDOSCOPY, COLON, DIAGNOSTIC      EYE SURGERY      CATARACTS    FINGER SURGERY Right 10/2016    3rd digit    HERNIA REPAIR      HYSTERECTOMY (CERVIX STATUS UNKNOWN)      JOINT REPLACEMENT      both knees    NERVE SURGERY Right 10/25/2019    Lumbar Facet MBB @ B93-H5,E2-6 L2-3 right side only #1 performed by Simone Chan MD at [facility] Right 12/16/2019    Lumbar Facet MBB @ Z75-E2-F5-9, L2-3 RIGHT SIDE ONLY performed by Simone Chan MD at [facility] Right 1/6/2020    Lumbar RFA T12-L1, L1-2, L2-3 Right performed by Simone Chan MD at [address] LAMINECTOMY,>2 [facility] [place]      6 FUSIONS    REVISION TOTAL KNEE ARTHROPLASTY Left 6/1/2021    REVISION LEFT TOTAL KNEE ARTHROPLASTY performed by Ilsa Mckinney MD at [facility] Right 11/28/2014    SHOULDER SURGERY  2014    SPINE SURGERY      TENDON RELEASE Right 05/18/2017    at [address] Bilateral     UPPER GASTROINTESTINAL ENDOSCOPY  2013,2015     Family History   Problem Relation Age of Onset    Arthritis Mother     Cancer Mother     Heart Disease Father     High Blood Pressure Father      Social History     Tobacco Use    Smoking status: Never Smoker    Smokeless tobacco: Never Used   Substance Use Topics    Alcohol use: No     Current Outpatient Medications   Medication Sig Dispense Refill    quinapril (ACCUPRIL) 10 MG tablet Take 2 tablets by mouth daily 180 tablet 3    omeprazole (PRILOSEC) 40 MG delayed release capsule Take 1 capsule by mouth daily 90 capsule 3    atorvastatin (LIPITOR) 40 MG tablet Take 1 tablet by mouth daily 90 tablet 3    hydrALAZINE (APRESOLINE) 25 MG tablet Take 1 tablet by mouth 2 times daily 180 tablet 1    sucralfate (CARAFATE) 1 GM tablet Take 1 tablet by mouth 4 times daily for 14 days 56 tablet 0    atenolol (TENORMIN) 50 MG tablet TAKE 1 TABLET DAILY 30 tablet 0    furosemide (LASIX) 20 MG tablet Take 1 tablet by mouth as needed (for weight gain of 3 lbs) 90 tablet 0    Magnesium Oxide (MAGNESIUM-OXIDE) 250 MG TABS tablet Take 1 tablet by mouth in the morning, at noon, and at bedtime 270 tablet 3    potassium chloride (KLOR-CON M) 20 MEQ extended release tablet Take 0.5 tablets by mouth as needed (take with Lasix) 45 tablet 0    metFORMIN (GLUCOPHAGE) 500 MG tablet TAKE 1 TABLET TWICE A DAY WITH MEALS 180 tablet 3    Handicap Placard MISC by Does not apply route Expires 8/9/26 1 each 0    ondansetron (ZOFRAN ODT) 4 MG disintegrating tablet Take 1 tablet by mouth every 8 hours as needed for Nausea or Vomiting 90 tablet 1    aspirin 325 MG EC tablet Take 1 tablet by mouth daily 30 tablet 0    clopidogrel (PLAVIX) 75 MG tablet Take 1 tablet by mouth daily 90 tablet 3    vitamin C (VITAMIN C) 1000 MG tablet Take 1 tablet by mouth daily 30 tablet 3    vitamin D (CHOLECALCIFEROL) 1000 UNIT TABS tablet Take 1,000 Units by mouth nightly. No current facility-administered medications for this visit. Allergies   Allergen Reactions    Norco [Hydrocodone-Acetaminophen] Itching    Tramadol Itching    Codeine Hives and Rash       SUBJECTIVE:   Review of Systems   Constitutional: Negative for activity change, appetite change and fatigue. Respiratory: Negative for cough, shortness of breath and wheezing. Cardiovascular: Negative for chest pain, palpitations and leg swelling.    Gastrointestinal: Positive for abdominal pain. Negative for abdominal distention. Musculoskeletal: Negative for gait problem. Neurological: Negative for weakness, light-headedness and headaches. Psychiatric/Behavioral: Negative for sleep disturbance. OBJECTIVE:   Today's Vitals:  BP (!) 156/82   Pulse 74   Ht 5' 4\" (1.626 m)   Wt 223 lb 12.8 oz (101.5 kg)   SpO2 97%   BMI 38.42 kg/m²     Physical Exam  Vitals reviewed. Constitutional:       General: She is not in acute distress. Appearance: Normal appearance. She is well-developed. She is not diaphoretic. HENT:      Head: Normocephalic and atraumatic. Eyes:      Conjunctiva/sclera: Conjunctivae normal.   Cardiovascular:      Rate and Rhythm: Normal rate and regular rhythm. Heart sounds: Normal heart sounds. No murmur heard. Pulmonary:      Effort: Pulmonary effort is normal. No respiratory distress. Breath sounds: Normal breath sounds. No wheezing, rhonchi or rales. Abdominal:      General: Bowel sounds are normal. There is no distension. Palpations: Abdomen is soft. Tenderness: There is no abdominal tenderness. Musculoskeletal:         General: Normal range of motion. Cervical back: Normal range of motion and neck supple. Right lower leg: No edema. Left lower leg: No edema. Skin:     General: Skin is warm and dry. Capillary Refill: Capillary refill takes less than 2 seconds. Neurological:      Mental Status: She is alert and oriented to person, place, and time. Coordination: Coordination normal.   Psychiatric:         Behavior: Behavior normal.         Wt Readings from Last 3 Encounters:   06/22/22 223 lb 12.8 oz (101.5 kg)   06/20/22 223 lb (101.2 kg)   06/17/22 225 lb (102.1 kg)     BP Readings from Last 3 Encounters:   06/22/22 (!) 156/82   06/20/22 (!) 154/91   06/17/22 (!) 189/99     Pulse Readings from Last 3 Encounters:   06/22/22 74   06/20/22 75   06/17/22 84     Body mass index is 38.42 kg/m².     ECHO: Conclusions      Summary   Technically difficult examination. Normal left ventricular size and systolic function. There were no regional wall motion abnormalities. Wall thickness was within normal limits. Ejection fraction was estimated at 50-55%. Doppler parameters were consistent with abnormal left ventricular   relaxation (grade 1 diastolic dysfunction). S/p TAVR. DOPPLER: Transaortic velocity was within the normal range with   no evidence of aortic stenosis (mean gradient 7 mmHg, V max 1.8 m/s, EOA   1.8 cm2). There was no evidence of aortic regurgitation. There was trace mitral regurgitation. IVC not well seen. Signature      ----------------------------------------------------------------   Electronically signed by Adolfo Jacobson MD (Interpreting   physician) on 03/02/2021 at 02:52 PM   ----------------------------------------------------------------      CATH/STRESS:   IMPRESSION:    1. Mild pulmonary hypertension. 2.    No step up in oxygen saturation from different chambers of right side          of the heart. 3.    Diastolic dysfunction of the left ventricle. 4.    Left ventricular hypertrophy, preserved systolic function of left          ventricle, ejection fraction about 55-60 percent. 5.    No mitral regurg, no gradient across aortic valve. 6.    Extremely tortuous lower abdomen and calcification. Renal sheath has          to be utilized. 7.    Patent nondominant right coronary artery. 8.    Short patent left main. 9.    Patent dominant circumflex artery. 10.   Nonobstructive disease proximal left anterior descending with          calcification. Distally the LAD was patent. The patient tolerated the procedure well. At this point we recommend for the  patient to continue aggressive medical treatment, risk factor modification. FOLLOWUP:  The patient should be considered for sleep apnea study and  possible C-PAP.      Thank you very much for allowing us to share in the management of this  patient. Mohan Hernandez M.D.        D: 03/21/2014 09:34                                    T: 03/21/2014 09:48  cp           Results reviewed:  BNP: No results found for: BNP  CBC:   Lab Results   Component Value Date    WBC 7.5 06/09/2022    RBC 4.58 06/09/2022    RBC 0-5 04/07/2015    HGB 13.8 06/09/2022    HCT 42.7 06/09/2022     06/09/2022     CMP:    Lab Results   Component Value Date     02/07/2022    K 4.4 02/07/2022    K 4.1 02/07/2021     02/07/2022    CO2 26 02/07/2022    BUN 14 02/07/2022    CREATININE 0.8 02/07/2022    LABGLOM 69 02/07/2022    GLUCOSE 184 02/07/2022    GLUCOSE 147 04/30/2021    CALCIUM 10.0 02/07/2022     Hepatic Function Panel:    Lab Results   Component Value Date    ALKPHOS 78 02/07/2022    ALT 24 02/07/2022    AST 23 02/07/2022    PROT 7.1 02/07/2022    BILITOT 0.5 02/07/2022    BILITOT NEGATIVE 04/07/2015    BILIDIR <0.2 02/08/2021    LABALBU 4.6 02/07/2022     Magnesium:    Lab Results   Component Value Date    MG 1.6 05/10/2022     PT/INR:    Lab Results   Component Value Date    INR 1.03 02/04/2021     Lipids:    Lab Results   Component Value Date    TRIG 141 05/14/2016    HDL 46 05/14/2016    [address] 70 05/14/2016       ASSESSMENT AND PLAN:   The patient's condition/symptoms are Stable: No clinical evidence of fluid overload today. Continue current medical regimen without changes at present time. Diagnosis Orders   1. CHF (congestive heart failure), NYHA class II, chronic, diastolic (HCC)  Basic Metabolic Panel    Lipid Panel    Magnesium   2. Abdominal discomfort  Ambulatory referral to Gastroenterology     Continue:  GDMT:   ACE/ARB/ARNI - Quinapril 10 mg daily   BB - Atenolol 25 mg Daily   Diuretic - Lasix 20 PRN  AA - none  SGLT2 -  none  Vasodilator - Hydralazine 25mg BID   Other - Plavix, Mg, ASA, KCl 10 PRN, mag ox     HFpEF 55%  Stable, appears Euvolemic.  No weight gain or recent swelling. Has not needed to take a lasix in months. Lab reviewed - K 4.4, Mag 1.5 Cr 0.8     BP still elevated, increasing quinapril to 20 daily. Call with continue elevated BP. Labs with in 1 month (Lipid panel, BMP, Mag)  GI referral made   Continue diet/fluid adherence  Continue daily wts. F/U w/ Cardiology  F/U in clinic PRN      Tolerating above noted HF meds, no ill side effects noted. Will continue to monitor kidney function and electrolytes. Will optimize as tolerated. Pt is compliant w/ medications. Total visit time of 25 minutes has been spent with patient on education of symptoms, management, medication, and plan of care; as well as review of chart: labs, ECHO, radiology reports, etc.   I personally spent more then 50% of the appt time face to face with the patient. · Daily weights  · Fluid restriction of 2 Liters per day  · Limit sodium in diet to around 2735-9374 mg/day  · Monitor BP  · Activity as tolerated       Patient was instructed to call the [facility] for any changes in symptoms as noted in AVS.      No follow-ups on file. or sooner if needed     Patient given educational materials - see patient instructions. We discussed the importance of weighing oneself and recording daily. We also discussed the importance of a low sodium diet, higher sodium foods to avoid and better low sodium food options. Patient verbalizes understanding of plan of care using teach back method, and is agreeable to the treatment plan.        Electronically signed by SEAN Sharif CNP on 6/22/2022 at 8:24 AM

## 2022-06-27 ENCOUNTER — OFFICE VISIT (OUTPATIENT)
Dept: FAMILY MEDICINE CLINIC | Age: 82
End: 2022-06-27
Payer: MEDICARE

## 2022-06-27 VITALS
OXYGEN SATURATION: 97 % | HEIGHT: 64 IN | TEMPERATURE: 97 F | HEART RATE: 72 BPM | DIASTOLIC BLOOD PRESSURE: 87 MMHG | WEIGHT: 225 LBS | RESPIRATION RATE: 16 BRPM | BODY MASS INDEX: 38.41 KG/M2 | SYSTOLIC BLOOD PRESSURE: 154 MMHG

## 2022-06-27 DIAGNOSIS — I10 HTN, GOAL BELOW 140/90: ICD-10-CM

## 2022-06-27 PROCEDURE — 1036F TOBACCO NON-USER: CPT | Performed by: FAMILY MEDICINE

## 2022-06-27 PROCEDURE — G8427 DOCREV CUR MEDS BY ELIG CLIN: HCPCS | Performed by: FAMILY MEDICINE

## 2022-06-27 PROCEDURE — 1123F ACP DISCUSS/DSCN MKR DOCD: CPT | Performed by: FAMILY MEDICINE

## 2022-06-27 PROCEDURE — 1090F PRES/ABSN URINE INCON ASSESS: CPT | Performed by: FAMILY MEDICINE

## 2022-06-27 PROCEDURE — 99213 OFFICE O/P EST LOW 20 MIN: CPT | Performed by: FAMILY MEDICINE

## 2022-06-27 PROCEDURE — G8399 PT W/DXA RESULTS DOCUMENT: HCPCS | Performed by: FAMILY MEDICINE

## 2022-06-27 PROCEDURE — G8417 CALC BMI ABV UP PARAM F/U: HCPCS | Performed by: FAMILY MEDICINE

## 2022-06-27 RX ORDER — ATENOLOL 100 MG/1
TABLET ORAL
Qty: 90 TABLET | Refills: 1 | Status: SHIPPED | OUTPATIENT
Start: 2022-06-27

## 2022-06-27 NOTE — PROGRESS NOTES
Galen Caicedo is a [de-identified] y.o. female that presents for     Chief Complaint   Patient presents with    Follow-up     hypertension  about the same       BP (!) 154/87   Pulse 72   Temp 97 °F (36.1 °C) (Infrared)   Resp 16   Ht 5' 4\" (1.626 m)   Wt 225 lb (102.1 kg)   SpO2 97%   BMI 38.62 kg/m²       HPI    HTN  Accupril increased since last visit, has not noted much improvement in BPs. Notes that she is feeling very stressed since the passing of her . Does patient check BP regularly at home? - Yes - running about where it is today  Current Medication regimen - accupril, atenolol, hydralazine, lasix  Tolerating medications well? - yes    Shortness of breath or chest pain? No  Headache or visual complaints? No  Neurologic changes like confusion? No  Extremity edema?  No    BP Readings from Last 3 Encounters:   06/27/22 (!) 154/87   06/22/22 (!) 156/82   06/20/22 (!) 154/91         Health Maintenance   Topic Date Due    DTaP/Tdap/Td vaccine (1 - Tdap) Never done    Shingles vaccine (1 of 2) Never done    Pneumococcal 65+ years Vaccine (2 - PPSV23 or PCV20) 12/14/2016    Lipids  05/14/2017    Annual Wellness Visit (AWV)  07/15/2021    Depression Monitoring  05/16/2023    Flu vaccine  Completed    COVID-19 Vaccine  Completed    DEXA (modify frequency per FRAX score)  Addressed    Hepatitis A vaccine  Aged Out    Hib vaccine  Aged Out    Meningococcal (ACWY) vaccine  Aged Out       Past Medical History:   Diagnosis Date    Arthritis     Chronic low back pain     COVID-19     DM type 2, goal A1c below 7     GERD (gastroesophageal reflux disease)     Hyperlipidemia     Hypertension     Loss of vision     Mitral valve disorder     Osteoporosis     Subarachnoid hemorrhage ([address] 4/25/2019    TIA (transient ischemic attack)     Type 2 diabetes mellitus with diabetic neuropathy, without long-term current use of insulin ([address] 7/26/2017   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    aslo after valve replacement 2020    Urinary incontinence        Past Surgical History:   Procedure Laterality Date    APPENDECTOMY      CARDIAC SURGERY      TAVR    CARPAL TUNNEL RELEASE Left 2011    CATARACT REMOVAL Bilateral     CERVICAL FUSION  1976    CHOLECYSTECTOMY      COLON SURGERY  2012    COLONOSCOPY  2015    ENDOSCOPY, COLON, DIAGNOSTIC      EYE SURGERY      CATARACTS    FINGER SURGERY Right 10/2016    3rd digit    HERNIA REPAIR      HYSTERECTOMY (CERVIX STATUS UNKNOWN)      JOINT REPLACEMENT      both knees    NERVE SURGERY Right 10/25/2019    Lumbar Facet MBB @ A65-O0,G3-5 L2-3 right side only #1 performed by Shelly Blankenship MD at [facility] Right 12/16/2019    Lumbar Facet MBB @ S41-K4-H2-2, L2-3 RIGHT SIDE ONLY performed by Shelly Blankenship MD at [facility] Right 1/6/2020    Lumbar RFA T12-L1, L1-2, L2-3 Right performed by Shelly Blankenship MD at [address] [facility] [address] [facility] LAMINECTOMY,>2 [facility]      6 FUSIONS    REVISION TOTAL KNEE ARTHROPLASTY Left 6/1/2021    REVISION LEFT TOTAL KNEE ARTHROPLASTY [place]isha Aviles [place] [facility] [place] 11/28/2014    SHOULDER SURGERY  2014    SPINE SURGERY      TENDON RELEASE Right 05/18/2017    at [address] [place] [address] Bilateral     UPPER GASTROINTESTINAL ENDOSCOPY  2013,2015       Social History     Tobacco Use    Smoking status: Never Smoker    Smokeless tobacco: Never Used   Vaping Use    Vaping Use: Never used   Substance Use Topics    Alcohol use: No    Drug use: No       Family History   Problem Relation Age of Onset    Arthritis Mother     Cancer Mother     Heart Disease Father     High Blood Pressure Father          I have reviewed the patient's past medical history, past surgical history, allergies, medications, social and family history and I have made updates where appropriate. Review of Systems        PHYSICAL EXAM:  BP (!) 154/87   Pulse 72   Temp 97 °F (36.1 °C) (Infrared)   Resp 16   Ht 5' 4\" (1.626 m)   Wt 225 lb (102.1 kg)   SpO2 97%   BMI 38.62 kg/m²     Physical Exam  Vitals and nursing note reviewed. Constitutional:       General: She is not in acute distress. Appearance: She is well-developed. HENT:      Head: Normocephalic and atraumatic. Right Ear: Hearing and external ear normal.      Left Ear: Hearing and external ear normal.      Nose: Nose normal.   Eyes:      General:         Right eye: No discharge. Left eye: No discharge. Conjunctiva/sclera: Conjunctivae normal.   Neck:      Thyroid: No thyromegaly. Trachea: No tracheal deviation. Cardiovascular:      Rate and Rhythm: Normal rate and regular rhythm. Heart sounds: Normal heart sounds. No murmur heard. No friction rub. No gallop. Pulmonary:      Effort: Pulmonary effort is normal. No respiratory distress. Breath sounds: No wheezing or rales. Chest:      Chest wall: No tenderness. Musculoskeletal:         General: No deformity. Cervical back: Normal range of motion and neck supple. Lymphadenopathy:      Cervical: No cervical adenopathy. Skin:     General: Skin is warm and dry. Findings: No erythema or rash. Neurological:      Mental Status: She is alert. Motor: No abnormal muscle tone. Coordination: Coordination normal.   Psychiatric:         Behavior: Behavior normal.         Thought Content: Thought content normal.         Judgment: Judgment normal.             ASSESSMENT & PLAN  Elier Madrid was seen today for follow-up. Diagnoses and all orders for this visit:    HTN, goal below 140/90  -     atenolol (TENORMIN) 100 MG tablet; TAKE 1 TABLET DAILY    -HTN still uncontrolled, will increase atenolol to 100mg, recheck in 2 weeks. Return in about 2 weeks (around 7/11/2022).     No red flag sx    The most recent results of the following tests were reviewed with the patient today: none     All copied or forwarded information in the progress note was verified by me to be accurate at the time of visit  Patient's past medical, surgical, social and family history were reviewed and updated     All patient questions answered. Patient voiced understanding.

## 2022-07-08 ENCOUNTER — TELEPHONE (OUTPATIENT)
Dept: CARDIOLOGY CLINIC | Age: 82
End: 2022-07-08

## 2022-07-08 NOTE — TELEPHONE ENCOUNTER
Pre op Risk Assessment    Procedure EGD with dil  Physician Dr. William Riddle  Date of surgery/procedure 8-2-22    Last OV 1-31-22  Last Stress 11-27-20  Last Echo 2-1-22  Last Cath TAVR 2-3-21  Cath- 1-13-21  Last Stent none in epic  Is patient on blood thinners Plavix  Hold Meds/how many days 5 days    Fax: [phone]

## 2022-07-11 ENCOUNTER — OFFICE VISIT (OUTPATIENT)
Dept: FAMILY MEDICINE CLINIC | Age: 82
End: 2022-07-11
Payer: MEDICARE

## 2022-07-11 VITALS
SYSTOLIC BLOOD PRESSURE: 136 MMHG | RESPIRATION RATE: 16 BRPM | TEMPERATURE: 97 F | HEIGHT: 64 IN | WEIGHT: 219.6 LBS | OXYGEN SATURATION: 98 % | DIASTOLIC BLOOD PRESSURE: 81 MMHG | HEART RATE: 69 BPM | BODY MASS INDEX: 37.49 KG/M2

## 2022-07-11 DIAGNOSIS — R11.0 DAILY NAUSEA: ICD-10-CM

## 2022-07-11 DIAGNOSIS — R42 DIZZINESS: Primary | ICD-10-CM

## 2022-07-11 DIAGNOSIS — F33.0 MAJOR DEPRESSIVE DISORDER, RECURRENT EPISODE, MILD (HCC): ICD-10-CM

## 2022-07-11 PROCEDURE — 1123F ACP DISCUSS/DSCN MKR DOCD: CPT | Performed by: FAMILY MEDICINE

## 2022-07-11 PROCEDURE — 99214 OFFICE O/P EST MOD 30 MIN: CPT | Performed by: FAMILY MEDICINE

## 2022-07-11 PROCEDURE — 1090F PRES/ABSN URINE INCON ASSESS: CPT | Performed by: FAMILY MEDICINE

## 2022-07-11 PROCEDURE — G8399 PT W/DXA RESULTS DOCUMENT: HCPCS | Performed by: FAMILY MEDICINE

## 2022-07-11 PROCEDURE — 1036F TOBACCO NON-USER: CPT | Performed by: FAMILY MEDICINE

## 2022-07-11 PROCEDURE — G8427 DOCREV CUR MEDS BY ELIG CLIN: HCPCS | Performed by: FAMILY MEDICINE

## 2022-07-11 PROCEDURE — G8417 CALC BMI ABV UP PARAM F/U: HCPCS | Performed by: FAMILY MEDICINE

## 2022-07-11 RX ORDER — ONDANSETRON 4 MG/1
4 TABLET, ORALLY DISINTEGRATING ORAL EVERY 8 HOURS PRN
Qty: 90 TABLET | Refills: 3 | Status: SHIPPED | OUTPATIENT
Start: 2022-07-11

## 2022-07-11 NOTE — PROGRESS NOTES
Praful Guillory is a [de-identified] y.o. female that presents for     Chief Complaint   Patient presents with    Dizziness    Hypertension       /81   Pulse 69   Temp 97 °F (36.1 °C) (Infrared)   Resp 16   Ht 5' 4\" (1.626 m)   Wt 219 lb 9.6 oz (99.6 kg)   SpO2 98%   BMI 37.69 kg/m²       HPI    HTN  Atenolol increased at last visit. Does patient check BP regularly at home? - Yes - States that it has still been running higher still  Current Medication regimen - accupril, atenolol, hydralazine, lasix  Tolerating medications well? - yes    Shortness of breath or chest pain? No  Headache or visual complaints? No  Neurologic changes like confusion? No  Extremity edema? No    BP Readings from Last 3 Encounters:   07/11/22 136/81   06/27/22 (!) 154/87   06/22/22 (!) 156/82     Continues to feel lightheaded. This has been a chronic issue. She does have a history cerebellar CVA. Notes that it worsens with head movement. Does have nausea as well.     Health Maintenance   Topic Date Due    DTaP/Tdap/Td vaccine (1 - Tdap) Never done    Shingles vaccine (1 of 2) Never done    Pneumococcal 65+ years Vaccine (2 - PPSV23 or PCV20) 12/14/2016    Lipids  05/14/2017    Annual Wellness Visit (AWV)  07/15/2021    Flu vaccine (1) 09/01/2022    Depression Monitoring  05/16/2023    COVID-19 Vaccine  Completed    DEXA (modify frequency per FRAX score)  Addressed    Hepatitis A vaccine  Aged Out    Hib vaccine  Aged Out    Meningococcal (ACWY) vaccine  Aged Out       Past Medical History:   Diagnosis Date    Arthritis     Chronic low back pain     COVID-19     DM type 2, goal A1c below 7     GERD (gastroesophageal reflux disease)     Hyperlipidemia     Hypertension     Loss of vision     Mitral valve disorder     Osteoporosis     Subarachnoid hemorrhage ([address] 4/25/2019    TIA (transient ischemic attack)     Type 2 diabetes mellitus with diabetic neuropathy, without long-term current use of insulin ([address] 7/26/2017   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    aslo after valve replacement 2020    Urinary incontinence        Past Surgical History:   Procedure Laterality Date    APPENDECTOMY      CARDIAC SURGERY      TAVR    CARPAL TUNNEL RELEASE Left 2011    CATARACT REMOVAL Bilateral     CERVICAL FUSION  1976    CHOLECYSTECTOMY      COLON SURGERY  2012    COLONOSCOPY  2015    ENDOSCOPY, COLON, DIAGNOSTIC      EYE SURGERY      CATARACTS    FINGER SURGERY Right 10/2016    3rd digit    HERNIA REPAIR      HYSTERECTOMY (CERVIX STATUS UNKNOWN)      JOINT REPLACEMENT      both knees    NERVE SURGERY Right 10/25/2019    Lumbar Facet MBB @ V09-J5,L7-3 L2-3 right side only #1 performed by Johnny Brooks MD at [address] [place] [address] Right 12/16/2019    Lumbar Facet MBB @ J30-U2-G3-9, L2-3 RIGHT SIDE ONLY performed by Johnny Brooks MD at [facility] Right 1/6/2020    Lumbar RFA T12-L1, L1-2, L2-3 Right performed by Johnny Brooks MD at [facility] LAMINECTOMY,>2 [facility]      6 FUSIONS    REVISION TOTAL KNEE ARTHROPLASTY Left 6/1/2021    REVISION LEFT TOTAL KNEE ARTHROPLASTY performed by Sandra Melgoza MD at [address] [place] [address] Right 11/28/2014    SHOULDER SURGERY  2014    SPINE SURGERY      TENDON RELEASE Right 05/18/2017    at [address] [place] [address] Bilateral     UPPER GASTROINTESTINAL ENDOSCOPY  2013,2015       Social History     Tobacco Use    Smoking status: Never Smoker    Smokeless tobacco: Never Used   Vaping Use    Vaping Use: Never used   Substance Use Topics    Alcohol use: No    Drug use: No       Family History   Problem Relation Age of Onset    Arthritis Mother     Cancer Mother     Heart Disease Father     High Blood Pressure Father          I have reviewed the patient's past medical history, past surgical history, allergies, medications, social and family history and I have made updates where appropriate. Review of Systems   Neurological: Positive for dizziness. PHYSICAL EXAM:  /81   Pulse 69   Temp 97 °F (36.1 °C) (Infrared)   Resp 16   Ht 5' 4\" (1.626 m)   Wt 219 lb 9.6 oz (99.6 kg)   SpO2 98%   BMI 37.69 kg/m²     Physical Exam  Vitals and nursing note reviewed. Constitutional:       General: She is not in acute distress. Appearance: She is well-developed. HENT:      Head: Normocephalic and atraumatic. Right Ear: Hearing and external ear normal.      Left Ear: Hearing and external ear normal.      Nose: Nose normal.   Eyes:      General:         Right eye: No discharge. Left eye: No discharge. Conjunctiva/sclera: Conjunctivae normal.   Neck:      Thyroid: No thyromegaly. Trachea: No tracheal deviation. Cardiovascular:      Rate and Rhythm: Normal rate and regular rhythm. Heart sounds: Normal heart sounds. No murmur heard. No friction rub. No gallop. Pulmonary:      Effort: Pulmonary effort is normal. No respiratory distress. Breath sounds: No wheezing or rales. Chest:      Chest wall: No tenderness. Musculoskeletal:         General: No deformity. Cervical back: Normal range of motion and neck supple. Lymphadenopathy:      Cervical: No cervical adenopathy. Skin:     General: Skin is warm and dry. Findings: No erythema or rash. Neurological:      Mental Status: She is alert. Motor: No abnormal muscle tone. Coordination: Coordination normal.   Psychiatric:         Behavior: Behavior normal.         Thought Content: Thought content normal.         Judgment: Judgment normal.             ASSESSMENT & PLAN  Akira Gray was seen today for dizziness and hypertension. Diagnoses and all orders for this visit:    Dizziness  -     Videonystagmography;  Future  -     [facility]. [facility]    Major depressive disorder, recurrent episode, mild ([facility]  -     sertraline (ZOLOFT) 50 MG tablet; Take 1 tablet by mouth daily    Daily nausea  -     ondansetron (ZOFRAN ODT) 4 MG disintegrating tablet; Take 1 tablet by mouth every 8 hours as needed for Nausea or Vomiting         -HTN better controlled, continue current regimen  -? BPPV for etiology of dizziness. Will obtain VNG to start evaluation. Return in about 1 month (around 8/11/2022), or if symptoms worsen or fail to improve. No red flag sx    The most recent results of the following tests were reviewed with the patient today: none     All copied or forwarded information in the progress note was verified by me to be accurate at the time of visit  Patient's past medical, surgical, social and family history were reviewed and updated     All patient questions answered. Patient voiced understanding.

## 2022-08-01 ENCOUNTER — HOSPITAL ENCOUNTER (OUTPATIENT)
Dept: AUDIOLOGY | Age: 82
Discharge: HOME | End: 2022-08-01
Payer: MEDICARE

## 2022-08-01 PROCEDURE — 92557 COMPREHENSIVE HEARING TEST: CPT | Performed by: AUDIOLOGIST

## 2022-08-01 PROCEDURE — 92567 TYMPANOMETRY: CPT | Performed by: AUDIOLOGIST

## 2022-08-01 PROCEDURE — 92540 BASIC VESTIBULAR EVALUATION: CPT | Performed by: AUDIOLOGIST

## 2022-08-01 NOTE — PROGRESS NOTES
are excellent at 100%, bilaterally, with speech presented at average conversation levels in quiet. Tympanometry indicated normal ear canal volumes, peak pressure and compliance, bilaterally. VNG testing indicated only a borderline abnormal finding for random saccades which, in isolation, is not a significant finding. All other oculomotor tasks and positionals were within normal limits. Caloric testing was not performed today due to bleeding drainage in right ear canal.         RECOMMENDATION(S):   Patient advised to return to PCP to evaluate and treat right external ear canal with possible ENT referral per discretion of the physician. Patient scheduled to return to complete caloric testing on 8/9/22 pending resolution of right ear bleeding. Please do not hesitate to contact our office with any questions.

## 2022-08-09 ENCOUNTER — NURSE ONLY (OUTPATIENT)
Dept: LAB | Age: 82
End: 2022-08-09

## 2022-08-09 DIAGNOSIS — I50.32 CHF (CONGESTIVE HEART FAILURE), NYHA CLASS II, CHRONIC, DIASTOLIC (HCC): ICD-10-CM

## 2022-08-09 LAB
ANION GAP SERPL CALCULATED.3IONS-SCNC: 13 MEQ/L (ref 8–16)
BUN BLDV-MCNC: 20 MG/DL (ref 7–22)
CALCIUM SERPL-MCNC: 9.5 MG/DL (ref 8.5–10.5)
CHLORIDE BLD-SCNC: 104 MEQ/L (ref 98–111)
CHOLESTEROL, TOTAL: 174 MG/DL (ref 100–199)
CO2: 24 MEQ/L (ref 23–33)
CREAT SERPL-MCNC: 0.8 MG/DL (ref 0.4–1.2)
GFR SERPL CREATININE-BSD FRML MDRD: 69 ML/MIN/1.73M2
GLUCOSE BLD-MCNC: 157 MG/DL (ref 70–108)
HDLC SERPL-MCNC: 51 MG/DL
LDL CHOLESTEROL CALCULATED: 92 MG/DL
MAGNESIUM: 1.6 MG/DL (ref 1.6–2.4)
POTASSIUM SERPL-SCNC: 4.3 MEQ/L (ref 3.5–5.2)
SODIUM BLD-SCNC: 141 MEQ/L (ref 135–145)
TRIGL SERPL-MCNC: 155 MG/DL (ref 0–199)

## 2022-08-12 ENCOUNTER — OFFICE VISIT (OUTPATIENT)
Dept: FAMILY MEDICINE CLINIC | Age: 82
End: 2022-08-12
Payer: MEDICARE

## 2022-08-12 VITALS
HEART RATE: 71 BPM | RESPIRATION RATE: 16 BRPM | SYSTOLIC BLOOD PRESSURE: 137 MMHG | BODY MASS INDEX: 39.13 KG/M2 | WEIGHT: 229.2 LBS | TEMPERATURE: 98.3 F | HEIGHT: 64 IN | OXYGEN SATURATION: 95 % | DIASTOLIC BLOOD PRESSURE: 76 MMHG

## 2022-08-12 DIAGNOSIS — R42 DIZZINESS: Primary | ICD-10-CM

## 2022-08-12 DIAGNOSIS — I10 HTN, GOAL BELOW 140/90: ICD-10-CM

## 2022-08-12 DIAGNOSIS — F33.0 MAJOR DEPRESSIVE DISORDER, RECURRENT EPISODE, MILD (HCC): ICD-10-CM

## 2022-08-12 PROCEDURE — 1036F TOBACCO NON-USER: CPT | Performed by: FAMILY MEDICINE

## 2022-08-12 PROCEDURE — 99213 OFFICE O/P EST LOW 20 MIN: CPT | Performed by: FAMILY MEDICINE

## 2022-08-12 PROCEDURE — 1090F PRES/ABSN URINE INCON ASSESS: CPT | Performed by: FAMILY MEDICINE

## 2022-08-12 PROCEDURE — G8417 CALC BMI ABV UP PARAM F/U: HCPCS | Performed by: FAMILY MEDICINE

## 2022-08-12 PROCEDURE — 1123F ACP DISCUSS/DSCN MKR DOCD: CPT | Performed by: FAMILY MEDICINE

## 2022-08-12 PROCEDURE — G8427 DOCREV CUR MEDS BY ELIG CLIN: HCPCS | Performed by: FAMILY MEDICINE

## 2022-08-12 PROCEDURE — 3288F FALL RISK ASSESSMENT DOCD: CPT | Performed by: FAMILY MEDICINE

## 2022-08-12 PROCEDURE — G8399 PT W/DXA RESULTS DOCUMENT: HCPCS | Performed by: FAMILY MEDICINE

## 2022-08-12 NOTE — PROGRESS NOTES
after valve replacement 2020    Urinary incontinence        Past Surgical History:   Procedure Laterality Date    APPENDECTOMY      CARDIAC SURGERY      TAVR    CARPAL TUNNEL RELEASE Left 2011    CATARACT REMOVAL Bilateral     CERVICAL FUSION  1976    CHOLECYSTECTOMY      COLON SURGERY  2012    COLONOSCOPY  2015    ENDOSCOPY, COLON, DIAGNOSTIC      EYE SURGERY      CATARACTS    FINGER SURGERY Right 10/2016    3rd digit    HERNIA REPAIR      HYSTERECTOMY (CERVIX STATUS UNKNOWN)      JOINT REPLACEMENT      both knees    NERVE SURGERY Right 10/25/2019    Lumbar Facet MBB @ I98-N4,O6-6 L2-3 right side only #1 performed by Jessee Couch MD at [address] [place] [address] Right 12/16/2019    Lumbar Facet MBB @ A08-U4-G6-3, L2-3 RIGHT SIDE ONLY performed by Jessee Couch MD at [facility] Right 1/6/2020    Lumbar RFA T12-L1, L1-2, L2-3 Right performed by Jessee Couch MD at [facility] LAMINECTOMY,>2 [address] [place] [address] Left 6/1/2021    REVISION LEFT TOTAL KNEE ARTHROPLASTY performed by Kailey Navarro MD at [address] [place] [address] Right 11/28/2014    SHOULDER SURGERY  2014    SPINE SURGERY      TENDON RELEASE Right 05/18/2017    at [facility] Bilateral     UPPER GASTROINTESTINAL ENDOSCOPY  2013,2015       Social History     Tobacco Use    Smoking status: Never    Smokeless tobacco: Never   Vaping Use    Vaping Use: Never used   Substance Use Topics    Alcohol use: No    Drug use: No       Family History   Problem Relation Age of Onset    Arthritis Mother     Cancer Mother     Heart Disease Father     High Blood Pressure Father          I have reviewed the patient's past medical history, past surgical history, allergies, medications, social and family history and I have made updates where appropriate.     Review of Systems   Neurological:  Positive for dizziness. PHYSICAL EXAM:  /76   Pulse 71   Temp 98.3 °F (36.8 °C)   Resp 16   Ht 5' 4\" (1.626 m)   Wt 229 lb 3.2 oz (104 kg)   SpO2 95%   BMI 39.34 kg/m²     Physical Exam  Vitals and nursing note reviewed. Constitutional:       General: She is not in acute distress. Appearance: She is well-developed. HENT:      Head: Normocephalic and atraumatic. Right Ear: Hearing and external ear normal.      Left Ear: Hearing and external ear normal.      Nose: Nose normal.   Eyes:      General:         Right eye: No discharge. Left eye: No discharge. Conjunctiva/sclera: Conjunctivae normal.   Neck:      Thyroid: No thyromegaly. Trachea: No tracheal deviation. Cardiovascular:      Rate and Rhythm: Normal rate and regular rhythm. Heart sounds: Normal heart sounds. No murmur heard. No friction rub. No gallop. Pulmonary:      Effort: Pulmonary effort is normal. No respiratory distress. Breath sounds: No wheezing or rales. Chest:      Chest wall: No tenderness. Musculoskeletal:         General: No deformity. Cervical back: Normal range of motion and neck supple. Lymphadenopathy:      Cervical: No cervical adenopathy. Skin:     General: Skin is warm and dry. Findings: No erythema or rash. Neurological:      Mental Status: She is alert. Motor: No abnormal muscle tone. Coordination: Coordination normal.   Psychiatric:         Behavior: Behavior normal.         Thought Content: Thought content normal.         Judgment: Judgment normal.           ASSESSMENT & PLAN  Valerie Nino was seen today for follow-up.     Diagnoses and all orders for this visit:    Dizziness    HTN, goal below 140/90    Major depressive disorder, recurrent episode, mild (HCC)       -HTN better controlled, continue current regimen  -MDD improving, continue zoloft  -Other chronic issues are stable, continue current medications  -Advised to call if any issues        Return in about 4 months (around 12/12/2022). No red flag sx    The most recent results of the following tests were reviewed with the patient today: none     All copied or forwarded information in the progress note was verified by me to be accurate at the time of visit  Patient's past medical, surgical, social and family history were reviewed and updated     All patient questions answered. Patient voiced understanding.

## 2022-09-06 RX ORDER — CLOPIDOGREL BISULFATE 75 MG/1
TABLET ORAL
Qty: 90 TABLET | Refills: 3 | Status: SHIPPED | OUTPATIENT
Start: 2022-09-06

## 2022-09-20 ENCOUNTER — TELEPHONE (OUTPATIENT)
Dept: CARDIOLOGY CLINIC | Age: 82
End: 2022-09-20

## 2022-09-20 NOTE — TELEPHONE ENCOUNTER
Patient called in with c/o SOB and elevated BP, weight up 10 lb. Had lost 35 lb after  , now up 10 lb. BP this /90, had been running -190/90s. Taking Hydralazine. Denies swelling, has not taken any PRN Lasix. Said she has not been eating the best, candy corn. Having SOB as well.

## 2022-09-27 ENCOUNTER — OFFICE VISIT (OUTPATIENT)
Dept: CARDIOLOGY CLINIC | Age: 82
End: 2022-09-27
Payer: MEDICARE

## 2022-09-27 VITALS
BODY MASS INDEX: 40.12 KG/M2 | SYSTOLIC BLOOD PRESSURE: 146 MMHG | WEIGHT: 235 LBS | HEART RATE: 67 BPM | OXYGEN SATURATION: 96 % | HEIGHT: 64 IN | DIASTOLIC BLOOD PRESSURE: 70 MMHG

## 2022-09-27 DIAGNOSIS — I49.9 IRREGULAR HEART RHYTHM: Primary | ICD-10-CM

## 2022-09-27 DIAGNOSIS — Z95.2 S/P TAVR (TRANSCATHETER AORTIC VALVE REPLACEMENT): ICD-10-CM

## 2022-09-27 PROCEDURE — G8399 PT W/DXA RESULTS DOCUMENT: HCPCS | Performed by: NURSE PRACTITIONER

## 2022-09-27 PROCEDURE — G8427 DOCREV CUR MEDS BY ELIG CLIN: HCPCS | Performed by: NURSE PRACTITIONER

## 2022-09-27 PROCEDURE — 99213 OFFICE O/P EST LOW 20 MIN: CPT | Performed by: NURSE PRACTITIONER

## 2022-09-27 PROCEDURE — 1123F ACP DISCUSS/DSCN MKR DOCD: CPT | Performed by: NURSE PRACTITIONER

## 2022-09-27 PROCEDURE — G8417 CALC BMI ABV UP PARAM F/U: HCPCS | Performed by: NURSE PRACTITIONER

## 2022-09-27 PROCEDURE — 93000 ELECTROCARDIOGRAM COMPLETE: CPT | Performed by: NURSE PRACTITIONER

## 2022-09-27 PROCEDURE — 1036F TOBACCO NON-USER: CPT | Performed by: NURSE PRACTITIONER

## 2022-09-27 PROCEDURE — 1090F PRES/ABSN URINE INCON ASSESS: CPT | Performed by: NURSE PRACTITIONER

## 2022-09-27 ASSESSMENT — ENCOUNTER SYMPTOMS
ABDOMINAL PAIN: 0
COUGH: 0
WHEEZING: 0
ABDOMINAL DISTENTION: 0
SHORTNESS OF BREATH: 0

## 2022-09-27 NOTE — PATIENT INSTRUCTIONS
You may receive a survey regarding the care you received during your visit. Your input is valuable to us. We encourage you to complete and return your survey. We hope you will choose us in the future for your healthcare needs. Continue:  Continue current medications  Daily weights and record  Fluid restriction of 2 Liters per day  Limit sodium in diet to around 3789-2434 mg/day  Monitor BP  Activity as tolerated     Call the [facility] for any of the following symptoms: [phone]  Weight gain of 2-3 pounds in 1 day or 5 pounds in 1 week  Increased shortness of breath  Shortness of breath while laying down  Cough  Chest pain  Swelling in feet, ankles or legs  Tenderness or bloating in the abdomen  Fatigue   Decreased appetite or nausea   Confusion          Continue to monitor BP at home. No medication changes at this time. If blood pressures continue above 140/90 at home (1hr after medication) call office    Continue diet/fluid adherence  Continue daily wts.   F/U w/ Cardiology  F/U in clinic PRN

## 2022-09-27 NOTE — PROGRESS NOTES
Heart Failure Clinic       Visit Date: 2022  Cardiologist:  Dr. Tori Sow  Primary Care Physician: Dr. Umang Medina, Jennifer Cardona is a [de-identified] y.o. female who presents today for:  Chief Complaint   Patient presents with    Congestive Heart Failure       HPI:   Edi Grijalva is a [de-identified] y.o. female who presents to the office for a follow up patient visit in the heart failure clinic. Accompanied by self    TYPE HF: HFpEF (55-60%) 2022  Cause:   Device: none  HX: TAVR (), HTN, DM, CAD, TIA    Dry Wt:  235 (241 on 9/15/21) (233 on 22) (223 on 22) (235 on 22)    Hospitalization:  >6 months    Concerns today: pt here today d/t elevated blood pressures at home, normal today at home 130s/80s. She denies swelling but did not worsening SOB. Visit on 22: elevated blood pressure back on hydralazine 25mg BID. Having GI discomfort, started on carafate by PCP, going to f/u w/ a GI doctor, asking for referral today. Visit on : very tearful of Ag death. Really wants off of medications. Has not had any CHF symptoms of recent, not needing to take her Lasix PRN. Not eating since Dima's death. Having diarrhea d/t stress. Plans to see GI doc. Has not been taking Mag TID, only BID. Still low. Visit on 9/15 no complaints todays, no new symptoms.  Has not needed to take Lasix PRN    Activity: walker used for recent knee replacement, no BRITO, walks long distances w/o needing to stop, ADLs perfromed  Diet: low sodium low fluid  followed    Patient has:  Chest Pain: no  SOB: no  Orthopnea/PND: no  ALLISON: refused to do sleep study  Edema: no  Fatigue: no  Abdominal bloating: no  Cough: no  Appetite: good      Past Medical History:   Diagnosis Date    Arthritis     Chronic low back pain     COVID-19     DM type 2, goal A1c below 7     GERD (gastroesophageal reflux disease)     Hyperlipidemia     Hypertension     Loss of vision     Mitral valve disorder     Osteoporosis Subarachnoid hemorrhage ([facility] 4/25/2019    TIA (transient ischemic attack)     Type 2 diabetes mellitus with diabetic neuropathy, without long-term current use of insulin ([facility] 7/26/2017    Unspecified cerebral artery occlusion with cerebral infarction 3-2014    aslo after valve replacement 2020    Urinary incontinence      Past Surgical History:   Procedure Laterality Date    APPENDECTOMY      CARDIAC SURGERY      TAVR    CARPAL TUNNEL RELEASE Left 2011    CATARACT REMOVAL Bilateral     CERVICAL FUSION  1976    CHOLECYSTECTOMY      COLON SURGERY  2012    COLONOSCOPY  2015    ENDOSCOPY, COLON, DIAGNOSTIC      EYE SURGERY      CATARACTS    FINGER SURGERY Right 10/2016    3rd digit    HERNIA REPAIR      HYSTERECTOMY (CERVIX STATUS UNKNOWN)      JOINT REPLACEMENT      both knees    NERVE SURGERY Right 10/25/2019    Lumbar Facet MBB @ U22-V5,Z5-2 L2-3 right side only #1 performed by Marci Gomez MD at [address] [place] [address] Right 12/16/2019    Lumbar Facet MBB @ Y41-H1-T7-3, L2-3 RIGHT SIDE ONLY performed by Marci Gomez MD at [facility] Right 1/6/2020    Lumbar RFA T12-L1, L1-2, L2-3 Right performed by Marci Gomez MD at [facility] LAMINECTOMY,>2 [facility]      6 FUSIONS    7 [address]ers Left 6/1/2021    REVISION LEFT TOTAL KNEE ARTHROPLASTY performed by Analia Mahmood MD at [address] [place] [address] Right 11/28/2014    SHOULDER SURGERY  2014    SPINE SURGERY      TENDON RELEASE Right 05/18/2017    at [address] [facility] Bilateral     UPPER GASTROINTESTINAL ENDOSCOPY  2013,2015     Family History   Problem Relation Age of Onset    Arthritis Mother     Cancer Mother     Heart Disease Father     High Blood Pressure Father      Social History     Tobacco Use    Smoking status: Never    Smokeless tobacco: Never   Substance Use Topics    Alcohol use: No     Current Outpatient Medications   Medication Sig Dispense Refill    clopidogrel (PLAVIX) 75 MG tablet TAKE 1 TABLET DAILY 90 tablet 3    sertraline (ZOLOFT) 50 MG tablet Take 1 tablet by mouth daily 90 tablet 3    ondansetron (ZOFRAN ODT) 4 MG disintegrating tablet Take 1 tablet by mouth every 8 hours as needed for Nausea or Vomiting 90 tablet 3    atenolol (TENORMIN) 100 MG tablet TAKE 1 TABLET DAILY 90 tablet 1    quinapril (ACCUPRIL) 10 MG tablet Take 2 tablets by mouth daily 180 tablet 3    omeprazole (PRILOSEC) 40 MG delayed release capsule Take 1 capsule by mouth daily 90 capsule 3    atorvastatin (LIPITOR) 40 MG tablet Take 1 tablet by mouth daily 90 tablet 3    hydrALAZINE (APRESOLINE) 25 MG tablet Take 1 tablet by mouth 2 times daily 180 tablet 1    Magnesium Oxide (MAGNESIUM-OXIDE) 250 MG TABS tablet Take 1 tablet by mouth in the morning, at noon, and at bedtime 270 tablet 3    metFORMIN (GLUCOPHAGE) 500 MG tablet TAKE 1 TABLET TWICE A DAY WITH MEALS 180 tablet 3    Handicap Placard MISC by Does not apply route Expires 8/9/26 1 each 0    aspirin 325 MG EC tablet Take 1 tablet by mouth daily 30 tablet 0    vitamin C (VITAMIN C) 1000 MG tablet Take 1 tablet by mouth daily 30 tablet 3    vitamin D (CHOLECALCIFEROL) 1000 UNIT TABS tablet Take 1,000 Units by mouth nightly. No current facility-administered medications for this visit. Allergies   Allergen Reactions    Norco [Hydrocodone-Acetaminophen] Itching    Tramadol Itching    Codeine Hives and Rash       SUBJECTIVE:   Review of Systems   Constitutional:  Negative for activity change, appetite change and fatigue. Respiratory:  Negative for cough, shortness of breath and wheezing. Cardiovascular:  Negative for chest pain, palpitations and leg swelling. Gastrointestinal:  Negative for abdominal distention and abdominal pain. Musculoskeletal:  Negative for gait problem. Neurological:  Negative for weakness, light-headedness and headaches. Psychiatric/Behavioral:  Negative for sleep disturbance. OBJECTIVE:   Today's Vitals:  BP (!) 146/70   Pulse 67   Ht 5' 4\" (1.626 m)   Wt 235 lb (106.6 kg)   SpO2 96%   BMI 40.34 kg/m²     Physical Exam  Vitals reviewed. Constitutional:       General: She is not in acute distress. Appearance: Normal appearance. She is well-developed. She is not diaphoretic. HENT:      Head: Normocephalic and atraumatic. Eyes:      Conjunctiva/sclera: Conjunctivae normal.   Cardiovascular:      Rate and Rhythm: Normal rate. Rhythm irregular. Heart sounds: Normal heart sounds. No murmur heard. Pulmonary:      Effort: Pulmonary effort is normal. No respiratory distress. Breath sounds: Normal breath sounds. No wheezing, rhonchi or rales. Abdominal:      General: Bowel sounds are normal. There is no distension. Palpations: Abdomen is soft. Tenderness: no abdominal tenderness   Musculoskeletal:         General: Normal range of motion. Cervical back: Normal range of motion and neck supple. Right lower leg: No edema. Left lower leg: No edema. Skin:     General: Skin is warm and dry. Capillary Refill: Capillary refill takes less than 2 seconds. Neurological:      Mental Status: She is alert and oriented to person, place, and time. Coordination: Coordination normal.   Psychiatric:         Behavior: Behavior normal.       Wt Readings from Last 3 Encounters:   09/27/22 235 lb (106.6 kg)   08/12/22 229 lb 3.2 oz (104 kg)   07/11/22 219 lb 9.6 oz (99.6 kg)     BP Readings from Last 3 Encounters:   09/27/22 (!) 146/70   08/12/22 137/76   07/11/22 136/81     Pulse Readings from Last 3 Encounters:   09/27/22 67   08/12/22 71   07/11/22 69     Body mass index is 40.34 kg/m². ECHO:   Conclusions      Summary   Technically difficult examination. Normal left ventricular size and systolic function. There were no regional wall motion abnormalities.    Wall thickness was within normal limits. Ejection fraction was estimated at 50-55%. Doppler parameters were consistent with abnormal left ventricular   relaxation (grade 1 diastolic dysfunction). S/p TAVR. DOPPLER: Transaortic velocity was within the normal range with   no evidence of aortic stenosis (mean gradient 7 mmHg, V max 1.8 m/s, EOA   1.8 cm2). There was no evidence of aortic regurgitation. There was trace mitral regurgitation. IVC not well seen. Signature      ----------------------------------------------------------------   Electronically signed by Anthony Garrido MD (Interpreting   physician) on 03/02/2021 at 02:52 PM   ----------------------------------------------------------------      CATH/STRESS:   IMPRESSION:    1. Mild pulmonary hypertension. 2.    No step up in oxygen saturation from different chambers of right side          of the heart. 3.    Diastolic dysfunction of the left ventricle. 4.    Left ventricular hypertrophy, preserved systolic function of left          ventricle, ejection fraction about 55-60 percent. 5.    No mitral regurg, no gradient across aortic valve. 6.    Extremely tortuous lower abdomen and calcification. Renal sheath has          to be utilized. 7.    Patent nondominant right coronary artery. 8.    Short patent left main. 9.    Patent dominant circumflex artery. 10.   Nonobstructive disease proximal left anterior descending with          calcification. Distally the LAD was patent. The patient tolerated the procedure well. At this point we recommend for the  patient to continue aggressive medical treatment, risk factor modification. FOLLOWUP:  The patient should be considered for sleep apnea study and  possible C-PAP. Thank you very much for allowing us to share in the management of this  patient. Mohan Zamora M.D.        D: 03/21/2014 09:34                                    T: 03/21/2014 09:48  cp           Results reviewed:  BNP: No results found for: BNP  CBC:   Lab Results   Component Value Date/Time    WBC 7.5 06/09/2022 08:58 AM    RBC 4.58 06/09/2022 08:58 AM    RBC 0-5 04/07/2015 12:00 AM    HGB 13.8 06/09/2022 08:58 AM    HCT 42.7 06/09/2022 08:58 AM     06/09/2022 08:58 AM     CMP:    Lab Results   Component Value Date/Time     08/09/2022 07:44 AM    K 4.3 08/09/2022 07:44 AM    K 4.1 02/07/2021 07:57 PM     08/09/2022 07:44 AM    CO2 24 08/09/2022 07:44 AM    BUN 20 08/09/2022 07:44 AM    CREATININE 0.8 08/09/2022 07:44 AM    LABGLOM 69 08/09/2022 07:44 AM    GLUCOSE 157 08/09/2022 07:44 AM    GLUCOSE 147 04/30/2021 01:15 PM    CALCIUM 9.5 08/09/2022 07:44 AM     Hepatic Function Panel:    Lab Results   Component Value Date/Time    ALKPHOS 78 02/07/2022 12:15 PM    ALT 24 02/07/2022 12:15 PM    AST 23 02/07/2022 12:15 PM    PROT 7.1 02/07/2022 12:15 PM    BILITOT 0.5 02/07/2022 12:15 PM    BILITOT NEGATIVE 04/07/2015 12:00 AM    BILIDIR <0.2 02/08/2021 11:38 AM    LABALBU 4.6 02/07/2022 12:15 PM     Magnesium:    Lab Results   Component Value Date/Time    MG 1.6 08/09/2022 07:44 AM     PT/INR:    Lab Results   Component Value Date/Time    INR 1.03 02/04/2021 04:59 AM     Lipids:    Lab Results   Component Value Date/Time    TRIG 155 08/09/2022 07:44 AM    HDL 51 08/09/2022 07:44 AM    LDLCALC 92 08/09/2022 07:44 AM       ASSESSMENT AND PLAN:   The patient's condition/symptoms are Stable: No clinical evidence of fluid overload today. Continue current medical regimen without changes at present time. Diagnosis Orders   1. Irregular heart rhythm  EKG 12 lead    EKG 12 lead      2. S/P TAVR (transcatheter aortic valve replacement)          Continue:  GDMT:   ACE/ARB/ARNI - Quinapril 20 mg daily   BB - Atenolol 25 mg Daily   Diuretic -   AA - none  SGLT2 -  none  Vasodilator - Hydralazine 25mg BID   Other - Plavix, Mg, ASA,  mag ox     HFpEF 55%  Stable, appears Euvolemic.  No weight gain or recent swelling. Has not needed to take a lasix in months. Lab reviewed - K 4.3, Mag 1.6 Cr 0.8    Continue to monitor BP at home. No medication changes at this time. If blood pressures continue above 140/90 at home (1hr after medication) call office    Continue diet/fluid adherence  Continue daily wts. F/U w/ Cardiology  F/U in clinic PRN      Tolerating above noted HF meds, no ill side effects noted. Will continue to monitor kidney function and electrolytes. Will optimize as tolerated. Pt is compliant w/ medications. Total visit time of 25 minutes has been spent with patient on education of symptoms, management, medication, and plan of care; as well as review of chart: labs, ECHO, radiology reports, etc.   I personally spent more then 50% of the appt time face to face with the patient. Daily weights  Fluid restriction of 2 Liters per day  Limit sodium in diet to around 5587-3116 mg/day  Monitor BP  Activity as tolerated       Patient was instructed to call the the [facility] for any changes in symptoms as noted in AVS.      No follow-ups on file. or sooner if needed     Patient given educational materials - see patient instructions. We discussed the importance of weighing oneself and recording daily. We also discussed the importance of a low sodium diet, higher sodium foods to avoid and better low sodium food options. Patient verbalizes understanding of plan of care using teach back method, and is agreeable to the treatment plan.        Electronically signed by SEAN Gillis CNP on 9/27/2022 at 2:22 PM

## 2022-11-14 DIAGNOSIS — E11.8 CONTROLLED TYPE 2 DIABETES MELLITUS WITH COMPLICATION, WITHOUT LONG-TERM CURRENT USE OF INSULIN (HCC): ICD-10-CM

## 2022-11-17 ENCOUNTER — HOSPITAL ENCOUNTER (OUTPATIENT)
Dept: NUCLEAR MEDICINE | Age: 82
Discharge: HOME | End: 2022-11-17
Payer: MEDICARE

## 2022-11-17 DIAGNOSIS — Z96.649 PAIN DUE TO HIP JOINT PROSTHESIS, SUBSEQUENT ENCOUNTER: ICD-10-CM

## 2022-11-17 DIAGNOSIS — M25.562 ARTHRALGIA OF LEFT LOWER LEG: ICD-10-CM

## 2022-11-17 DIAGNOSIS — T84.84XD PAIN DUE TO HIP JOINT PROSTHESIS, SUBSEQUENT ENCOUNTER: ICD-10-CM

## 2022-11-17 PROCEDURE — 78315 BONE IMAGING 3 PHASE: CPT

## 2022-11-17 PROCEDURE — 3430000000 HC RX DIAGNOSTIC RADIOPHARMACEUTICAL: Performed by: PHYSICIAN ASSISTANT

## 2022-11-17 PROCEDURE — A9503 TC99M MEDRONATE: HCPCS | Performed by: PHYSICIAN ASSISTANT

## 2022-11-17 RX ORDER — TC 99M MEDRONATE 20 MG/10ML
27.2 INJECTION, POWDER, LYOPHILIZED, FOR SOLUTION INTRAVENOUS
Status: COMPLETED | OUTPATIENT
Start: 2022-11-17 | End: 2022-11-17

## 2022-11-17 RX ADMIN — TC 99M MEDRONATE 27.2 MILLICURIE: 20 INJECTION, POWDER, LYOPHILIZED, FOR SOLUTION INTRAVENOUS at 11:40

## 2022-11-21 ENCOUNTER — HOSPITAL ENCOUNTER (OUTPATIENT)
Dept: NUCLEAR MEDICINE | Age: 82
Discharge: HOME | End: 2022-11-21
Payer: MEDICARE

## 2022-11-21 DIAGNOSIS — T84.84XD PAIN DUE TO HIP JOINT PROSTHESIS, SUBSEQUENT ENCOUNTER: ICD-10-CM

## 2022-11-21 DIAGNOSIS — Z96.649 PAIN DUE TO HIP JOINT PROSTHESIS, SUBSEQUENT ENCOUNTER: ICD-10-CM

## 2022-11-21 PROCEDURE — 3430000000 HC RX DIAGNOSTIC RADIOPHARMACEUTICAL: Performed by: PHYSICIAN ASSISTANT

## 2022-11-21 PROCEDURE — 78102 BONE MARROW IMAGING LTD: CPT

## 2022-11-21 PROCEDURE — A9570 INDIUM IN-111 AUTO WBC: HCPCS | Performed by: PHYSICIAN ASSISTANT

## 2022-11-21 RX ADMIN — Medication 544 MICRO CURIE: at 13:00

## 2022-11-22 ENCOUNTER — HOSPITAL ENCOUNTER (OUTPATIENT)
Dept: NUCLEAR MEDICINE | Age: 82
Discharge: HOME | End: 2022-11-22
Payer: MEDICARE

## 2022-11-22 PROCEDURE — 3430000000 HC RX DIAGNOSTIC RADIOPHARMACEUTICAL: Performed by: PHYSICIAN ASSISTANT

## 2022-11-22 PROCEDURE — A9541 TC99M SULFUR COLLOID: HCPCS | Performed by: PHYSICIAN ASSISTANT

## 2022-11-22 RX ADMIN — Medication 18.5 MILLICURIE: at 11:55

## 2022-12-28 DIAGNOSIS — K21.9 GASTROESOPHAGEAL REFLUX DISEASE WITHOUT ESOPHAGITIS: ICD-10-CM

## 2022-12-29 RX ORDER — OMEPRAZOLE 40 MG/1
40 CAPSULE, DELAYED RELEASE ORAL DAILY
Qty: 90 CAPSULE | Refills: 3 | Status: SHIPPED | OUTPATIENT
Start: 2022-12-29

## 2022-12-29 RX ORDER — CLOPIDOGREL BISULFATE 75 MG/1
TABLET ORAL
Qty: 90 TABLET | Refills: 3 | Status: SHIPPED | OUTPATIENT
Start: 2022-12-29

## 2022-12-29 RX ORDER — QUINAPRIL 10 MG/1
20 TABLET ORAL DAILY
Qty: 180 TABLET | Refills: 3 | Status: SHIPPED | OUTPATIENT
Start: 2022-12-29

## 2023-01-06 ENCOUNTER — HOSPITAL ENCOUNTER (EMERGENCY)
Age: 83
Discharge: HOME | End: 2023-01-06
Payer: MEDICARE

## 2023-01-06 VITALS
OXYGEN SATURATION: 96 % | RESPIRATION RATE: 18 BRPM | BODY MASS INDEX: 39.48 KG/M2 | DIASTOLIC BLOOD PRESSURE: 81 MMHG | SYSTOLIC BLOOD PRESSURE: 193 MMHG | WEIGHT: 230 LBS | HEART RATE: 75 BPM | TEMPERATURE: 98.9 F

## 2023-01-06 DIAGNOSIS — J02.9 ACUTE PHARYNGITIS, UNSPECIFIED ETIOLOGY: Primary | ICD-10-CM

## 2023-01-06 LAB — S PYO AG THROAT QL: NEGATIVE

## 2023-01-06 PROCEDURE — 99213 OFFICE O/P EST LOW 20 MIN: CPT

## 2023-01-06 PROCEDURE — 87070 CULTURE OTHR SPECIMN AEROBIC: CPT

## 2023-01-06 PROCEDURE — 87651 STREP A DNA AMP PROBE: CPT

## 2023-01-06 PROCEDURE — 99212 OFFICE O/P EST SF 10 MIN: CPT | Performed by: EMERGENCY MEDICINE

## 2023-01-06 RX ORDER — AMOXICILLIN AND CLAVULANATE POTASSIUM 875; 125 MG/1; MG/1
1 TABLET, FILM COATED ORAL 2 TIMES DAILY
Qty: 20 TABLET | Refills: 0 | Status: SHIPPED | OUTPATIENT
Start: 2023-01-06 | End: 2023-01-16

## 2023-01-06 RX ORDER — PREDNISONE 20 MG/1
20 TABLET ORAL DAILY
Qty: 5 TABLET | Refills: 0 | Status: SHIPPED | OUTPATIENT
Start: 2023-01-06 | End: 2023-01-11

## 2023-01-06 ASSESSMENT — ENCOUNTER SYMPTOMS
SHORTNESS OF BREATH: 0
SORE THROAT: 1
COUGH: 0

## 2023-01-06 ASSESSMENT — PAIN SCALES - GENERAL: PAINLEVEL_OUTOF10: 5

## 2023-01-06 ASSESSMENT — PAIN DESCRIPTION - DESCRIPTORS: DESCRIPTORS: SORE

## 2023-01-06 ASSESSMENT — PAIN DESCRIPTION - LOCATION: LOCATION: THROAT

## 2023-01-06 ASSESSMENT — PAIN DESCRIPTION - FREQUENCY: FREQUENCY: CONTINUOUS

## 2023-01-06 NOTE — DISCHARGE INSTRUCTIONS
Perform salt water gargles and use over-the-counter Chloraseptic spray to help with the sore throat    Augmentin as directed until gone    Drink plenty of fluids    Follow-up with family physician. Return if no improvement in 3 days.   Sooner if worse

## 2023-01-06 NOTE — ED TRIAGE NOTES
Patient to room with c/o left sided sore throat beginning two days ago. C/o left ear pain beginning this morning.

## 2023-01-06 NOTE — ED PROVIDER NOTES
[facility]  Urgent Care Encounter       CHIEF COMPLAINT       Chief Complaint   Patient presents with    Pharyngitis     Left side       Nurses Notes reviewed and I agree except as noted in the HPI. HISTORY OF PRESENT ILLNESS   Jana Pereira is a [de-identified] y.o. female who presents plaints of left-sided sore throat. She states it only hurts on the left side. This has been present for 2 days. She states her grandchildren have been around her and they are positive for strep throat. She states the pain is started to radiate into her left ear. No cough. No chest pain. No shortness of breath. Currently rates her pain 5 on a 10 scale. No known fever. HPI    REVIEW OF SYSTEMS     Review of Systems   Constitutional:  Negative for activity change, fatigue and fever. HENT:  Positive for ear pain (left) and sore throat. Respiratory:  Negative for cough and shortness of breath. Cardiovascular:  Negative for chest pain. Neurological:  Negative for dizziness and headaches. PAST MEDICAL HISTORY         Diagnosis Date    Arthritis     Chronic low back pain     COVID-19     DM type 2, goal A1c below 7     GERD (gastroesophageal reflux disease)     Hyperlipidemia     Hypertension     Loss of vision     Mitral valve disorder     Osteoporosis     Subarachnoid hemorrhage ([address] 4/25/2019    TIA (transient ischemic attack)     Type 2 diabetes mellitus with diabetic neuropathy, without long-term current use of insulin ([address] 7/26/2017    Unspecified cerebral artery occlusion with cerebral infarction 3-2014    aslo after valve replacement 2020    Urinary incontinence        SURGICALHISTORY     Patient  has a past surgical history that includes Appendectomy; Cholecystectomy; hernia repair; Hysterectomy; Spine surgery; Carpal tunnel release (Left, 2011); pr laminectomy w/o ffd > 2 vert seg lumbar; cervical fusion (1976); eye surgery;  Rotator cuff repair (Right, 11/28/2014); shoulder surgery (2014); Cataract removal (Bilateral); Colon surgery (2012); Colonoscopy (2015); Upper gastrointestinal endoscopy (3786,[id number]); Finger surgery (Right, 10/2016); tendon release (Right, 05/18/2017); Total knee arthroplasty (Bilateral); Nerve Surgery (Right, 10/25/2019); Nerve Surgery (Right, 12/16/2019); Pain management procedure (Right, 1/6/2020); Endoscopy, colon, diagnostic; joint replacement; Cardiac surgery; and Revision total knee arthroplasty (Left, 6/1/2021). CURRENT MEDICATIONS       Previous Medications    ASPIRIN 325 MG EC TABLET    Take 1 tablet by mouth daily    ATENOLOL (TENORMIN) 100 MG TABLET    TAKE 1 TABLET DAILY    ATORVASTATIN (LIPITOR) 40 MG TABLET    Take 1 tablet by mouth daily    CLOPIDOGREL (PLAVIX) 75 MG TABLET    TAKE 1 TABLET DAILY    HANDICAP PLACARD MISC    by Does not apply route Expires 8/9/26    HYDRALAZINE (APRESOLINE) 25 MG TABLET    Take 1 tablet by mouth 2 times daily    MAGNESIUM OXIDE (MAGNESIUM-OXIDE) 250 MG TABS TABLET    Take 1 tablet by mouth in the morning, at noon, and at bedtime    METFORMIN (GLUCOPHAGE) 500 MG TABLET    TAKE 1 TABLET TWICE A DAY WITH MEALS    OMEPRAZOLE (PRILOSEC) 40 MG DELAYED RELEASE CAPSULE    Take 1 capsule by mouth daily    ONDANSETRON (ZOFRAN ODT) 4 MG DISINTEGRATING TABLET    Take 1 tablet by mouth every 8 hours as needed for Nausea or Vomiting    QUINAPRIL (ACCUPRIL) 10 MG TABLET    Take 2 tablets by mouth daily    SERTRALINE (ZOLOFT) 50 MG TABLET    Take 1 tablet by mouth daily    VITAMIN C (VITAMIN C) 1000 MG TABLET    Take 1 tablet by mouth daily    VITAMIN D (CHOLECALCIFEROL) 1000 UNIT TABS TABLET    Take 1,000 Units by mouth nightly. ALLERGIES     Patient is is allergic to norco [hydrocodone-acetaminophen], tramadol, and codeine.     Patients   Immunization History   Administered Date(s) Administered    COVID-19, PFIZER PURPLE top, DILUTE for use, (age 15 y+), 30mcg/0.3mL 03/04/2021, 03/25/2021, 10/06/2021    Influenza Vaccine, unspecified formulation 10/01/2016    Influenza Virus Vaccine 09/09/2013, 09/30/2014    Influenza Whole 09/09/2013, 09/30/2014    Influenza, FLUAD, (age 72 y+), Adjuvanted, 0.5mL 10/07/2020, 09/10/2021    Influenza, Triv, inactivated, subunit, adjuvanted, IM (Fluad 65 yrs and older) 09/25/2018, 09/16/2019    Pneumococcal Conjugate 13-valent (Lella Hutching) 12/14/2015    Zoster Recombinant (Shingrix) 07/20/2022       FAMILY HISTORY     Patient's family history includes Arthritis in her mother; Cancer in her mother; Heart Disease in her father; High Blood Pressure in her father. SOCIAL HISTORY     Patient  reports that she has never smoked. She has never used smokeless tobacco. She reports that she does not drink alcohol and does not use drugs. PHYSICAL EXAM     ED TRIAGE VITALS  BP: (!) 193/81, Temp: 98.9 °F (37.2 °C), Heart Rate: 75, Resp: 18, SpO2: 96 %,Estimated body mass index is 39.48 kg/m² as calculated from the following:    Height as of 9/27/22: 5' 4\" (1.626 m). Weight as of this encounter: 230 lb (104.3 kg). ,No LMP recorded. Patient has had a hysterectomy. Physical Exam  Constitutional:       General: She is not in acute distress. Appearance: She is normal weight. She is not ill-appearing. HENT:      Right Ear: Tympanic membrane and ear canal normal.      Left Ear: Tympanic membrane and ear canal normal.      Nose: No congestion or rhinorrhea. Mouth/Throat:      Mouth: Mucous membranes are moist.      Pharynx: Uvula midline. Pharyngeal swelling and posterior oropharyngeal erythema present. Tonsils: No tonsillar exudate. 0 on the right. 0 on the left. Cardiovascular:      Rate and Rhythm: Normal rate and regular rhythm. Heart sounds: Normal heart sounds. Pulmonary:      Effort: Pulmonary effort is normal.      Breath sounds: Normal breath sounds. Lymphadenopathy:      Cervical: No cervical adenopathy. Skin:     Capillary Refill: Capillary refill takes less than 2 seconds. Neurological:      Mental Status: She is alert. DIAGNOSTIC RESULTS     Labs:  Results for orders placed or performed during the hospital encounter of 01/06/23   Strep Screen Group A Throat   Result Value Ref Range    Rapid Strep A Screen NEGATIVE        IMAGING:    No orders to display         EKG:      URGENT CARE COURSE:     Vitals:    01/06/23 0901 01/06/23 0912   BP: (!) 193/81    Pulse:  75   Resp: 18    Temp: 98.9 °F (37.2 °C)    SpO2: 96%    Weight: 230 lb (104.3 kg)        Medications - No data to display         PROCEDURES:  None    FINAL IMPRESSION      1. Acute pharyngitis, unspecified etiology          DISPOSITION/ PLAN     Patient presents for pharyngitis. Rapid strep is negative. Patient does have erythema and swelling to the posterior oropharynx. She also has left ear pain. I will place patient on Augmentin. Throat culture in process. Prednisone to see if this helps decrease the swelling. Advised to perform salt water gargles, Chloraseptic spray. Cold slushy drinks. Tylenol. Follow-up primary care provider if no improvement in 3 to 5 days.   Return sooner if worse      PATIENT REFERRED TO:  [address] / [place] [id number]      DISCHARGE MEDICATIONS:  New Prescriptions    AMOXICILLIN-CLAVULANATE (AUGMENTIN) 875-125 MG PER TABLET    Take 1 tablet by mouth 2 times daily for 10 days    PREDNISONE (DELTASONE) 20 MG TABLET    Take 1 tablet by mouth daily for 5 days       Discontinued Medications    No medications on file       Current Discharge Medication List          SEAN Benoit CNP    (Please note that portions of this note were completed with a voice recognition program. Efforts were made to edit the dictations but occasionally words are mis-transcribed.)           SEAN Benoit CNP  01/06/23 [id number]

## 2023-01-08 LAB — THROAT/NOSE CULTURE: NORMAL

## 2023-01-23 RX ORDER — HYDRALAZINE HYDROCHLORIDE 50 MG/1
TABLET, FILM COATED ORAL
Qty: 180 TABLET | Refills: 0 | Status: SHIPPED | OUTPATIENT
Start: 2023-01-23

## 2023-02-06 NOTE — TELEPHONE ENCOUNTER
Spoke with Chato Reyna and Dr. Campos Salvador about pt's symptoms. She states she actually just threw up again and still has a headache. Advised her to go to ER to be re-evaluated. She verbalized understanding. Roseanne Boggs(Resident)

## 2023-02-09 ENCOUNTER — OFFICE VISIT (OUTPATIENT)
Dept: CARDIOLOGY CLINIC | Age: 83
End: 2023-02-09
Payer: MEDICARE

## 2023-02-09 VITALS
HEART RATE: 68 BPM | DIASTOLIC BLOOD PRESSURE: 86 MMHG | SYSTOLIC BLOOD PRESSURE: 177 MMHG | HEIGHT: 64 IN | BODY MASS INDEX: 42.29 KG/M2 | WEIGHT: 247.7 LBS

## 2023-02-09 DIAGNOSIS — Z95.2 S/P TAVR (TRANSCATHETER AORTIC VALVE REPLACEMENT): ICD-10-CM

## 2023-02-09 DIAGNOSIS — I50.32 CHF (CONGESTIVE HEART FAILURE), NYHA CLASS II, CHRONIC, DIASTOLIC (HCC): ICD-10-CM

## 2023-02-09 DIAGNOSIS — I10 HTN, GOAL BELOW 140/90: Primary | ICD-10-CM

## 2023-02-09 PROCEDURE — 99214 OFFICE O/P EST MOD 30 MIN: CPT | Performed by: INTERNAL MEDICINE

## 2023-02-09 PROCEDURE — G8484 FLU IMMUNIZE NO ADMIN: HCPCS | Performed by: INTERNAL MEDICINE

## 2023-02-09 PROCEDURE — 1090F PRES/ABSN URINE INCON ASSESS: CPT | Performed by: INTERNAL MEDICINE

## 2023-02-09 PROCEDURE — 3077F SYST BP >= 140 MM HG: CPT | Performed by: INTERNAL MEDICINE

## 2023-02-09 PROCEDURE — 3079F DIAST BP 80-89 MM HG: CPT | Performed by: INTERNAL MEDICINE

## 2023-02-09 PROCEDURE — G8417 CALC BMI ABV UP PARAM F/U: HCPCS | Performed by: INTERNAL MEDICINE

## 2023-02-09 PROCEDURE — 1123F ACP DISCUSS/DSCN MKR DOCD: CPT | Performed by: INTERNAL MEDICINE

## 2023-02-09 PROCEDURE — G8399 PT W/DXA RESULTS DOCUMENT: HCPCS | Performed by: INTERNAL MEDICINE

## 2023-02-09 PROCEDURE — G8427 DOCREV CUR MEDS BY ELIG CLIN: HCPCS | Performed by: INTERNAL MEDICINE

## 2023-02-09 PROCEDURE — 1036F TOBACCO NON-USER: CPT | Performed by: INTERNAL MEDICINE

## 2023-02-09 RX ORDER — AMLODIPINE BESYLATE 5 MG/1
5 TABLET ORAL DAILY
Qty: 30 TABLET | Refills: 0 | Status: SHIPPED | OUTPATIENT
Start: 2023-02-09

## 2023-02-09 RX ORDER — CARVEDILOL 12.5 MG/1
12.5 TABLET ORAL 2 TIMES DAILY
Qty: 180 TABLET | Refills: 1 | Status: SHIPPED | OUTPATIENT
Start: 2023-02-09 | End: 2023-02-09 | Stop reason: SDUPTHER

## 2023-02-09 RX ORDER — AMLODIPINE BESYLATE 5 MG/1
5 TABLET ORAL DAILY
Qty: 30 TABLET | Refills: 3 | Status: SHIPPED | OUTPATIENT
Start: 2023-02-09 | End: 2023-02-09 | Stop reason: SDUPTHER

## 2023-02-09 RX ORDER — CARVEDILOL 12.5 MG/1
12.5 TABLET ORAL 2 TIMES DAILY
Qty: 60 TABLET | Refills: 0 | Status: SHIPPED | OUTPATIENT
Start: 2023-02-09

## 2023-02-09 RX ORDER — AMLODIPINE BESYLATE 5 MG/1
5 TABLET ORAL DAILY
Qty: 90 TABLET | Refills: 3 | Status: SHIPPED | OUTPATIENT
Start: 2023-02-09

## 2023-02-09 NOTE — PROGRESS NOTES
[id number] [address] [facility] [address]  Dept: [phone]  Dept Fax: [phone]  Loc: [phone]    Visit Date: 2/9/2023    Ms. Sadaf Moses is a [de-identified] y.o. female  who presented for:  Chief Complaint   Patient presents with    1 Year Follow Up    Congestive Heart Failure    Shortness of Breath    Check-Up       HPI:   HPI   [de-identified] yo F s/p TAVR who returned with left cerebellar CVA with symptoms of nausea/vomiting, recovered completely who comes for follow-up. She has been having issues with BP meds. She is taking all of he rmeds but no improvement. She is on Atenolol. She has had issues with knee replacement. She has chronic sob. She has no chest pain. DAPT, no bleeding.        Current Outpatient Medications:     hydrALAZINE (APRESOLINE) 50 MG tablet, TAKE 1 TABLET BY MOUTH TWO TIMES A DAY, Disp: 180 tablet, Rfl: 0    omeprazole (PRILOSEC) 40 MG delayed release capsule, Take 1 capsule by mouth daily, Disp: 90 capsule, Rfl: 3    clopidogrel (PLAVIX) 75 MG tablet, TAKE 1 TABLET DAILY, Disp: 90 tablet, Rfl: 3    quinapril (ACCUPRIL) 10 MG tablet, Take 2 tablets by mouth daily, Disp: 180 tablet, Rfl: 3    metFORMIN (GLUCOPHAGE) 500 MG tablet, TAKE 1 TABLET TWICE A DAY WITH MEALS, Disp: 180 tablet, Rfl: 3    sertraline (ZOLOFT) 50 MG tablet, Take 1 tablet by mouth daily, Disp: 90 tablet, Rfl: 3    ondansetron (ZOFRAN ODT) 4 MG disintegrating tablet, Take 1 tablet by mouth every 8 hours as needed for Nausea or Vomiting, Disp: 90 tablet, Rfl: 3    atenolol (TENORMIN) 100 MG tablet, TAKE 1 TABLET DAILY, Disp: 90 tablet, Rfl: 1    atorvastatin (LIPITOR) 40 MG tablet, Take 1 tablet by mouth daily, Disp: 90 tablet, Rfl: 3    hydrALAZINE (APRESOLINE) 25 MG tablet, Take 1 tablet by mouth 2 times daily, Disp: 180 tablet, Rfl: 1    Magnesium Oxide (MAGNESIUM-OXIDE) 250 MG TABS tablet, Take 1 tablet by mouth in the morning, at noon, and at bedtime, Disp: 270 tablet, Rfl: 3    Handicap Cheryl MISC, by Does not apply route Expires 8/9/26, Disp: 1 each, Rfl: 0    aspirin 325 MG EC tablet, Take 1 tablet by mouth daily, Disp: 30 tablet, Rfl: 0    vitamin C (VITAMIN C) 1000 MG tablet, Take 1 tablet by mouth daily, Disp: 30 tablet, Rfl: 3    vitamin D (CHOLECALCIFEROL) 1000 UNIT TABS tablet, Take 1,000 Units by mouth nightly., Disp: , Rfl:     Past Medical History  Alexandru Chavis  has a past medical history of Arthritis, Chronic low back pain, COVID-19, DM type 2, goal A1c below 7, GERD (gastroesophageal reflux disease), Hyperlipidemia, Hypertension, Loss of vision, Mitral valve disorder, Osteoporosis, Subarachnoid hemorrhage ([facility] [address] TIA (transient ischemic attack), Type 2 diabetes mellitus with diabetic neuropathy, without long-term current use of insulin ([facility] [address] Unspecified cerebral artery occlusion with cerebral infarction, and Urinary incontinence. Social History  Excell Palmira  reports that she has never smoked. She has never used smokeless tobacco. She reports that she does not drink alcohol and does not use drugs. Family History  Excell Palmira family history includes Arthritis in her mother; Cancer in her mother; Heart Disease in her father; High Blood Pressure in her father. There is no family history of bicuspid aortic valve, aneurysms, heart transplant, pacemakers, defibrillators, or sudden cardiac death.       Past Surgical History   Past Surgical History:   Procedure Laterality Date    APPENDECTOMY      CARDIAC SURGERY      TAVR    CARPAL TUNNEL RELEASE Left 2011    CATARACT REMOVAL Bilateral     CERVICAL FUSION  1976    CHOLECYSTECTOMY      COLON SURGERY  2012    COLONOSCOPY  2015    ENDOSCOPY, COLON, DIAGNOSTIC      EYE SURGERY      CATARACTS    FINGER SURGERY Right 10/2016    3rd digit    HERNIA REPAIR      HYSTERECTOMY (CERVIX STATUS UNKNOWN)      JOINT REPLACEMENT      both knees    NERVE SURGERY Right 10/25/2019    Lumbar Facet MBB @ R99-Y7,S4-3 L2-3 right side only #1 performed by Aryan Warren MD at [address] [facility] Right 12/16/2019    Lumbar Facet MBB @ F84-Y0-F8-0, L2-3 RIGHT SIDE ONLY performed by Aryan Warren MD at [facility] Right 1/6/2020    Lumbar RFA T12-L1, L1-2, L2-3 Right performed by Aryan Warren MD at [address] [facility] W/O FFD > 2 VERT SEG LUMBAR      6 FUSIONS    REVISION TOTAL KNEE ARTHROPLASTY Left 6/1/2021    REVISION LEFT TOTAL KNEE ARTHROPLASTY performed by Hannah Cowart MD at [address] Right 11/28/2014    SHOULDER SURGERY  2014    SPINE SURGERY      TENDON RELEASE Right 05/18/2017    at [address] [facility] Bilateral     UPPER GASTROINTESTINAL ENDOSCOPY  2013,2015       Review of Systems   Constitutional: Negative for chills and fever  HENT: Negative for congestion, sinus pressure, sneezing and sore throat. Eyes: Negative for pain, discharge, redness and itching. Respiratory: Negative for apnea, cough  Gastrointestinal: Negative for blood in stool, constipation, diarrhea   Endocrine: Negative for cold intolerance, heat intolerance, polydipsia. Genitourinary: Negative for dysuria, enuresis, flank pain and hematuria. Musculoskeletal: Negative for arthralgias, joint swelling and neck pain. Neurological: Negative for numbness and headaches. Psychiatric/Behavioral: Negative for agitation, confusion, decreased concentration and dysphoric mood. Objective:     BP (!) 177/86   Pulse 68   Ht 5' 4\" (1.626 m)   Wt 247 lb 11.2 oz (112.4 kg)   BMI 42.52 kg/m²     Wt Readings from Last 3 Encounters:   02/09/23 247 lb 11.2 oz (112.4 kg)   01/06/23 230 lb (104.3 kg)   09/27/22 235 lb (106.6 kg)     BP Readings from Last 3 Encounters:   02/09/23 (!) 177/86   01/06/23 (!) 193/81   09/27/22 (!) 146/70       Nursing note and vitals reviewed.     Physical Exam   Constitutional: Oriented to person, place, and time. Appears well-developed and well-nourished. HENT:   Head: Normocephalic and atraumatic. Eyes: EOM are normal. Pupils are equal, round, and reactive to light. Neck: Normal range of motion. Neck supple. No JVD present. Cardiovascular: Normal rate, regular rhythm, normal heart sounds and intact distal pulses. No murmur heard. Pulmonary/Chest: Effort normal and breath sounds normal. No respiratory distress. No wheezes. No rales. Abdominal: Soft. Bowel sounds are normal. No distension. There is no tenderness. Musculoskeletal: Normal range of motion. No edema. Neurological: Alert and oriented to person, place, and time. No cranial nerve deficit. Coordination normal.   Skin: Skin is warm and dry. Psychiatric: Normal mood and affect.        Lab Results   Component Value Date/Time    CKTOTAL 49 09/04/2020 08:22 PM       Lab Results   Component Value Date/Time    WBC 7.5 06/09/2022 08:58 AM    RBC 4.58 06/09/2022 08:58 AM    RBC 0-5 04/07/2015 12:00 AM    HGB 13.8 06/09/2022 08:58 AM    HCT 42.7 06/09/2022 08:58 AM    MCV 93.2 06/09/2022 08:58 AM    MCH 30.1 06/09/2022 08:58 AM    MCHC 32.3 06/09/2022 08:58 AM    RDW 13.4 01/12/2017 02:30 PM     06/09/2022 08:58 AM    MPV 11.9 06/09/2022 08:58 AM       Lab Results   Component Value Date/Time     08/09/2022 07:44 AM    K 4.3 08/09/2022 07:44 AM    K 4.1 02/07/2021 07:57 PM     08/09/2022 07:44 AM    CO2 24 08/09/2022 07:44 AM    BUN 20 08/09/2022 07:44 AM    LABALBU 4.6 02/07/2022 12:15 PM    CREATININE 0.8 08/09/2022 07:44 AM    CALCIUM 9.5 08/09/2022 07:44 AM    LABGLOM 69 08/09/2022 07:44 AM    GLUCOSE 157 08/09/2022 07:44 AM    GLUCOSE 147 04/30/2021 01:15 PM       Lab Results   Component Value Date/Time    ALKPHOS 78 02/07/2022 12:15 PM    ALT 24 02/07/2022 12:15 PM    AST 23 02/07/2022 12:15 PM    PROT 7.1 02/07/2022 12:15 PM    BILITOT 0.5 02/07/2022 12:15 PM    BILITOT NEGATIVE 04/07/2015 12:00 AM    BILIDIR <0.2 02/08/2021 11:38 AM    LABALBU 4.6 02/07/2022 12:15 PM       Lab Results   Component Value Date/Time    MG 1.6 08/09/2022 07:44 AM       Lab Results   Component Value Date    INR 1.03 02/04/2021    INR 0.95 02/03/2021    INR 1.01 01/13/2021         Lab Results   Component Value Date/Time    LABA1C 6.4 05/16/2022 02:35 PM       Lab Results   Component Value Date/Time    TRIG 155 08/09/2022 07:44 AM    HDL 51 08/09/2022 07:44 AM    LDLCALC 92 08/09/2022 07:44 AM       Lab Results   Component Value Date/Time    TSH 1.900 05/16/2022 02:35 PM         Testing Reviewed:      I have individually reviewed the cardiac test below:    ECHO: No results found for this or any previous visit. Assessment/Plan   s/p TAVR who returned with left cerebellar CVA with symptoms of nausea/vomiting, recovered completely   Chronic diastolic CHF, NYHA II  HTN  L TKR  LBBB  Uncontrolled BP, change to Coreg and Norvasc. D/c Atenolol. Continue the current meds. 1 year post TAVR without issues. No volume. The patient was advised on risk/benefits of the new Rx and she agreed to proceed with the medication(s). She is following with CHF clinic. Monitor BP. Discussed diet/exercise/BP/weight loss/health lifestyle choices/lipids; the patient understands the goals and will try to comply.     Disposition:  1 year           Electronically signed by Babita Olivo MD   2/9/2023 at 11:06 AM EST

## 2023-02-22 NOTE — ANESTHESIA PROCEDURE NOTES
Peripheral Block    Patient location during procedure: OR  Staffing  Performed: anesthesiologist   Anesthesiologist: Godwin Keith DO  Preanesthetic Checklist  Completed: patient identified, IV checked, site marked, risks and benefits discussed, surgical consent, monitors and equipment checked, pre-op evaluation, timeout performed, anesthesia consent given, oxygen available and patient being monitored  Peripheral Block  Patient position: supine  Prep: ChloraPrep  Patient monitoring: continuous pulse ox, frequent blood pressure checks and IV access  Block type: Femoral  Laterality: left  Injection technique: single-shot  Guidance: nerve stimulator and ultrasound guided  Provider prep: mask and sterile gloves  Needle  Needle type: short-bevel   Needle gauge: 22 G  Test dose: negative  Assessment  Injection assessment: negative aspiration for heme, no paresthesia on injection and local visualized surrounding nerve on ultrasound  Slow fractionated injection: yes  Hemodynamics: stable  Medications Administered  Ropivacaine (NAROPIN) injection 0.5%, 30 mL  Reason for block: post-op pain management and at surgeon's request
Yes

## 2023-03-23 RX ORDER — CLOPIDOGREL BISULFATE 75 MG/1
TABLET ORAL
Qty: 90 TABLET | Refills: 3 | Status: SHIPPED | OUTPATIENT
Start: 2023-03-23

## 2023-03-23 RX ORDER — QUINAPRIL 10 MG/1
20 TABLET ORAL DAILY
Qty: 180 TABLET | Refills: 3 | OUTPATIENT
Start: 2023-03-23

## 2023-03-23 NOTE — TELEPHONE ENCOUNTER
Bradly Moralez called requesting a refill on the following medications:  Requested Prescriptions     Pending Prescriptions Disp Refills    quinapril (ACCUPRIL) 10 MG tablet 180 tablet 3     Sig: Take 2 tablets by mouth daily    clopidogrel (PLAVIX) 75 MG tablet 90 tablet 3     Sig: TAKE 1 TABLET DAILY     Pharmacy verified:[facility]  Raffy rueda      Date of last visit: 2-9-22  Date of next visit (if applicable): 4-

## 2023-04-20 RX ORDER — AMLODIPINE BESYLATE 5 MG/1
5 TABLET ORAL DAILY
Qty: 90 TABLET | Refills: 1 | Status: SHIPPED | OUTPATIENT
Start: 2023-04-20

## 2023-04-20 RX ORDER — QUINAPRIL 10 MG/1
20 TABLET ORAL DAILY
Qty: 180 TABLET | Refills: 3 | Status: CANCELLED | OUTPATIENT
Start: 2023-04-20

## 2023-04-20 NOTE — TELEPHONE ENCOUNTER
John Rico called requesting a refill on the following medications:  Requested Prescriptions     Pending Prescriptions Disp Refills    quinapril (ACCUPRIL) 10 MG tablet 180 tablet 3     Sig: Take 2 tablets by mouth daily    amLODIPine (NORVASC) 5 MG tablet 30 tablet 0     Sig: Take 1 tablet by mouth daily     Pharmacy verified:[facility]  . mohit      Date of last visit: 2/9/23     Date of next visit (if applicable): 1/75/93

## 2023-04-24 DIAGNOSIS — I10 HTN, GOAL BELOW 140/90: ICD-10-CM

## 2023-04-24 DIAGNOSIS — R11.0 DAILY NAUSEA: ICD-10-CM

## 2023-04-24 DIAGNOSIS — K21.9 GASTROESOPHAGEAL REFLUX DISEASE WITHOUT ESOPHAGITIS: ICD-10-CM

## 2023-04-24 DIAGNOSIS — E11.8 CONTROLLED TYPE 2 DIABETES MELLITUS WITH COMPLICATION, WITHOUT LONG-TERM CURRENT USE OF INSULIN (HCC): ICD-10-CM

## 2023-04-24 DIAGNOSIS — F33.0 MAJOR DEPRESSIVE DISORDER, RECURRENT EPISODE, MILD (HCC): ICD-10-CM

## 2023-04-24 RX ORDER — AMLODIPINE BESYLATE 5 MG/1
5 TABLET ORAL DAILY
Qty: 90 TABLET | Refills: 3 | OUTPATIENT
Start: 2023-04-24

## 2023-04-24 RX ORDER — OMEPRAZOLE 40 MG/1
40 CAPSULE, DELAYED RELEASE ORAL DAILY
Qty: 90 CAPSULE | Refills: 3 | Status: SHIPPED | OUTPATIENT
Start: 2023-04-24

## 2023-04-24 RX ORDER — ONDANSETRON 4 MG/1
4 TABLET, ORALLY DISINTEGRATING ORAL EVERY 8 HOURS PRN
Qty: 90 TABLET | Refills: 3 | Status: SHIPPED | OUTPATIENT
Start: 2023-04-24

## 2023-04-24 RX ORDER — HYDRALAZINE HYDROCHLORIDE 25 MG/1
25 TABLET, FILM COATED ORAL 2 TIMES DAILY
Qty: 180 TABLET | Refills: 1 | Status: SHIPPED | OUTPATIENT
Start: 2023-04-24 | End: 2023-04-24 | Stop reason: ALTCHOICE

## 2023-04-24 RX ORDER — AMLODIPINE BESYLATE 5 MG/1
5 TABLET ORAL DAILY
Qty: 90 TABLET | Refills: 3 | Status: SHIPPED | OUTPATIENT
Start: 2023-04-24

## 2023-04-24 RX ORDER — HYDRALAZINE HYDROCHLORIDE 50 MG/1
50 TABLET, FILM COATED ORAL 2 TIMES DAILY
Qty: 180 TABLET | Refills: 0 | Status: CANCELLED | OUTPATIENT
Start: 2023-04-24

## 2023-04-24 RX ORDER — CARVEDILOL 12.5 MG/1
12.5 TABLET ORAL 2 TIMES DAILY
Qty: 180 TABLET | Refills: 3 | Status: SHIPPED | OUTPATIENT
Start: 2023-04-24

## 2023-04-24 RX ORDER — QUINAPRIL 10 MG/1
20 TABLET ORAL DAILY
Qty: 180 TABLET | Refills: 3 | Status: SHIPPED | OUTPATIENT
Start: 2023-04-24

## 2023-04-24 RX ORDER — ATORVASTATIN CALCIUM 40 MG/1
40 TABLET, FILM COATED ORAL DAILY
Qty: 90 TABLET | Refills: 3 | Status: SHIPPED | OUTPATIENT
Start: 2023-04-24

## 2023-04-24 RX ORDER — HYDRALAZINE HYDROCHLORIDE 50 MG/1
50 TABLET, FILM COATED ORAL 2 TIMES DAILY
Qty: 180 TABLET | Refills: 3 | Status: SHIPPED | OUTPATIENT
Start: 2023-04-24

## 2023-04-24 RX ORDER — CLOPIDOGREL BISULFATE 75 MG/1
TABLET ORAL
Qty: 90 TABLET | Refills: 3 | Status: SHIPPED | OUTPATIENT
Start: 2023-04-24

## 2023-04-24 RX ORDER — CARVEDILOL 12.5 MG/1
12.5 TABLET ORAL 2 TIMES DAILY
Qty: 60 TABLET | Refills: 0 | Status: CANCELLED | OUTPATIENT
Start: 2023-04-24

## 2023-04-24 NOTE — TELEPHONE ENCOUNTER
From: Amy Berman  To: Office of Jeanne Chavez  Sent: 4/24/2023 10:53 AM EDT  Subject: Medication Renewal Request    Refills have been requested for the following medications:     atorvastatin (LIPITOR) 40 MG tablet [Dr. Braeden Jean DO]     hydrALAZINE (APRESOLINE) 25 MG tablet [Dr. Braeden Jean DO]     sertraline (ZOLOFT) 50 MG tablet [Dr. Braeden Jean DO]     ondansetron (ZOFRAN ODT) 4 MG disintegrating tablet [Dr. Braeden Jean DO]     metFORMIN (GLUCOPHAGE) 500 MG tablet [Dr. Braeden Jean DO]     omeprazole (PRILOSEC) 40 MG delayed release capsule [Dr. Braeden Jean DO]     quinapril (ACCUPRIL) 10 MG tablet [Dr. Braeden Jean DO]    Preferred pharmacy: [facility], [address] [facility] [phone] [facility] [phone]      Medication renewals requested in this message routed separately:     clopidogrel (PLAVIX) 75 MG tablet [Velia Ayala, APRN - CNP]     amLODIPine (NORVASC) 5 MG tablet Larisa Ray APRN - CNP]   Patient Comment: I keep renewing my scripts on my chart but am not getting them and im about out. Please reply that you arecrenewing them on the previous page. [facility].  Thank you       hydrALAZINE (APRESOLINE) 50 MG tablet [Velia Ayala, APRN - CNP]       carvedilol (COREG) 12.5 MG tablet [Dr. Jose Luis Mcduffie MD]     amLODIPine (NORVASC) 5 MG tablet [Dr. Jose Luis Mcduffie MD]       Magnesium Oxide (MAGNESIUM-OXIDE) 250 MG TABS tablet Sushma Kincaid APRN - CNP]

## 2023-05-01 RX ORDER — QUINAPRIL 10 MG/1
10 TABLET ORAL DAILY
Qty: 90 TABLET | Refills: 3 | Status: CANCELLED | OUTPATIENT
Start: 2023-05-01

## 2023-05-01 NOTE — TELEPHONE ENCOUNTER
Accupril 10 mg has been recalled.   What do you want to change it to? [employer] message sent to patient.    Dinora Quinn RN  [facility]

## 2023-05-01 NOTE — TELEPHONE ENCOUNTER
Dr. Vasquez Aas patient called in stating she was trying to get a refill on her Accupril and she states it has been recalled. I did confirm with patient that she was taking Accupril 10mg 1 tablet daily. Would you like to change patient to something different?

## 2023-05-02 RX ORDER — LISINOPRIL 10 MG/1
10 TABLET ORAL DAILY
Qty: 30 TABLET | Refills: 0 | Status: SHIPPED | OUTPATIENT
Start: 2023-05-02

## 2023-05-02 RX ORDER — LISINOPRIL 10 MG/1
10 TABLET ORAL DAILY
Qty: 90 TABLET | Refills: 3 | Status: SHIPPED | OUTPATIENT
Start: 2023-05-02

## 2023-09-20 ENCOUNTER — TELEPHONE (OUTPATIENT)
Dept: CARDIOLOGY CLINIC | Age: 83
End: 2023-09-20

## 2023-09-20 NOTE — TELEPHONE ENCOUNTER
Pt needs clearance for EGD with Dil 9/26/23  Holding plavix and asa 5 days     Fax [phone]  Dr Kaye Lever

## 2024-01-29 ENCOUNTER — HOSPITAL ENCOUNTER (EMERGENCY)
Age: 84
Discharge: HOME | End: 2024-01-29
Payer: MEDICARE

## 2024-01-29 VITALS
HEIGHT: 64 IN | BODY MASS INDEX: 39.27 KG/M2 | RESPIRATION RATE: 18 BRPM | WEIGHT: 230 LBS | TEMPERATURE: 97.6 F | SYSTOLIC BLOOD PRESSURE: 126 MMHG | OXYGEN SATURATION: 97 % | HEART RATE: 93 BPM | DIASTOLIC BLOOD PRESSURE: 81 MMHG

## 2024-01-29 DIAGNOSIS — N39.0 URINARY TRACT INFECTION WITHOUT HEMATURIA, SITE UNSPECIFIED: Primary | ICD-10-CM

## 2024-01-29 LAB
BILIRUB UR STRIP.AUTO-MCNC: NEGATIVE MG/DL
CHARACTER UR: CLEAR
COLOR: ABNORMAL
GLUCOSE UR QL STRIP.AUTO: NEGATIVE MG/DL
KETONES UR QL STRIP.AUTO: NEGATIVE
NITRITE UR QL STRIP.AUTO: POSITIVE
PH UR STRIP.AUTO: 5.5 [PH] (ref 5–9)
PROT UR STRIP.AUTO-MCNC: 30 MG/DL
RBC #/AREA URNS HPF: NEGATIVE /[HPF]
SP GR UR STRIP.AUTO: 1.02 (ref 1–1.03)
UROBILINOGEN, URINE: 0.2 EU/DL (ref 0.2–1)
WBC #/AREA URNS HPF: ABNORMAL /[HPF]

## 2024-01-29 PROCEDURE — 87077 CULTURE AEROBIC IDENTIFY: CPT

## 2024-01-29 PROCEDURE — 87186 SC STD MICRODIL/AGAR DIL: CPT

## 2024-01-29 PROCEDURE — 81003 URINALYSIS AUTO W/O SCOPE: CPT

## 2024-01-29 PROCEDURE — 99213 OFFICE O/P EST LOW 20 MIN: CPT

## 2024-01-29 PROCEDURE — 87086 URINE CULTURE/COLONY COUNT: CPT

## 2024-01-29 RX ORDER — NITROFURANTOIN 25; 75 MG/1; MG/1
100 CAPSULE ORAL 2 TIMES DAILY
Qty: 14 CAPSULE | Refills: 0 | Status: SHIPPED | OUTPATIENT
Start: 2024-01-29 | End: 2024-02-05

## 2024-01-29 ASSESSMENT — PAIN - FUNCTIONAL ASSESSMENT: PAIN_FUNCTIONAL_ASSESSMENT: NONE - DENIES PAIN

## 2024-01-29 NOTE — ED PROVIDER NOTES
[facility] URGENT CARE  Urgent Care Encounter       CHIEF COMPLAINT       Chief Complaint   Patient presents with    Urinary Tract Infection       Nurses Notes reviewed and I agree except as noted in the HPI.  HISTORY OF PRESENT ILLNESS   Angeles Berman is a 83 y.o. female who presents with concerns of a urinary tract infection. Reports symptoms intermittently started a few days prior but have become more bothersome the past three days. Reports no fevers. Reports urinary frequency and urgency.     HPI    REVIEW OF SYSTEMS     Review of Systems   Constitutional:  Negative for activity change, fatigue and fever.   Genitourinary:  Positive for dysuria, frequency and urgency. Negative for decreased urine volume, difficulty urinating, flank pain and pelvic pain.   Psychiatric/Behavioral:  Negative for confusion.    All other systems reviewed and are negative.      PAST MEDICAL HISTORY         Diagnosis Date    Arthritis     Chronic low back pain     COVID-19     DM type 2, goal A1c below 7     GERD (gastroesophageal reflux disease)     Hyperlipidemia     Hypertension     Loss of vision     Mitral valve disorder     Nausea and vomiting 02/08/2021    Osteoporosis     Subarachnoid hemorrhage ([facility]) 04/25/2019    TIA (transient ischemic attack)     Type 2 diabetes mellitus with diabetic neuropathy, without long-term current use of insulin ([facility]) 07/26/2017    Unspecified cerebral artery occlusion with cerebral infarction 03/2014    aslo after valve replacement 2020    Urinary incontinence        SURGICALHISTORY     Patient  has a past surgical history that includes Appendectomy; Cholecystectomy; hernia repair; Hysterectomy; Spine surgery; Carpal tunnel release (Left, 2011); pr laminectomy w/o ffd > 2 vert seg lumbar; cervical fusion (1976); eye surgery; Rotator cuff repair (Right, 11/28/2014); shoulder surgery (2014); Cataract removal (Bilateral); Colon surgery (2012); Colonoscopy (2015); Upper  of UTI. Macrobid prescribed, instructed to complete all antibiotic. Instructed to take ATB with food, advised probiotic, yogurt.  Urine sent out for culture, aware urgent care will call in 24-48 hours if change in antibiotic is warranted. Patient was advised to drink plenty of water or fluids and take medication as prescribed.The patient is advised to monitor for any changes in pain, development of high fever, chills, persistent vomiting, development of increasing back or flank pain or increase in hematuria the patient is advised to go to the emergency department for reevaluation and further follow-up if they wouldn't notice any of the above symptoms.   The patient was also advised to follow up with family doctor or primary care provider after the antibiotic is completed for repeat urinalysis.  The patient did verbalize understanding of discharge instructions and is agreeable to the treatment plan.  The patient left ambulatory without any changes or concerns in stable condition.          PATIENT REFERRED TO:  Kady López APRN - CNP  [address] / [place] [address]      DISCHARGE MEDICATIONS:  Discharge Medication List as of 1/29/2024 10:13 AM        START taking these medications    Details   nitrofurantoin, macrocrystal-monohydrate, (MACROBID) 100 MG capsule Take 1 capsule by mouth 2 times daily for 7 days, Disp-14 capsule, R-0Normal             Discharge Medication List as of 1/29/2024 10:13 AM          Discharge Medication List as of 1/29/2024 10:13 AM          SEAN Mixon CNP    (Please note that portions of this note were completed with a voice recognition program. Efforts were made to edit the dictations but occasionally words are mis-transcribed.)            Carri Everett APRN - CNP  01/29/24 [id number]

## 2024-01-29 NOTE — DISCHARGE INSTRUCTIONS
Macrobid as prescribed, complete all antibiotic even if symptoms improve.   Increase water intake, frequent hand washing.  Tylenol / Ibuprofen as needed for fever and or pain.  Follow up with PCP in 3-5 days if no improvement or sooner with worsening symptoms.

## 2024-01-31 LAB
BACTERIA UR CULT: ABNORMAL
ORGANISM: ABNORMAL

## 2024-02-22 ENCOUNTER — OFFICE VISIT (OUTPATIENT)
Dept: CARDIOLOGY CLINIC | Age: 84
End: 2024-02-22
Payer: MEDICARE

## 2024-02-22 ENCOUNTER — TELEPHONE (OUTPATIENT)
Dept: CARDIOLOGY CLINIC | Age: 84
End: 2024-02-22

## 2024-02-22 VITALS
DIASTOLIC BLOOD PRESSURE: 80 MMHG | BODY MASS INDEX: 38.93 KG/M2 | WEIGHT: 228 LBS | HEART RATE: 87 BPM | HEIGHT: 64 IN | SYSTOLIC BLOOD PRESSURE: 150 MMHG

## 2024-02-22 DIAGNOSIS — F33.0 MAJOR DEPRESSIVE DISORDER, RECURRENT EPISODE, MILD (HCC): ICD-10-CM

## 2024-02-22 DIAGNOSIS — Z95.2 S/P TAVR (TRANSCATHETER AORTIC VALVE REPLACEMENT): Primary | ICD-10-CM

## 2024-02-22 DIAGNOSIS — I50.32 CHF (CONGESTIVE HEART FAILURE), NYHA CLASS II, CHRONIC, DIASTOLIC (HCC): ICD-10-CM

## 2024-02-22 PROCEDURE — G8427 DOCREV CUR MEDS BY ELIG CLIN: HCPCS | Performed by: INTERNAL MEDICINE

## 2024-02-22 PROCEDURE — 1123F ACP DISCUSS/DSCN MKR DOCD: CPT | Performed by: INTERNAL MEDICINE

## 2024-02-22 PROCEDURE — 3079F DIAST BP 80-89 MM HG: CPT | Performed by: INTERNAL MEDICINE

## 2024-02-22 PROCEDURE — 3077F SYST BP >= 140 MM HG: CPT | Performed by: INTERNAL MEDICINE

## 2024-02-22 PROCEDURE — G8399 PT W/DXA RESULTS DOCUMENT: HCPCS | Performed by: INTERNAL MEDICINE

## 2024-02-22 PROCEDURE — 99214 OFFICE O/P EST MOD 30 MIN: CPT | Performed by: INTERNAL MEDICINE

## 2024-02-22 PROCEDURE — 1090F PRES/ABSN URINE INCON ASSESS: CPT | Performed by: INTERNAL MEDICINE

## 2024-02-22 PROCEDURE — 93000 ELECTROCARDIOGRAM COMPLETE: CPT | Performed by: INTERNAL MEDICINE

## 2024-02-22 PROCEDURE — 1036F TOBACCO NON-USER: CPT | Performed by: INTERNAL MEDICINE

## 2024-02-22 PROCEDURE — G8417 CALC BMI ABV UP PARAM F/U: HCPCS | Performed by: INTERNAL MEDICINE

## 2024-02-22 PROCEDURE — G8484 FLU IMMUNIZE NO ADMIN: HCPCS | Performed by: INTERNAL MEDICINE

## 2024-02-22 RX ORDER — CLOPIDOGREL BISULFATE 75 MG/1
TABLET ORAL
Qty: 90 TABLET | Refills: 3 | Status: SHIPPED | OUTPATIENT
Start: 2024-02-22

## 2024-02-22 RX ORDER — LISINOPRIL 10 MG/1
10 TABLET ORAL DAILY
Qty: 90 TABLET | Refills: 3 | Status: SHIPPED | OUTPATIENT
Start: 2024-02-22

## 2024-02-22 RX ORDER — CARVEDILOL 12.5 MG/1
12.5 TABLET ORAL 2 TIMES DAILY
Qty: 180 TABLET | Refills: 3 | Status: SHIPPED | OUTPATIENT
Start: 2024-02-22

## 2024-02-22 RX ORDER — AMLODIPINE BESYLATE 5 MG/1
5 TABLET ORAL DAILY
Qty: 90 TABLET | Refills: 3 | Status: SHIPPED | OUTPATIENT
Start: 2024-02-22

## 2024-02-22 RX ORDER — HYDRALAZINE HYDROCHLORIDE 50 MG/1
50 TABLET, FILM COATED ORAL 2 TIMES DAILY
Qty: 180 TABLET | Refills: 3 | Status: SHIPPED | OUTPATIENT
Start: 2024-02-22

## 2024-02-22 NOTE — TELEPHONE ENCOUNTER
Dr. Gaming (cardiology) saw patient today. Patient has lost her  and has no PCP currently. Patient dx with major depressive disorder and started on Zoloft 50mg QD today at her visit. Referral initiated to Bianca Jiang to establish care. He would like patient to been seen soon if possible. Referral in chart. Thank you!

## 2024-02-22 NOTE — PATIENT INSTRUCTIONS
Your nurses today were KENDALL Lorenzo and NAHED Rai  Your provider today was Dr. Gaming  Phone number: [phone]        You may receive a survey regarding the care you received during your visit.  Your input is valuable to us.  We encourage you to complete and return your survey.  We hope you will choose us in the future for your healthcare needs.

## 2024-02-22 NOTE — PROGRESS NOTES
[facility] [place] [facility]  [facility] CARDIOLOGY  [address]  [address]  [facility] [place] [facility] [address]  Dept: [phone]  Dept Fax: [phone]  Loc: [phone]    Visit Date: 2/22/2024    Ms. Berman is a 84 y.o. female  who presented for:  Chief Complaint   Patient presents with    1 Year Follow Up    Congestive Heart Failure       HPI:   HPI   85 yo F s/p TAVR who returned with left cerebellar CVA with symptoms of nausea/vomiting, recovered completely who comes for follow-up.  She is depressed.  She lost her PCP.  She is on DAPT, no bleeding.  BP elevated.  She is on SSRI.  She also has neuropathy and excruciating pain.        Current Outpatient Medications:     lisinopril (PRINIVIL;ZESTRIL) 10 MG tablet, Take 1 tablet by mouth daily, Disp: 30 tablet, Rfl: 0    clopidogrel (PLAVIX) 75 MG tablet, TAKE 1 TABLET DAILY, Disp: 90 tablet, Rfl: 3    Magnesium Oxide (MAGNESIUM-OXIDE) 250 MG TABS tablet, Take 1 tablet by mouth in the morning, at noon, and at bedtime, Disp: 270 tablet, Rfl: 3    carvedilol (COREG) 12.5 MG tablet, Take 1 tablet by mouth 2 times daily, Disp: 180 tablet, Rfl: 3    hydrALAZINE (APRESOLINE) 50 MG tablet, Take 1 tablet by mouth 2 times daily, Disp: 180 tablet, Rfl: 3    atorvastatin (LIPITOR) 40 MG tablet, Take 1 tablet by mouth daily, Disp: 90 tablet, Rfl: 3    sertraline (ZOLOFT) 50 MG tablet, Take 1 tablet by mouth daily, Disp: 90 tablet, Rfl: 3    ondansetron (ZOFRAN ODT) 4 MG disintegrating tablet, Take 1 tablet by mouth every 8 hours as needed for Nausea or Vomiting, Disp: 90 tablet, Rfl: 3    metFORMIN (GLUCOPHAGE) 500 MG tablet, Take 1 tablet by mouth 2 times daily (with meals), Disp: 180 tablet, Rfl: 3    omeprazole (PRILOSEC) 40 MG delayed release capsule, Take 1 capsule by mouth daily, Disp: 90 capsule, Rfl: 3    amLODIPine (NORVASC) 5 MG tablet, Take 1 tablet by mouth daily, Disp: 90 tablet, Rfl: 3    Handicap Placard MISC, by Does not apply route Expires

## 2024-02-26 DIAGNOSIS — F33.0 MAJOR DEPRESSIVE DISORDER, RECURRENT EPISODE, MILD (HCC): ICD-10-CM

## 2024-02-26 NOTE — TELEPHONE ENCOUNTER
[facility] not [facility] of Z[facility]ft- queued medication for a local pharmacy for a short term supply until they are able to supply it to pt.

## 2024-02-27 ENCOUNTER — OFFICE VISIT (OUTPATIENT)
Dept: FAMILY MEDICINE CLINIC | Age: 84
End: 2024-02-27
Payer: MEDICARE

## 2024-02-27 ENCOUNTER — TELEPHONE (OUTPATIENT)
Dept: INTERNAL MEDICINE CLINIC | Age: 84
End: 2024-02-27

## 2024-02-27 VITALS
HEART RATE: 82 BPM | DIASTOLIC BLOOD PRESSURE: 82 MMHG | BODY MASS INDEX: 38.93 KG/M2 | WEIGHT: 228 LBS | OXYGEN SATURATION: 96 % | SYSTOLIC BLOOD PRESSURE: 132 MMHG | RESPIRATION RATE: 16 BRPM | HEIGHT: 64 IN | TEMPERATURE: 99 F

## 2024-02-27 DIAGNOSIS — F33.0 MAJOR DEPRESSIVE DISORDER, RECURRENT EPISODE, MILD (HCC): ICD-10-CM

## 2024-02-27 DIAGNOSIS — I50.32 CHF (CONGESTIVE HEART FAILURE), NYHA CLASS II, CHRONIC, DIASTOLIC (HCC): ICD-10-CM

## 2024-02-27 DIAGNOSIS — I10 HTN, GOAL BELOW 140/90: Primary | ICD-10-CM

## 2024-02-27 DIAGNOSIS — Z91.81 AT HIGH RISK FOR FALLS: ICD-10-CM

## 2024-02-27 DIAGNOSIS — K21.9 GASTROESOPHAGEAL REFLUX DISEASE WITHOUT ESOPHAGITIS: ICD-10-CM

## 2024-02-27 DIAGNOSIS — E11.8 CONTROLLED TYPE 2 DIABETES MELLITUS WITH COMPLICATION, WITHOUT LONG-TERM CURRENT USE OF INSULIN (HCC): ICD-10-CM

## 2024-02-27 LAB — HBA1C MFR BLD: 8.1 % (ref 4.3–5.7)

## 2024-02-27 PROCEDURE — 3052F HG A1C>EQUAL 8.0%<EQUAL 9.0%: CPT | Performed by: NURSE PRACTITIONER

## 2024-02-27 PROCEDURE — G8484 FLU IMMUNIZE NO ADMIN: HCPCS | Performed by: NURSE PRACTITIONER

## 2024-02-27 PROCEDURE — G8427 DOCREV CUR MEDS BY ELIG CLIN: HCPCS | Performed by: NURSE PRACTITIONER

## 2024-02-27 PROCEDURE — G8417 CALC BMI ABV UP PARAM F/U: HCPCS | Performed by: NURSE PRACTITIONER

## 2024-02-27 PROCEDURE — 3075F SYST BP GE 130 - 139MM HG: CPT | Performed by: NURSE PRACTITIONER

## 2024-02-27 PROCEDURE — 1090F PRES/ABSN URINE INCON ASSESS: CPT | Performed by: NURSE PRACTITIONER

## 2024-02-27 PROCEDURE — 99214 OFFICE O/P EST MOD 30 MIN: CPT | Performed by: NURSE PRACTITIONER

## 2024-02-27 PROCEDURE — 1036F TOBACCO NON-USER: CPT | Performed by: NURSE PRACTITIONER

## 2024-02-27 PROCEDURE — 1123F ACP DISCUSS/DSCN MKR DOCD: CPT | Performed by: NURSE PRACTITIONER

## 2024-02-27 PROCEDURE — 83036 HEMOGLOBIN GLYCOSYLATED A1C: CPT | Performed by: NURSE PRACTITIONER

## 2024-02-27 PROCEDURE — G8399 PT W/DXA RESULTS DOCUMENT: HCPCS | Performed by: NURSE PRACTITIONER

## 2024-02-27 PROCEDURE — 3079F DIAST BP 80-89 MM HG: CPT | Performed by: NURSE PRACTITIONER

## 2024-02-27 RX ORDER — METFORMIN HYDROCHLORIDE 500 MG/1
TABLET, EXTENDED RELEASE ORAL
Qty: 270 TABLET | Refills: 3 | Status: SHIPPED | OUTPATIENT
Start: 2024-02-27

## 2024-02-27 RX ORDER — ATORVASTATIN CALCIUM 40 MG/1
40 TABLET, FILM COATED ORAL DAILY
Qty: 90 TABLET | Refills: 3 | Status: SHIPPED | OUTPATIENT
Start: 2024-02-27

## 2024-02-27 RX ORDER — OMEPRAZOLE 40 MG/1
40 CAPSULE, DELAYED RELEASE ORAL DAILY
Qty: 90 CAPSULE | Refills: 3 | Status: SHIPPED | OUTPATIENT
Start: 2024-02-27

## 2024-02-27 RX ORDER — SERTRALINE HYDROCHLORIDE 100 MG/1
100 TABLET, FILM COATED ORAL DAILY
Qty: 90 TABLET | Refills: 3 | Status: SHIPPED | OUTPATIENT
Start: 2024-02-27

## 2024-02-27 SDOH — ECONOMIC STABILITY: INCOME INSECURITY: HOW HARD IS IT FOR YOU TO PAY FOR THE VERY BASICS LIKE FOOD, HOUSING, MEDICAL CARE, AND HEATING?: NOT HARD AT ALL

## 2024-02-27 SDOH — ECONOMIC STABILITY: FOOD INSECURITY: WITHIN THE PAST 12 MONTHS, THE FOOD YOU BOUGHT JUST DIDN'T LAST AND YOU DIDN'T HAVE MONEY TO GET MORE.: NEVER TRUE

## 2024-02-27 SDOH — ECONOMIC STABILITY: FOOD INSECURITY: WITHIN THE PAST 12 MONTHS, YOU WORRIED THAT YOUR FOOD WOULD RUN OUT BEFORE YOU GOT MONEY TO BUY MORE.: NEVER TRUE

## 2024-02-27 ASSESSMENT — PATIENT HEALTH QUESTIONNAIRE - PHQ9
4. FEELING TIRED OR HAVING LITTLE ENERGY: 0
2. FEELING DOWN, DEPRESSED OR HOPELESS: 1
9. THOUGHTS THAT YOU WOULD BE BETTER OFF DEAD, OR OF HURTING YOURSELF: 0
1. LITTLE INTEREST OR PLEASURE IN DOING THINGS: 0
5. POOR APPETITE OR OVEREATING: 0
SUM OF ALL RESPONSES TO PHQ QUESTIONS 1-9: 1
SUM OF ALL RESPONSES TO PHQ QUESTIONS 1-9: 1
8. MOVING OR SPEAKING SO SLOWLY THAT OTHER PEOPLE COULD HAVE NOTICED. OR THE OPPOSITE, BEING SO FIGETY OR RESTLESS THAT YOU HAVE BEEN MOVING AROUND A LOT MORE THAN USUAL: 0
6. FEELING BAD ABOUT YOURSELF - OR THAT YOU ARE A FAILURE OR HAVE LET YOURSELF OR YOUR FAMILY DOWN: 0
7. TROUBLE CONCENTRATING ON THINGS, SUCH AS READING THE NEWSPAPER OR WATCHING TELEVISION: 0
3. TROUBLE FALLING OR STAYING ASLEEP: 0
10. IF YOU CHECKED OFF ANY PROBLEMS, HOW DIFFICULT HAVE THESE PROBLEMS MADE IT FOR YOU TO DO YOUR WORK, TAKE CARE OF THINGS AT HOME, OR GET ALONG WITH OTHER PEOPLE: 0
SUM OF ALL RESPONSES TO PHQ QUESTIONS 1-9: 1
SUM OF ALL RESPONSES TO PHQ QUESTIONS 1-9: 1
SUM OF ALL RESPONSES TO PHQ9 QUESTIONS 1 & 2: 1

## 2024-02-27 NOTE — PROGRESS NOTES
Affect: Mood normal.         Behavior: Behavior normal.           ASSESSMENT & PLAN  Angeles was seen today for new to provider.    Diagnoses and all orders for this visit:    HTN, goal below 140/90  -     CBC with Auto Differential; Future  -     Comprehensive Metabolic Panel; Future  -     Lipid Panel; Future  -     TSH; Future  -     T4, Free; Future  -     Vitamin D 25 Hydroxy; Future  -     Vitamin B12; Future  -     atorvastatin (LIPITOR) 40 MG tablet; Take 1 tablet by mouth daily  -     Discontinue: metFORMIN (GLUCOPHAGE) 500 MG tablet; Take 1 tablet by mouth 2 times daily (with meals)    Controlled type 2 diabetes mellitus with complication, without long-term current use of insulin (HCC)  -     CBC with Auto Differential; Future  -     Comprehensive Metabolic Panel; Future  -     Lipid Panel; Future  -     TSH; Future  -     T4, Free; Future  -     Vitamin D 25 Hydroxy; Future  -     Vitamin B12; Future  -     atorvastatin (LIPITOR) 40 MG tablet; Take 1 tablet by mouth daily  -     Discontinue: metFORMIN (GLUCOPHAGE) 500 MG tablet; Take 1 tablet by mouth 2 times daily (with meals)  -     POCT glycosylated hemoglobin (Hb A1C)  -     metFORMIN (GLUCOPHAGE-XR) 500 MG extended release tablet; Take 2 tabs with breakfast and 1 tab with dinner.    Gastroesophageal reflux disease without esophagitis  -     CBC with Auto Differential; Future  -     Comprehensive Metabolic Panel; Future  -     Lipid Panel; Future  -     TSH; Future  -     T4, Free; Future  -     Vitamin D 25 Hydroxy; Future  -     Vitamin B12; Future  -     omeprazole (PRILOSEC) 40 MG delayed release capsule; Take 1 capsule by mouth daily    Major depressive disorder, recurrent episode, mild (HCC)  -     CBC with Auto Differential; Future  -     Comprehensive Metabolic Panel; Future  -     Lipid Panel; Future  -     TSH; Future  -     T4, Free; Future  -     Vitamin D 25 Hydroxy; Future  -     Vitamin B12; Future  -     sertraline (ZOLOFT) 100 MG

## 2024-02-27 NOTE — TELEPHONE ENCOUNTER
KEMAR Lilly from Dr Jiang's ofc LM on VM stating Dr Jiang is not taking new patients at this time and they referred her back to PCP. She saw PCP today

## 2024-02-27 NOTE — TELEPHONE ENCOUNTER
Dr Gaming's office left a voicemail asking about appt.  I called back and left a voicemail letting them know Dr. Jiang is not taking any new pts and has referred her back to her pcp and looks like she had an appt today.    Tracie called back from Cardiology and I notified her of this as well.

## 2024-02-28 ENCOUNTER — NURSE ONLY (OUTPATIENT)
Dept: LAB | Age: 84
End: 2024-02-28

## 2024-02-28 DIAGNOSIS — F33.0 MAJOR DEPRESSIVE DISORDER, RECURRENT EPISODE, MILD (HCC): ICD-10-CM

## 2024-02-28 DIAGNOSIS — E11.8 CONTROLLED TYPE 2 DIABETES MELLITUS WITH COMPLICATION, WITHOUT LONG-TERM CURRENT USE OF INSULIN (HCC): ICD-10-CM

## 2024-02-28 DIAGNOSIS — I50.32 CHF (CONGESTIVE HEART FAILURE), NYHA CLASS II, CHRONIC, DIASTOLIC (HCC): ICD-10-CM

## 2024-02-28 DIAGNOSIS — K21.9 GASTROESOPHAGEAL REFLUX DISEASE WITHOUT ESOPHAGITIS: ICD-10-CM

## 2024-02-28 DIAGNOSIS — I10 HTN, GOAL BELOW 140/90: ICD-10-CM

## 2024-02-28 LAB
25(OH)D3 SERPL-MCNC: 31 NG/ML (ref 30–100)
ALBUMIN SERPL BCG-MCNC: 4.5 G/DL (ref 3.5–5.1)
ALP SERPL-CCNC: 74 U/L (ref 38–126)
ALT SERPL W/O P-5'-P-CCNC: 15 U/L (ref 11–66)
ANION GAP SERPL CALC-SCNC: 15 MEQ/L (ref 8–16)
AST SERPL-CCNC: 15 U/L (ref 5–40)
BASOPHILS ABSOLUTE: 0 THOU/MM3 (ref 0–0.1)
BASOPHILS NFR BLD AUTO: 0.6 %
BILIRUB SERPL-MCNC: 0.6 MG/DL (ref 0.3–1.2)
BUN SERPL-MCNC: 16 MG/DL (ref 7–22)
CALCIUM SERPL-MCNC: 10.1 MG/DL (ref 8.5–10.5)
CHLORIDE SERPL-SCNC: 102 MEQ/L (ref 98–111)
CHOLEST SERPL-MCNC: 143 MG/DL (ref 100–199)
CO2 SERPL-SCNC: 24 MEQ/L (ref 23–33)
CREAT SERPL-MCNC: 0.9 MG/DL (ref 0.4–1.2)
DEPRECATED RDW RBC AUTO: 43.1 FL (ref 35–45)
EOSINOPHIL NFR BLD AUTO: 8.5 %
EOSINOPHILS ABSOLUTE: 0.6 THOU/MM3 (ref 0–0.4)
ERYTHROCYTE [DISTWIDTH] IN BLOOD BY AUTOMATED COUNT: 12.9 % (ref 11.5–14.5)
GFR SERPL CREATININE-BSD FRML MDRD: > 60 ML/MIN/1.73M2
GLUCOSE SERPL-MCNC: 205 MG/DL (ref 70–108)
HCT VFR BLD AUTO: 41.9 % (ref 37–47)
HDLC SERPL-MCNC: 48 MG/DL
HGB BLD-MCNC: 14.3 GM/DL (ref 12–16)
IMM GRANULOCYTES # BLD AUTO: 0.02 THOU/MM3 (ref 0–0.07)
IMM GRANULOCYTES NFR BLD AUTO: 0.3 %
LDLC SERPL CALC-MCNC: 65 MG/DL
LYMPHOCYTES ABSOLUTE: 1.7 THOU/MM3 (ref 1–4.8)
LYMPHOCYTES NFR BLD AUTO: 23.3 %
MCH RBC QN AUTO: 31.3 PG (ref 26–33)
MCHC RBC AUTO-ENTMCNC: 34.1 GM/DL (ref 32.2–35.5)
MCV RBC AUTO: 91.7 FL (ref 81–99)
MONOCYTES ABSOLUTE: 0.5 THOU/MM3 (ref 0.4–1.3)
MONOCYTES NFR BLD AUTO: 6.6 %
NEUTROPHILS NFR BLD AUTO: 60.7 %
NRBC BLD AUTO-RTO: 0 /100 WBC
PLATELET # BLD AUTO: 234 THOU/MM3 (ref 130–400)
PMV BLD AUTO: 11.6 FL (ref 9.4–12.4)
POTASSIUM SERPL-SCNC: 4.4 MEQ/L (ref 3.5–5.2)
PROT SERPL-MCNC: 7.2 G/DL (ref 6.1–8)
RBC # BLD AUTO: 4.57 MILL/MM3 (ref 4.2–5.4)
SEGMENTED NEUTROPHILS ABSOLUTE COUNT: 4.3 THOU/MM3 (ref 1.8–7.7)
SODIUM SERPL-SCNC: 141 MEQ/L (ref 135–145)
T4 FREE SERPL-MCNC: 1.03 NG/DL (ref 0.93–1.68)
TRIGL SERPL-MCNC: 148 MG/DL (ref 0–199)
TSH SERPL DL<=0.005 MIU/L-ACNC: 2.33 UIU/ML (ref 0.4–4.2)
VIT B12 SERPL-MCNC: 291 PG/ML (ref 211–911)
WBC # BLD AUTO: 7.1 THOU/MM3 (ref 4.8–10.8)

## 2024-03-04 ENCOUNTER — TELEPHONE (OUTPATIENT)
Dept: FAMILY MEDICINE CLINIC | Age: 84
End: 2024-03-04

## 2024-03-04 NOTE — TELEPHONE ENCOUNTER
Pt informed and understanding with no further questions at this time.     Pt states she has been taking vitamin D  She will start taking vitamin b12

## 2024-03-04 NOTE — TELEPHONE ENCOUNTER
----- Message from SEAN Murray - CNP sent at 3/1/2024  2:18 PM EST -----  Labs were stable except Vit D and Vit B12 were on the low side of normal.  Recommend starting an otc supplement to replace these if she isn't already taking either.

## 2024-04-03 NOTE — CONSULTS
HEART CARE ENCOUNTER CONSULTATON NOTE      [facility]  [phone]      Assessment/Recommendations   Assessment:   CAD: DESx 2 LAD/D1 and NIDIA x2 RCA 2019 [facility], denies angina on aspirin 81 mg  HLD: atorvastatin 80 mg.  Last LDL 75 ago, we discussed recommendation for addition of Zetia 10 mg if LDL has continued to rise, mechanism of action and potential side effect.    Plan:   Repeat fasting lipid panel, prefers [facility].  Continue daily exercise of walking and healthy diet      Follow up in 1 year or sooner as needed     History of Present Illness/Subjective    HPI: Tunde Orozco is a 64 year old male with PMHx of CAD, HLD presents for follow up. Patient reports feeling well with no changes over the last year.  Continues atorvastatin and aspirin daily.  He walks daily for exercise.  He denies fatigue, shortness of breath, dyspnea on exertion, orthopnea, PND, palpitations, chest pain, and lower extremity edema.        Angiogram 2019:   Conclusions        1) Significant mid RCA stenosis, severe disease distal         RCA.        2) Patent stents proximal to mid LAD/D1.         3) Normal LVEDP=10mmHg.         4) S/p uneventful PCI of to mid RCA (4.0x12mm Synergy         NIDIA), distal RCA into ISABELLA (2.03f94vd Synergy NIDIA) +         POBA ostial PDA.      Conclusions        1) Subtotal occlusion of mid LAD after D1 take-off         (culprit-ZANE II flow).        2) Borderline mid RCA stenosis, severe disease distal         RCA        3) LVEDP=10mmHg.        4) S/p uneventful PCI of proximal to mid LAD (Synergy         DESx3) and D1 (Synergy DESx1).      Physical Examination  Review of Systems   Vitals: /60 (BP Location: Right arm, Patient Position: Sitting, Cuff Size: Adult Large)   Pulse 61   Resp 14   Wt 101.6 kg (224 lb)   BMI 32.14 kg/m    BMI= Body mass index is 32.14 kg/m .  Wt Readings from Last 3 Encounters:   04/03/24 101.6 kg (224 lb)   03/23/23 105.9 kg  Nelida Dickey 60, Willie CHANDLER 33                                          NEUROSURGICAL CONSULTATION NOTE       Barbie Jiménez   YOB: 1940  Account Number: [de-identified]   Date of Examination: 2/9/2021    ASSESSMENT:  -This is a 79-year-old female who developed left cerebellum infarction.  -There is no new neurological deficit. -GCS: 15/15    PLAN:    -There no indication for any neurosurgical intervention at this time (based on pt current neurological status and the patient's current brain radiological features). -Continue the current medical management per patient by primary.  -Follow-up the recommendation of neurology/stroke team at this time.  -From this time on neurosurgery will see this patient only as needed as long as she is in the hospital.  When She is discharged she also needs to follow-up with neurosurgery only as needed. Please call me if you have any further questions and concern regarding this patient.  -The treatment plan and the recommendation for this patient were reviewed with patient in detail. Also reviewed the recommendation treatment plan with patient's nurse. Questions and concerns were addressed and answered. HISTORY OF PRESENT ILLNESS:  Barbie Jiménez is a 68 y.o. female, admitted on :2/8/2021 11:14 AM    This is a 79-year-old female who who presented to the ER with history of of nausea and vomiting. Patient underwent brain CT that showed findings suggestive of cerebellum infarct. Because of concern of cerebellum edema neurosurgery team was consulted. There is no history of loss of consciousness. No history of new focal weakness or numbness. ROS:    Review of Systems   Constitutional: Negative for fever. HENT: Negative for trouble swallowing. Eyes: Negative for visual disturbance. Respiratory: Negative for chest tightness. Cardiovascular: Negative for chest pain. (233 lb 6.4 oz)   02/17/22 106.6 kg (235 lb)           ENT/Mouth: membranes moist, no oral lesions or bleeding gums.      EYES:  no scleral icterus, normal conjunctivae                    Neck: No carotid bruit or thyromegaly   Chest/Lungs:   lungs are clear to auscultation, no rales or wheezing, equal chest wall expansion    Cardiovascular:   Regular. Normal first and second heart sounds with no murmurs, rubs, or gallops; the carotid, radial and posterior tibial pulses are intact, absent edema bilaterally        Extremities: no cyanosis or clubbing   Skin: no xanthelasma, warm.    Neurologic:  no tremors     Psychiatric: alert and oriented x3, calm        Please refer above for cardiac ROS details.        Medical History  Surgical History Family History Social History   No past medical history on file.  No past surgical history on file.  No family history on file.     Social History     Socioeconomic History    Marital status:      Spouse name: Not on file    Number of children: Not on file    Years of education: Not on file    Highest education level: Not on file   Occupational History    Not on file   Tobacco Use    Smoking status: Never    Smokeless tobacco: Never   Vaping Use    Vaping Use: Never used   Substance and Sexual Activity    Alcohol use: Not on file    Drug use: Never    Sexual activity: Not on file   Other Topics Concern    Not on file   Social History Narrative    Not on file     Social Determinants of Health     Financial Resource Strain: Not on file   Food Insecurity: Not on file   Transportation Needs: Not on file   Physical Activity: Not on file   Stress: Not on file   Social Connections: Not on file   Interpersonal Safety: Not on file   Housing Stability: Not on file           Medications  Allergies   Current Outpatient Medications   Medication Sig Dispense Refill    aspirin (ASA) 81 MG EC tablet Take 81 mg by mouth daily      atorvastatin (LIPITOR) 80 MG tablet Take 1 tablet (80 mg) by  "mouth daily **Due for follow-up appointment** 180 tablet 1    nitroGLYcerin (NITROSTAT) 0.4 MG sublingual tablet Place 1 tablet (0.4 mg) under the tongue every 5 minutes as needed for chest pain 20 tablet 4       Allergies   Allergen Reactions    Penicillins Anaphylaxis and Hives     Other reaction(s): hives          Lab Results    Chemistry/lipid CBC Cardiac Enzymes/BNP/TSH/INR   Recent Labs   Lab Test 03/16/23  1000   CHOL 124   HDL 32*   LDL 75   TRIG 87     Recent Labs   Lab Test 03/16/23  1000 12/09/21  1000 10/11/16  1636   LDL 75 80 131*     Recent Labs   Lab Test 10/08/22  1040      POTASSIUM 4.6   CHLORIDE 105   CO2 27   *   BUN 15.9   CR 1.09   GFRESTIMATED 77   SHELLIE 9.3     Recent Labs   Lab Test 10/08/22  1040   CR 1.09     No results for input(s): \"A1C\" in the last 56545 hours.       No results for input(s): \"WBC\", \"HGB\", \"HCT\", \"MCV\", \"PLT\" in the last 07501 hours.  No results for input(s): \"HGB\" in the last 71732 hours. No results for input(s): \"TROPONINI\" in the last 78302 hours.  No results for input(s): \"BNP\", \"NTBNPI\", \"NTBNP\" in the last 97879 hours.  No results for input(s): \"TSH\" in the last 22198 hours.  No results for input(s): \"INR\" in the last 90111 hours.       This note has been dictated using voice recognition software. Any grammatical, typographical, or context distortions are unintentional and inherent to the software    Cathy Torres PA-C                                       " furosemide (LASIX) 40 MG tablet Take 0.5 tablets by mouth daily 1/15/21  Yes SEAN Fried CNP   potassium chloride (KLOR-CON M) 20 MEQ extended release tablet Take 0.5 tablets by mouth daily 1/15/21  Yes SEAN Fried CNP   Magnesium Oxide 250 MG TABS Take 1 tablet by mouth 2 times daily 1/15/21  Yes SEAN Fried CNP   amLODIPine (NORVASC) 10 MG tablet TAKE 1 TABLET DAILY 11/11/20  Yes Patricio Butler MD   vitamin C (VITAMIN C) 1000 MG tablet Take 1 tablet by mouth daily 9/6/20  Yes ELIZABETH Gasca   zinc sulfate (ZINCATE) 220 (50 Zn) MG capsule Take 1 capsule by mouth daily 9/6/20  Yes ELIZABETH Dillon   ondansetron (ZOFRAN ODT) 4 MG disintegrating tablet Take 1 tablet by mouth every 8 hours as needed for Nausea or Vomiting 9/4/20  Yes SEAN Contreras CNP   metFORMIN (GLUCOPHAGE) 500 MG tablet TAKE 1 TABLET TWICE A DAY WITH MEALS 3/9/20  Yes Naveed Valentin, DO   atorvastatin (LIPITOR) 40 MG tablet TAKE 1 TABLET DAILY 3/5/20  Yes Heaven Okeefe, DO   sertraline (ZOLOFT) 100 MG tablet TAKE 2 TABLETS DAILY 3/5/20  Yes Naveed Valentin, DO   pantoprazole (PROTONIX) 40 MG tablet TAKE 1 TABLET TWICE A DAY 3/5/20  Yes Naveed Valentin, DO   nystatin (MYCOSTATIN) 053295 UNIT/GM cream Apply topically 2 times daily. Patient taking differently: Apply topically as needed Apply topically 2 times daily. 1/14/20  Yes Heaven Okeefe, DO   aspirin 81 MG tablet Take 81 mg by mouth daily   Yes Historical Provider, MD   promethazine (PHENERGAN) 25 MG tablet Take 1 tablet by mouth every 6 hours as needed for Nausea 4/29/19  Yes Vannesa Zepeda PA-C   vitamin D (CHOLECALCIFEROL) 1000 UNIT TABS tablet Take 1,000 Units by mouth nightly.    Yes Historical Provider, MD       Current Facility-Administered Medications   Medication Dose Route Frequency Provider Last Rate Last Admin  acetaminophen (TYLENOL) suppository 650 mg  650 mg Rectal Q6H PRN Nunzio Bloch, MD        lactulose (CHRONULAC) 10 GM/15ML solution 20 g  20 g Oral TID PRN Nunzio Bloch, MD   20 g at 02/08/21 1818    hydrALAZINE (APRESOLINE) injection 5 mg  5 mg Intravenous Q6H PRN Nunzio Bloch, MD                sodium chloride flush, 10 mL, PRN      promethazine, 12.5 mg, Q6H PRN    Or      ondansetron, 4 mg, Q6H PRN      polyethylene glycol, 17 g, Daily PRN      acetaminophen, 650 mg, Q6H PRN    Or      acetaminophen, 650 mg, Q6H PRN      lactulose, 20 g, TID PRN      hydrALAZINE, 5 mg, Q6H PRN         sodium chloride 100 mL/hr at 02/09/21 0103         ALLERGIES:   Norco [hydrocodone-acetaminophen], Tramadol, and Codeine    PAST MEDICAL  HISTORY:    has a past medical history of Arthritis, Chronic low back pain, COVID-19, DM type 2, goal A1c below 7, GERD (gastroesophageal reflux disease), Hyperlipidemia, Hypertension, Loss of vision, Mitral valve disorder, Osteoporosis, Subarachnoid hemorrhage ([facility]), TIA (transient ischemic attack), Type 2 diabetes mellitus with diabetic neuropathy, without long-term current use of insulin ([facility]), Unspecified cerebral artery occlusion with cerebral infarction, and Urinary incontinence.      PAST SURGICAL  HISTORY: has a past surgical history that includes Appendectomy; Cholecystectomy; hernia repair; Hysterectomy; Spine surgery; Carpal tunnel release (Left, 2011); pr laminectomy,>2 sgmt,lumbar; cervical fusion (1976); eye surgery; Rotator cuff repair (Right, 11/28/2014); shoulder surgery (2014); Cataract removal (Bilateral); Colon surgery (2012); Colonoscopy (2015); Upper gastrointestinal endoscopy (3195,8902); Finger surgery (Right, 10/2016); tendon release (Right, 05/18/2017); Total knee arthroplasty (Bilateral); Nerve Surgery (Right, 10/25/2019); Nerve Surgery (Right, 12/16/2019); Pain management procedure (Right, 1/6/2020); Endoscopy, colon, diagnostic; joint replacement; and Cardiac surgery. SOCIAL HISTORY:   Social History     Tobacco Use    Smoking status: Never Smoker    Smokeless tobacco: Never Used   Substance Use Topics    Alcohol use: No       FAMILY HISTORY:  Family History   Problem Relation Age of Onset    Arthritis Mother     Cancer Mother     Heart Disease Father     High Blood Pressure Father        LABS  CBC:   Lab Results   Component Value Date    WBC 8.7 02/10/2021    RBC 3.88 02/10/2021    RBC 0-5 04/07/2015    HGB 11.7 02/10/2021    HCT 35.4 02/10/2021    MCV 91.2 02/10/2021    MCH 30.2 02/10/2021    MCHC 33.1 02/10/2021    RDW 13.4 01/12/2017     02/10/2021    MPV 10.3 02/10/2021     CMP:    Lab Results   Component Value Date     02/10/2021    K 4.1 02/10/2021    K 4.1 02/07/2021     02/10/2021    CO2 22 02/10/2021    BUN 20 02/10/2021    CREATININE 0.8 02/10/2021    LABGLOM 69 02/10/2021    GLUCOSE 109 02/10/2021    PROT 7.6 02/08/2021    LABALBU 4.3 02/08/2021    CALCIUM 8.6 02/10/2021    BILITOT 0.9 02/08/2021    BILITOT NEGATIVE 04/07/2015    ALKPHOS 69 02/08/2021    AST 38 02/08/2021    ALT 21 02/08/2021     Uric Acid:  No results found for: LABURIC, URICACID    RADIOLOGY:  Pertinent images have been reviewed.   Brain CT showed:

## 2024-06-20 ENCOUNTER — NURSE ONLY (OUTPATIENT)
Dept: LAB | Age: 84
End: 2024-06-20

## 2024-06-20 LAB
BASOPHILS ABSOLUTE: 0.1 THOU/MM3 (ref 0–0.1)
BASOPHILS NFR BLD AUTO: 0.8 %
CRP SERPL-MCNC: < 0.3 MG/DL (ref 0–1)
DEPRECATED RDW RBC AUTO: 43.8 FL (ref 35–45)
EOSINOPHIL NFR BLD AUTO: 9.9 %
EOSINOPHILS ABSOLUTE: 0.8 THOU/MM3 (ref 0–0.4)
ERYTHROCYTE [DISTWIDTH] IN BLOOD BY AUTOMATED COUNT: 13.2 % (ref 11.5–14.5)
ERYTHROCYTE [SEDIMENTATION RATE] IN BLOOD BY WESTERGREN METHOD: 3 MM/HR (ref 0–20)
HCT VFR BLD AUTO: 40.5 % (ref 37–47)
HGB BLD-MCNC: 13.8 GM/DL (ref 12–16)
IMM GRANULOCYTES # BLD AUTO: 0.02 THOU/MM3 (ref 0–0.07)
IMM GRANULOCYTES NFR BLD AUTO: 0.3 %
LYMPHOCYTES ABSOLUTE: 1.3 THOU/MM3 (ref 1–4.8)
LYMPHOCYTES NFR BLD AUTO: 17 %
MCH RBC QN AUTO: 31.1 PG (ref 26–33)
MCHC RBC AUTO-ENTMCNC: 34.1 GM/DL (ref 32.2–35.5)
MCV RBC AUTO: 91.2 FL (ref 81–99)
MONOCYTES ABSOLUTE: 0.5 THOU/MM3 (ref 0.4–1.3)
MONOCYTES NFR BLD AUTO: 5.9 %
NEUTROPHILS ABSOLUTE: 5.2 THOU/MM3 (ref 1.8–7.7)
NEUTROPHILS NFR BLD AUTO: 66.1 %
NRBC BLD AUTO-RTO: 0 /100 WBC
PLATELET # BLD AUTO: 191 THOU/MM3 (ref 130–400)
PMV BLD AUTO: 11.6 FL (ref 9.4–12.4)
RBC # BLD AUTO: 4.44 MILL/MM3 (ref 4.2–5.4)
WBC # BLD AUTO: 7.8 THOU/MM3 (ref 4.8–10.8)

## 2024-06-25 ENCOUNTER — TELEPHONE (OUTPATIENT)
Dept: FAMILY MEDICINE CLINIC | Age: 84
End: 2024-06-25

## 2024-06-25 ENCOUNTER — OFFICE VISIT (OUTPATIENT)
Dept: FAMILY MEDICINE CLINIC | Age: 84
End: 2024-06-25
Payer: MEDICARE

## 2024-06-25 VITALS
DIASTOLIC BLOOD PRESSURE: 78 MMHG | HEART RATE: 74 BPM | WEIGHT: 228 LBS | BODY MASS INDEX: 38.93 KG/M2 | TEMPERATURE: 97.8 F | OXYGEN SATURATION: 96 % | SYSTOLIC BLOOD PRESSURE: 124 MMHG | HEIGHT: 64 IN | RESPIRATION RATE: 14 BRPM

## 2024-06-25 DIAGNOSIS — E55.9 VITAMIN D DEFICIENCY: ICD-10-CM

## 2024-06-25 DIAGNOSIS — M46.1 SI (SACROILIAC) JOINT INFLAMMATION (HCC): ICD-10-CM

## 2024-06-25 DIAGNOSIS — E66.01 SEVERE OBESITY (BMI 35.0-39.9) WITH COMORBIDITY (HCC): ICD-10-CM

## 2024-06-25 DIAGNOSIS — F33.0 MAJOR DEPRESSIVE DISORDER, RECURRENT EPISODE, MILD (HCC): Primary | ICD-10-CM

## 2024-06-25 DIAGNOSIS — E11.40 TYPE 2 DIABETES MELLITUS WITH DIABETIC NEUROPATHY, WITHOUT LONG-TERM CURRENT USE OF INSULIN (HCC): ICD-10-CM

## 2024-06-25 DIAGNOSIS — E53.8 VITAMIN B12 DEFICIENCY: ICD-10-CM

## 2024-06-25 DIAGNOSIS — R30.0 DYSURIA: Primary | ICD-10-CM

## 2024-06-25 LAB
BILIRUBIN, URINE: NEGATIVE
BLOOD URINE, POC: NEGATIVE
CHARACTER, URINE: CLEAR
COLOR: YELLOW
GLUCOSE URINE: NEGATIVE MG/DL
KETONES, URINE: NEGATIVE
LEUKOCYTE CLUMPS, URINE: ABNORMAL
NITRITE, URINE: NEGATIVE
PH, URINE: 8.5 (ref 5–9)
PROTEIN, URINE: 30 MG/DL
SPECIFIC GRAVITY UA: 1.01 (ref 1–1.03)
UROBILINOGEN, URINE: 1 EU/DL (ref 0–1)

## 2024-06-25 PROCEDURE — 99214 OFFICE O/P EST MOD 30 MIN: CPT | Performed by: NURSE PRACTITIONER

## 2024-06-25 PROCEDURE — 1090F PRES/ABSN URINE INCON ASSESS: CPT | Performed by: NURSE PRACTITIONER

## 2024-06-25 PROCEDURE — 1036F TOBACCO NON-USER: CPT | Performed by: NURSE PRACTITIONER

## 2024-06-25 PROCEDURE — G8427 DOCREV CUR MEDS BY ELIG CLIN: HCPCS | Performed by: NURSE PRACTITIONER

## 2024-06-25 PROCEDURE — G8399 PT W/DXA RESULTS DOCUMENT: HCPCS | Performed by: NURSE PRACTITIONER

## 2024-06-25 PROCEDURE — 3074F SYST BP LT 130 MM HG: CPT | Performed by: NURSE PRACTITIONER

## 2024-06-25 PROCEDURE — 3052F HG A1C>EQUAL 8.0%<EQUAL 9.0%: CPT | Performed by: NURSE PRACTITIONER

## 2024-06-25 PROCEDURE — 3078F DIAST BP <80 MM HG: CPT | Performed by: NURSE PRACTITIONER

## 2024-06-25 PROCEDURE — 1123F ACP DISCUSS/DSCN MKR DOCD: CPT | Performed by: NURSE PRACTITIONER

## 2024-06-25 PROCEDURE — G8417 CALC BMI ABV UP PARAM F/U: HCPCS | Performed by: NURSE PRACTITIONER

## 2024-06-25 RX ORDER — GRANULES FOR ORAL 3 G/1
3 POWDER ORAL ONCE
Qty: 1 EACH | Refills: 0 | Status: SHIPPED | OUTPATIENT
Start: 2024-06-25 | End: 2024-06-25

## 2024-06-25 RX ORDER — BUPROPION HYDROCHLORIDE 150 MG/1
150 TABLET ORAL EVERY MORNING
Qty: 30 TABLET | Refills: 3 | Status: SHIPPED | OUTPATIENT
Start: 2024-06-25

## 2024-06-25 NOTE — TELEPHONE ENCOUNTER
Urine positive for mod amt of leuks  Since she is symptomatic will send in a 1x dose of Fosfomycin  Not enough urine to send for culture

## 2024-06-25 NOTE — PROGRESS NOTES
Angeles Berman is a 84 y.o. female thatpresents for Depression (Lost  and then husbands dog )      History obtained today from Patient.    Depressed Mood    HPI:    Depressed Mood? yes - been more down, lost , recently lost his dog  Anhedonia?  Doesn't do anything for enjoyment  Appetite changes?  no  Sleep disturbances? no  Feelings of guilt? no  Decreased energy? yes - since dog passed   Impaired concentration? no  Substance abuse? no    Suicidal/Homicidal Ideation? no      Not on any  Sees therapist?:  no  Family History of Mental Illness?  no    Review of Systems - Psychological ROS: positive for - depression, negative for - suicidal ideation    Stopped Zoloft a couple mos ago  Would like to look at starting something else  Also tried Buspar, Lexapro      I have reviewed the patient's past medical history, past surgical history, allergies,medications, social and family history and I have made updates where appropriate.    Past Medical History:   Diagnosis Date    Arthritis     Chronic low back pain     COVID-19     DM type 2, goal A1c below 7     GERD (gastroesophageal reflux disease)     Hyperlipidemia     Hypertension     Loss of vision     Mitral valve disorder     Nausea and vomiting 02/08/2021    Osteoporosis     Subarachnoid hemorrhage (HCC) 04/25/2019    TIA (transient ischemic attack)     Type 2 diabetes mellitus with diabetic neuropathy, without long-term current use of insulin (HCC) 07/26/2017    Unspecified cerebral artery occlusion with cerebral infarction 03/2014    aslo after valve replacement 2020    Urinary incontinence        Social History     Tobacco Use    Smoking status: Never    Smokeless tobacco: Never   Vaping Use    Vaping Use: Never used   Substance Use Topics    Alcohol use: No    Drug use: No       Family History   Problem Relation Age of Onset    Arthritis Mother     Cancer Mother     Heart Disease Father     High Blood Pressure Father          Review of

## 2024-06-26 ENCOUNTER — TELEPHONE (OUTPATIENT)
Dept: FAMILY MEDICINE CLINIC | Age: 84
End: 2024-06-26

## 2024-06-26 ENCOUNTER — NURSE ONLY (OUTPATIENT)
Dept: LAB | Age: 84
End: 2024-06-26

## 2024-06-26 DIAGNOSIS — E55.9 VITAMIN D DEFICIENCY: ICD-10-CM

## 2024-06-26 DIAGNOSIS — E55.9 VITAMIN D DEFICIENCY: Primary | ICD-10-CM

## 2024-06-26 DIAGNOSIS — E53.8 VITAMIN B12 DEFICIENCY: ICD-10-CM

## 2024-06-26 LAB
25(OH)D3 SERPL-MCNC: 38 NG/ML (ref 30–100)
VIT B12 SERPL-MCNC: 783 PG/ML (ref 211–911)

## 2024-06-26 RX ORDER — ERGOCALCIFEROL 1.25 MG/1
50000 CAPSULE ORAL WEEKLY
Qty: 12 CAPSULE | Refills: 1 | Status: SHIPPED | OUTPATIENT
Start: 2024-06-26

## 2024-06-26 NOTE — TELEPHONE ENCOUNTER
----- Message from SEAN Murray - CNP sent at 6/26/2024  4:19 PM EDT -----  Vit D is still on the lower side of normal.  Rec she stop her otc vit d and start the prescription weekly vit d.  Will then plan to recheck in 3 mos.

## 2024-07-17 PROBLEM — Z86.39 HISTORY OF OBESITY: Status: ACTIVE | Noted: 2024-07-17

## 2024-07-17 PROBLEM — Z96.653 PRESENCE OF ARTIFICIAL KNEE JOINT, BILATERAL: Status: ACTIVE | Noted: 2024-07-17

## 2024-07-17 RX ORDER — AMOXICILLIN 500 MG/1
CAPSULE ORAL
COMMUNITY
Start: 2024-06-05

## 2024-07-17 RX ORDER — GRANULES FOR ORAL 3 G/1
POWDER ORAL
COMMUNITY
Start: 2024-06-25

## 2024-07-18 ENCOUNTER — OFFICE VISIT (OUTPATIENT)
Dept: FAMILY MEDICINE CLINIC | Age: 84
End: 2024-07-18
Payer: MEDICARE

## 2024-07-18 VITALS
DIASTOLIC BLOOD PRESSURE: 80 MMHG | HEART RATE: 94 BPM | SYSTOLIC BLOOD PRESSURE: 126 MMHG | WEIGHT: 228 LBS | HEIGHT: 64 IN | TEMPERATURE: 97.9 F | RESPIRATION RATE: 14 BRPM | OXYGEN SATURATION: 96 % | BODY MASS INDEX: 38.93 KG/M2

## 2024-07-18 DIAGNOSIS — R11.2 NAUSEA AND VOMITING, UNSPECIFIED VOMITING TYPE: Primary | ICD-10-CM

## 2024-07-18 DIAGNOSIS — Z86.73 HISTORY OF STROKE: ICD-10-CM

## 2024-07-18 DIAGNOSIS — F33.0 MAJOR DEPRESSIVE DISORDER, RECURRENT EPISODE, MILD (HCC): ICD-10-CM

## 2024-07-18 LAB — HBA1C MFR BLD: 7.3 % (ref 4.3–5.7)

## 2024-07-18 PROCEDURE — G8427 DOCREV CUR MEDS BY ELIG CLIN: HCPCS | Performed by: NURSE PRACTITIONER

## 2024-07-18 PROCEDURE — 1123F ACP DISCUSS/DSCN MKR DOCD: CPT | Performed by: NURSE PRACTITIONER

## 2024-07-18 PROCEDURE — 99214 OFFICE O/P EST MOD 30 MIN: CPT | Performed by: NURSE PRACTITIONER

## 2024-07-18 PROCEDURE — 3074F SYST BP LT 130 MM HG: CPT | Performed by: NURSE PRACTITIONER

## 2024-07-18 PROCEDURE — 1090F PRES/ABSN URINE INCON ASSESS: CPT | Performed by: NURSE PRACTITIONER

## 2024-07-18 PROCEDURE — G8417 CALC BMI ABV UP PARAM F/U: HCPCS | Performed by: NURSE PRACTITIONER

## 2024-07-18 PROCEDURE — G8399 PT W/DXA RESULTS DOCUMENT: HCPCS | Performed by: NURSE PRACTITIONER

## 2024-07-18 PROCEDURE — 1036F TOBACCO NON-USER: CPT | Performed by: NURSE PRACTITIONER

## 2024-07-18 PROCEDURE — 3079F DIAST BP 80-89 MM HG: CPT | Performed by: NURSE PRACTITIONER

## 2024-07-18 RX ORDER — PROMETHAZINE HYDROCHLORIDE 12.5 MG/1
12.5 TABLET ORAL 3 TIMES DAILY PRN
Qty: 21 TABLET | Refills: 0 | Status: SHIPPED | OUTPATIENT
Start: 2024-07-18 | End: 2024-07-25

## 2024-07-18 RX ORDER — BUPROPION HYDROCHLORIDE 300 MG/1
300 TABLET ORAL EVERY MORNING
Qty: 30 TABLET | Refills: 3 | Status: CANCELLED | OUTPATIENT
Start: 2024-07-18

## 2024-07-18 NOTE — PROGRESS NOTES
Angeles Berman is a 84 y.o. female thatpresents for Follow-up, Depression, and Diabetes      History obtained today from Patient.    Recheck of A1c          Wellbutrin follow up  150 mg not helping much  Interested in trying a higher dose  No panic attacks or SI      Vomiting over the weekend after eating a lot of cherries   Sick for a couple days, then felt better but Tuesday and ate more cherries, started vomiting again, better today, hasn't vomited yet today  Trying to push fluids, soft foods  Still not feeling 100%  Feels pretty tired and dizzy at times  No cp or sob  Threw the cherries away  No fever, chills, diarrhea, constipation, bloody stools  Vomit was red, but was after eating the cherries  Voiding well  Going to the store to get some food and more gatorade, ginger ale       I have reviewed the patient's past medical history, past surgical history, allergies,medications, social and family history and I have made updates where appropriate.    Past Medical History:   Diagnosis Date    Arthritis     Chronic low back pain     COVID-19     DM type 2, goal A1c below 7     GERD (gastroesophageal reflux disease)     Hyperlipidemia     Hypertension     Loss of vision     Mitral valve disorder     Nausea and vomiting 02/08/2021    Osteoporosis     Subarachnoid hemorrhage (HCC) 04/25/2019    TIA (transient ischemic attack)     Type 2 diabetes mellitus with diabetic neuropathy, without long-term current use of insulin (HCC) 07/26/2017    Unspecified cerebral artery occlusion with cerebral infarction 03/2014    aslo after valve replacement 2020    Urinary incontinence        Social History     Tobacco Use    Smoking status: Never    Smokeless tobacco: Never   Vaping Use    Vaping Use: Never used   Substance Use Topics    Alcohol use: No    Drug use: No       Family History   Problem Relation Age of Onset    Arthritis Mother     Cancer Mother     Heart Disease

## 2024-07-18 NOTE — PATIENT INSTRUCTIONS
Push fluids  Rest when you get home  Call me if you can't go to to the store and I will get it for you

## 2024-07-19 ENCOUNTER — TELEPHONE (OUTPATIENT)
Dept: FAMILY MEDICINE CLINIC | Age: 84
End: 2024-07-19

## 2024-07-19 PROBLEM — Z86.73 HISTORY OF STROKE: Status: ACTIVE | Noted: 2024-07-19

## 2024-07-19 RX ORDER — BUPROPION HYDROCHLORIDE 300 MG/1
300 TABLET ORAL EVERY MORNING
Qty: 30 TABLET | Refills: 3 | Status: SHIPPED | OUTPATIENT
Start: 2024-07-19

## 2024-07-19 NOTE — TELEPHONE ENCOUNTER
Called and spoke to patient, she said she feels much better today. Has not felt this good in a long time, she was able to get her groceries yesterday.

## 2024-08-21 DIAGNOSIS — F33.0 MAJOR DEPRESSIVE DISORDER, RECURRENT EPISODE, MILD (HCC): ICD-10-CM

## 2024-08-21 RX ORDER — BUPROPION HYDROCHLORIDE 150 MG/1
TABLET ORAL
Refills: 0 | OUTPATIENT
Start: 2024-08-21

## 2024-08-21 RX ORDER — BUPROPION HYDROCHLORIDE 300 MG/1
300 TABLET ORAL EVERY MORNING
Qty: 90 TABLET | Refills: 3 | Status: SHIPPED | OUTPATIENT
Start: 2024-08-21

## 2024-08-21 NOTE — TELEPHONE ENCOUNTER
Recent Visits  Date Type Provider Dept   07/18/24 Office Visit Conchis Teixeira APRN - CNP [facility]   06/25/24 Office Visit Conchis Teixeira APRN - CNP [facility]   02/27/24 Office Visit Conchis Teixeira APRN - CNP [facility]   Showing recent visits within past 540 days with a meds authorizing provider and meeting all other requirements  Future Appointments  Date Type Provider Dept   10/24/24 Appointment Conchis Teixeira APRN - CNP [facility]   Showing future appointments within next 150 days with a meds authorizing provider and meeting all other requirements

## 2024-08-22 ENCOUNTER — OFFICE VISIT (OUTPATIENT)
Dept: CARDIOLOGY CLINIC | Age: 84
End: 2024-08-22

## 2024-08-22 VITALS
SYSTOLIC BLOOD PRESSURE: 138 MMHG | HEIGHT: 64 IN | HEART RATE: 80 BPM | BODY MASS INDEX: 39.14 KG/M2 | DIASTOLIC BLOOD PRESSURE: 80 MMHG

## 2024-08-22 DIAGNOSIS — Z95.2 S/P TAVR (TRANSCATHETER AORTIC VALVE REPLACEMENT): Primary | ICD-10-CM

## 2024-08-22 NOTE — PROGRESS NOTES
CARPAL TUNNEL RELEASE Left 2011    CATARACT REMOVAL Bilateral     CERVICAL FUSION  1976    CHOLECYSTECTOMY      COLON SURGERY  2012    COLONOSCOPY  2015    ENDOSCOPY, COLON, DIAGNOSTIC      EYE SURGERY      CATARACTS    FINGER SURGERY Right 10/2016    3rd digit    HERNIA REPAIR      HYSTERECTOMY (CERVIX STATUS UNKNOWN)      JOINT REPLACEMENT      both knees    NERVE SURGERY Right 10/25/2019    Lumbar Facet MBB @ T12-L1,L1-2 L2-3 right side only #1 performed by Anatoly Hare MD at [facility] [place]    NERVE SURGERY Right 12/16/2019    Lumbar Facet MBB @ T46-Q3-G8-5, L2-3 RIGHT SIDE ONLY performed by Anatoly Hare MD at [facility] [place]    PAIN MANAGEMENT PROCEDURE Right 1/6/2020    Lumbar RFA T12-L1, L1-2, L2-3 Right performed by Anatoly Hare MD at [facility] [place]    W[place] LAMINECTOMY W/O FFD > 2 VERT SEG LUMBAR      6 FUSIONS    REVISION TOTAL KNEE ARTHROPLASTY Left 6/1/2021    REVISION LEFT TOTAL KNEE ARTHROPLASTY performed by Uriel Loza MD at [facility] OR    ROTATOR CUFF REPAIR Right 11/28/2014    SHOULDER SURGERY  2014    SPINE SURGERY      TENDON RELEASE Right 05/18/2017    at [facility]    TOTAL KNEE ARTHROPLASTY Bilateral     UPPER GASTROINTESTINAL ENDOSCOPY  2013,2015       Review of Systems   Constitutional: Negative for chills and fever  HENT: Negative for congestion, sinus pressure, sneezing and sore throat.    Eyes: Negative for pain, discharge, redness and itching.   Respiratory: Negative for apnea, cough  Gastrointestinal: Negative for blood in stool, constipation, diarrhea   Endocrine: Negative for cold intolerance, heat intolerance, polydipsia.  Genitourinary: Negative for dysuria, enuresis, flank pain and hematuria.   Musculoskeletal: Negative for arthralgias, joint swelling and neck pain.   Neurological: Negative for numbness and headaches.   Psychiatric/Behavioral: Negative for agitation, confusion, decreased concentration and dysphoric mood.     Objective:

## 2024-10-23 ENCOUNTER — TELEPHONE (OUTPATIENT)
Dept: FAMILY MEDICINE CLINIC | Age: 84
End: 2024-10-23

## 2024-10-23 NOTE — TELEPHONE ENCOUNTER
KEMAR I contacted the patient to confirm her appointment for tomorrow and she informed me that she now lives in [place] and requested that I cancel her appointment. I wished her the best and let her know that if she ever needs anything to please feel free to reach out to our office.

## 2025-05-01 NOTE — H&P
[facility]  History and Physical Update    Pt Name: Rupinder Davidson  MRN: [id number]  YOB: 1940  Date of evaluation: 6/1/2021    I have examined the patient and reviewed the H&P/Consult and there are no changes to the patient or plans.       Nery Purcell MD  Electronically signed 6/1/2021 at 7:14 AM
What Is The Reason For Today's Visit?: Full Body Skin Examination
as well as history of CVA. She is  a diabetic, but her last A1C being 7. At this point, she would like to  move forth with more definitive treatment, after trying and failing  exhaustive conservative measures and continued pain. PHYSICAL EXAMINATION:  GENERAL:  Physical exam taken from encounter note on 05/13/2021;  well-developed, well-nourished female, in no acute distress, oriented  x4, walks with an antalgic gait, favoring left knee, with use of walker  now. EXTREMITIES:  The left knee shows a well-healed incision. She does have  full extension, no significant adhesions today. No warmth, no erythema. Global instability both varus and valgus as well as significant  posterior instability. Pain in all motions. Otherwise, neurologically  intact, vascular is intact, strength is intact, skin is intact. DIAGNOSTIC DATA:  X-rays 3-view of the left knee shows uncemented total  knee replacement with no definite evidence of failure of hardware. Lateral x-ray does shows some posterior subluxation of the tibia. 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 PLAN:  At this point, I discussed with the patient about conservative  versus surgical options. She is having continued problems with this  left knee, despite bracing, activity modification, and cryotherapy. She  would like to move forth with more definitive treatment in the form of  revision of left total knee replacement. She understands risks,  benefits, and possible complications including, but not limited to  infection, dislocation, neurovascular compromise, DVT, PE, flare-up of  medical problems, leg length inequality, stiffness, infection, bone,  artery, nerve, and soft tissue damage. She understands the procedure at  best and would like to proceed.         Dru Ashley P.A.-C    D: 05/27/2021 7:49:28       T: 05/27/2021 9:21:56     [email or web address]  Job#: [id number]     Doc#: [id number]    CC:

## 2025-06-20 RX ORDER — CARVEDILOL 12.5 MG/1
12.5 TABLET ORAL 2 TIMES DAILY
Qty: 180 TABLET | Refills: 3 | OUTPATIENT
Start: 2025-06-20

## (undated) DEVICE — PACK-MAJOR

## (undated) DEVICE — CURVED SHARP RF CANNULA, RADIOPAQUE MARKER: Brand: RADIOPAQUE RADIOFREQUENCY CANNULA

## (undated) DEVICE — SUTURE VCRL + SZ 2-0 L27IN ABSRB UD CP-1 1/2 CIR REV CUT VCP266H

## (undated) DEVICE — SHEET,DRAPE,3/4,53X77,STERILE: Brand: MEDLINE

## (undated) DEVICE — GLOVE SURG SZ 75 L12IN FNGR THK94MIL TRNSLUC YEL LTX

## (undated) DEVICE — SYRINGE MED 10ML LUERLOCK TIP W/O SFTY DISP

## (undated) DEVICE — GLOVE ORANGE PI 7   MSG9070

## (undated) DEVICE — GLOVE ORANGE PI 7 1/2   MSG9075

## (undated) DEVICE — PAD,ABDOMINAL,5"X9",ST,LF,25/BX: Brand: MEDLINE INDUSTRIES, INC.

## (undated) DEVICE — IMPREGNATED GAUZE DRESSING: Brand: CUTICERIN 7.5X20CM CTN 50

## (undated) DEVICE — NEEDLE SPNL 22GA L3.5IN BLK HUB S STL REG WALL FIT STYL W/

## (undated) DEVICE — DRAPE,EXTREMITY,89X128,STERILE: Brand: MEDLINE

## (undated) DEVICE — BASIC SINGLE BASIN BTC-LF: Brand: MEDLINE INDUSTRIES, INC.

## (undated) DEVICE — SYRINGE MED 5ML STD CLR PLAS LUERLOCK TIP N CTRL DISP

## (undated) DEVICE — APPLICATOR MEDICATED 26 CC SOLUTION HI LT ORNG CHLORAPREP

## (undated) DEVICE — HYPODERMIC SAFETY NEEDLE: Brand: MAGELLAN

## (undated) DEVICE — Device

## (undated) DEVICE — GENESIS TROCHLEAR PIN 1/8 X 3
Type: IMPLANTABLE DEVICE | Site: KNEE | Status: NON-FUNCTIONAL
Brand: GENESIS
Removed: 2021-06-01

## (undated) DEVICE — Z INACTIVE USE 2660664 SOLUTION IRRIG 3000ML 0.9% SOD CHL USP UROMATIC PLAS CONT

## (undated) DEVICE — GAUZE,SPONGE,4"X4",12PLY,STERILE,LF,2'S: Brand: MEDLINE

## (undated) DEVICE — BANDAGE,ELASTIC,ESMARK,STERILE,4"X9',LF: Brand: MEDLINE

## (undated) DEVICE — SPONGE GZ W4XL4IN COT 12 PLY TYP VII WVN C FLD DSGN

## (undated) DEVICE — NEEDLE SYR 18GA L1.5IN RED PLAS HUB S STL BLNT FILL W/O

## (undated) DEVICE — GLOVE ORANGE PI 8   MSG9080

## (undated) DEVICE — SUTURE VCRL SZ 1 L36IN ABSRB UD CTX L48MM 1/2 CIR J977H

## (undated) DEVICE — BLADE SAW  6 13X90X1.27MM

## (undated) DEVICE — TETRA-FLEX CF WOVEN LATEX FREE ELASTIC BANDAGE 6" X 5.5 YD: Brand: TETRA-FLEX™CF

## (undated) DEVICE — 3M™ IOBAN™ 2 ANTIMICROBIAL INCISE DRAPE 6650EZ: Brand: IOBAN™ 2

## (undated) DEVICE — PADDING CAST W6INXL4YD COT LO LINTING WYTEX

## (undated) DEVICE — ADHESIVE SKIN CLOSURE 0.7CC TOP MICROBIAL APPL DERMBND ADV

## (undated) DEVICE — BANDAGE COMPR E SGL LAYERED CLSR BGE W/ CLP W4INXL15FT

## (undated) DEVICE — TOWEL,OR,DSP,ST,BLUE,STD,4/PK,20PK/CS: Brand: MEDLINE

## (undated) DEVICE — CLOSURE SKIN FLX NONINVASIVE PRELOC TECHNOLOGY FOR 24IN

## (undated) DEVICE — NEEDLE SPNL L3.5IN PNK HUB S STL REG WALL FIT STYL W/ QNCKE

## (undated) DEVICE — VORTEX VACUUM MIXING SYSTEM: Brand: VORTEX

## (undated) DEVICE — DUAL CUT SAGITTAL BLADE

## (undated) DEVICE — 450 ML BOTTLE OF 0.05% CHLORHEXIDINE GLUCONATE IN 99.95% STERILE WATER FOR IRRIGATION, USP AND APPLICATOR.: Brand: IRRISEPT ANTIMICROBIAL WOUND LAVAGE

## (undated) DEVICE — ZINACTIVE USE 2537982 PAD GRND FOR RF PAIN MGMT DISP

## (undated) DEVICE — ADHESIVE SKIN CLSR 0.7ML TOP DERMBND ADV

## (undated) DEVICE — CONTAINER,SPECIMEN,PNEU TUBE,4OZ,OR STRL: Brand: MEDLINE

## (undated) DEVICE — 35 ML SYRINGE LUER-LOCK TIP: Brand: MONOJECT

## (undated) DEVICE — SUTURE STRATAFIX SPRL SZ 1 L14IN ABSRB VLT L48CM CTX 1/2 SXPD2B405